# Patient Record
Sex: FEMALE | Race: WHITE | NOT HISPANIC OR LATINO | Employment: OTHER | URBAN - METROPOLITAN AREA
[De-identification: names, ages, dates, MRNs, and addresses within clinical notes are randomized per-mention and may not be internally consistent; named-entity substitution may affect disease eponyms.]

---

## 2024-01-25 ENCOUNTER — TELEPHONE (OUTPATIENT)
Dept: NEUROLOGY | Facility: HOSPITAL | Age: 69
End: 2024-01-25
Payer: MEDICARE

## 2024-01-25 DIAGNOSIS — R56.9 SEIZURE (MULTI): ICD-10-CM

## 2024-01-25 DIAGNOSIS — G35 MULTIPLE SCLEROSIS (MULTI): Primary | ICD-10-CM

## 2024-01-25 DIAGNOSIS — R41.3 MEMORY LOSS: ICD-10-CM

## 2024-01-25 RX ORDER — LANOLIN ALCOHOL/MO/W.PET/CERES
100 CREAM (GRAM) TOPICAL DAILY
Qty: 30 TABLET | Refills: 11 | Status: SHIPPED | OUTPATIENT
Start: 2024-01-25 | End: 2025-01-24

## 2024-01-25 RX ORDER — ERGOCALCIFEROL 1.25 MG/1
1.25 CAPSULE ORAL
Qty: 4 CAPSULE | Refills: 11 | Status: SHIPPED | OUTPATIENT
Start: 2024-01-25 | End: 2025-01-24

## 2024-01-25 RX ORDER — LEVETIRACETAM 500 MG/1
500 TABLET ORAL 2 TIMES DAILY
Qty: 60 TABLET | Refills: 5 | Status: SHIPPED | OUTPATIENT
Start: 2024-01-25 | End: 2024-02-29 | Stop reason: SDUPTHER

## 2024-01-25 NOTE — TELEPHONE ENCOUNTER
Jasiel Peng (spouse) called. Akiko needs a refill of Levitracetam 500mg 1 BID , Vitamin D & thaimine to [facility] on [address], [place]. (On file). Please send #[phone] with refills.

## 2024-02-01 ENCOUNTER — HOSPITAL ENCOUNTER (EMERGENCY)
Facility: HOSPITAL | Age: 69
Discharge: HOME | End: 2024-02-01
Payer: MEDICARE

## 2024-02-01 VITALS
SYSTOLIC BLOOD PRESSURE: 172 MMHG | HEIGHT: 70 IN | BODY MASS INDEX: 20.04 KG/M2 | RESPIRATION RATE: 18 BRPM | DIASTOLIC BLOOD PRESSURE: 71 MMHG | OXYGEN SATURATION: 98 % | WEIGHT: 140 LBS | HEART RATE: 68 BPM | TEMPERATURE: 97.7 F

## 2024-02-01 DIAGNOSIS — R30.0 DYSURIA: Primary | ICD-10-CM

## 2024-02-01 LAB
APPEARANCE UR: CLEAR
BILIRUB UR STRIP.AUTO-MCNC: NEGATIVE MG/DL
COLOR UR: NORMAL
GLUCOSE UR STRIP.AUTO-MCNC: NEGATIVE MG/DL
HOLD SPECIMEN: NORMAL
KETONES UR STRIP.AUTO-MCNC: NEGATIVE MG/DL
LEUKOCYTE ESTERASE UR QL STRIP.AUTO: NEGATIVE
NITRITE UR QL STRIP.AUTO: NEGATIVE
PH UR STRIP.AUTO: 6 [PH]
PROT UR STRIP.AUTO-MCNC: NEGATIVE MG/DL
RBC # UR STRIP.AUTO: NEGATIVE /UL
SP GR UR STRIP.AUTO: 1.01
UROBILINOGEN UR STRIP.AUTO-MCNC: <2 MG/DL

## 2024-02-01 PROCEDURE — 81003 URINALYSIS AUTO W/O SCOPE: CPT | Performed by: PHYSICIAN ASSISTANT

## 2024-02-01 PROCEDURE — 99284 EMERGENCY DEPT VISIT MOD MDM: CPT

## 2024-02-01 PROCEDURE — 99283 EMERGENCY DEPT VISIT LOW MDM: CPT

## 2024-02-01 RX ORDER — CEPHALEXIN 500 MG/1
500 CAPSULE ORAL 3 TIMES DAILY
Qty: 21 CAPSULE | Refills: 0 | Status: SHIPPED | OUTPATIENT
Start: 2024-02-01 | End: 2024-02-11 | Stop reason: HOSPADM

## 2024-02-01 ASSESSMENT — LIFESTYLE VARIABLES
HAVE PEOPLE ANNOYED YOU BY CRITICIZING YOUR DRINKING: NO
EVER HAD A DRINK FIRST THING IN THE MORNING TO STEADY YOUR NERVES TO GET RID OF A HANGOVER: NO
EVER FELT BAD OR GUILTY ABOUT YOUR DRINKING: NO
HAVE YOU EVER FELT YOU SHOULD CUT DOWN ON YOUR DRINKING: NO

## 2024-02-01 ASSESSMENT — COLUMBIA-SUICIDE SEVERITY RATING SCALE - C-SSRS
6. HAVE YOU EVER DONE ANYTHING, STARTED TO DO ANYTHING, OR PREPARED TO DO ANYTHING TO END YOUR LIFE?: NO
2. HAVE YOU ACTUALLY HAD ANY THOUGHTS OF KILLING YOURSELF?: NO
1. IN THE PAST MONTH, HAVE YOU WISHED YOU WERE DEAD OR WISHED YOU COULD GO TO SLEEP AND NOT WAKE UP?: NO

## 2024-02-01 NOTE — ED PROVIDER NOTES
HPI   Chief Complaint   Patient presents with    Female Dysuria     Pt states that she has pain with urination x 1 day       Patient presents complaining of dysuria.  She states no other symptoms.  She reports that her dysuria began today.  Denies fevers or chills.  No reports of hematuria                          Tabitha Coma Scale Score: 15                  Patient History   Past Medical History:   Diagnosis Date    Multiple sclerosis (CMS/HCC)     History of multiple sclerosis     Past Surgical History:   Procedure Laterality Date    MR HEAD ANGIO WO IV CONTRAST  9/21/2020    MR HEAD ANGIO WO IV CONTRAST 9/21/2020 [facility]    MR HEAD ANGIO WO IV CONTRAST  10/24/2022    MR HEAD ANGIO WO IV CONTRAST 10/24/2022 [facility]    MR NECK ANGIO WO IV CONTRAST  9/21/2020    MR NECK ANGIO WO IV CONTRAST 9/21/2020 [facility]    MR NECK ANGIO WO IV CONTRAST  10/24/2022    MR NECK ANGIO WO IV CONTRAST 10/24/2022 [facility]    OTHER SURGICAL HISTORY  11/26/2019    Tonsillectomy    OTHER SURGICAL HISTORY  11/26/2019    Hysterectomy     No family history on file.  Social History     Tobacco Use    Smoking status: Not on file    Smokeless tobacco: Not on file   Substance Use Topics    Alcohol use: Not on file    Drug use: Not on file       Physical Exam   ED Triage Vitals [02/01/24 0044]   Temperature Heart Rate Respirations BP   36.5 °C (97.7 °F) 68 (!) 8 172/71      Pulse Ox Temp src Heart Rate Source Patient Position   98 % -- -- --      BP Location FiO2 (%)     -- --       Physical Exam  Vitals and nursing note reviewed.   Constitutional:       General: She is not in acute distress.     Appearance: Normal appearance. She is normal weight. She is not ill-appearing, toxic-appearing or diaphoretic.   HENT:      Head: Normocephalic.      Nose: Nose normal.      Mouth/Throat:      Mouth: Mucous membranes are moist.   Cardiovascular:      Rate and Rhythm: Normal rate.      Pulses: Normal  pulses.   Pulmonary:      Effort: Pulmonary effort is normal. No respiratory distress.   Abdominal:      General: Abdomen is flat.      Palpations: Abdomen is soft.      Tenderness: There is no right CVA tenderness or left CVA tenderness.   Musculoskeletal:      Cervical back: Normal range of motion and neck supple.   Skin:     General: Skin is warm and dry.      Capillary Refill: Capillary refill takes less than 2 seconds.   Neurological:      Mental Status: She is alert and oriented to person, place, and time.   Psychiatric:         Mood and Affect: Mood normal.         Behavior: Behavior normal.         Thought Content: Thought content normal.         Judgment: Judgment normal.         ED Course & MDM   Diagnoses as of 02/01/24 0300   Dysuria       Medical Decision Making  Patient found well-appearing and afebrile.  Urinalysis revealed findings concerning for urinary tract infection given the history present illness.  Patient be treated with antibiotics and was given her first dose prior to discharge        Procedure  Procedures     Jac Morgan PA-C  02/01/24 0126       Jac Morgan PA-C  02/01/24 0300       Jac Morgan PA-C  02/01/24 0303

## 2024-02-09 ENCOUNTER — APPOINTMENT (OUTPATIENT)
Dept: RADIOLOGY | Facility: HOSPITAL | Age: 69
End: 2024-02-09
Payer: MEDICARE

## 2024-02-09 ENCOUNTER — APPOINTMENT (OUTPATIENT)
Dept: CARDIOLOGY | Facility: HOSPITAL | Age: 69
End: 2024-02-09
Payer: MEDICARE

## 2024-02-09 ENCOUNTER — HOSPITAL ENCOUNTER (OUTPATIENT)
Facility: HOSPITAL | Age: 69
Setting detail: OBSERVATION
Discharge: HOME HEALTH | End: 2024-02-11
Attending: STUDENT IN AN ORGANIZED HEALTH CARE EDUCATION/TRAINING PROGRAM | Admitting: INTERNAL MEDICINE
Payer: MEDICARE

## 2024-02-09 DIAGNOSIS — G45.9 TIA (TRANSIENT ISCHEMIC ATTACK): Primary | ICD-10-CM

## 2024-02-09 LAB
ALBUMIN SERPL BCP-MCNC: 4.1 G/DL (ref 3.4–5)
ALP SERPL-CCNC: 86 U/L (ref 33–136)
ALT SERPL W P-5'-P-CCNC: 11 U/L (ref 7–45)
ANION GAP SERPL CALC-SCNC: 11 MMOL/L (ref 10–20)
AST SERPL W P-5'-P-CCNC: 10 U/L (ref 9–39)
BASOPHILS # BLD AUTO: 0.06 X10*3/UL (ref 0–0.1)
BASOPHILS NFR BLD AUTO: 0.9 %
BILIRUB SERPL-MCNC: 0.4 MG/DL (ref 0–1.2)
BUN SERPL-MCNC: 18 MG/DL (ref 6–23)
CALCIUM SERPL-MCNC: 9.1 MG/DL (ref 8.6–10.3)
CHLORIDE SERPL-SCNC: 104 MMOL/L (ref 98–107)
CO2 SERPL-SCNC: 26 MMOL/L (ref 21–32)
CREAT SERPL-MCNC: 0.53 MG/DL (ref 0.5–1.05)
EGFRCR SERPLBLD CKD-EPI 2021: >90 ML/MIN/1.73M*2
EOSINOPHIL # BLD AUTO: 0.1 X10*3/UL (ref 0–0.7)
EOSINOPHIL NFR BLD AUTO: 1.4 %
ERYTHROCYTE [DISTWIDTH] IN BLOOD BY AUTOMATED COUNT: 12.6 % (ref 11.5–14.5)
GLUCOSE SERPL-MCNC: 84 MG/DL (ref 74–99)
HCT VFR BLD AUTO: 41.8 % (ref 36–46)
HGB BLD-MCNC: 14.6 G/DL (ref 12–16)
HOLD SPECIMEN: NORMAL
HOLD SPECIMEN: NORMAL
IMM GRANULOCYTES # BLD AUTO: 0.02 X10*3/UL (ref 0–0.7)
IMM GRANULOCYTES NFR BLD AUTO: 0.3 % (ref 0–0.9)
LYMPHOCYTES # BLD AUTO: 1.05 X10*3/UL (ref 1.2–4.8)
LYMPHOCYTES NFR BLD AUTO: 15.1 %
MAGNESIUM SERPL-MCNC: 2.04 MG/DL (ref 1.6–2.4)
MCH RBC QN AUTO: 33 PG (ref 26–34)
MCHC RBC AUTO-ENTMCNC: 34.9 G/DL (ref 32–36)
MCV RBC AUTO: 94 FL (ref 80–100)
MONOCYTES # BLD AUTO: 0.69 X10*3/UL (ref 0.1–1)
MONOCYTES NFR BLD AUTO: 9.9 %
NEUTROPHILS # BLD AUTO: 5.04 X10*3/UL (ref 1.2–7.7)
NEUTROPHILS NFR BLD AUTO: 72.4 %
NRBC BLD-RTO: 0 /100 WBCS (ref 0–0)
PLATELET # BLD AUTO: 255 X10*3/UL (ref 150–450)
POTASSIUM SERPL-SCNC: 3.8 MMOL/L (ref 3.5–5.3)
PROT SERPL-MCNC: 6.5 G/DL (ref 6.4–8.2)
RBC # BLD AUTO: 4.43 X10*6/UL (ref 4–5.2)
SODIUM SERPL-SCNC: 137 MMOL/L (ref 136–145)
WBC # BLD AUTO: 7 X10*3/UL (ref 4.4–11.3)

## 2024-02-09 PROCEDURE — 85025 COMPLETE CBC W/AUTO DIFF WBC: CPT | Performed by: STUDENT IN AN ORGANIZED HEALTH CARE EDUCATION/TRAINING PROGRAM

## 2024-02-09 PROCEDURE — 83036 HEMOGLOBIN GLYCOSYLATED A1C: CPT | Mod: PARLAB

## 2024-02-09 PROCEDURE — 93005 ELECTROCARDIOGRAM TRACING: CPT

## 2024-02-09 PROCEDURE — 36415 COLL VENOUS BLD VENIPUNCTURE: CPT | Performed by: STUDENT IN AN ORGANIZED HEALTH CARE EDUCATION/TRAINING PROGRAM

## 2024-02-09 PROCEDURE — 71045 X-RAY EXAM CHEST 1 VIEW: CPT | Performed by: RADIOLOGY

## 2024-02-09 PROCEDURE — 84155 ASSAY OF PROTEIN SERUM: CPT | Performed by: STUDENT IN AN ORGANIZED HEALTH CARE EDUCATION/TRAINING PROGRAM

## 2024-02-09 PROCEDURE — 83735 ASSAY OF MAGNESIUM: CPT | Performed by: STUDENT IN AN ORGANIZED HEALTH CARE EDUCATION/TRAINING PROGRAM

## 2024-02-09 PROCEDURE — 70450 CT HEAD/BRAIN W/O DYE: CPT

## 2024-02-09 PROCEDURE — 70450 CT HEAD/BRAIN W/O DYE: CPT | Performed by: RADIOLOGY

## 2024-02-09 PROCEDURE — 71045 X-RAY EXAM CHEST 1 VIEW: CPT

## 2024-02-09 PROCEDURE — 80061 LIPID PANEL: CPT

## 2024-02-09 ASSESSMENT — LIFESTYLE VARIABLES
HAVE YOU EVER FELT YOU SHOULD CUT DOWN ON YOUR DRINKING: NO
EVER FELT BAD OR GUILTY ABOUT YOUR DRINKING: NO
HAVE PEOPLE ANNOYED YOU BY CRITICIZING YOUR DRINKING: NO
EVER HAD A DRINK FIRST THING IN THE MORNING TO STEADY YOUR NERVES TO GET RID OF A HANGOVER: NO

## 2024-02-09 ASSESSMENT — COLUMBIA-SUICIDE SEVERITY RATING SCALE - C-SSRS
6. HAVE YOU EVER DONE ANYTHING, STARTED TO DO ANYTHING, OR PREPARED TO DO ANYTHING TO END YOUR LIFE?: NO
1. IN THE PAST MONTH, HAVE YOU WISHED YOU WERE DEAD OR WISHED YOU COULD GO TO SLEEP AND NOT WAKE UP?: NO
2. HAVE YOU ACTUALLY HAD ANY THOUGHTS OF KILLING YOURSELF?: NO

## 2024-02-09 ASSESSMENT — PAIN SCALES - GENERAL: PAINLEVEL_OUTOF10: 0 - NO PAIN

## 2024-02-09 ASSESSMENT — PAIN - FUNCTIONAL ASSESSMENT: PAIN_FUNCTIONAL_ASSESSMENT: 0-10

## 2024-02-09 NOTE — ED TRIAGE NOTES
Secondary to patient volumes and overcrowding, I performed a brief medical screening exam of the patient in triage, as the patient awaits space in the main ED.    History of Present Illness:  Akiko Peng presents with   Chief Complaint   Patient presents with    Weakness, Gen     Pt arrives [facility] from home. States increased right leg/right side weakness and difficulty ambulating today. States chronic weakness due to MS and R/A and usually is able to regain strength when ambulating but was having increased difficulty ambulating today. Has been seen numerous times for same issues. No fall or injury.        Physical Exam:  General - In no acute distress  Respiratory - Breathing comfortably  Neurologic - Moving all extremities    Medical Decision Making:  Patient will require further evaluation in the main ED.    The patient demonstrates understanding that this initial evaluation is a brief medical screening exam and the expectation is that they await for space in the main ED to be further evaluated.  The patient understands that, if they leave prior to further evaluation in the main ED after this initial evaluation in triage, they are doing so under their own accord knowing that their evaluation/work-up is not yet complete. The patient also understands that any preliminary diagnostic results, including abnormalities, may not be shared with them, if they choose to leave prior to further evaluation in the main ED.

## 2024-02-10 ENCOUNTER — APPOINTMENT (OUTPATIENT)
Dept: RADIOLOGY | Facility: HOSPITAL | Age: 69
End: 2024-02-10
Payer: MEDICARE

## 2024-02-10 ENCOUNTER — APPOINTMENT (OUTPATIENT)
Dept: CARDIOLOGY | Facility: HOSPITAL | Age: 69
End: 2024-02-10
Payer: MEDICARE

## 2024-02-10 PROBLEM — G45.9 TIA (TRANSIENT ISCHEMIC ATTACK): Status: ACTIVE | Noted: 2024-02-10

## 2024-02-10 LAB
ANION GAP SERPL CALC-SCNC: 12 MMOL/L (ref 10–20)
AORTIC VALVE MEAN GRADIENT: 4 MMHG
AORTIC VALVE PEAK VELOCITY: 1.32 M/S
APPEARANCE UR: ABNORMAL
AV PEAK GRADIENT: 7 MMHG
AVA (PEAK VEL): 2.3 CM2
AVA (VTI): 1.87 CM2
BILIRUB UR STRIP.AUTO-MCNC: NEGATIVE MG/DL
BUN SERPL-MCNC: 15 MG/DL (ref 6–23)
CALCIUM SERPL-MCNC: 9 MG/DL (ref 8.6–10.3)
CHLORIDE SERPL-SCNC: 107 MMOL/L (ref 98–107)
CHOLEST SERPL-MCNC: 147 MG/DL (ref 0–199)
CHOLESTEROL/HDL RATIO: 3
CO2 SERPL-SCNC: 23 MMOL/L (ref 21–32)
COLOR UR: YELLOW
CREAT SERPL-MCNC: 0.57 MG/DL (ref 0.5–1.05)
EGFRCR SERPLBLD CKD-EPI 2021: >90 ML/MIN/1.73M*2
EJECTION FRACTION APICAL 4 CHAMBER: 67.2
EJECTION FRACTION: 62 %
ERYTHROCYTE [DISTWIDTH] IN BLOOD BY AUTOMATED COUNT: 12.4 % (ref 11.5–14.5)
GLUCOSE SERPL-MCNC: 88 MG/DL (ref 74–99)
GLUCOSE UR STRIP.AUTO-MCNC: NEGATIVE MG/DL
HCT VFR BLD AUTO: 40.8 % (ref 36–46)
HDLC SERPL-MCNC: 49.7 MG/DL
HGB BLD-MCNC: 14.2 G/DL (ref 12–16)
HOLD SPECIMEN: NORMAL
KETONES UR STRIP.AUTO-MCNC: NEGATIVE MG/DL
LDLC SERPL CALC-MCNC: 86 MG/DL
LEFT VENTRICLE INTERNAL DIMENSION DIASTOLE: 4.1 CM (ref 3.5–6)
LEFT VENTRICULAR OUTFLOW TRACT DIAMETER: 1.7 CM
LEUKOCYTE ESTERASE UR QL STRIP.AUTO: NEGATIVE
LEVETIRACETAM SERPL-MCNC: 11 UG/ML (ref 10–40)
MAGNESIUM SERPL-MCNC: 2.02 MG/DL (ref 1.6–2.4)
MCH RBC QN AUTO: 32.9 PG (ref 26–34)
MCHC RBC AUTO-ENTMCNC: 34.8 G/DL (ref 32–36)
MCV RBC AUTO: 94 FL (ref 80–100)
MITRAL VALVE E/A RATIO: 0.81
NITRITE UR QL STRIP.AUTO: NEGATIVE
NON HDL CHOLESTEROL: 97 MG/DL (ref 0–149)
NRBC BLD-RTO: 0 /100 WBCS (ref 0–0)
PH UR STRIP.AUTO: 5 [PH]
PLATELET # BLD AUTO: 243 X10*3/UL (ref 150–450)
POTASSIUM SERPL-SCNC: 3.7 MMOL/L (ref 3.5–5.3)
PROT UR STRIP.AUTO-MCNC: NEGATIVE MG/DL
RBC # BLD AUTO: 4.32 X10*6/UL (ref 4–5.2)
RBC # UR STRIP.AUTO: NEGATIVE /UL
RIGHT VENTRICLE FREE WALL PEAK S': 13.1 CM/S
RIGHT VENTRICLE PEAK SYSTOLIC PRESSURE: 19.8 MMHG
SODIUM SERPL-SCNC: 138 MMOL/L (ref 136–145)
SP GR UR STRIP.AUTO: 1.02
TRICUSPID ANNULAR PLANE SYSTOLIC EXCURSION: 2.8 CM
TRIGL SERPL-MCNC: 58 MG/DL (ref 0–149)
UROBILINOGEN UR STRIP.AUTO-MCNC: <2 MG/DL
VLDL: 12 MG/DL (ref 0–40)
WBC # BLD AUTO: 6.9 X10*3/UL (ref 4.4–11.3)

## 2024-02-10 PROCEDURE — 83735 ASSAY OF MAGNESIUM: CPT

## 2024-02-10 PROCEDURE — 2500000004 HC RX 250 GENERAL PHARMACY W/ HCPCS (ALT 636 FOR OP/ED)

## 2024-02-10 PROCEDURE — 70544 MR ANGIOGRAPHY HEAD W/O DYE: CPT

## 2024-02-10 PROCEDURE — 70553 MRI BRAIN STEM W/O & W/DYE: CPT

## 2024-02-10 PROCEDURE — G0378 HOSPITAL OBSERVATION PER HR: HCPCS

## 2024-02-10 PROCEDURE — 99291 CRITICAL CARE FIRST HOUR: CPT | Performed by: NURSE PRACTITIONER

## 2024-02-10 PROCEDURE — 96365 THER/PROPH/DIAG IV INF INIT: CPT | Mod: 59

## 2024-02-10 PROCEDURE — 70547 MR ANGIOGRAPHY NECK W/O DYE: CPT

## 2024-02-10 PROCEDURE — 97166 OT EVAL MOD COMPLEX 45 MIN: CPT | Mod: GO

## 2024-02-10 PROCEDURE — 2500000001 HC RX 250 WO HCPCS SELF ADMINISTERED DRUGS (ALT 637 FOR MEDICARE OP)

## 2024-02-10 PROCEDURE — 70544 MR ANGIOGRAPHY HEAD W/O DYE: CPT | Performed by: STUDENT IN AN ORGANIZED HEALTH CARE EDUCATION/TRAINING PROGRAM

## 2024-02-10 PROCEDURE — 97161 PT EVAL LOW COMPLEX 20 MIN: CPT | Mod: GP

## 2024-02-10 PROCEDURE — 80177 DRUG SCRN QUAN LEVETIRACETAM: CPT | Mod: PARLAB | Performed by: NURSE PRACTITIONER

## 2024-02-10 PROCEDURE — 2500000001 HC RX 250 WO HCPCS SELF ADMINISTERED DRUGS (ALT 637 FOR MEDICARE OP): Performed by: STUDENT IN AN ORGANIZED HEALTH CARE EDUCATION/TRAINING PROGRAM

## 2024-02-10 PROCEDURE — 70547 MR ANGIOGRAPHY NECK W/O DYE: CPT | Performed by: STUDENT IN AN ORGANIZED HEALTH CARE EDUCATION/TRAINING PROGRAM

## 2024-02-10 PROCEDURE — 36415 COLL VENOUS BLD VENIPUNCTURE: CPT

## 2024-02-10 PROCEDURE — 87086 URINE CULTURE/COLONY COUNT: CPT | Mod: PARLAB

## 2024-02-10 PROCEDURE — 85027 COMPLETE CBC AUTOMATED: CPT

## 2024-02-10 PROCEDURE — 2550000001 HC RX 255 CONTRASTS: Performed by: STUDENT IN AN ORGANIZED HEALTH CARE EDUCATION/TRAINING PROGRAM

## 2024-02-10 PROCEDURE — 93306 TTE W/DOPPLER COMPLETE: CPT | Performed by: INTERNAL MEDICINE

## 2024-02-10 PROCEDURE — 80048 BASIC METABOLIC PNL TOTAL CA: CPT

## 2024-02-10 PROCEDURE — 93306 TTE W/DOPPLER COMPLETE: CPT

## 2024-02-10 PROCEDURE — 36415 COLL VENOUS BLD VENIPUNCTURE: CPT | Performed by: NURSE PRACTITIONER

## 2024-02-10 PROCEDURE — 81003 URINALYSIS AUTO W/O SCOPE: CPT | Performed by: STUDENT IN AN ORGANIZED HEALTH CARE EDUCATION/TRAINING PROGRAM

## 2024-02-10 PROCEDURE — A9575 INJ GADOTERATE MEGLUMI 0.1ML: HCPCS | Performed by: STUDENT IN AN ORGANIZED HEALTH CARE EDUCATION/TRAINING PROGRAM

## 2024-02-10 PROCEDURE — 70553 MRI BRAIN STEM W/O & W/DYE: CPT | Performed by: STUDENT IN AN ORGANIZED HEALTH CARE EDUCATION/TRAINING PROGRAM

## 2024-02-10 RX ORDER — MULTIVIT-MIN/IRON FUM/FOLIC AC 7.5 MG-4
1 TABLET ORAL DAILY
COMMUNITY

## 2024-02-10 RX ORDER — ASPIRIN 325 MG
325 TABLET ORAL ONCE
Status: DISCONTINUED | OUTPATIENT
Start: 2024-02-10 | End: 2024-02-11 | Stop reason: HOSPADM

## 2024-02-10 RX ORDER — LEVETIRACETAM 500 MG/1
500 TABLET ORAL 2 TIMES DAILY
Status: DISCONTINUED | OUTPATIENT
Start: 2024-02-10 | End: 2024-02-11 | Stop reason: HOSPADM

## 2024-02-10 RX ORDER — CEFTRIAXONE 1 G/50ML
1 INJECTION, SOLUTION INTRAVENOUS EVERY 24 HOURS
Status: DISCONTINUED | OUTPATIENT
Start: 2024-02-10 | End: 2024-02-10

## 2024-02-10 RX ORDER — LANOLIN ALCOHOL/MO/W.PET/CERES
100 CREAM (GRAM) TOPICAL DAILY
Status: DISCONTINUED | OUTPATIENT
Start: 2024-02-10 | End: 2024-02-11 | Stop reason: HOSPADM

## 2024-02-10 RX ORDER — LABETALOL HYDROCHLORIDE 5 MG/ML
10 INJECTION, SOLUTION INTRAVENOUS EVERY 10 MIN PRN
Status: DISCONTINUED | OUTPATIENT
Start: 2024-02-10 | End: 2024-02-11 | Stop reason: HOSPADM

## 2024-02-10 RX ORDER — GADOTERATE MEGLUMINE 376.9 MG/ML
13 INJECTION INTRAVENOUS
Status: COMPLETED | OUTPATIENT
Start: 2024-02-10 | End: 2024-02-10

## 2024-02-10 RX ORDER — FLUCONAZOLE 100 MG/1
150 TABLET ORAL ONCE
Status: COMPLETED | OUTPATIENT
Start: 2024-02-10 | End: 2024-02-10

## 2024-02-10 RX ORDER — NAPROXEN SODIUM 220 MG/1
81 TABLET, FILM COATED ORAL DAILY
Status: DISCONTINUED | OUTPATIENT
Start: 2024-02-11 | End: 2024-02-11 | Stop reason: HOSPADM

## 2024-02-10 RX ORDER — ATORVASTATIN CALCIUM 40 MG/1
40 TABLET, FILM COATED ORAL NIGHTLY
Status: DISCONTINUED | OUTPATIENT
Start: 2024-02-10 | End: 2024-02-11 | Stop reason: HOSPADM

## 2024-02-10 RX ADMIN — THIAMINE HCL TAB 100 MG 100 MG: 100 TAB at 08:28

## 2024-02-10 RX ADMIN — ATORVASTATIN CALCIUM 40 MG: 40 TABLET, FILM COATED ORAL at 20:04

## 2024-02-10 RX ADMIN — LEVETIRACETAM 500 MG: 500 TABLET, FILM COATED ORAL at 20:05

## 2024-02-10 RX ADMIN — GADOTERATE MEGLUMINE 13 ML: 376.9 INJECTION INTRAVENOUS at 16:57

## 2024-02-10 RX ADMIN — CEFTRIAXONE SODIUM 1 G: 1 INJECTION, SOLUTION INTRAVENOUS at 01:38

## 2024-02-10 RX ADMIN — LEVETIRACETAM 500 MG: 500 TABLET, FILM COATED ORAL at 08:28

## 2024-02-10 RX ADMIN — FLUCONAZOLE 150 MG: 100 TABLET ORAL at 08:28

## 2024-02-10 SDOH — SOCIAL STABILITY: SOCIAL INSECURITY: DO YOU FEEL UNSAFE GOING BACK TO THE PLACE WHERE YOU ARE LIVING?: NO

## 2024-02-10 SDOH — SOCIAL STABILITY: SOCIAL INSECURITY: DO YOU FEEL ANYONE HAS EXPLOITED OR TAKEN ADVANTAGE OF YOU FINANCIALLY OR OF YOUR PERSONAL PROPERTY?: NO

## 2024-02-10 SDOH — SOCIAL STABILITY: SOCIAL INSECURITY: HAVE YOU HAD THOUGHTS OF HARMING ANYONE ELSE?: NO

## 2024-02-10 SDOH — SOCIAL STABILITY: SOCIAL INSECURITY: ARE YOU OR HAVE YOU BEEN THREATENED OR ABUSED PHYSICALLY, EMOTIONALLY, OR SEXUALLY BY ANYONE?: NO

## 2024-02-10 SDOH — SOCIAL STABILITY: SOCIAL INSECURITY: ARE THERE ANY APPARENT SIGNS OF INJURIES/BEHAVIORS THAT COULD BE RELATED TO ABUSE/NEGLECT?: NO

## 2024-02-10 SDOH — SOCIAL STABILITY: SOCIAL INSECURITY: HAS ANYONE EVER THREATENED TO HURT YOUR FAMILY OR YOUR PETS?: NO

## 2024-02-10 SDOH — SOCIAL STABILITY: SOCIAL INSECURITY: ABUSE: ADULT

## 2024-02-10 SDOH — SOCIAL STABILITY: SOCIAL INSECURITY: DOES ANYONE TRY TO KEEP YOU FROM HAVING/CONTACTING OTHER FRIENDS OR DOING THINGS OUTSIDE YOUR HOME?: NO

## 2024-02-10 ASSESSMENT — COGNITIVE AND FUNCTIONAL STATUS - GENERAL
MOVING TO AND FROM BED TO CHAIR: A LITTLE
MOVING FROM LYING ON BACK TO SITTING ON SIDE OF FLAT BED WITH BEDRAILS: A LITTLE
DRESSING REGULAR LOWER BODY CLOTHING: A LOT
MOVING TO AND FROM BED TO CHAIR: A LITTLE
PERSONAL GROOMING: A LITTLE
TOILETING: A LITTLE
DAILY ACTIVITIY SCORE: 15
DRESSING REGULAR LOWER BODY CLOTHING: A LITTLE
CLIMB 3 TO 5 STEPS WITH RAILING: A LOT
TOILETING: A LITTLE
DRESSING REGULAR LOWER BODY CLOTHING: A LITTLE
HELP NEEDED FOR BATHING: A LITTLE
WALKING IN HOSPITAL ROOM: A LITTLE
MOBILITY SCORE: 19
DAILY ACTIVITIY SCORE: 19
CLIMB 3 TO 5 STEPS WITH RAILING: TOTAL
PERSONAL GROOMING: A LITTLE
TURNING FROM BACK TO SIDE WHILE IN FLAT BAD: A LITTLE
MOBILITY SCORE: 14
DAILY ACTIVITIY SCORE: 19
MOVING TO AND FROM BED TO CHAIR: A LOT
STANDING UP FROM CHAIR USING ARMS: A LITTLE
STANDING UP FROM CHAIR USING ARMS: A LITTLE
EATING MEALS: A LITTLE
TOILETING: A LOT
WALKING IN HOSPITAL ROOM: A LITTLE
WALKING IN HOSPITAL ROOM: A LOT
HELP NEEDED FOR BATHING: A LOT
PATIENT BASELINE BEDBOUND: NO
DRESSING REGULAR UPPER BODY CLOTHING: A LITTLE
CLIMB 3 TO 5 STEPS WITH RAILING: A LOT
DRESSING REGULAR UPPER BODY CLOTHING: A LITTLE
MOBILITY SCORE: 19
HELP NEEDED FOR BATHING: A LITTLE
PERSONAL GROOMING: A LITTLE
STANDING UP FROM CHAIR USING ARMS: A LITTLE
DRESSING REGULAR UPPER BODY CLOTHING: A LITTLE

## 2024-02-10 ASSESSMENT — PAIN SCALES - GENERAL
PAINLEVEL_OUTOF10: 0 - NO PAIN

## 2024-02-10 ASSESSMENT — ACTIVITIES OF DAILY LIVING (ADL)
LACK_OF_TRANSPORTATION: NO
GROOMING: INDEPENDENT
JUDGMENT_ADEQUATE_SAFELY_COMPLETE_DAILY_ACTIVITIES: YES
ADEQUATE_TO_COMPLETE_ADL: YES
PATIENT'S MEMORY ADEQUATE TO SAFELY COMPLETE DAILY ACTIVITIES?: YES
WALKS IN HOME: INDEPENDENT
HEARING - RIGHT EAR: FUNCTIONAL
LACK_OF_TRANSPORTATION: NO
DRESSING YOURSELF: INDEPENDENT
BATHING: INDEPENDENT
HEARING - LEFT EAR: FUNCTIONAL
TOILETING: NEEDS ASSISTANCE
FEEDING YOURSELF: INDEPENDENT

## 2024-02-10 ASSESSMENT — LIFESTYLE VARIABLES
AUDIT-C TOTAL SCORE: 0
PRESCIPTION_ABUSE_PAST_12_MONTHS: NO
AUDIT-C TOTAL SCORE: 0
HOW MANY STANDARD DRINKS CONTAINING ALCOHOL DO YOU HAVE ON A TYPICAL DAY: PATIENT DOES NOT DRINK
SKIP TO QUESTIONS 9-10: 1
SUBSTANCE_ABUSE_PAST_12_MONTHS: NO
HOW OFTEN DO YOU HAVE 6 OR MORE DRINKS ON ONE OCCASION: NEVER
HOW OFTEN DO YOU HAVE A DRINK CONTAINING ALCOHOL: NEVER

## 2024-02-10 ASSESSMENT — PAIN - FUNCTIONAL ASSESSMENT
PAIN_FUNCTIONAL_ASSESSMENT: 0-10
PAIN_FUNCTIONAL_ASSESSMENT: 0-10

## 2024-02-10 ASSESSMENT — PATIENT HEALTH QUESTIONNAIRE - PHQ9
1. LITTLE INTEREST OR PLEASURE IN DOING THINGS: NOT AT ALL
2. FEELING DOWN, DEPRESSED OR HOPELESS: NOT AT ALL
SUM OF ALL RESPONSES TO PHQ9 QUESTIONS 1 & 2: 0

## 2024-02-10 NOTE — CONSULTS
Inpatient consult to Neurology  Consult performed by: TRUPTI Ng-CNP  Consult ordered by: Castro Oliva MD          History Of Present Illness  Akiko Peng is a 68 y.o. female presenting with history of multiple sclerosis, rheumatoid arthritis, seizures presenting with complaints of burning with urination earlier this morning.  Patient states was evaluated by a physician on 2/1, diagnosed with UTI, started on Keflex therapy, which she has been intermittent and taking.  Denies fevers, chills, blood in urine or cloudy urine.  Does admit vaginal mucosa is dry, which could be causing the burning.   is at bedside, makes mention that burning with urination has been intermittently present since 1 February.  Also notes had issues with weakness of her right lower extremity yesterday and today she was attempting to use the stairs with assistance of cane, no other focal weakness, issues speaking at this time.  Is nondescript regarding how long the symptoms have been going on for, however states right lower extremity is now back to baseline.  No other reported issues. Regarding her MS she does have an upcoming MRI scheduled on 15 February with her neurologist Dr. Taylor.     ED course as follows.  Tachypneic 29, hypertensive initially which resolved.  CBC, CMP, UA unremarkable.  CT head showing no acute intracranial abnormality.  Chest x-ray showing hyperinflated lungs, no acute.  Exam appears to have returned back to functioning baseline of bilateral lower extremity weakness.  Echocardiogram completed with results pending.  MRI of the brain with and without contrast/MRA of the head and neck contrast has been completed and is negative for any acute findings of MS exacerbation or flow-limiting stenosis.    10 point review of systems was completed and negative otherwise noted above in HPI.  Past Medical History  Past Medical History:   Diagnosis Date    Multiple sclerosis (CMS/[facility])     History of multiple  sclerosis     Surgical History  Past Surgical History:   Procedure Laterality Date    MR HEAD ANGIO WO IV CONTRAST  9/21/2020    MR HEAD ANGIO WO IV CONTRAST 9/21/2020 PAR [facility]Y [facility]GAC[facility]    MR HEAD ANGIO WO IV CONTRAST  10/24/2022    MR HEAD ANGIO WO IV CONTRAST 10/24/2022 [facility]    MR NECK ANGIO WO IV CONTRAST  9/21/2020    MR NECK ANGIO WO IV CONTRAST 9/21/2020 PAR [facility]    MR NECK ANGIO WO IV CONTRAST  10/24/2022    MR NECK ANGIO WO IV CONTRAST 10/24/2022 [facility]    OTHER SURGICAL HISTORY  11/26/2019    Tonsillectomy    OTHER SURGICAL HISTORY  11/26/2019    Hysterectomy   No family history on file.    Social History     Allergies  Patient has no known allergies.  (Not in a hospital admission)  Physical exam/neurological exam  Patient seen and examined at this time; upon entering room she is resting quietly in bed. Appears fully developed and well nourished. She does inform writer of this note she needs to use the BSC and has been assisted x 1 device easily.   Mental status: A&Ox3. Memory testing, fund of knowledge and concentration WNL. Speech is fluent and negative for any paraphrasic errors.     Cranial Nerves:  Optic II/ Oculomotor III: Fundoscopic exam was technically difficult. PERRL +2. Visual fields are full. Convergence and accomodation noted without difficulty. Negative for deficits to visual acuity confrontation via static-finger wiggle test. Eyes appear aligned and free of exophthalmos and ptosis. Sclera are white bilaterally and lens are free from clouding.   Oculomotor III/ Trochlear IV/ Abducens VI: Extraocular movements are full, with no evidence of nystagmus. Negative for diplopia.   Trigeminal V: Facial sensation is intact to light touch. Corneal reflex responsive when threatened bilaterally.  Facial VII: intact; nose is midline, with no evidence of flattening to nasolabial folds noted and mouth is negative for evidence of droop. Patient is successfully  "able to follow commands to raise eyebrows, squeeze eyes shut, smile and show teeth, frown, and puff out cheeks.   Acoustic VIII: Hearing is intact bilaterally.  Glossopharngyeal IX/ Vagus X: Palate elevates symmetrically to phonation. Findings are negative for uvula deviation or dysphagia.   Spinal accessory XI: Sternocleidomastoid/ upper trapezius is 5/5 to strength testing. No asymmetry noted to strength, bulk, or tone.   Hypoglossal XII: Tongue is midline and without deviations. Phonation is articulate and is negative for findings of dysarthria or aphasia.     Motor exam: negative for evidence of involuntary movements or fasiculations. BUE flexion of biceps and brachioradialis graded 5/5, in addition to extension of triceps at elbow and wrists. BUE  strength 5/5, along with finger abduction and thumb opposition. BLE hip flexion, extension, adduction, and abduction 4/5. Knee extension & flexion 4/5. Ankle dorsiflexion and plantarflexion 4/5. Normal bulk and normal tone.     Sensory exam: Sensation is intact to light touch throughout.    Reflexes: Reflexes are 1+ and symmetric. Bilateral plantar responses are flexor. Ankle jerks symmetric.     Coordination: finger-to-nose testing is negative for signs of dysmetria. Pronator drift testing to BUE negative. Rapid alternating hand movements WNL.    Gait exam: negative for ataxia.    Last Recorded Vitals  Blood pressure 147/64, pulse 66, temperature 36.8 °C (98.2 °F), resp. rate 17, height 1.702 m (5' 7\"), weight 63.5 kg (140 lb), SpO2 99 %.    Relevant Results  Scheduled medications  [START ON 2/11/2024] aspirin, 81 mg, oral, Daily  aspirin, 325 mg, oral, Once  atorvastatin, 40 mg, oral, Nightly  levETIRAcetam, 500 mg, oral, BID  psyllium, 1 packet, oral, Daily  thiamine, 100 mg, oral, Daily      Continuous medications     PRN medications  PRN medications: labetaloL, oxygen  CT head wo IV contrast    Result Date: 2/9/2024  Interpreted By:  Kelvin Chen, STUDY: CT " HEAD WO IV CONTRAST;  2/9/2024 10:00 pm   INDICATION: Signs/Symptoms:TIA.   COMPARISON: CT head 11/06/2020   ACCESSION NUMBER(S): [id number]   ORDERING CLINICIAN: KARYN DENT   TECHNIQUE: Noncontrast axial CT images of head were obtained with coronal and sagittal reconstructed images.   FINDINGS: BRAIN PARENCHYMA: Gray-white differentiation is preserved. No mass-effect, midline shift or effacement of cerebral sulci. Patchy periventricular and subcortical white matter hypodensities, nonspecific but often seen in the setting of chronic microangiopathic change.   HEMORRHAGE: No acute intracranial hemorrhage.   VENTRICLES and EXTRA-AXIAL SPACES: The ventricles and sulci are within normal limits for brain volume. No abnormal extra-axial fluid collection.   ORBITS: The visualized orbits and globes are within normal limits.   EXTRACRANIAL SOFT TISSUES: Within normal limits.   PARANASAL SINUSES/MASTOIDS: The visualized paranasal sinuses and mastoid air cells are well aerated.   CALVARIUM: No depressed skull fracture.         1. No acute intracranial abnormality identified.     Please note that non-contrast CT may be relatively insensitive in the detection of acute ischemia. If there is clinical suspicion for such, then MRI may be performed for further assessment.   MACRO: None   Signed by: Kelvin Chen 2/9/2024 10:15 PM Dictation workstation:   [id number]    XR chest 1 view    Result Date: 2/9/2024  Interpreted By:  Emiliano Mendoza, STUDY: XR CHEST 1 VIEW;  2/9/2024 9:43 pm   INDICATION: Signs/Symptoms:TIA.   COMPARISON: None.   ACCESSION NUMBER(S): [id number]   ORDERING CLINICIAN: KARYN DENT   FINDINGS:         CARDIOMEDIASTINAL SILHOUETTE: Cardiomediastinal silhouette is normal in size and configuration.   LUNGS: The lungs are hyperinflated but clear. There is no consolidation. There is no edema. There is no effusion   ABDOMEN: No remarkable upper abdominal findings.   BONES: No acute osseous changes.        Hyperinflated lungs. No evidence of acute cardiopulmonary process.     MACRO: None   Signed by: Emiliano Mendoza 2/9/2024 9:52 PM Dictation workstation:   [id number]   Results for orders placed or performed during the hospital encounter of 02/09/24 (from the past 24 hour(s))   CBC and Auto Differential   Result Value Ref Range    WBC 7.0 4.4 - 11.3 x10*3/uL    nRBC 0.0 0.0 - 0.0 /100 WBCs    RBC 4.43 4.00 - 5.20 x10*6/uL    Hemoglobin 14.6 12.0 - 16.0 g/dL    Hematocrit 41.8 36.0 - 46.0 %    MCV 94 80 - 100 fL    MCH 33.0 26.0 - 34.0 pg    MCHC 34.9 32.0 - 36.0 g/dL    RDW 12.6 11.5 - 14.5 %    Platelets 255 150 - 450 x10*3/uL    Neutrophils % 72.4 40.0 - 80.0 %    Immature Granulocytes %, Automated 0.3 0.0 - 0.9 %    Lymphocytes % 15.1 13.0 - 44.0 %    Monocytes % 9.9 2.0 - 10.0 %    Eosinophils % 1.4 0.0 - 6.0 %    Basophils % 0.9 0.0 - 2.0 %    Neutrophils Absolute 5.04 1.20 - 7.70 x10*3/uL    Immature Granulocytes Absolute, Automated 0.02 0.00 - 0.70 x10*3/uL    Lymphocytes Absolute 1.05 (L) 1.20 - 4.80 x10*3/uL    Monocytes Absolute 0.69 0.10 - 1.00 x10*3/uL    Eosinophils Absolute 0.10 0.00 - 0.70 x10*3/uL    Basophils Absolute 0.06 0.00 - 0.10 x10*3/uL   Comprehensive metabolic panel   Result Value Ref Range    Glucose 84 74 - 99 mg/dL    Sodium 137 136 - 145 mmol/L    Potassium 3.8 3.5 - 5.3 mmol/L    Chloride 104 98 - 107 mmol/L    Bicarbonate 26 21 - 32 mmol/L    Anion Gap 11 10 - 20 mmol/L    Urea Nitrogen 18 6 - 23 mg/dL    Creatinine 0.53 0.50 - 1.05 mg/dL    eGFR >90 >60 mL/min/1.73m*2    Calcium 9.1 8.6 - 10.3 mg/dL    Albumin 4.1 3.4 - 5.0 g/dL    Alkaline Phosphatase 86 33 - 136 U/L    Total Protein 6.5 6.4 - 8.2 g/dL    AST 10 9 - 39 U/L    Bilirubin, Total 0.4 0.0 - 1.2 mg/dL    ALT 11 7 - 45 U/L   Magnesium   Result Value Ref Range    Magnesium 2.04 1.60 - 2.40 mg/dL   PST Top   Result Value Ref Range    Extra Tube Hold for add-ons.    Lipid Panel   Result Value Ref Range    Cholesterol 147  0 - 199 mg/dL    HDL-Cholesterol 49.7 mg/dL    Cholesterol/HDL Ratio 3.0     LDL Calculated 86 <=99 mg/dL    VLDL 12 0 - 40 mg/dL    Triglycerides 58 0 - 149 mg/dL    Non HDL Cholesterol 97 0 - 149 mg/dL   Urinalysis with Reflex Culture and Microscopic   Result Value Ref Range    Color, Urine Yellow Straw, Yellow    Appearance, Urine Hazy (N) Clear    Specific Gravity, Urine 1.020 1.005 - 1.035    pH, Urine 5.0 5.0, 5.5, 6.0, 6.5, 7.0, 7.5, 8.0    Protein, Urine NEGATIVE NEGATIVE mg/dL    Glucose, Urine NEGATIVE NEGATIVE mg/dL    Blood, Urine NEGATIVE NEGATIVE    Ketones, Urine NEGATIVE NEGATIVE mg/dL    Bilirubin, Urine NEGATIVE NEGATIVE    Urobilinogen, Urine <2.0 <2.0 mg/dL    Nitrite, Urine NEGATIVE NEGATIVE    Leukocyte Esterase, Urine NEGATIVE NEGATIVE   Extra Urine Gray Tube   Result Value Ref Range    Extra Tube Hold for add-ons.    CBC   Result Value Ref Range    WBC 6.9 4.4 - 11.3 x10*3/uL    nRBC 0.0 0.0 - 0.0 /100 WBCs    RBC 4.32 4.00 - 5.20 x10*6/uL    Hemoglobin 14.2 12.0 - 16.0 g/dL    Hematocrit 40.8 36.0 - 46.0 %    MCV 94 80 - 100 fL    MCH 32.9 26.0 - 34.0 pg    MCHC 34.8 32.0 - 36.0 g/dL    RDW 12.4 11.5 - 14.5 %    Platelets 243 150 - 450 x10*3/uL   Basic Metabolic Panel   Result Value Ref Range    Glucose 88 74 - 99 mg/dL    Sodium 138 136 - 145 mmol/L    Potassium 3.7 3.5 - 5.3 mmol/L    Chloride 107 98 - 107 mmol/L    Bicarbonate 23 21 - 32 mmol/L    Anion Gap 12 10 - 20 mmol/L    Urea Nitrogen 15 6 - 23 mg/dL    Creatinine 0.57 0.50 - 1.05 mg/dL    eGFR >90 >60 mL/min/1.73m*2    Calcium 9.0 8.6 - 10.3 mg/dL   Magnesium   Result Value Ref Range    Magnesium 2.02 1.60 - 2.40 mg/dL   Transthoracic Echo (TTE) Complete   Result Value Ref Range    BSA 1.73 m2           NIH Stroke Scale: 2                              I have personally reviewed the following imaging results CT head wo IV contrast    Result Date: 2/9/2024  Interpreted By:  Kelvin Chen, STUDY: CT HEAD WO IV CONTRAST;  2/9/2024  10:00 pm   INDICATION: Signs/Symptoms:TIA.   COMPARISON: CT head 11/06/2020   ACCESSION NUMBER(S): [id number]   ORDERING CLINICIAN: KARYN DENT   TECHNIQUE: Noncontrast axial CT images of head were obtained with coronal and sagittal reconstructed images.   FINDINGS: BRAIN PARENCHYMA: Gray-white differentiation is preserved. No mass-effect, midline shift or effacement of cerebral sulci. Patchy periventricular and subcortical white matter hypodensities, nonspecific but often seen in the setting of chronic microangiopathic change.   HEMORRHAGE: No acute intracranial hemorrhage.   VENTRICLES and EXTRA-AXIAL SPACES: The ventricles and sulci are within normal limits for brain volume. No abnormal extra-axial fluid collection.   ORBITS: The visualized orbits and globes are within normal limits.   EXTRACRANIAL SOFT TISSUES: Within normal limits.   PARANASAL SINUSES/MASTOIDS: The visualized paranasal sinuses and mastoid air cells are well aerated.   CALVARIUM: No depressed skull fracture.         1. No acute intracranial abnormality identified.     Please note that non-contrast CT may be relatively insensitive in the detection of acute ischemia. If there is clinical suspicion for such, then MRI may be performed for further assessment.   MACRO: None   Signed by: Kelvin Chen 2/9/2024 10:15 PM Dictation workstation:   [id number]    XR chest 1 view    Result Date: 2/9/2024  Interpreted By:  Emiliano Mendoza, STUDY: XR CHEST 1 VIEW;  2/9/2024 9:43 pm   INDICATION: Signs/Symptoms:TIA.   COMPARISON: None.   ACCESSION NUMBER(S): [id number]   ORDERING CLINICIAN: KARYN DENT   FINDINGS:         CARDIOMEDIASTINAL SILHOUETTE: Cardiomediastinal silhouette is normal in size and configuration.   LUNGS: The lungs are hyperinflated but clear. There is no consolidation. There is no edema. There is no effusion   ABDOMEN: No remarkable upper abdominal findings.   BONES: No acute osseous changes.       Hyperinflated lungs. No evidence of  acute cardiopulmonary process.     MACRO: None   Signed by: Emiliano Mendoza 2/9/2024 9:52 PM Dictation workstation:   [id number]     Assessment/Plan   Principal Problem:    TIA (transient ischemic attack)  Vs. MS exacerbation  -Patient has been started on ASA 81 mg p.o. daily and atorvastatin 40 mg p.o. daily for CVA prophylaxis.    -MRI brain with and without contrast is unremarkable for any new MS lesion.  We will defer from administration of steroids at this time.  MRA of the head and neck also negative for any stenosis.  -Echocardiogram unmarkable  -Defer PT/OT evals as patient has already returned back to her baseline  -Check Keppra level  -Continue promotion of lifestyle modifications, such as: Strict BP and glycemic control, dietary habit changes, incorporation of daily exercise regimen, adherence to all prescription/OTC medication schedules, attendance to all follow-up appointments, cessation from smoking if applicable, abstinence from alcohol and illicit drug use if applicable  -Patient to follow-up with PCP in 1 to 2 weeks postdischarge  -Patient to follow-up with Dr. Nani Taylor as previously established  -We will continue to follow patient while hospitalized.  Thank you for allowing us to serve as part of this patient's multidisciplinary treatment team.  If any additional questions or concerns arise, please not hesitate to contact us     Total critical care face-to-face time spent with patient was 60 minutes; more than 50% of my time was spent counseling the patient on: preparation to see patient via chart review of resulted laboratory values, radiographic imaging, prescribed medications, and impairment of involved organ systems. Time also spent on medical examination, placing appropriate orders for testing/medications, communicating with other health care providers, counseling/educating the patient/family, and care coordination.    *This note was created using voice recognition transcription  software. Despite proofreading, unintentional typographical errors may be present. Please contact the department of neurology with any questions or concerns.    TRUPTI Ng-CNP

## 2024-02-10 NOTE — PROGRESS NOTES
Physical Therapy    Physical Therapy    Physical Therapy Evaluation    Patient Name: Akiko Peng  MRN: [id number]  Today's Date: 2/10/2024   Time Calculation  Start Time: 0947  Stop Time: 1016  Time Calculation (min): 29 min    Assessment/Plan   PT Assessment  PT Assessment Results: Decreased strength, Decreased endurance, Impaired balance, Decreased mobility  Rehab Prognosis: Good  Evaluation/Treatment Tolerance: Patient limited by fatigue  Medical Staff Made Aware: Yes  Assessment Comment: Pt presents /c above impairments and decline from functional baseline. Pt will benefit from continued PT services at mod intensity to address above and maximize functional mobility.  End of Session Patient Position: On cart, Alarm off, not on at start of session  IP OR SWING BED PT PLAN  Inpatient or Swing Bed: Inpatient  PT Plan  Treatment/Interventions: Bed mobility, Transfer training, Gait training, Balance training, Neuromuscular re-education, Strengthening, Endurance training, Therapeutic exercise, Therapeutic activity  PT Plan: Skilled PT  PT Frequency: 5 times per week  PT Discharge Recommendations: High intensity level of continued care  PT - OK to Discharge: Yes    Subjective     Current Problem:  Patient Active Problem List   Diagnosis    TIA (transient ischemic attack)       General Visit Information:  General  Reason for Referral: PT eval and treat  Referred By: Rafa  Past Medical History Relevant to Rehab: MS, seizures  Prior to Session Communication: Bedside nurse  Patient Position Received: On cart, Alarm off, not on at start of session  General Comment: Pt presents with c/o burning /c urination, RLE weakness, possible TIA. Head CT (-) acute findings. Pt cleared for therapy by nursing. Pt supine, agreeable.    Home Living:  Home Living  Home Living Comments: Pt /c spouse in 2 story home, all needs on 1st floor. 4 steps /c rail to enter.    Prior Level of Function:  Prior Function Per Pt/Caregiver Report  Prior  "Function Comments: Pt states being indep /c ADLs, dtr assists /c IADLs. Take out for meals. Pt sleeps in a hospital bed. Use of walking stick and furniture walking in the home. Needs assist for climbing stairs. Doesn't drive. Denies falls. Pt states her spouse is currently mostly in a w/c, unclear how much assist he can provide.    Precautions:  Precautions  Precautions Comment:  (falls)      Objective     Pain:  Pain Assessment  Pain Assessment: 0-10  Pain Score: 0 - No pain    Cognition:  Cognition  Overall Cognitive Status: Within Functional Limits  Orientation Level: Disoriented to time (With additional time oriented to \"February\", but not the year.)    General Assessments:  General Observation  General Observation: OOB /c A   Activity Tolerance  Endurance: Decreased tolerance for upright activites  Sensation  Sensation Comment: denies n/t  Strength   LLE hip flex 3/5, knee ext 3+/5, DF/PF 3/5  RLE hip flex 2+/5, knee ext 3-/5, DF 2+/5, PF 3/5           Dynamic Sitting Balance  Dynamic Sitting-Comments: G-  Dynamic Standing Balance  Dynamic Standing-Comments: F    Functional Assessments:     Bed Mobility  Bed Mobility: Yes  Bed Mobility 1  Bed Mobility Comments 1: Supine<>Sit /c minAx1, pt assisting RLE towards EOB, increased time.  Transfers  Transfer: Yes  Transfer 1  Trials/Comments 1: Sit<>Stand /c modAx2, WW in place. Blocking to LEs and pt holding onto walker frame for support.  Ambulation/Gait Training  Ambulation/Gait Training Performed:  (Pt ambulates 3' /c WW, minAx2. Difficulty clearing the floor to take steps, pt pivoting to advance LEs. Lateral trunk lean to fully clear the floor /c RLE 2nd drop foot. High fall risk. Pt demos quick fatigue.)       Outcome Measures:  [facility] Basic Mobility  Turning from your back to your side while in a flat bed without using bedrails: A little  Moving from lying on your back to sitting on the side of a flat bed without using bedrails: A little  Moving to and from bed " to chair (including a wheelchair): A lot  Standing up from a chair using your arms (e.g. wheelchair or bedside chair): A little  To walk in hospital room: A lot  Climbing 3-5 steps with railing: Total  Basic Mobility - Total Score: 14        Goals:  Encounter Problems       Encounter Problems (Active)       PT Problem       STG - Pt will transition supine <> sitting with SBA (Progressing)       Start:  02/10/24    Expected End:  02/24/24            STG - Pt will transfer STS with CGA (Progressing)       Start:  02/10/24    Expected End:  02/24/24            STG - Pt will amb 40' using WW with CGA (Progressing)       Start:  02/10/24    Expected End:  02/24/24            Pt will maintain F+ dynamic standing balance to decrease risk of falls.  (Progressing)       Start:  02/10/24    Expected End:  02/24/24            STG - Pt will perform a B LE ther ex program of 2-3 sets of 10 to improve functional strength (Progressing)       Start:  02/10/24    Expected End:  02/24/24                 Education Documentation  Mobility Training, taught by Charito Hoffman, PT at 2/10/2024 12:05 PM.  Learner: Patient  Readiness: Acceptance  Method: Explanation  Response: Verbalizes Understanding, Needs Reinforcement    Education Comments  No comments found.

## 2024-02-10 NOTE — PROGRESS NOTES
Pharmacy Medication History Review    Akiko Peng is a 68 y.o. female admitted for TIA (transient ischemic attack). Pharmacy reviewed the patient's [id number] medications and allergies for accuracy.    The list below reflectives the updated PTA list. Please review each medication in order reconciliation for additional clarification and justification.  Prior to Admission medications    Medication Sig Start Date End Date Taking? Authorizing Provider   ergocalciferol (Vitamin D-2) 1.25 MG (95980 UT) capsule Take 1 capsule (1,250 mcg) by mouth 1 (one) time per week. 1/25/24 1/24/25 Pt denies CINTHIA Soler   levETIRAcetam (Keppra) 500 mg tablet Take 1 tablet (500 mg) by mouth 2 times a day. 1/25/24   CINTHIA Soler   multivitamin with minerals tablet Take 1 tablet by mouth once daily.    Historical Provider, MD   thiamine (Vitamin B-1) 100 mg tablet Take 1 tablet (100 mg) by mouth once daily. 1/25/24 1/24/25  CINTHIA Soler        The list below reflectives the updated allergy list. Please review each documented allergy for additional clarification and justification.  Allergies  Reviewed by Dano Martins MD on 2/9/2024   No Known Allergies         Below are additional concerns with the patient's PTA list.      Olivia Ramirez CPhT

## 2024-02-10 NOTE — ED PROVIDER NOTES
HPI   Chief Complaint   Patient presents with   • Weakness, Gen     Pt arrives [facility] from home. States increased right leg/right side weakness and difficulty ambulating today. States chronic weakness due to MS and R/A and usually is able to regain strength when ambulating but was having increased difficulty ambulating today. Has been seen numerous times for same issues. No fall or injury.        68-year-old female history of multiple sclerosis presenting to the emergency department for numbness in her right lower extremity.  She states that she has chronic weakness secondary to multiple sclerosis in her lower extremities.  She states that he noticed new numbness on the right side which made her concerned and thus she presented to the emergency department.  She states that she is at her neurologic baseline at this time she has chronic weakness in her lower extremities.  She denies any headache vision changes or any numbness weakness tingling.  Patient reports no other history at this time.                          No data recorded       NIH Stroke Scale: 4             Patient History   Past Medical History:   Diagnosis Date   • Multiple sclerosis (CMS/HCC)     History of multiple sclerosis     Past Surgical History:   Procedure Laterality Date   • MR HEAD ANGIO WO IV CONTRAST  9/21/2020    MR HEAD ANGIO WO IV CONTRAST 9/21/2020 [facility]   • MR HEAD ANGIO WO IV CONTRAST  10/24/2022    MR HEAD ANGIO WO IV CONTRAST 10/24/2022 [facility]   • MR NECK ANGIO WO IV CONTRAST  9/21/2020    MR NECK ANGIO WO IV CONTRAST 9/21/2020 [facility]   • MR NECK ANGIO WO IV CONTRAST  10/24/2022    MR NECK ANGIO WO IV CONTRAST 10/24/2022 [facility]   • OTHER SURGICAL HISTORY  11/26/2019    Tonsillectomy   • OTHER SURGICAL HISTORY  11/26/2019    Hysterectomy     No family history on file.  Social History     Tobacco Use   • Smoking status: Not on file   • Smokeless tobacco: Not on file    Substance Use Topics   • Alcohol use: Not on file   • Drug use: Not on file       Physical Exam   ED Triage Vitals [02/09/24 1815]   Temperature Heart Rate Respirations BP   36.8 °C (98.2 °F) 74 18 119/58      Pulse Ox Temp src Heart Rate Source Patient Position   98 % -- -- --      BP Location FiO2 (%)     -- --       Physical Exam  Vitals reviewed.   Constitutional:       Appearance: Normal appearance.   HENT:      Head: Normocephalic and atraumatic.      Nose: Nose normal.      Mouth/Throat:      Mouth: Mucous membranes are moist.   Eyes:      Extraocular Movements: Extraocular movements intact.      Conjunctiva/sclera: Conjunctivae normal.   Cardiovascular:      Rate and Rhythm: Normal rate and regular rhythm.      Pulses: Normal pulses.      Heart sounds: Normal heart sounds.   Pulmonary:      Effort: Pulmonary effort is normal.      Breath sounds: Normal breath sounds.   Abdominal:      General: Abdomen is flat. Bowel sounds are normal.      Palpations: Abdomen is soft.   Musculoskeletal:         General: Normal range of motion.   Skin:     General: Skin is warm and dry.      Capillary Refill: Capillary refill takes less than 2 seconds.   Neurological:      Mental Status: She is alert and oriented to person, place, and time. Mental status is at baseline.      Cranial Nerves: Cranial nerves 2-12 are intact. No cranial nerve deficit.      Sensory: Sensation is intact. No sensory deficit.      Motor: Weakness (4/5 strength in BLE) present.      Comments: NIH Stroke Scale: 4  VAN: Negative   Psychiatric:         Mood and Affect: Mood normal.         ED Course & MDM   ED Course as of 02/09/24 2351   Fri Feb 09, 2024 2036 68-year-old female history of multiple sclerosis with residual weakness in her lower extremities presents emergency department for transient episode of numbness which is now resolved.  Patient on examination is stable well-appearing.  She has chronic bilateral lower extremity weakness with more  profound weakness in the right lower extremity.  She reports this to be her baseline.  Light touch sensation is grossly intact.  NIH stroke scale is 4 however her delta NIH is 0 considering the patient has chronic drift in her lower extremities.  Patient has been negative.  Differential diagnosis includes TIA, electrolyte abnormality, intracranial hemorrhage.  CT imaging of the head chest x-ray CBC CMP urinalysis magnesium have been ordered patient will likely require admission to the hospital. [ZS]   2343 EKG on my interpretation sinus rhythm no ST elevation or depression ventricular 61 RI interval 164 QRS 66 QTc 434 no other acute ischemic changes appreciated.    CT imaging the head and chest x-ray shows no acute process on my independent interpretation CBC CMP unremarkable magnesium 2.04 patient will be admitted to medicine at this time. [ZS]      ED Course User Index  [ZS] Castro Oliva MD         Diagnoses as of 02/09/24 2351   TIA (transient ischemic attack)       Medical Decision Making      Procedure  Procedures     Castro Oliva MD  02/09/24 2351

## 2024-02-10 NOTE — PROGRESS NOTES
Occupational Therapy    Evaluation    Patient Name: Akiko Peng  MRN: [id number]  Today's Date: 2/10/2024  Time Calculation  Start Time: 0946  Stop Time: 1016  Time Calculation (min): 30 min    Assessment  IP OT Assessment  OT Assessment: Patient would benefit from further OT to address ADL's and functional transfers/mobility due to decline in baseline function and high risk for falls.  Hospital admit 2/9 due to TIA; UTI, weakness RLE, dysuria.  CT head: no acute.  Prognosis: Good  Evaluation/Treatment Tolerance: Patient limited by fatigue, Patient limited by pain  End of Session Communication: Bedside nurse    Plan:  Treatment Interventions: ADL retraining, Functional transfer training, Endurance training, Compensatory technique education, Patient/family training, Equipment evaluation/education  OT Frequency: 5 times per week  OT Discharge Recommendations: High intensity level of continued care  OT - OK to Discharge: Yes (to next level of care when medically cleared by physician/medical team)    Subjective   Current Problem:  1. TIA (transient ischemic attack)  Transthoracic Echo (TTE) Complete    Transthoracic Echo (TTE) Complete        General:  General  Reason for Referral: ADL  Referred By: Castro Oliva MD  Past Medical History Relevant to Rehab: RA, MS, seizures  Prior to Session Communication: Bedside nurse  Patient Position Received: Bed, 2 rail up, Alarm off, not on at start of session  General Comment: Patient seen in room CDU-7; cooperative    Precautions:  Medical Precautions: Fall precautions  Precautions Comment: multiple medical lines     Pain:  Pain Assessment  Pain Assessment: 0-10  Pain Score: 0 - No pain    Objective   Cognition:  Overall Cognitive Status: Within Functional Limits  Orientation Level: Disoriented to time (only)  Processing Speed: Delayed    Home Living:  Type of Home: House  Lives With: Spouse  Home Adaptive Equipment:  (w/c lift at back of house that  uses)  Home Layout:  Bed/bath upstairs, Able to live on main level with bedroom/bathroom  Home Access: Stairs to enter with rails (4)  Bathroom Shower/Tub: Tub/shower unit  Bathroom Equipment: Bedside commode, Grab bars in shower, Hand-held shower hose     Prior Function:  Level of [place]: Independent with ADLs and functional transfers (indep laundry; shares cooking with ; daughter primarily does cleaning)  Ambulatory Assistance: Independent (use of walking stick to ambulate)  Hand Dominance: Right  Prior Function Comments: family does driving/shopping     ADL:  LE Dressing Assistance: Stand by (sitting edge of bed)  LE Dressing Deficit: Don/doff R sock, Don/doff L sock  ADL Comments: Anticipate further assist for other self-care tasks due to decrease endurance; impaired standing balance.  Would benefit from further addressing in OT sessions with use of ADL adaptive equipment/assistive techniques as needed to facilitate indep and ease in performance.     Bed Mobility/Transfers: Bed Mobility  Bed Mobility: Yes  Bed Mobility 1  Bed Mobility 1: Supine to sitting  Level of Assistance 1: Minimal verbal cues (SBA)  Bed Mobility Comments 1: HOB elevated  Bed Mobility 2  Bed Mobility  2: Sitting to supine  Level of Assistance 2: Minimum assistance, Minimal verbal cues    Transfer 1  Transfer From 1: Sit to, Stand to  Transfer to 1: Bed  Transfer Device 1: Walker  Transfer Level of Assistance 1: Moderate assistance, +2, Moderate verbal cues    Ambulation/Gait Training:  Ambulation/Gait Training  Ambulation/Gait Training Performed: Yes  Ambulation/Gait Training 1  Device 1: Rolling walker  Assistance 1: Minimum assistance, Moderate verbal cues, Moderate tactile cues (of 2 staff)    Sitting Balance:  Static Sitting Balance  Static Sitting-Level of Assistance: Close supervision  Standing Balance:  Static Standing Balance  Static Standing-Level of Assistance: Moderate assistance (fair)    Sensation:  Light Touch: No apparent  deficits    Extremities: RUE   RUE : Within Functional Limits and LUE   LUE: Within Functional Limits    Outcome Measures: [facility] Daily Activity  Putting on and taking off regular lower body clothing: A lot  Bathing (including washing, rinsing, drying): A lot  Putting on and taking off regular upper body clothing: A little  Toileting, which includes using toilet, bedpan or urinal: A lot  Taking care of personal grooming such as brushing teeth: A little  Eating Meals: A little  Daily Activity - Total Score: 15    Goals:   Encounter Problems       Encounter Problems (Active)       OT Goals       OT Goal 1 (Progressing)       Start:  02/10/24    Expected End:  02/24/24       Patient with complete lower body bathing/dressing and toileting with modified independence using adaptive equipment as needed           OT Goal 2 (Progressing)       Start:  02/10/24    Expected End:  02/24/24       Patient will perform bed mobility and functional transfers safely and independently: bed, chair, commode using DME as needed           OT Goal 3 (Progressing)       Start:  02/10/24    Expected End:  02/24/24       Patient will tolerate standing for 8 mins. and show good (-) standing balance during ADL's and functional transfers/mobility

## 2024-02-10 NOTE — PROGRESS NOTES
This TCC met with patient at bedside, introduced self and explained role.  Demographic information and insurance verified.  Patient is from home with spouse, who is in a wheelchair.  Patient stated she does not have to provide care for spouse.  Patient is independent with ADLs, using a cane to assist with ambulation, and no longer drives.  Denies SW needs at this time.  Discharge disposition is pending hospital course.  Patient anticipates returning home at discharge.  This TCC discussed PT/OT eval recommending high intensity therapy.  Provided patient a list of ARFs, freedom of choice explained.  Patient is hesitant to agree to facility placement at discharge.  Patient's preference is home with [facility].   Patient's  can provide transportation home.  TCC will continue to follow care progression for discharge planning needs.     02/10/24 8252   Discharge Planning   Living Arrangements Spouse/significant other   Support Systems Spouse/significant other;Children  (spouse is in wheelchair, but does not require care from patient)   Assistance Needed Independent with ADL's. Uses cane to assist with ambulation. Does not drive.   Type of Residence Private residence   Number of Stairs to Enter Residence 5   Number of Stairs Within Residence   (Lives on 1st floor, no longer ambulates stairs to basement.)   Do you have animals or pets at home? Yes   Type of Animals or Pets 1 cat   Patient expects to be discharged to: home   Does the patient need discharge transport arranged? No  (spouse has handicapped accessible van)   Financial Resource Strain   How hard is it for you to pay for the very basics like food, housing, medical care, and heating? Not hard   Housing Stability   In the last 12 months, was there a time when you were not able to pay the mortgage or rent on time? N   In the last 12 months, how many places have you lived? 1   In the last 12 months, was there a time when you did not have a steady place to sleep or  slept in a shelter (including now)? N   Transportation Needs   In the past 12 months, has lack of transportation kept you from medical appointments or from getting medications? no   In the past 12 months, has lack of transportation kept you from meetings, work, or from getting things needed for daily living? No   Patient Choice   Provider Choice list and CMS website (https://medicare.gov/care-compare#search) for post-acute Quality and Resource Measure Data were provided and reviewed with: Patient   Patient / Family choosing to utilize agency / facility established prior to hospitalization No     Torie Kennedy RN BSN, ED-TCC

## 2024-02-10 NOTE — H&P
History Of Present Illness  Akiko Peng is a 68 y.o. female with history of multiple sclerosis, rheumatoid arthritis, seizures presenting with complaints of burning with urination earlier this morning.  Patient states was evaluated by a physician on 2/1, diagnosed with UTI, started on Keflex therapy, which she has been intermittent and taking.  Denies fevers, chills, blood in urine or cloudy urine.  Does admit vaginal mucosa is dry, which could be causing the burning.   is at bedside, makes mention that burning with urination has been intermittently present since 1 February.  Also notes had issues with weakness of her right lower extremity yesterday and today she was attempting to use the stairs with assistance of cane, no other focal weakness, issues speaking at this time.  Is nondescript regarding how long the symptoms have been going on for, however states right lower extremity is now back to baseline.  No other reported issues. Regarding her MS she does have an upcoming MRI scheduled on 15 February with her neurologist Dr. Taylor.    ED course as follows.  Tachypneic 29, hypertensive initially which resolved.  CBC, CMP, UA unremarkable.  CT head showing no acute intracranial abnormality.  Chest x-ray showing hyperinflated lungs, no acute.  Being admitted for further management and neurology evaluation.    -Medical history: As above  -Surgical history: Tonsillectomy, hysterectomy  -Family history: ALS, MS  -Social history: Denies smoking, alcohol or illicit drug use, lives at home with   -CODE STATUS: DNR/DNI (discussion completed with patient-of note  is in disagreement with this however patient has decision-making capacity at this time)    10 systems reviewed and negative except otherwise noted in HPI above.     Physical Exam  GENERAL: Elderly, awake/alert/oriented x3, mild distress, alert and cooperative  HEENT: AT/NC, PERRL, EOMI  NECK: Normal Inspection  CARDIOVASCULAR: RRR, no  "murmurs, 2+ equal pulses of the extremities, normal S1 and S 2  RESPIRATORY: CTAB, normal breath sounds with good chest expansion, No Wheezes, Rales or Rhonchi  ABDOMEN: Soft, Non-Tender, Normal Bowel Sounds, No Distention, no suprapubic tenderness  SKIN: Warm and dry  EXTREMITIES: No lower extremity edema, bilateral upper extremities strength 5/5, left lower extremity strength 5/5, right lower extremity strength 4/5 (patient reports to be at her baseline)  NEURO: A&O x 3, no focal deficits, sensation intact in all 4 extremities  PSYCH: Appropriate      Last Recorded Vitals  Blood pressure 118/56, pulse 57, temperature 36.8 °C (98.2 °F), resp. rate 20, height 1.702 m (5' 7\"), weight 63.5 kg (140 lb), SpO2 95 %.    Assessment/Plan   68-year-old female with history of multiple sclerosis, rheumatoid arthritis, seizures presenting with complaints of burning with urination earlier this morning, started on antibiotic therapy for diagnosed UTI on 2/1 (patient was not adherent with), and right lower extremity intermittent weakness beginning 2/8.  Being admitted for further management and neurology evaluation.    #TIA versus ongoing multiple sclerosis  #Right lower extremity weakness, intermittent, resolved  -CT head negative. Right lower extremity back to baseline. Had an upcoming MRI scheduled on 15 February with her neurologist Dr. Taylor. States that the symptoms have occurred on and off in the past, however history is difficult to obtain. Thus, have ordered MRI, MRAs.  -Aspirin load, maintenance therapy thereafter. Initiated statin.  -Pending echocardiogram.  -Neurology consulted for further input.    #Dysuria  -UA with negative nitrite, negative leukocyte esterase. Reviewed urine cultures in the past, which have been negative.  Patient is concerned she might have a UTI as a urinary discomfort is described as severe and would like treatment, have initiated Rocephin 1 g daily 2/10-   -Follow urine culture  -Possible she " has atrophy of vaginal mucosa, can consider estrogen topical in the outpatient setting.    Rheumatoid arthritis  Seizures  -Medications reconciled    DVT prophylaxis: Risk score 3, ambulation, SCDs  Diet: Pending bedside swallow, thereafter regular  IVF: --  Consults: Neurology  Code status: DNR/DNI (discussion completed with patient-of note  is in disagreement with this however patient has decision-making capacity at this time)      Katlin Barkley, DO  Internal Medicine, PGY-II

## 2024-02-11 ENCOUNTER — DOCUMENTATION (OUTPATIENT)
Dept: HOME HEALTH SERVICES | Facility: HOME HEALTH | Age: 69
End: 2024-02-11
Payer: MEDICARE

## 2024-02-11 ENCOUNTER — HOME HEALTH ADMISSION (OUTPATIENT)
Dept: HOME HEALTH SERVICES | Facility: HOME HEALTH | Age: 69
End: 2024-02-11
Payer: MEDICARE

## 2024-02-11 VITALS
TEMPERATURE: 97.3 F | HEART RATE: 80 BPM | BODY MASS INDEX: 21.97 KG/M2 | WEIGHT: 140 LBS | HEIGHT: 67 IN | SYSTOLIC BLOOD PRESSURE: 116 MMHG | OXYGEN SATURATION: 99 % | RESPIRATION RATE: 18 BRPM | DIASTOLIC BLOOD PRESSURE: 58 MMHG

## 2024-02-11 LAB
ALBUMIN SERPL BCP-MCNC: 3.9 G/DL (ref 3.4–5)
ANION GAP SERPL CALC-SCNC: 11 MMOL/L (ref 10–20)
BACTERIA UR CULT: NO GROWTH
BUN SERPL-MCNC: 15 MG/DL (ref 6–23)
CALCIUM SERPL-MCNC: 8.7 MG/DL (ref 8.6–10.3)
CHLORIDE SERPL-SCNC: 106 MMOL/L (ref 98–107)
CO2 SERPL-SCNC: 27 MMOL/L (ref 21–32)
CREAT SERPL-MCNC: 0.65 MG/DL (ref 0.5–1.05)
EGFRCR SERPLBLD CKD-EPI 2021: >90 ML/MIN/1.73M*2
ERYTHROCYTE [DISTWIDTH] IN BLOOD BY AUTOMATED COUNT: 12.7 % (ref 11.5–14.5)
GLUCOSE SERPL-MCNC: 90 MG/DL (ref 74–99)
HCT VFR BLD AUTO: 40.6 % (ref 36–46)
HGB BLD-MCNC: 13.5 G/DL (ref 12–16)
MCH RBC QN AUTO: 31.8 PG (ref 26–34)
MCHC RBC AUTO-ENTMCNC: 33.3 G/DL (ref 32–36)
MCV RBC AUTO: 96 FL (ref 80–100)
NRBC BLD-RTO: 0 /100 WBCS (ref 0–0)
PHOSPHATE SERPL-MCNC: 4 MG/DL (ref 2.5–4.9)
PLATELET # BLD AUTO: 258 X10*3/UL (ref 150–450)
POTASSIUM SERPL-SCNC: 4.3 MMOL/L (ref 3.5–5.3)
RBC # BLD AUTO: 4.24 X10*6/UL (ref 4–5.2)
SODIUM SERPL-SCNC: 140 MMOL/L (ref 136–145)
WBC # BLD AUTO: 5.6 X10*3/UL (ref 4.4–11.3)

## 2024-02-11 PROCEDURE — 85027 COMPLETE CBC AUTOMATED: CPT | Performed by: STUDENT IN AN ORGANIZED HEALTH CARE EDUCATION/TRAINING PROGRAM

## 2024-02-11 PROCEDURE — 36415 COLL VENOUS BLD VENIPUNCTURE: CPT | Performed by: STUDENT IN AN ORGANIZED HEALTH CARE EDUCATION/TRAINING PROGRAM

## 2024-02-11 PROCEDURE — 2500000001 HC RX 250 WO HCPCS SELF ADMINISTERED DRUGS (ALT 637 FOR MEDICARE OP)

## 2024-02-11 PROCEDURE — G0378 HOSPITAL OBSERVATION PER HR: HCPCS

## 2024-02-11 PROCEDURE — 99285 EMERGENCY DEPT VISIT HI MDM: CPT | Mod: 25 | Performed by: STUDENT IN AN ORGANIZED HEALTH CARE EDUCATION/TRAINING PROGRAM

## 2024-02-11 PROCEDURE — 80069 RENAL FUNCTION PANEL: CPT | Performed by: STUDENT IN AN ORGANIZED HEALTH CARE EDUCATION/TRAINING PROGRAM

## 2024-02-11 RX ORDER — NAPROXEN SODIUM 220 MG/1
81 TABLET, FILM COATED ORAL DAILY
Qty: 30 TABLET | Refills: 0 | Status: SHIPPED | OUTPATIENT
Start: 2024-02-11 | End: 2024-03-12

## 2024-02-11 RX ORDER — ATORVASTATIN CALCIUM 40 MG/1
40 TABLET, FILM COATED ORAL DAILY
Qty: 30 TABLET | Refills: 0 | Status: SHIPPED | OUTPATIENT
Start: 2024-02-11 | End: 2024-05-14

## 2024-02-11 RX ADMIN — ASPIRIN 81 MG 81 MG: 81 TABLET ORAL at 09:37

## 2024-02-11 RX ADMIN — THIAMINE HCL TAB 100 MG 100 MG: 100 TAB at 09:37

## 2024-02-11 RX ADMIN — LEVETIRACETAM 500 MG: 500 TABLET, FILM COATED ORAL at 09:37

## 2024-02-11 ASSESSMENT — COGNITIVE AND FUNCTIONAL STATUS - GENERAL
MOBILITY SCORE: 19
HELP NEEDED FOR BATHING: A LITTLE
MOVING TO AND FROM BED TO CHAIR: A LITTLE
DRESSING REGULAR LOWER BODY CLOTHING: A LITTLE
PERSONAL GROOMING: A LITTLE
STANDING UP FROM CHAIR USING ARMS: A LITTLE
DAILY ACTIVITIY SCORE: 19
TOILETING: A LITTLE
DRESSING REGULAR UPPER BODY CLOTHING: A LITTLE
WALKING IN HOSPITAL ROOM: A LITTLE
CLIMB 3 TO 5 STEPS WITH RAILING: A LOT

## 2024-02-11 ASSESSMENT — PAIN SCALES - GENERAL: PAINLEVEL_OUTOF10: 0 - NO PAIN

## 2024-02-11 ASSESSMENT — PAIN - FUNCTIONAL ASSESSMENT: PAIN_FUNCTIONAL_ASSESSMENT: 0-10

## 2024-02-11 NOTE — PROGRESS NOTES
Patient to be discharged home today with home care. Patients PCP is Dr. Craig. Agreeable to [facility].  will continue to follow. Message with questions.  EDGARD Vaughan

## 2024-02-11 NOTE — CARE PLAN
The patient's goals for the shift include not fall    The clinical goals for the shift include Free from falls/injury    Over the shift, the patient did not make progress toward the following goals. Barriers to progression include acute illness. Recommendations to address these barriers include communication.

## 2024-02-11 NOTE — DISCHARGE SUMMARY
Discharge Diagnosis  #TIA versus ongoing multiple sclerosis  #Right lower extremity weakness, intermittent, resolved  #Dysuria   Rheumatoid arthritis  Seizures    Issues Requiring Follow-Up  Weakness    Discharge Meds     Your medication list        START taking these medications        Instructions Last Dose Given Next Dose Due   aspirin 81 mg chewable tablet      Chew 1 tablet (81 mg) once daily.       atorvastatin 40 mg tablet  Commonly known as: Lipitor      Take 1 tablet (40 mg) by mouth once daily.              CONTINUE taking these medications        Instructions Last Dose Given Next Dose Due   ergocalciferol 1.25 MG (67914 UT) capsule  Commonly known as: Vitamin D-2      Take 1 capsule (1,250 mcg) by mouth 1 (one) time per week.       levETIRAcetam 500 mg tablet  Commonly known as: Keppra      Take 1 tablet (500 mg) by mouth 2 times a day.       multivitamin with minerals tablet           thiamine 100 mg tablet  Commonly known as: Vitamin B-1      Take 1 tablet (100 mg) by mouth once daily.              STOP taking these medications      cephalexin 500 mg capsule  Commonly known as: Keflex                  Where to Get Your Medications        These medications were sent to [address]  [address] [address]      Phone: [phone]   aspirin 81 mg chewable tablet  atorvastatin 40 mg tablet         Test Results Pending At Discharge  Pending Labs       Order Current Status    CBC Collected (02/11/24 0649)    Renal function panel Collected (02/11/24 0649)    Hemoglobin A1C In process    Urine culture In process            Hospital Course   Patient is a 68-year-old female with a past medical history of MS, RA, seizures who presents to [facility] emergency department to right lower extremity weakness.  Patient states the right lower extremity is weak at baseline, but felt more weakness than normal.  Also had dysuria.  Patient was subsequent  mated for concerns of TIA.  Patient did not have a UTI and thus did not receive antibiotics.  Patient evaluated by neurology all initial testing was unremarkable.  Patient was started on aspirin and atorvastatin.  At this time, patient is stable for discharge home.  Patient was evaluated by PT and OT AM-PAC was found to be 14.  Recommended SNF, patient refused SNF stating that he is more comfortable at home and prefer home health care.  Patient is being discharged with instructions to follow-up with her primary care provider in 1 to 2 weeks, and her outpatient neurologist in the next 1 to 2 weeks as previously scheduled.  Patient be discharged home with aspirin and atorvastatin.    Pertinent Physical Exam At Time of Discharge  Physical Exam  Physical Exam:  General:  Pleasant and cooperative. No apparent distress.  HEENT:  Normocephalic, atraumatic, mucus membranes moist.   Neck:  Trachea midline.  No JVD.    Chest:  Clear to auscultation bilaterally. No wheezes, rales, or rhonchi.  CV:  Regular rate and rhythm.  Positive S1/S2.   Abdomen: Bowel sounds present in all four quadrants, abdomen is soft, non-tender, non-distended.  Extremities:  No lower extremity edema or cyanosis.   Neurological:  AAOx3. No focal deficits.  Skin:  Warm and dry.    Outpatient Follow-Up  Future Appointments   Date Time Provider Department Center   2/15/2024 10:30 AM [facility]   2/15/2024 11:15 AM [facility]   2/15/2024 12:00 PM [facility]   2/29/2024 11:00 AM Joaquin Taylor MD [id number] [facility]         Dereje Hunter DO

## 2024-02-11 NOTE — HH CARE COORDINATION
Home Care received a Referral for Nursing. PT  OT   We have processed the referral for a Start of Care on 2.13.24.     If you have any questions or concerns, please feel free to contact us at [phone]. Follow the prompts, enter your five digit zip code, and you will be directed to your care team on WEST 3.

## 2024-02-12 LAB
EST. AVERAGE GLUCOSE BLD GHB EST-MCNC: 94 MG/DL
HBA1C MFR BLD: 4.9 %

## 2024-02-15 ENCOUNTER — APPOINTMENT (OUTPATIENT)
Dept: RADIOLOGY | Facility: CLINIC | Age: 69
End: 2024-02-15
Payer: MEDICARE

## 2024-02-16 LAB
ATRIAL RATE: 61 BPM
P AXIS: 58 DEGREES
P OFFSET: 179 MS
P ONSET: 144 MS
PR INTERVAL: 164 MS
Q ONSET: 226 MS
QRS COUNT: 10 BEATS
QRS DURATION: 66 MS
QT INTERVAL: 432 MS
QTC CALCULATION(BAZETT): 434 MS
QTC FREDERICIA: 434 MS
R AXIS: 49 DEGREES
T AXIS: 76 DEGREES
T OFFSET: 442 MS
VENTRICULAR RATE: 61 BPM

## 2024-02-29 ENCOUNTER — OFFICE VISIT (OUTPATIENT)
Dept: NEUROLOGY | Facility: HOSPITAL | Age: 69
End: 2024-02-29
Payer: MEDICARE

## 2024-02-29 VITALS
HEIGHT: 67 IN | HEART RATE: 56 BPM | WEIGHT: 140 LBS | RESPIRATION RATE: 18 BRPM | BODY MASS INDEX: 21.97 KG/M2 | TEMPERATURE: 96.9 F | SYSTOLIC BLOOD PRESSURE: 111 MMHG | DIASTOLIC BLOOD PRESSURE: 61 MMHG

## 2024-02-29 DIAGNOSIS — R56.9 SEIZURE (MULTI): ICD-10-CM

## 2024-02-29 DIAGNOSIS — G35 MULTIPLE SCLEROSIS (MULTI): Primary | ICD-10-CM

## 2024-02-29 DIAGNOSIS — R41.3 MEMORY LOSS: ICD-10-CM

## 2024-02-29 PROCEDURE — 1126F AMNT PAIN NOTED NONE PRSNT: CPT | Performed by: PSYCHIATRY & NEUROLOGY

## 2024-02-29 PROCEDURE — 1036F TOBACCO NON-USER: CPT | Performed by: PSYCHIATRY & NEUROLOGY

## 2024-02-29 PROCEDURE — 1159F MED LIST DOCD IN RCRD: CPT | Performed by: PSYCHIATRY & NEUROLOGY

## 2024-02-29 PROCEDURE — 1157F ADVNC CARE PLAN IN RCRD: CPT | Performed by: PSYCHIATRY & NEUROLOGY

## 2024-02-29 PROCEDURE — 99214 OFFICE O/P EST MOD 30 MIN: CPT | Performed by: PSYCHIATRY & NEUROLOGY

## 2024-02-29 RX ORDER — LEVETIRACETAM 500 MG/1
500 TABLET ORAL 2 TIMES DAILY
Qty: 180 TABLET | Refills: 3 | Status: SHIPPED
Start: 2024-02-29 | End: 2024-05-17 | Stop reason: HOSPADM

## 2024-02-29 NOTE — PATIENT INSTRUCTIONS
I'm sorry you experienced transient worsening of your right leg weakness. Luckily, your brain MRI did not show an acute stroke or new or active brain lesions. However, we still need to look at your spinal cord to see if there have been any new MS lesions there. I will order STAT MRI of your spinal cord.     I agree with aspirin and lipitor for now.     Please continue to take keppra, vitamin D, and vitamin B1    Follow up (in-person or virtually) or call after you complete the MRI to discuss results.    Thank you for visiting the clinic today    Joaquin Taylor MD

## 2024-02-29 NOTE — PROGRESS NOTES
Chief Complaint  MS.      History of Present Illness     Neuro - Immunology   Date of onset: 1991   Date of diagnosis: 1991   Last MRI brain: 2/2024   Last MRI cervical: 10/2022   Last OCT (Optical Coherence Tomography/Visual Evoked Potential): NOT DONE~   SPMS with progression and without activity.      Disease Course at Onset: relapsing.   Disease Course Now: progressive without relapses.   Current DMT (Disease Modifying Therapies): none.   Previous DMT (Disease Modifying Therapies): avonex from 2015 to 2017 then stopped by choice.   CSF (Cerebrospinal Fluid): not done?.   JCV (Cyril Cunningham Virus): not done.   VZV (Varicella Zoster Virus): not done.   Hep Panel: not done.   NMO (Neuromyelitis Optica): not done.   MOG (Myelin Oligodendrocyte Glycoprotein): not done.   Narrative HPI:   Pt is a 68 year old woman with MS who presents for MS follow up.     Interval hx:  Had worsening RLE weakness two weeks ago and was unable to walk. She went to the ER and was admitted to the hospital. Mri brain WWO show no evidence of acute stroke or new or active MS lesions. MRA was unremarkable. She was thought to have TIA and was started on aspirin and lipitor. She started taking keppra but is not compliant with vitamins D and B1. She feels back to  baseline now.      MS Symptom Review:   Weakness:~BLE.   No sensory changes.   Incoordination:~may be alcohol related.   Falling:   Gait Change:~uses a cane at home.   No painful vision loss.   No double vision.   No vertigo.   No facial/bulbar weakness.   No Lhermitte's.   No bladder/bowel dysfunction.   Spasticity:~BLE.   Tonic spasms:~report of chest spasms in the past.   Tremors:   No RLS (Restless Leg Syndrome).   No Dystonia.   No other movement disorder.   No heat sensitivity.   Fatigue:   No depression.   No anxiety.   Cognitive changes:~prominent symptom.   No DMT.   Vitamin D: poor compliance     Active Problems  Problems    · Memory loss (780.93) (R41.3)   · Multiple  sclerosis, relapsing-remitting (340) (G35)   · Rheumatoid arthritis (714.0) (M06.9)   · Seizure (780.39) (R56.9)   · Unsteady gait (781.2) (R26.81)   · Weakness of both lower extremities (729.89) (R29.898)     Past Medical History  Problems    · History of multiple sclerosis (340) (G35)     Surgical History  Problems    · History of Hysterectomy   · History of Tonsillectomy     Family History  Other    · Family history of multiple sclerosis (V17.2) (Z82.0)     Social History  Problems    · Alcohol consumption of one to four drinks per day (V69.8) (Z78.9)   · Never a smoker     Allergies  Medication    · No Known Drug Allergies   Recorded By: Di Carbajal; 11/26/2019 10:02:29 AM          Physical Exam  Physical Exam:  General: NAD, NC/AT  Skin: Warm, dry, intact  Heart: RRR  Lungs: CTAB  Abdomen: Soft, NT/ND  Extremities: WWP, no edema BL LE     Neurological Exam:  MENTAL STATUS:  Orientation: AxO3 (year, month, day of week, place and situation)  Language: Expression, repetition, naming, comprehension intact  Follows complex commands across midline  Thought processes: Logical, organized  Spells world forward and backward  Calculation: Cannot complete serial sevens or addition subtraction  Concentration: Impaired  Poor long-term memory  Fund of knowledge: Impaired  Judgment and Insight: Intact     CRANIAL NERVES:  - Fundoscopic exam: Deferred 2/2 COVID pandemic  - II/III: PERRL  - II: Visual fields intact to confrontation bilaterally tested individually and together  - III, IV, VI: EOM full to pursuit without nystagmus  - V: V1-V3 sensation intact bilaterally  - VII: Face muscles symmetric with smile and eye closure  - VIII: Intact to interview  - IX, X: Palate elevated symmetrically bilaterally, no hoarseness  - XI: 5/5 strength on shoulder shrugging bilaterally  - XII: Tongue midline without atrophy or fasciculation     MOTOR:   Right upper extremity pronator drift  No rest tremor  Paratonia in bilateral upper  extremities  R> L spasticity in lower extremities     STRENGTH:   R            L  Deltoid                            4-            5  Biceps                            4             5  Triceps                           4             5                    5             5        Hip flexion       3+           3+  Quadriceps      5             5  Hamstrings      5             5  DorsiFlex          5            5  PlantarFlex      5             5     REFLEXES:    R            L  Biceps                            4             4  Triceps                           3+           3+  Brachioradialis 3             3  Patellar                           4             4  Achilles                           +2           +2  Plantar                            Down      Down     + Pectoralis reflex bilaterally  + Almanzar bilaterally  + Suprapatellar reflex bilaterally        COORDINATION: Action tremor bilaterally on finger-to-nose testing, mild dysmetria on right finger-to-nose. Unable to complete heel-to-shin testing due to weakness  SENSORY: Intact to light touch in bl UE and LE  ROMBERG: Deferred as patient did not bring cane with her to appointment  GAIT: Deferred as patient did not bring cane with her to appointment    Provider Impressions     Neuro - Immunology Assessment: This is a 67 year old white,~right - handed female, with PMH of: RA (never on treatment?) and prior heavy alcohol use (sober since 2019) who presents: to establish care for known MS. Symptoms started in 1991 with a spinal motor attack followed by progressive decline in gait/balance and cognition. started developing seizure-like episodes over the past two years (aphasia, confusion, post-ictal state). Only took avonex from 2015 to 2017 and otherwise been refusing DMT by choice. Also refused to take keppra for seizures.   The Neurological Exam showed: marked cognitive impairment, moderate BLE weakness, and appendicular ataxia.   MRI Showed: a moderate  burden of non-enhancing typical demyelinating plaques in periventricular and spinal locations stable in 2022 compared to 2020. routine EEG was reportedly normal.   CSF (Cerebrospinal Fluid): not done.   OCT/VEP: not done.   MS Mimics: not ruled out.   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   DMT (Disease Modifying Therapies): not indicated unless clinical or radiological activity happens in the future. There is room for symptomatic management most importantly seizure medications.       2/29/2024: Had worsening RLE weakness two weeks ago and was unable to walk. She went to the ER and was admitted to the hospital. Mri brain WWO show no evidence of acute stroke or new or active MS lesions. MRA was unremarkable. She was thought to have TIA and was started on aspirin and lipitor. She started taking keppra but is not compliant with vitamins D and B1. She feels back to  baseline now. We need to make sure that the RLE weakness was not due to a spinal MS lesion since she is not on DMT.  Will order STAT MRI of the  cervical/thoracic spinal cord. WWO.           Plan: SPMS was discussed with the patient (and family if present) in detail including pathogenesis, clinical picture, complications, course, prognosis, monitoring strategy, and management options. All questions answered.   Acute Relapse Management: will decide after spine MRI  DMT (Disease Modifying Therapies): not indicated for inactive SPMS but will have a low threshold to start DMT in case of any future clinical or radiological activity.   Will Order MRI of: the C/T spine WWO to evaluate her new symptoms.   Symptomatic Management: continue keppra and thiamine supplements.   Vitamin D: continue vitamin D3 24385 UNITS once a week.   Smoking: non smoker.   PT/OT: will address in the next visit.   Instructions: Keep an active lifestyle and develop exercise routine as tolerated.  Recommend a balanced healthy diet. Avoid excessive amounts of salt, carbohydrates, fat, and red meat. Increase intake of fruits, vegetables, and white meat.   Follow-Up: After MRI

## 2024-03-17 ENCOUNTER — HOSPITAL ENCOUNTER (OUTPATIENT)
Dept: RADIOLOGY | Facility: HOSPITAL | Age: 69
Discharge: HOME | End: 2024-03-17
Payer: MEDICARE

## 2024-03-17 DIAGNOSIS — G35 MULTIPLE SCLEROSIS (MULTI): ICD-10-CM

## 2024-03-17 PROCEDURE — 72157 MRI CHEST SPINE W/O & W/DYE: CPT | Performed by: STUDENT IN AN ORGANIZED HEALTH CARE EDUCATION/TRAINING PROGRAM

## 2024-03-17 PROCEDURE — 72157 MRI CHEST SPINE W/O & W/DYE: CPT

## 2024-03-17 PROCEDURE — 72156 MRI NECK SPINE W/O & W/DYE: CPT | Performed by: STUDENT IN AN ORGANIZED HEALTH CARE EDUCATION/TRAINING PROGRAM

## 2024-03-17 PROCEDURE — 2550000001 HC RX 255 CONTRASTS: Performed by: PSYCHIATRY & NEUROLOGY

## 2024-03-17 PROCEDURE — 72156 MRI NECK SPINE W/O & W/DYE: CPT

## 2024-03-17 PROCEDURE — A9575 INJ GADOTERATE MEGLUMI 0.1ML: HCPCS | Performed by: PSYCHIATRY & NEUROLOGY

## 2024-03-17 RX ORDER — GADOTERATE MEGLUMINE 376.9 MG/ML
12 INJECTION INTRAVENOUS
Status: COMPLETED | OUTPATIENT
Start: 2024-03-17 | End: 2024-03-17

## 2024-03-17 RX ADMIN — GADOTERATE MEGLUMINE 12 ML: 376.9 INJECTION INTRAVENOUS at 11:27

## 2024-04-03 ENCOUNTER — HOSPITAL ENCOUNTER (EMERGENCY)
Facility: HOSPITAL | Age: 69
Discharge: HOME | End: 2024-04-03
Payer: MEDICARE

## 2024-04-03 VITALS
RESPIRATION RATE: 18 BRPM | WEIGHT: 140 LBS | TEMPERATURE: 97.7 F | HEART RATE: 80 BPM | HEIGHT: 70 IN | DIASTOLIC BLOOD PRESSURE: 59 MMHG | OXYGEN SATURATION: 100 % | SYSTOLIC BLOOD PRESSURE: 126 MMHG | BODY MASS INDEX: 20.04 KG/M2

## 2024-04-03 DIAGNOSIS — R30.0 DYSURIA: Primary | ICD-10-CM

## 2024-04-03 LAB
ALBUMIN SERPL BCP-MCNC: 4.5 G/DL (ref 3.4–5)
ALP SERPL-CCNC: 149 U/L (ref 33–136)
ALT SERPL W P-5'-P-CCNC: 41 U/L (ref 7–45)
ANION GAP SERPL CALC-SCNC: 13 MMOL/L (ref 10–20)
APPEARANCE UR: CLEAR
AST SERPL W P-5'-P-CCNC: 35 U/L (ref 9–39)
BASOPHILS # BLD AUTO: 0.07 X10*3/UL (ref 0–0.1)
BASOPHILS NFR BLD AUTO: 0.8 %
BILIRUB SERPL-MCNC: 0.6 MG/DL (ref 0–1.2)
BILIRUB UR STRIP.AUTO-MCNC: NEGATIVE MG/DL
BUN SERPL-MCNC: 14 MG/DL (ref 6–23)
CALCIUM SERPL-MCNC: 9.2 MG/DL (ref 8.6–10.3)
CHLORIDE SERPL-SCNC: 105 MMOL/L (ref 98–107)
CO2 SERPL-SCNC: 25 MMOL/L (ref 21–32)
COLOR UR: YELLOW
CREAT SERPL-MCNC: 0.67 MG/DL (ref 0.5–1.05)
EGFRCR SERPLBLD CKD-EPI 2021: >90 ML/MIN/1.73M*2
EOSINOPHIL # BLD AUTO: 0.13 X10*3/UL (ref 0–0.7)
EOSINOPHIL NFR BLD AUTO: 1.6 %
ERYTHROCYTE [DISTWIDTH] IN BLOOD BY AUTOMATED COUNT: 12.2 % (ref 11.5–14.5)
GLUCOSE SERPL-MCNC: 78 MG/DL (ref 74–99)
GLUCOSE UR STRIP.AUTO-MCNC: NEGATIVE MG/DL
HCT VFR BLD AUTO: 46.3 % (ref 36–46)
HGB BLD-MCNC: 16 G/DL (ref 12–16)
HOLD SPECIMEN: NORMAL
IMM GRANULOCYTES # BLD AUTO: 0.03 X10*3/UL (ref 0–0.7)
IMM GRANULOCYTES NFR BLD AUTO: 0.4 % (ref 0–0.9)
KETONES UR STRIP.AUTO-MCNC: NEGATIVE MG/DL
LEUKOCYTE ESTERASE UR QL STRIP.AUTO: NEGATIVE
LYMPHOCYTES # BLD AUTO: 1.06 X10*3/UL (ref 1.2–4.8)
LYMPHOCYTES NFR BLD AUTO: 12.9 %
MCH RBC QN AUTO: 32.6 PG (ref 26–34)
MCHC RBC AUTO-ENTMCNC: 34.6 G/DL (ref 32–36)
MCV RBC AUTO: 94 FL (ref 80–100)
MONOCYTES # BLD AUTO: 0.85 X10*3/UL (ref 0.1–1)
MONOCYTES NFR BLD AUTO: 10.3 %
NEUTROPHILS # BLD AUTO: 6.1 X10*3/UL (ref 1.2–7.7)
NEUTROPHILS NFR BLD AUTO: 74 %
NITRITE UR QL STRIP.AUTO: NEGATIVE
NRBC BLD-RTO: 0 /100 WBCS (ref 0–0)
PH UR STRIP.AUTO: 5 [PH]
PLATELET # BLD AUTO: 251 X10*3/UL (ref 150–450)
POTASSIUM SERPL-SCNC: 3.9 MMOL/L (ref 3.5–5.3)
PROT SERPL-MCNC: 7.3 G/DL (ref 6.4–8.2)
PROT UR STRIP.AUTO-MCNC: NEGATIVE MG/DL
RBC # BLD AUTO: 4.91 X10*6/UL (ref 4–5.2)
RBC # UR STRIP.AUTO: NEGATIVE /UL
SODIUM SERPL-SCNC: 139 MMOL/L (ref 136–145)
SP GR UR STRIP.AUTO: 1.02
UROBILINOGEN UR STRIP.AUTO-MCNC: <2 MG/DL
WBC # BLD AUTO: 8.2 X10*3/UL (ref 4.4–11.3)

## 2024-04-03 PROCEDURE — 85025 COMPLETE CBC W/AUTO DIFF WBC: CPT | Performed by: NURSE PRACTITIONER

## 2024-04-03 PROCEDURE — 2500000004 HC RX 250 GENERAL PHARMACY W/ HCPCS (ALT 636 FOR OP/ED): Performed by: NURSE PRACTITIONER

## 2024-04-03 PROCEDURE — 96360 HYDRATION IV INFUSION INIT: CPT

## 2024-04-03 PROCEDURE — 96361 HYDRATE IV INFUSION ADD-ON: CPT

## 2024-04-03 PROCEDURE — 80053 COMPREHEN METABOLIC PANEL: CPT | Performed by: NURSE PRACTITIONER

## 2024-04-03 PROCEDURE — 36415 COLL VENOUS BLD VENIPUNCTURE: CPT | Performed by: NURSE PRACTITIONER

## 2024-04-03 PROCEDURE — 81003 URINALYSIS AUTO W/O SCOPE: CPT | Performed by: EMERGENCY MEDICINE

## 2024-04-03 PROCEDURE — 99283 EMERGENCY DEPT VISIT LOW MDM: CPT | Mod: 25

## 2024-04-03 RX ORDER — SODIUM CHLORIDE 9 MG/ML
75 INJECTION, SOLUTION INTRAVENOUS CONTINUOUS
Status: DISCONTINUED | OUTPATIENT
Start: 2024-04-03 | End: 2024-04-03 | Stop reason: HOSPADM

## 2024-04-03 RX ADMIN — SODIUM CHLORIDE 75 ML/HR: 9 INJECTION, SOLUTION INTRAVENOUS at 08:17

## 2024-04-03 RX ADMIN — SODIUM CHLORIDE 500 ML: 9 INJECTION, SOLUTION INTRAVENOUS at 06:40

## 2024-04-03 ASSESSMENT — COLUMBIA-SUICIDE SEVERITY RATING SCALE - C-SSRS
1. IN THE PAST MONTH, HAVE YOU WISHED YOU WERE DEAD OR WISHED YOU COULD GO TO SLEEP AND NOT WAKE UP?: NO
6. HAVE YOU EVER DONE ANYTHING, STARTED TO DO ANYTHING, OR PREPARED TO DO ANYTHING TO END YOUR LIFE?: NO
2. HAVE YOU ACTUALLY HAD ANY THOUGHTS OF KILLING YOURSELF?: NO

## 2024-04-03 ASSESSMENT — LIFESTYLE VARIABLES
EVER HAD A DRINK FIRST THING IN THE MORNING TO STEADY YOUR NERVES TO GET RID OF A HANGOVER: NO
HAVE YOU EVER FELT YOU SHOULD CUT DOWN ON YOUR DRINKING: NO
EVER FELT BAD OR GUILTY ABOUT YOUR DRINKING: NO
HAVE PEOPLE ANNOYED YOU BY CRITICIZING YOUR DRINKING: NO
TOTAL SCORE: 0

## 2024-04-03 ASSESSMENT — PAIN SCALES - GENERAL
PAINLEVEL_OUTOF10: 0 - NO PAIN

## 2024-04-03 ASSESSMENT — PAIN - FUNCTIONAL ASSESSMENT
PAIN_FUNCTIONAL_ASSESSMENT: 0-10
PAIN_FUNCTIONAL_ASSESSMENT: 0-10

## 2024-04-03 NOTE — ED TRIAGE NOTES
Patient arrives via EMS from home with complaints of dysuria. Patient denies any difficulty, urgency or frequency, just burning. Patient says this started today. Patient also endorses some increased difficulty with moving, hx MS and RA.

## 2024-04-03 NOTE — DISCHARGE INSTRUCTIONS
Lab work normal including normal kidney function, electrolytes and hemoglobin.  Urine does not show infection at this time.  You received fluids.  Please follow-up with PCP in the next 2 to 3 days.  Resume any normal medications.  Stay well-hydrated.  Monitor your symptoms.

## 2024-04-03 NOTE — ED PROVIDER NOTES
Limitations to History: None     HPI:      Akiko Peng is a 68 y.o.  female with significant past medical history for MS presenting to ED today from home by herself for evaluation of dysuria.  This morning the patient woke up she noticed dysuria.  Symptoms are similar to previous episodes of UTI.  Denies fever/chills, cough/cold symptoms, chest pain, shortness of breath, nausea/vomiting, abdominal pain, hematuria, change in bowel habits or any other complaints.  No smoking, EtOH or drug use.  PCP is Dr. Craig.    Additional History Obtained from: None    ------------------------------------------------------------------------------------------------------------------------------------------    VS: As documented in the triage note and EMR flowsheet from this visit were reviewed.    Physical Exam:  Gen: 68-year-old female, nontoxic looking.  Awake and alert, oriented x 3.  Well-nourished and hydrated.  Head/Neck: NCAT, neck w/ FROM  Eyes: EOMI, PERRL, anicteric sclerae, noninjected conjunctivae  Ears: TMs clear b/l without sign of infection  Nose: Nares patent w/o rhinorrhea  Mouth:  MMM, no OP lesions noted  Heart: RRR no MRG  Lungs: CTA b/l no RRW, no increased work of breathing  Abdomen: soft, NT, ND, no HSM, no palpable masses  Musculoskeletal: Movement of extremities x 4.  MSPs intact.  Skin intact.  No deformities.  Decreased mobility all extremities due to MS.  Neurologic: Alert, symmetrical facies, phonates clearly, moves all extremities equally, responsive to touch, ambulates normally   Skin: Pink warm and dry.  No rashes noted        ------------------------------------------------------------------------------------------------------------------------------------------    Medical Decision Makin-year-old female with MS is evaluated at the bedside for dysuria that began this morning.  On arrival to the ED, awake and alert, vital signs within normal limits.  Afebrile.  Patient has decreased  mobility of all 4 extremities and needs assistance with getting to the bathroom, this is baseline for the patient.  Lungs clear, abdomen soft and nontender.  Patient is nontoxic looking.  Differential includes but is not limited to YOSHI, UTI dehydration.  Will proceed with basic labs and cath UA.  Normal saline 500 wide open with maintenance rate to follow.      ED Course as of 04/03/24 0829 Wed Apr 03, 2024 0814 Laboratory studies reviewed, no leukocytosis or evidence of anemia.  Normal kidney function, electrolytes and LFTs.  Urine shows no evidence of infection.  Patient feels improved after fluids.  Remains hemodynamically stable here in the emergency department.  With a negative workup here in the emergency department, I feel comfortable discharging the patient home.  She will follow-up with PCP Dr. Craig in the next 2 to 3 days.  Resume normal medications.  Increase fluids.  Monitor symptoms.  Return precautions discussed.  Diagnosis, treatment and plan discussed with patient, she verbalizes understanding and is agreement.  Condition stable for discharge home per transport. [SB]      ED Course User Index  [SB] CINTHIA Baum         Diagnoses as of 04/03/24 0829   Dysuria       EKG interpreted by myself (ED attending physician): Not ordered    Chronic Medical Conditions Significantly Affecting Care: None    External Records Reviewed: I reviewed recent and relevant outside records including: None    Discussion of Management with Other Providers: None    I discussed the patient/results with: None       CINTHIA Baum  04/03/24 0830

## 2024-05-14 ENCOUNTER — APPOINTMENT (OUTPATIENT)
Dept: RADIOLOGY | Facility: HOSPITAL | Age: 69
DRG: 057 | End: 2024-05-14
Payer: MEDICARE

## 2024-05-14 ENCOUNTER — APPOINTMENT (OUTPATIENT)
Dept: CARDIOLOGY | Facility: HOSPITAL | Age: 69
DRG: 057 | End: 2024-05-14
Payer: MEDICARE

## 2024-05-14 ENCOUNTER — HOSPITAL ENCOUNTER (INPATIENT)
Facility: HOSPITAL | Age: 69
LOS: 3 days | Discharge: HOME HEALTH | DRG: 057 | End: 2024-05-17
Attending: STUDENT IN AN ORGANIZED HEALTH CARE EDUCATION/TRAINING PROGRAM | Admitting: STUDENT IN AN ORGANIZED HEALTH CARE EDUCATION/TRAINING PROGRAM
Payer: MEDICARE

## 2024-05-14 DIAGNOSIS — G45.9 TIA (TRANSIENT ISCHEMIC ATTACK): ICD-10-CM

## 2024-05-14 DIAGNOSIS — R09.89 SUSPECTED CHF (CONGESTIVE HEART FAILURE): ICD-10-CM

## 2024-05-14 DIAGNOSIS — G35 MULTIPLE SCLEROSIS (MULTI): ICD-10-CM

## 2024-05-14 DIAGNOSIS — R56.9 SEIZURE (MULTI): ICD-10-CM

## 2024-05-14 DIAGNOSIS — R47.01 APHASIA: Primary | ICD-10-CM

## 2024-05-14 DIAGNOSIS — R00.1 BRADYCARDIA: ICD-10-CM

## 2024-05-14 DIAGNOSIS — I50.43 CHF (CONGESTIVE HEART FAILURE), NYHA CLASS I, ACUTE ON CHRONIC, COMBINED (MULTI): ICD-10-CM

## 2024-05-14 DIAGNOSIS — I73.9 PERIPHERAL VASCULAR DISEASE, UNSPECIFIED (CMS-HCC): ICD-10-CM

## 2024-05-14 DIAGNOSIS — R41.3 MEMORY LOSS: ICD-10-CM

## 2024-05-14 DIAGNOSIS — R50.9 FEVER, UNSPECIFIED FEVER CAUSE: ICD-10-CM

## 2024-05-14 LAB
ALBUMIN SERPL BCP-MCNC: 4.3 G/DL (ref 3.4–5)
ALP SERPL-CCNC: 93 U/L (ref 33–136)
ALT SERPL W P-5'-P-CCNC: 11 U/L (ref 7–45)
ANION GAP SERPL CALC-SCNC: 13 MMOL/L (ref 10–20)
APTT PPP: 30 SECONDS (ref 27–38)
AST SERPL W P-5'-P-CCNC: 12 U/L (ref 9–39)
BASOPHILS # BLD AUTO: 0.04 X10*3/UL (ref 0–0.1)
BASOPHILS NFR BLD AUTO: 0.3 %
BILIRUB SERPL-MCNC: 0.7 MG/DL (ref 0–1.2)
BNP SERPL-MCNC: 112 PG/ML (ref 0–99)
BUN SERPL-MCNC: 15 MG/DL (ref 6–23)
CALCIUM SERPL-MCNC: 9 MG/DL (ref 8.6–10.3)
CARDIAC TROPONIN I PNL SERPL HS: 6 NG/L (ref 0–13)
CHLORIDE SERPL-SCNC: 104 MMOL/L (ref 98–107)
CHOLEST SERPL-MCNC: 159 MG/DL (ref 0–199)
CHOLESTEROL/HDL RATIO: 3
CO2 SERPL-SCNC: 27 MMOL/L (ref 21–32)
CREAT SERPL-MCNC: 0.66 MG/DL (ref 0.5–1.05)
EGFRCR SERPLBLD CKD-EPI 2021: >90 ML/MIN/1.73M*2
EOSINOPHIL # BLD AUTO: 0 X10*3/UL (ref 0–0.7)
EOSINOPHIL NFR BLD AUTO: 0 %
ERYTHROCYTE [DISTWIDTH] IN BLOOD BY AUTOMATED COUNT: 12 % (ref 11.5–14.5)
GLUCOSE BLD MANUAL STRIP-MCNC: 140 MG/DL (ref 74–99)
GLUCOSE BLD MANUAL STRIP-MCNC: 93 MG/DL (ref 74–99)
GLUCOSE BLD MANUAL STRIP-MCNC: 99 MG/DL (ref 74–99)
GLUCOSE SERPL-MCNC: 118 MG/DL (ref 74–99)
HCT VFR BLD AUTO: 41.7 % (ref 36–46)
HDLC SERPL-MCNC: 52.5 MG/DL
HGB BLD-MCNC: 14.6 G/DL (ref 12–16)
IMM GRANULOCYTES # BLD AUTO: 0.08 X10*3/UL (ref 0–0.7)
IMM GRANULOCYTES NFR BLD AUTO: 0.6 % (ref 0–0.9)
INR PPP: 1 (ref 0.9–1.1)
LACTATE SERPL-SCNC: 1 MMOL/L (ref 0.4–2)
LDLC SERPL CALC-MCNC: 97 MG/DL
LYMPHOCYTES # BLD AUTO: 0.48 X10*3/UL (ref 1.2–4.8)
LYMPHOCYTES NFR BLD AUTO: 3.6 %
MCH RBC QN AUTO: 32.2 PG (ref 26–34)
MCHC RBC AUTO-ENTMCNC: 35 G/DL (ref 32–36)
MCV RBC AUTO: 92 FL (ref 80–100)
MONOCYTES # BLD AUTO: 0.53 X10*3/UL (ref 0.1–1)
MONOCYTES NFR BLD AUTO: 4 %
MRSA DNA SPEC QL NAA+PROBE: NOT DETECTED
NEUTROPHILS # BLD AUTO: 12.09 X10*3/UL (ref 1.2–7.7)
NEUTROPHILS NFR BLD AUTO: 91.5 %
NON HDL CHOLESTEROL: 107 MG/DL (ref 0–149)
NRBC BLD-RTO: 0 /100 WBCS (ref 0–0)
PLATELET # BLD AUTO: 230 X10*3/UL (ref 150–450)
POTASSIUM SERPL-SCNC: 4.3 MMOL/L (ref 3.5–5.3)
PROT SERPL-MCNC: 6.8 G/DL (ref 6.4–8.2)
PROTHROMBIN TIME: 11.4 SECONDS (ref 9.8–12.8)
RBC # BLD AUTO: 4.53 X10*6/UL (ref 4–5.2)
SODIUM SERPL-SCNC: 140 MMOL/L (ref 136–145)
TRIGL SERPL-MCNC: 46 MG/DL (ref 0–149)
VLDL: 9 MG/DL (ref 0–40)
WBC # BLD AUTO: 13.2 X10*3/UL (ref 4.4–11.3)

## 2024-05-14 PROCEDURE — 83605 ASSAY OF LACTIC ACID: CPT | Performed by: STUDENT IN AN ORGANIZED HEALTH CARE EDUCATION/TRAINING PROGRAM

## 2024-05-14 PROCEDURE — 70450 CT HEAD/BRAIN W/O DYE: CPT

## 2024-05-14 PROCEDURE — 36415 COLL VENOUS BLD VENIPUNCTURE: CPT | Performed by: STUDENT IN AN ORGANIZED HEALTH CARE EDUCATION/TRAINING PROGRAM

## 2024-05-14 PROCEDURE — 70450 CT HEAD/BRAIN W/O DYE: CPT | Performed by: RADIOLOGY

## 2024-05-14 PROCEDURE — 82947 ASSAY GLUCOSE BLOOD QUANT: CPT

## 2024-05-14 PROCEDURE — 80053 COMPREHEN METABOLIC PANEL: CPT | Performed by: STUDENT IN AN ORGANIZED HEALTH CARE EDUCATION/TRAINING PROGRAM

## 2024-05-14 PROCEDURE — 85730 THROMBOPLASTIN TIME PARTIAL: CPT | Performed by: STUDENT IN AN ORGANIZED HEALTH CARE EDUCATION/TRAINING PROGRAM

## 2024-05-14 PROCEDURE — 70496 CT ANGIOGRAPHY HEAD: CPT | Performed by: RADIOLOGY

## 2024-05-14 PROCEDURE — 71045 X-RAY EXAM CHEST 1 VIEW: CPT | Mod: FOREIGN READ | Performed by: RADIOLOGY

## 2024-05-14 PROCEDURE — 2020000001 HC ICU ROOM DAILY

## 2024-05-14 PROCEDURE — 85610 PROTHROMBIN TIME: CPT | Performed by: STUDENT IN AN ORGANIZED HEALTH CARE EDUCATION/TRAINING PROGRAM

## 2024-05-14 PROCEDURE — 83880 ASSAY OF NATRIURETIC PEPTIDE: CPT

## 2024-05-14 PROCEDURE — 70496 CT ANGIOGRAPHY HEAD: CPT

## 2024-05-14 PROCEDURE — 93005 ELECTROCARDIOGRAM TRACING: CPT

## 2024-05-14 PROCEDURE — 84484 ASSAY OF TROPONIN QUANT: CPT | Performed by: STUDENT IN AN ORGANIZED HEALTH CARE EDUCATION/TRAINING PROGRAM

## 2024-05-14 PROCEDURE — 2500000004 HC RX 250 GENERAL PHARMACY W/ HCPCS (ALT 636 FOR OP/ED)

## 2024-05-14 PROCEDURE — 73630 X-RAY EXAM OF FOOT: CPT | Mod: RT

## 2024-05-14 PROCEDURE — 2550000001 HC RX 255 CONTRASTS: Performed by: STUDENT IN AN ORGANIZED HEALTH CARE EDUCATION/TRAINING PROGRAM

## 2024-05-14 PROCEDURE — 71045 X-RAY EXAM CHEST 1 VIEW: CPT

## 2024-05-14 PROCEDURE — 82947 ASSAY GLUCOSE BLOOD QUANT: CPT | Mod: 91

## 2024-05-14 PROCEDURE — 70498 CT ANGIOGRAPHY NECK: CPT | Performed by: RADIOLOGY

## 2024-05-14 PROCEDURE — 73610 X-RAY EXAM OF ANKLE: CPT | Mod: RIGHT SIDE | Performed by: STUDENT IN AN ORGANIZED HEALTH CARE EDUCATION/TRAINING PROGRAM

## 2024-05-14 PROCEDURE — 3E03317 INTRODUCTION OF OTHER THROMBOLYTIC INTO PERIPHERAL VEIN, PERCUTANEOUS APPROACH: ICD-10-PCS | Performed by: PSYCHIATRY & NEUROLOGY

## 2024-05-14 PROCEDURE — 87641 MR-STAPH DNA AMP PROBE: CPT

## 2024-05-14 PROCEDURE — 80061 LIPID PANEL: CPT

## 2024-05-14 PROCEDURE — 2500000004 HC RX 250 GENERAL PHARMACY W/ HCPCS (ALT 636 FOR OP/ED): Mod: JW | Performed by: STUDENT IN AN ORGANIZED HEALTH CARE EDUCATION/TRAINING PROGRAM

## 2024-05-14 PROCEDURE — 73610 X-RAY EXAM OF ANKLE: CPT | Mod: RT

## 2024-05-14 PROCEDURE — 70450 CT HEAD/BRAIN W/O DYE: CPT | Performed by: STUDENT IN AN ORGANIZED HEALTH CARE EDUCATION/TRAINING PROGRAM

## 2024-05-14 PROCEDURE — 96375 TX/PRO/DX INJ NEW DRUG ADDON: CPT

## 2024-05-14 PROCEDURE — 85025 COMPLETE CBC W/AUTO DIFF WBC: CPT | Performed by: STUDENT IN AN ORGANIZED HEALTH CARE EDUCATION/TRAINING PROGRAM

## 2024-05-14 PROCEDURE — 73630 X-RAY EXAM OF FOOT: CPT | Mod: RIGHT SIDE | Performed by: STUDENT IN AN ORGANIZED HEALTH CARE EDUCATION/TRAINING PROGRAM

## 2024-05-14 PROCEDURE — 96374 THER/PROPH/DIAG INJ IV PUSH: CPT

## 2024-05-14 PROCEDURE — 99291 CRITICAL CARE FIRST HOUR: CPT | Mod: 25 | Performed by: STUDENT IN AN ORGANIZED HEALTH CARE EDUCATION/TRAINING PROGRAM

## 2024-05-14 PROCEDURE — 83036 HEMOGLOBIN GLYCOSYLATED A1C: CPT

## 2024-05-14 PROCEDURE — 99223 1ST HOSP IP/OBS HIGH 75: CPT | Performed by: PSYCHIATRY & NEUROLOGY

## 2024-05-14 PROCEDURE — 87040 BLOOD CULTURE FOR BACTERIA: CPT | Mod: 91,PARLAB | Performed by: STUDENT IN AN ORGANIZED HEALTH CARE EDUCATION/TRAINING PROGRAM

## 2024-05-14 RX ORDER — HYDRALAZINE HYDROCHLORIDE 25 MG/1
25 TABLET, FILM COATED ORAL EVERY 6 HOURS PRN
Status: DISCONTINUED | OUTPATIENT
Start: 2024-05-16 | End: 2024-05-17 | Stop reason: HOSPADM

## 2024-05-14 RX ORDER — VANCOMYCIN HYDROCHLORIDE 1 G/20ML
INJECTION, POWDER, LYOPHILIZED, FOR SOLUTION INTRAVENOUS DAILY PRN
Status: DISCONTINUED | OUTPATIENT
Start: 2024-05-14 | End: 2024-05-15

## 2024-05-14 RX ORDER — LEVETIRACETAM 500 MG/1
500 TABLET ORAL 2 TIMES DAILY
Status: DISCONTINUED | OUTPATIENT
Start: 2024-05-14 | End: 2024-05-14

## 2024-05-14 RX ORDER — LABETALOL HYDROCHLORIDE 5 MG/ML
10 INJECTION, SOLUTION INTRAVENOUS EVERY 10 MIN PRN
Status: ACTIVE | OUTPATIENT
Start: 2024-05-14 | End: 2024-05-16

## 2024-05-14 RX ORDER — DEXTROSE, SODIUM CHLORIDE, SODIUM LACTATE, POTASSIUM CHLORIDE, AND CALCIUM CHLORIDE 5; .6; .31; .03; .02 G/100ML; G/100ML; G/100ML; G/100ML; G/100ML
75 INJECTION, SOLUTION INTRAVENOUS CONTINUOUS
Status: DISCONTINUED | OUTPATIENT
Start: 2024-05-14 | End: 2024-05-15

## 2024-05-14 RX ORDER — HYDRALAZINE HYDROCHLORIDE 20 MG/ML
10 INJECTION INTRAMUSCULAR; INTRAVENOUS
Status: ACTIVE | OUTPATIENT
Start: 2024-05-14 | End: 2024-05-16

## 2024-05-14 RX ORDER — ACETAMINOPHEN 325 MG/1
650 TABLET ORAL ONCE
Status: DISCONTINUED | OUTPATIENT
Start: 2024-05-14 | End: 2024-05-16

## 2024-05-14 RX ORDER — LEVETIRACETAM 10 MG/ML
1000 INJECTION INTRAVASCULAR ONCE
Status: COMPLETED | OUTPATIENT
Start: 2024-05-14 | End: 2024-05-14

## 2024-05-14 RX ORDER — ATORVASTATIN CALCIUM 80 MG/1
80 TABLET, FILM COATED ORAL NIGHTLY
Status: DISCONTINUED | OUTPATIENT
Start: 2024-05-14 | End: 2024-05-17 | Stop reason: HOSPADM

## 2024-05-14 RX ORDER — VIT C/E/ZN/COPPR/LUTEIN/ZEAXAN 250MG-90MG
1 CAPSULE ORAL DAILY
COMMUNITY

## 2024-05-14 RX ORDER — LEVETIRACETAM 5 MG/ML
500 INJECTION INTRAVASCULAR EVERY 12 HOURS
Status: DISCONTINUED | OUTPATIENT
Start: 2024-05-15 | End: 2024-05-16

## 2024-05-14 RX ORDER — LORAZEPAM 2 MG/ML
INJECTION INTRAMUSCULAR
Status: DISPENSED
Start: 2024-05-14 | End: 2024-05-15

## 2024-05-14 RX ADMIN — SODIUM CHLORIDE, SODIUM LACTATE, POTASSIUM CHLORIDE, CALCIUM CHLORIDE AND DEXTROSE MONOHYDRATE 75 ML/HR: 5; 600; 310; 30; 20 INJECTION, SOLUTION INTRAVENOUS at 17:36

## 2024-05-14 RX ADMIN — IOHEXOL 75 ML: 350 INJECTION, SOLUTION INTRAVENOUS at 13:09

## 2024-05-14 RX ADMIN — Medication 15 MG: at 13:16

## 2024-05-14 RX ADMIN — VANCOMYCIN HYDROCHLORIDE 1750 MG: 1 INJECTION, POWDER, LYOPHILIZED, FOR SOLUTION INTRAVENOUS at 18:18

## 2024-05-14 RX ADMIN — PIPERACILLIN SODIUM AND TAZOBACTAM SODIUM 3.38 G: 3; .375 INJECTION, SOLUTION INTRAVENOUS at 21:46

## 2024-05-14 RX ADMIN — PIPERACILLIN SODIUM AND TAZOBACTAM SODIUM 3.38 G: 3; .375 INJECTION, SOLUTION INTRAVENOUS at 15:08

## 2024-05-14 RX ADMIN — LEVETIRACETAM 1000 MG: 10 INJECTION INTRAVENOUS at 14:29

## 2024-05-14 SDOH — SOCIAL STABILITY: SOCIAL INSECURITY: HAVE YOU HAD THOUGHTS OF HARMING ANYONE ELSE?: UNABLE TO ASSESS

## 2024-05-14 SDOH — SOCIAL STABILITY: SOCIAL INSECURITY: WERE YOU ABLE TO COMPLETE ALL THE BEHAVIORAL HEALTH SCREENINGS?: NO

## 2024-05-14 ASSESSMENT — LIFESTYLE VARIABLES
HOW MANY STANDARD DRINKS CONTAINING ALCOHOL DO YOU HAVE ON A TYPICAL DAY: PATIENT UNABLE TO ANSWER
PRESCIPTION_ABUSE_PAST_12_MONTHS: NO
AUDIT-C TOTAL SCORE: -1
HOW OFTEN DO YOU HAVE A DRINK CONTAINING ALCOHOL: PATIENT UNABLE TO ANSWER
HOW OFTEN DO YOU HAVE 6 OR MORE DRINKS ON ONE OCCASION: PATIENT UNABLE TO ANSWER
AUDIT-C TOTAL SCORE: -1
SUBSTANCE_ABUSE_PAST_12_MONTHS: NO
SKIP TO QUESTIONS 9-10: 0

## 2024-05-14 ASSESSMENT — COGNITIVE AND FUNCTIONAL STATUS - GENERAL
DRESSING REGULAR LOWER BODY CLOTHING: TOTAL
MOVING FROM LYING ON BACK TO SITTING ON SIDE OF FLAT BED WITH BEDRAILS: TOTAL
DAILY ACTIVITIY SCORE: 6
PATIENT BASELINE BEDBOUND: UNABLE TO ASSESS AT THIS TIME
EATING MEALS: TOTAL
STANDING UP FROM CHAIR USING ARMS: TOTAL
WALKING IN HOSPITAL ROOM: TOTAL
CLIMB 3 TO 5 STEPS WITH RAILING: TOTAL
PERSONAL GROOMING: TOTAL
TOILETING: TOTAL
MOBILITY SCORE: 6
DRESSING REGULAR UPPER BODY CLOTHING: TOTAL
MOVING TO AND FROM BED TO CHAIR: TOTAL
HELP NEEDED FOR BATHING: TOTAL
TURNING FROM BACK TO SIDE WHILE IN FLAT BAD: TOTAL

## 2024-05-14 ASSESSMENT — ACTIVITIES OF DAILY LIVING (ADL)
DRESSING YOURSELF: UNABLE TO ASSESS
HEARING - RIGHT EAR: FUNCTIONAL
WALKS IN HOME: UNABLE TO ASSESS
GROOMING: UNABLE TO ASSESS
TOILETING: UNABLE TO ASSESS
JUDGMENT_ADEQUATE_SAFELY_COMPLETE_DAILY_ACTIVITIES: UNABLE TO ASSESS
FEEDING YOURSELF: UNABLE TO ASSESS
BATHING: UNABLE TO ASSESS
PATIENT'S MEMORY ADEQUATE TO SAFELY COMPLETE DAILY ACTIVITIES?: UNABLE TO ASSESS
HEARING - LEFT EAR: FUNCTIONAL
ASSISTIVE_DEVICE: WALKER;CANE;WHEELCHAIR
LACK_OF_TRANSPORTATION: PATIENT UNABLE TO ANSWER
ADEQUATE_TO_COMPLETE_ADL: NO

## 2024-05-14 NOTE — CARE PLAN
The patient's goals for the shift include      The clinical goals for the shift include  stable neuro and hemodynammic status    Over the shift, the patient vs remained stable . Neuro status improved as pt became more awae

## 2024-05-14 NOTE — PROGRESS NOTES
PHARMACY STROKE RESPONSE      Patient Name: Akiko Peng  MRN: [id number]  Location: [place] [address]    Patient Weight (kg):   Wt Readings from Last 1 Encounters:   05/14/24 60.5 kg (133 lb 6.1 oz)        An acute Brain Attack has been activated, pharmacy participated in multidisciplinary team bedside response for Akiko Peng.  Contraindications for fibrinolytic therapy have been reviewed by pharmacy and any issues relating to medication therapy have been discussed directly with the provider(s) caring for this patient.     Pharmacy aided in the procurement, preparation, facilitation, bedside response for fibrinolytic therapy. Patient did fibrinolysis. Tenecteplase (TNKase) Dose administered:  15 mg.    Dose was administered at 13:16 5/14/24.      Orders Placed This Encounter      acetaminophen (Tylenol) tablet 650 mg      iohexol (OMNIPaque) 350 mg iodine/mL solution 75 mL      piperacillin-tazobactam-dextrose (Zosyn) IV 3.375 g      tenecteplase (TNKASE) injection for STROKE 15 mg      Thank you for allowing me to take part in the care of this patient.     Ammon Rao, PharmD  5/14/2024  1:27 PM         References:    [facility] Stroke Tools   [facility] IV Thrombolysis Checklist

## 2024-05-14 NOTE — NURSING NOTE
"Pt arrived to unit at 1530 pt does not follow commands or respond to questions, every answer is \"No\". Pt does not give hand grasps, will not smile or stick out tongue . Unable to assess strength or dorsi/plantar flex of feet. Mottled skin noted to right foot to ankle, reddish purple in appearance, Rfoot cooler than left and doppled pedal and post tibial pulses. R foot warm, pale with palpable pulses.Dr Patino at bedside and updated on status.   1550- Pt  at bedside and answering questions for admission and MRI questionere  "

## 2024-05-14 NOTE — CONSULTS
Inpatient consult to Neurology  Consult performed by: Cyril Cartagena MD  Consult ordered by: Danica Patino MD          History Of Present Illness  Akiko Peng is a 68 y.o. female presenting with right-sided weakness and aphasia.  The patient was in her usual state control about 1 hour prior to arrival to the ER.  The patient does have a baseline neurological status of right upper extremity weakness and bilateral lower extremity weakness.  The spouse reports that the patient has been incontinent of urine.  The patient was taken by squad to the ER when his spouse noted that the patient was not talking and not moving her right upper extremity.  In the ER, the patient had a stroke team called and her NIH stroke scale score was 14.  The patient had a CT scan of the brain done that was negative for any acute process.  The patient also had a CT angiogram of the neck and brain that showed no significant stenosis or intracranial cutoff.  Informed consent was given to the patient's  and TNK was administered.  The patient apparently had a seizure in the emergency room.  He had a repeat CT scan of the brain that showed no acute process.  The patient was started on Keppra 500 mg IV every 12 hours.      Past Medical History  Past Medical History:   Diagnosis Date    Multiple sclerosis (Multi)     History of multiple sclerosis     Surgical History  Past Surgical History:   Procedure Laterality Date    MR HEAD ANGIO WO IV CONTRAST  9/21/2020    MR HEAD ANGIO WO IV CONTRAST 9/21/2020 [facility]    MR HEAD ANGIO WO IV CONTRAST  10/24/2022    MR HEAD ANGIO WO IV CONTRAST 10/24/2022 [facility]    MR NECK ANGIO WO IV CONTRAST  9/21/2020    MR NECK ANGIO WO IV CONTRAST 9/21/2020 [facility]    MR NECK ANGIO WO IV CONTRAST  10/24/2022    MR NECK ANGIO WO IV CONTRAST 10/24/2022 [facility]    OTHER SURGICAL HISTORY  11/26/2019    Tonsillectomy    OTHER SURGICAL HISTORY  11/26/2019     "Hysterectomy     Social History  Social History     Tobacco Use    Smoking status: Never    Smokeless tobacco: Never   Vaping Use    Vaping status: Never Used   Substance Use Topics    Alcohol use: Never    Drug use: Never     Allergies  Patient has no known allergies.  Medications Prior to Admission   Medication Sig Dispense Refill Last Dose    atorvastatin (Lipitor) 40 mg tablet Take 1 tablet (40 mg) by mouth once daily. 30 tablet 0 Unknown    cholecalciferol (Vitamin D-3) 25 MCG (1000 UT) capsule Take 1 capsule (25 mcg) by mouth once daily.   Unknown    ergocalciferol (Vitamin D-2) 1.25 MG (79558 UT) capsule Take 1 capsule (1,250 mcg) by mouth 1 (one) time per week. 4 capsule 11 Unknown    levETIRAcetam (Keppra) 500 mg tablet Take 1 tablet (500 mg) by mouth 2 times a day. 180 tablet 3 Unknown    multivitamin with minerals tablet Take 1 tablet by mouth once daily.   Unknown    thiamine (Vitamin B-1) 100 mg tablet Take 1 tablet (100 mg) by mouth once daily. 30 tablet 11 Unknown       Review of Systems   Reason unable to perform ROS: The patient is currently aphasic.     Family history  The patient's family history is significant for ALS and MS.    Neurological Exam  Physical Exam  Last Recorded Vitals  Blood pressure 120/55, pulse 60, temperature 37.2 °C (99 °F), temperature source Temporal, resp. rate 19, height 1.778 m (5' 10\"), weight 60.5 kg (133 lb 6.1 oz), SpO2 100%.    The patient is a well developed, [well-nourished] [female] in no acute distress.    The patient's funduscopic examination shows no papilledema bilaterally.    The patient's extremity examination shows that the pulses are 2+ in the upper and lower extremities bilaterally and there is no edema in the lower extremities bilaterally.    The patient's mental status testing is alert and oriented ×0 with severe confusion but with a severe aphasia and no dysarthria.  The patient is perseverating and occasionally follows simple commands but is unable to " name any objects. The patient's memory testing, fund of knowledge and concentration are all poor.  The patient's cranial nerves 2, 3, 4, 5, 6, 7, 8, 9, 10, 11 and 12 are all within normal limits.  The patient's motor testing shows increased tone in the upper and lower extremities bilaterally.  The patient has 4+5 strength in the left upper and left lower extremity and 3-/5 strength in the right upper and right lower extremities.  The patient's sensory testing is intact to light touch in the upper and lower extremities bilaterally.  The patient's cerebellar testing is limited in the upper and lower extremities bilaterally.  The patient's station and gait were not tested.  The patient's reflexes are 1+ in the upper and lower extremities and symmetrical.    Relevant Results        NIH Stroke Scale  1A. Level of Consciousness: Alert, Keenly Responsive  1B. Ask Month and Age: No Questions Right  1C. Blink Eyes & Squeeze Hands: Performs 1 Task  2. Best Gaze: Normal  3. Visual: No Visual Loss  4. Facial Palsy: Normal Symmetrical Movements  5A. Motor - Left Arm: No Drift  5B. Motor - Right Arm: No Effort Against Gravity  6A. Motor - Left Leg: No Drift  6B. Motor - Right Leg: No Effort Against [place]  7. Limb Ataxia: Absent  8. Sensory Loss: Normal  9. Best Language: Severe Aphasia  10. Dysarthria: Normal  11. Extinction and Inattention: No Abnormality  NIH Stroke Scale: 11           Tabitha Coma Scale  Best Eye Response: Spontaneous  Best Verbal Response: Inappropriate words  Best Motor Response: Follows commands  Tabitha Coma Scale Score: 13            Scheduled medications  acetaminophen, 650 mg, oral, Once  atorvastatin, 80 mg, oral, Nightly  [START ON 5/15/2024] levETIRAcetam, 500 mg, intravenous, q12h  LORazepam, , ,   piperacillin-tazobactam, 3.375 g, intravenous, q6h  vancomycin, 1,750 mg, intravenous, q24h      Continuous medications  dextrose 5 % and lactated Ringer's, 75 mL/hr      PRN medications  PRN  medications: hydrALAZINE **FOLLOWED BY** [START ON 5/16/2024] hydrALAZINE, labetaloL, LORazepam, oxygen, vancomycin    Results for orders placed or performed during the hospital encounter of 05/14/24 (from the past 96 hour(s))   POCT GLUCOSE   Result Value Ref Range    POCT Glucose 140 (H) 74 - 99 mg/dL   CBC and Auto Differential   Result Value Ref Range    WBC 13.2 (H) 4.4 - 11.3 x10*3/uL    nRBC 0.0 0.0 - 0.0 /100 WBCs    RBC 4.53 4.00 - 5.20 x10*6/uL    Hemoglobin 14.6 12.0 - 16.0 g/dL    Hematocrit 41.7 36.0 - 46.0 %    MCV 92 80 - 100 fL    MCH 32.2 26.0 - 34.0 pg    MCHC 35.0 32.0 - 36.0 g/dL    RDW 12.0 11.5 - 14.5 %    Platelets 230 150 - 450 x10*3/uL    Neutrophils % 91.5 40.0 - 80.0 %    Immature Granulocytes %, Automated 0.6 0.0 - 0.9 %    Lymphocytes % 3.6 13.0 - 44.0 %    Monocytes % 4.0 2.0 - 10.0 %    Eosinophils % 0.0 0.0 - 6.0 %    Basophils % 0.3 0.0 - 2.0 %    Neutrophils Absolute 12.09 (H) 1.20 - 7.70 x10*3/uL    Immature Granulocytes Absolute, Automated 0.08 0.00 - 0.70 x10*3/uL    Lymphocytes Absolute 0.48 (L) 1.20 - 4.80 x10*3/uL    Monocytes Absolute 0.53 0.10 - 1.00 x10*3/uL    Eosinophils Absolute 0.00 0.00 - 0.70 x10*3/uL    Basophils Absolute 0.04 0.00 - 0.10 x10*3/uL   Comprehensive metabolic panel   Result Value Ref Range    Glucose 118 (H) 74 - 99 mg/dL    Sodium 140 136 - 145 mmol/L    Potassium 4.3 3.5 - 5.3 mmol/L    Chloride 104 98 - 107 mmol/L    Bicarbonate 27 21 - 32 mmol/L    Anion Gap 13 10 - 20 mmol/L    Urea Nitrogen 15 6 - 23 mg/dL    Creatinine 0.66 0.50 - 1.05 mg/dL    eGFR >90 >60 mL/min/1.73m*2    Calcium 9.0 8.6 - 10.3 mg/dL    Albumin 4.3 3.4 - 5.0 g/dL    Alkaline Phosphatase 93 33 - 136 U/L    Total Protein 6.8 6.4 - 8.2 g/dL    AST 12 9 - 39 U/L    Bilirubin, Total 0.7 0.0 - 1.2 mg/dL    ALT 11 7 - 45 U/L   Troponin I, High Sensitivity   Result Value Ref Range    Troponin I, High Sensitivity 6 0 - 13 ng/L   Protime-INR   Result Value Ref Range    Protime 11.4 9.8  - 12.8 seconds    INR 1.0 0.9 - 1.1   APTT   Result Value Ref Range    aPTT 30 27 - 38 seconds   Lipid Panel   Result Value Ref Range    Cholesterol 159 0 - 199 mg/dL    HDL-Cholesterol 52.5 mg/dL    Cholesterol/HDL Ratio 3.0     LDL Calculated 97 <=99 mg/dL    VLDL 9 0 - 40 mg/dL    Triglycerides 46 0 - 149 mg/dL    Non HDL Cholesterol 107 0 - 149 mg/dL   B-Type Natriuretic Peptide   Result Value Ref Range     (H) 0 - 99 pg/mL   Lactate   Result Value Ref Range    Lactate 1.0 0.4 - 2.0 mmol/L          I have personally reviewed the following imaging results CT head wo IV contrast    Result Date: 5/14/2024  Interpreted By:  Jac Saba, STUDY: CT HEAD WO IV CONTRAST; 5/14/2024 2:13 pm   INDICATION: Signs/Symptoms:Seizure status post TNK. Generalized weakness. Localized right upper extremity weakness with aphasia. Last known well 1 hour prior to arrival. Patient has a history of MS. Patient has already undergone brain attack protocol including CT angiogram of the head and neck.   COMPARISON: Head CT 05/14/2024 at 1256 hours   ACCESSION NUMBER(S): [id number]   ORDERING CLINICIAN: DOMINGO CABRERA   TECHNIQUE: A helical acquisition data was obtained.   One or more of the following dose reduction techniques were used: Automated exposure control Adjustment of the mA and/or kV according to patient size, and/or use of iterative reconstruction technique.   FINDINGS: Intracranial findings: There is no evidence for intracranial hemorrhage or mass effect. There is residual intravenous contrast material enhancing the cerebellar tentorium, falx, and vascular spaces. Age-related atrophy is present. Periventricular white matter hypodensity is present, most consistent with age-related change and/or white matter ischemic disease. Some of the white matter changes are also likely related to the patient's underlying history of multiple sclerosis.   Paranasal sinuses and temporal bone findings:  The visualized portions of  the paranasal sinuses, mastoid air cells, and middle ear cavities are clear.   Orbital findings: The visualized portions of the orbits are unremarkable.       No acute intracranial pathologic findings are identified. Age-related intracranial findings are present along with findings likely related to the patient's underlying multiple sclerosis. There is no interval change when compared to the previous examination aside from the postcontrast enhancement findings.   MACRO: none   Signed by: Jac Saba 5/14/2024 2:25 PM Dictation workstation:   [id number]    XR chest 1 view    Result Date: 5/14/2024  STUDY: Chest Radiograph;  05/14/2024 1:36PM INDICATION: Altered mental status. COMPARISON: XR Chest 02/09/2024 ACCESSION NUMBER(S): [id number] ORDERING CLINICIAN: DOMINGO CABRERA TECHNIQUE:  Frontal chest (two images) was obtained at 13:35 hours. FINDINGS: CARDIOMEDIASTINAL SILHOUETTE: Cardiomediastinal silhouette is normal in size and configuration.  LUNGS: Emphysematous changes noted.  Lungs otherwise clear.  ABDOMEN: No remarkable upper abdominal findings.  BONES: No acute osseous changes.    No acute process. Signed by Jens Christine MD    CT brain attack angio head and neck W and WO IV contrast    Result Date: 5/14/2024  Interpreted By:  Lady Ma, STUDY: CT BRAIN ATTACK ANGIO HEAD AND NECK W AND WO IV CONTRAST;  5/14/2024 1:08 pm   INDICATION: Signs/Symptoms:CVA.   COMPARISON: Same day unenhanced head CT which is reported separately.   ACCESSION NUMBER(S): [id number]   ORDERING CLINICIAN: DOMINGO CABRERA   TECHNIQUE: 75 mL Omnipaque 350 was administered intravenously and axial images of the head and neck were acquired.  Coronal, sagittal, and 3-D reconstructions were provided for review. 3D reconstructions were performed on an independent workstation.   FINDINGS:     CTA HEAD FINDINGS:   Anterior circulation: The intracranial segments of the internal carotid arteries are patent. There are very  small focal outpouchings projecting inferiorly from the communicating segments bilaterally measuring less than 2 mm.   Posterior circulation: The V4 segment on the right is dominant and the left is diminutive in caliber. The basilar artery and proximal PCAs are patent.   CTA NECK FINDINGS:   The aortic arch and arch vessels are degraded by artifact.   Carotid vessels:   The proximal common carotid arteries are degraded by artifact. They otherwise appear patent. The carotid bifurcations and cervical segments of the ICAs are somewhat degraded by artifact and motion but otherwise demonstrate no measurable stenosis.   Vertebral vessels:   The right vertebral artery is dominant. The distal V2 and V3 segments are somewhat degraded by artifact. Otherwise, they appear to be patent.   There is nonspecific apical pleural thickening and nodularity.   There are multilevel degenerative changes of the cervical spine with associated central canal and neuroforaminal stenosis.           No evidence for significant stenosis of the cervical vessels.   No evidence for significant stenosis or large branch vessel cutoffs of the intracranial vessels. Relative paucity of distal MCA branches on the left compared with right on the volumetric reformats is not confirmed on the source images and therefore favored to be due to technique/artifact.   Small focal outpouchings projecting inferiorly from the communicating segments of the ICAs are too small to characterize and may correspond to the origins of otherwise poorly seen branch vessels or possibly tiny aneurysms.   MACRO: None   Signed by: Lady Ma 5/14/2024 1:26 PM Dictation workstation:   [id number]    CT brain attack head wo IV contrast    Result Date: 5/14/2024  Interpreted By:  Surjit Gimenez, STUDY: CT BRAIN ATTACK HEAD WO IV CONTRAST;  5/14/2024 12:56 pm   INDICATION: Signs/Symptoms:Stroke Evaluation.   COMPARISON: MRI 02/10/2024.   ACCESSION NUMBER(S): [id number]    ORDERING CLINICIAN: DOMINGO CABRERA   TECHNIQUE: Noncontrast axial CT scan of head was performed.   FINDINGS: Parenchyma: There is no intracranial hemorrhage. The grey-white differentiation is intact. There is no mass effect or midline shift.   CSF Spaces: The ventricles, sulci and basal cisterns are within normal limits for age.   Extra-Axial Fluid: No extraaxial fluid collection.   Calvarium: No acute fracture.   Paranasal sinuses: Bilateral maxillary and ethmoid mucosal thickening.   Mastoids: Clear.   Orbits: Normal.   Soft tissues: Unremarkable.       No acute intracranial abnormality.   MACRO: Surjit Gimenez discussed the significance and urgency of this critical finding by Epic secure chat with  DOMINGO CABRERA on 5/14/2024 at 1:00 pm.  (**-RCF-**) Findings:  See findings.   Signed by: Surjit Gimenez 5/14/2024 1:01 PM Dictation workstation:   [id number]    The patient had an echocardiogram this past February that showed an EF of 60 to 65% with a negative bubble study.  No clot was noted.    Assessment/Plan   Impression: The patient is 67-year-old female with history of MS who presents with the acute onset of difficulty with speech and right sided weakness.  The patient received TNK.  Her neurological examination is abnormal and noted above.  The differential diagnosis includes cerebral infarction, TIA, seizure and MS exacerbation.    Plan: The patient needs an MRI of the brain with and without contrast and an EEG.  The patient should continue Keppra 500 mg IV every 12 hours.  The patient should not drive until she has been seizure-free for 6 months.  The patient will need a CBC and liver function test every 6 months while on this medicine.  I did warn her of the possibility of sedation and personality changes with this medicine.  The patient needs a lipid panel and hemoglobin A1c.  The patient will need a Zio patch for 2 weeks.  The patient will need to be on Plavix 75 mg a day and aspirin 81 mg a day  for 21 days.  After 21 days, the Plavix can be discontinued and aspirin can be continued at 81 mg a day.  We will not start her antiplatelet therapy until 24 hours after TNK was administered.  The patient needs to continue stroke risk factor modification.   The patient needs a PT, OT, social service, rehab and speech therapy consult.  The patient needs DVT prophylaxis.  The patient needs neurochecks as per protocol.  I will send the note to Dr. Patino.  Thank you very much for sending me this very interesting consultation.  I discussed all these issues in detail with the patient and answered all their questions.  I will continue to follow the patient while they are in the hospital.  The patient needs follow-up with their primary care doctor within 2 weeks of discharge.  The patient will follow-up with Dr. Taylor in the office in 4 months as an outpatient.    Cyril Cartagena MD

## 2024-05-14 NOTE — H&P
SUBJECTIVE     HPI:  Akiko Peng is a 68 y.o. year old female with a history of multiple sclerosis, RA, seizures (on Keppra), recurrent UTI, who presents to [facility] on 5/14 for right-sided hemiplegia and aphasia.  Briefly, patient was recently discharged from this facility on 2/11/2024 for similar concerns of TIA who was evaluated thoroughly by neurology and all testing was found to be unremarkable and was discharged on ASA, statin.  On this presentation however  says that her last known well was 1 hour prior to arrival in the ED.  He confirms that patient has a history of frequent UTIs and has been incontinent of late.  Confirms that patient is compliant with Keppra at home.  Stroke team was called and NIH SS was scored at 14.  CT brain Noncon was negative for acute bleed.  CTA head/neck showed no significant stenosis.  Per patient history she is not on anticoagulation and  confirms.  It was decided that patient should receive TNK which was administered at 1316.  Later in the ED patient had a seizure.  Repeat CT was unremarkable.  IV Keppra 500 mg twice daily initiated.  Patient was admitted to ICU for further medical management and evaluation of suspected stroke versus seizure versus MS flare s/p TNK administration.    In the ED: 100.4 °F, 71 HR, 18 RR, 148/69, 98% room air.  CBC unremarkable.  Lipid panel unremarkable.  Lactate normal.  , troponin 6.  CBC remarkable for WBC 13.2.  CXR showing no acute processes.  Patient was given Tylenol for her fever and started on IV Zosyn.  Before Keppra administration IV Ativan was attempted.    During evaluation in the ICU, intensivist had conversation with  who decided to revoke CODE STATUS back to full code.  It was reported that patient was DNR/DNI prior to admission however given patient's age and ability to recover that she should have full resuscitation efforts.    Past Medical History:  Past Medical History:   Diagnosis Date    Multiple  sclerosis (Multi)     History of multiple sclerosis       Past Surgical History:  Past Surgical History:   Procedure Laterality Date    MR HEAD ANGIO WO IV CONTRAST  9/21/2020    MR HEAD ANGIO WO IV CONTRAST 9/21/2020 [facility]    MR HEAD ANGIO WO IV CONTRAST  10/24/2022    MR HEAD ANGIO WO IV CONTRAST 10/24/2022 [facility]    MR NECK ANGIO WO IV CONTRAST  9/21/2020    MR NECK ANGIO WO IV CONTRAST 9/21/2020 [facility]    MR NECK ANGIO WO IV CONTRAST  10/24/2022    MR NECK ANGIO WO IV CONTRAST 10/24/2022 [facility]    OTHER SURGICAL HISTORY  11/26/2019    Tonsillectomy    OTHER SURGICAL HISTORY  11/26/2019    Hysterectomy        Family History:  No family history on file.     Social History:   reports that she has never smoked. She has never used smokeless tobacco. She reports that she does not drink alcohol and does not use drugs.    OBJECTIVE     Vitals:    05/14/24 1415 05/14/24 1500 05/14/24 1515 05/14/24 1600   BP: 143/65  127/56    BP Location:       Pulse:       Resp: 20 20 20    Temp:    37.2 °C (99 °F)   TempSrc:    Temporal   SpO2: 99%      Weight:       Height:          Results from last 7 days   Lab Units 05/14/24  1308   WBC AUTO x10*3/uL 13.2*   HEMOGLOBIN g/dL 14.6   HEMATOCRIT % 41.7   PLATELETS AUTO x10*3/uL 230   NEUTROS PCT AUTO % 91.5   LYMPHS PCT AUTO % 3.6   MONOS PCT AUTO % 4.0   EOS PCT AUTO % 0.0     Results from last 7 days   Lab Units 05/14/24  1308   SODIUM mmol/L 140   POTASSIUM mmol/L 4.3   CHLORIDE mmol/L 104   CO2 mmol/L 27   BUN mg/dL 15   CREATININE mg/dL 0.66   CALCIUM mg/dL 9.0   PROTEIN TOTAL g/dL 6.8   BILIRUBIN TOTAL mg/dL 0.7   ALK PHOS U/L 93   ALT U/L 11   AST U/L 12   GLUCOSE mg/dL 118*     24hr Min/Max:  Temp  Min: 37.2 °C (99 °F)  Max: 38 °C (100.4 °F)  Pulse  Min: 71  Max: 71  BP  Min: 127/56  Max: 148/69  Resp  Min: 18  Max: 20  SpO2  Min: 98 %  Max: 99 %  LDA:      Vent settings:     Hemodynamic parameters for last 24 hours:    "      Intake/Output Summary (Last 24 hours) at 5/14/2024 1635  Last data filed at 5/14/2024 1440  Gross per 24 hour   Intake 100 ml   Output --   Net 100 ml     All other labs and Imaging have been personally reviewed.     Scheduled Medications  acetaminophen, 650 mg, oral, Once  atorvastatin, 80 mg, oral, Nightly  [START ON 5/15/2024] levETIRAcetam, 500 mg, intravenous, q12h  LORazepam, , ,   piperacillin-tazobactam, 3.375 g, intravenous, q6h  vancomycin, 1,750 mg, intravenous, q24h       Continuous Medications:   dextrose 5 % and lactated Ringer's, 75 mL/hr       Physical Exam    Constitutional: Well developed, no acute distress, perseverating on the word \"no\", does not follow commands  Eyes: EOMI, clear sclera  Respiratory/Thorax: normal breath sounds with good chest expansion  Cardiovascular: RRR, no murmurs  Gastrointestinal: Nondistended, soft, non-tender  Extremities: RLE appears to have mottled skin and slightly colder to touch, pulses appreciated via Doppler bilaterally  Psychological: Perseverating  Skin: Warm and dry    ASSESSMENT & PLAN     Neuro/Constitutional  #R-sided Hemiplegia and aphasia s/p TNK  #Suspect conversion disorder vs Seizure  #MS, chronic right-sided weakness  -Patient perseverating the word \"no\".  Able to move all 4 extremities however does not move RUE/RLE voluntarily.  Retracts to pain.  Additionally when testing stimuli she is able to use the words like \"hey\" and \"ouch\" however when you ask her other question she goes back to \"no\"  -  - states that when patient has MS flares she experiences right-sided weakness  PLAN:  -Monitor for ICU delirium & maintain sleep hygiene   -Neurochecks per stroke order set  -As needed hydralazine for BP's >180/>105  -Echo, EEG, MRI pending  -Repeat CT in 24 hours post TNK admin or if worsening NIHSS  -Continue IV Keppra 500 mg twice daily    Cardiovascular  #H/o TIA  PLAN:  -Continue home statin once cleared for p.o.  -Maintain BP parameters for " post TNK as above     Pulmonary  -No acute issues  -Satting well on room air, no indication of infectious origin  -CXR unremarkable in the ED    GI  -No acute issues  -NPO, starting D5 LR 75/h  -Pending SLP eval for oral intake    Renal  -No acute issues    Endocrine  -No acute issues    Heme/Onc  -No acute issues    ID  #Leukocytosis, fever  #UTI history  -Patient presenting with fever 100.4 with reported history of urinary incontinence.  Given TNK use cannot place [place], will attempt UA with external  PLAN:  -Placing external catheter for UA  -Tylenol as needed fever  -Vancomycin, Zosyn    ICU CHECK LIST  Antimicrobials: V/Z  Oxygen: RA  Feeding: N.p.o. pending SLP eval  Drips: --  Fluids: D5LR 75/h  Analgesia: --   Sedation: --  VTE ppx: Defer D/T TNK  GI ppx: --  Glycemic control: --  Bowel care: Defer until p.o.  Indwelling catheters: --  Lines: PIV x 1  Code Status: Full    Ariel Gregory,   PGY-1, Internal Medicine  Please SecureChat for any further questions  This is a preliminary note, please await attending attestation for final A/P

## 2024-05-14 NOTE — ED PROVIDER NOTES
EMERGENCY DEPARTMENT ENCOUNTER      Pt Name: Akiko Peng  MRN: [id number]  Birthdate 1955  Date of evaluation: 5/14/2024  Provider: Adams Corona DO    CHIEF COMPLAINT       Chief Complaint   Patient presents with    Stroke       HISTORY OF PRESENT ILLNESS    Akiko Peng is a 68 y.o. female who presents to the emergency department with EMS after spouse called for generalized weakness and weakness localized to the right upper extremity as well as aphasia.  Last known well 1 hour prior to arrival patient does have history of MS not atypical to have bilateral lower extremity weakness and flaccid paralysis of the right upper although aphasia is out of the usual.  Spouse reports patient has been incontinent as well does have frequent UTIs.  Not on anticoagulation no history of brain bleeds or recent surgeries no contraindications for TNK patient's spouse did consent.          Nursing Notes were reviewed.    REVIEW OF SYSTEMS     Review of systems is hard to obtain secondary to patient's mentation at this time and word finding difficulty.    PAST MEDICAL HISTORY     Past Medical History:   Diagnosis Date    Multiple sclerosis (Multi)     History of multiple sclerosis       SURGICAL HISTORY       Past Surgical History:   Procedure Laterality Date    MR HEAD ANGIO WO IV CONTRAST  9/21/2020    MR HEAD ANGIO WO IV CONTRAST 9/21/2020 [facility]    MR HEAD ANGIO WO IV CONTRAST  10/24/2022    MR HEAD ANGIO WO IV CONTRAST 10/24/2022 [facility]    MR NECK ANGIO WO IV CONTRAST  9/21/2020    MR NECK ANGIO WO IV CONTRAST 9/21/2020 [facility]    MR NECK ANGIO WO IV CONTRAST  10/24/2022    MR NECK ANGIO WO IV CONTRAST 10/24/2022 [facility]    OTHER SURGICAL HISTORY  11/26/2019    Tonsillectomy    OTHER SURGICAL HISTORY  11/26/2019    Hysterectomy       ALLERGIES     Patient has no known allergies.    FAMILY HISTORY     No family history on file.     SOCIAL HISTORY       Social History      Socioeconomic History    Marital status:      Spouse name: None    Number of children: None    Years of education: None    Highest education level: None   Occupational History    None   Tobacco Use    Smoking status: Never    Smokeless tobacco: Never   Vaping Use    Vaping status: Never Used   Substance and Sexual Activity    Alcohol use: Never    Drug use: Never    Sexual activity: Defer   Other Topics Concern    None   Social History Narrative    None     Social Determinants of Health     Financial Resource Strain: Patient Unable To Answer (5/14/2024)    Overall Financial Resource Strain (CARDIA)     Difficulty of Paying Living Expenses: Patient unable to answer   Food Insecurity: Not on file   Transportation Needs: Patient Unable To Answer (5/14/2024)    PRAPARE - Transportation     Lack of Transportation (Medical): Patient unable to answer     Lack of Transportation (Non-Medical): Patient unable to answer   Physical Activity: Not on file   Stress: Not on file   Social Connections: Not on file   Intimate Partner Violence: Not on file   Housing Stability: Patient Unable To Answer (5/14/2024)    Housing Stability Vital Sign     Unable to Pay for Housing in the Last Year: Patient unable to answer     Number of Places Lived in the Last Year: 1     Unstable Housing in the Last Year: Patient unable to answer       PHYSICAL EXAM   VS: As documented in the triage note from today's date and EMR flowsheet were reviewed.  Gen: Well developed. No acute distress. Seated in bed. Appears nontoxic. Warm to touch.  Skin: Warm. Dry. Intact. No rashes or lesions.  Eyes: Pupils equally round and reactive to light. Clear sclera.   HENT: Atraumatic appearance. Mucosal membranes moist. No oral lesions, uvula midline, airway patent.   CV: Regular rate and regular rhythm. S1, S2. No pedal edema. Warm extremities.  Resp: Nonlabored breathing Clear to auscultation bilaterally. No increased work of breathing.   GI: Soft and  nontender. No rebound or guarding. Bowel sounds x4 present.   MSK: Symmetric muscle bulk. No joint swelling in the extremities. Compartments are soft. Neurovascularly intact x4 extremities. Radial pulses +2 equal bilaterally.  Pedal pulses +2 bilaterally.  Neuro: Speech is garbled moderate aphasia.  Only has strength in the left upper extremity.  NIH14 Van positive +1 for months and age, +4 right upper extremity, +4 right lower extremity +4 left lower extremity, +1 aphasia  Psych: Disheveled.      DIAGNOSTIC RESULTS   RADIOLOGY:   Non-plain film images such as CT, Ultrasound and MRI are read by the radiologist. Plain radiographic images are visualized and preliminarily interpreted by the emergency physician with the below findings: Chest x-ray no signs of pneumonia or widened mediastinum.      Interpretation per the Radiologist below, if available at the time of this note:    CT head wo IV contrast   Final Result   No acute intracranial pathologic findings are identified.   Age-related intracranial findings are present along with findings   likely related to the patient's underlying multiple sclerosis. There   is no interval change when compared to the previous examination aside   from the postcontrast enhancement findings.        MACRO:   none        Signed by: Jac Saba 5/14/2024 2:25 PM   Dictation workstation:   [id number]      XR chest 1 view   Final Result   No acute process.   Signed by Jens Christine MD      CT brain attack angio head and neck W and WO IV contrast   Final Result   No evidence for significant stenosis of the cervical vessels.        No evidence for significant stenosis or large branch vessel cutoffs   of the intracranial vessels. Relative paucity of distal MCA branches   on the left compared with right on the volumetric reformats is not   confirmed on the source images and therefore favored to be due to   technique/artifact.        Small focal outpouchings projecting inferiorly from the  communicating   segments of the ICAs are too small to characterize and may correspond   to the origins of otherwise poorly seen branch vessels or possibly   tiny aneurysms.        MACRO:   None        Signed by: Lady Ma 5/14/2024 1:26 PM   Dictation workstation:   [id number]      CT brain attack head wo IV contrast   Final Result   No acute intracranial abnormality.        MACRO:   Surjit Gimenez discussed the significance and urgency of this   critical finding by Epic secure chat with  DOMINGO CABRERA on   5/14/2024 at 1:00 pm.  (**-RCF-**) Findings:  See findings.        Signed by: Surjit Gimenez 5/14/2024 1:01 PM   Dictation workstation:   [id number]      MR brain w and wo IV contrast    (Results Pending)         ED BEDSIDE ULTRASOUND:   Performed by ED Physician - none    LABS:  Labs Reviewed   CBC WITH AUTO DIFFERENTIAL - Abnormal       Result Value    WBC 13.2 (*)     nRBC 0.0      RBC 4.53      Hemoglobin 14.6      Hematocrit 41.7      MCV 92      MCH 32.2      MCHC 35.0      RDW 12.0      Platelets 230      Neutrophils % 91.5      Immature Granulocytes %, Automated 0.6      Lymphocytes % 3.6      Monocytes % 4.0      Eosinophils % 0.0      Basophils % 0.3      Neutrophils Absolute 12.09 (*)     Immature Granulocytes Absolute, Automated 0.08      Lymphocytes Absolute 0.48 (*)     Monocytes Absolute 0.53      Eosinophils Absolute 0.00      Basophils Absolute 0.04     COMPREHENSIVE METABOLIC PANEL - Abnormal    Glucose 118 (*)     Sodium 140      Potassium 4.3      Chloride 104      Bicarbonate 27      Anion Gap 13      Urea Nitrogen 15      Creatinine 0.66      eGFR >90      Calcium 9.0      Albumin 4.3      Alkaline Phosphatase 93      Total Protein 6.8      AST 12      Bilirubin, Total 0.7      ALT 11     B-TYPE NATRIURETIC PEPTIDE - Abnormal     (*)     Narrative:        <100 pg/mL - Heart failure unlikely  100-299 pg/mL - Intermediate probability of acute heart                  failure  exacerbation. Correlate with clinical                  context and patient history.    >=300 pg/mL - Heart Failure likely. Correlate with clinical                  context and patient history.    BNP testing is performed using different testing methodology at [facility] than at other system hospitals. Direct result comparisons should only be made within the same method.      POCT GLUCOSE - Abnormal    POCT Glucose 140 (*)    TROPONIN I, HIGH SENSITIVITY - Normal    Troponin I, High Sensitivity 6      Narrative:     Less than 99th percentile of normal range cutoff-  Female and children under 18 years old <14 ng/L; Male <21 ng/L: Negative  Repeat testing should be performed if clinically indicated.     Female and children under 18 years old 14-50 ng/L; Male 21-50 ng/L:  Consistent with possible cardiac damage and possible increased clinical   risk. Serial measurements may help to assess extent of myocardial damage.     >50 ng/L: Consistent with cardiac damage, increased clinical risk and  myocardial infarction. Serial measurements may help assess extent of   myocardial damage.      NOTE: Children less than 1 year old may have higher baseline troponin   levels and results should be interpreted in conjunction with the overall   clinical context.     NOTE: Troponin I testing is performed using a different   testing methodology at [facility] than at other   [facility]. Direct result comparisons should only   be made within the same method.   PROTIME-INR - Normal    Protime 11.4      INR 1.0     APTT - Normal    aPTT 30      Narrative:     The APTT is no longer used for monitoring Unfractionated Heparin Therapy. For monitoring Heparin Therapy, use the Heparin Assay.   LACTATE - Normal    Lactate 1.0      Narrative:     Venipuncture immediately after or during the administration of Metamizole may lead to falsely low results. Testing should be performed immediately  prior to Metamizole  dosing.   POCT GLUCOSE - Normal    POCT Glucose 99     BLOOD CULTURE   BLOOD CULTURE   MRSA SURVEILLANCE FOR VANCOMYCIN DE-ESCALATION, PCR   LIPID PANEL    Cholesterol 159      HDL-Cholesterol 52.5      Cholesterol/HDL Ratio 3.0      LDL Calculated 97      VLDL 9      Triglycerides 46      Non HDL Cholesterol 107     URINALYSIS WITH REFLEX MICROSCOPIC   HEMOGLOBIN A1C   POCT GLUCOSE METER   POCT GLUCOSE METER   POCT GLUCOSE METER       All other labs were within normal range or not returned as of this dictation.    EMERGENCY DEPARTMENT COURSE/MDM:   Vitals:    Vitals:    05/14/24 1715 05/14/24 1730 05/14/24 1745 05/14/24 1800   BP: 137/63 116/56  115/56   BP Location:       Pulse: 63 64 74 73   Resp: 19 19 20 22   Temp:       TempSrc:       SpO2: 100% 100% 99% 98%   Weight:       Height:           I reviewed the patient's triage vitals and they are hypertensive will need to follow-up with primary physician for repeat checks.  Patient was found to be febrile therefore was given Tylenol.    Due to the above findings the following was ordered stroke workup to include blood cultures patient's been incontinent suspected source would be UTI therefore she was covered with Zosyn.    CT imaging shows no acute intracranial bleed.  Under the direction of neurology  he did recommend TNK due to presentation.  He is aware of patient's past medical history and typical flare presentation as well as the new aphasia.  I did have a thorough discussion with the patient's spouse he is in agreement with TNK he is not aware of any contraindications for which we went over.  Patient does have a mild leukocytosis she is febrile on examination it was reported that she was incontinent therefore I did cover her with Zosyn for suspected UTI.  Her chest x-ray is clear.  Renal function is appropriate no significant electrolyte derangements.  Shortly after TNK being given patient had left gaze palsy with right facial twitching noted by  nursing staff by the time I had presented to the room this had resolved only lasted less than 1 minute.  She was given home dose of Keppra sent back for repeat CTs which showed no acute intracranial bleed.  She continues to protect airway NIH is unchanged from previous presentation exam.  I did thorough discussion with intensivist who is agreed to accept the patient they did present to the emergency department to evaluate the patient they did take over care at time of admission order.      Critical Care    Performed by: Adams Corona DO  Authorized by: Adams Corona DO    Critical care provider statement:     Critical care time (minutes):  58    Critical care time was exclusive of:  Separately billable procedures and treating other patients and teaching time    Critical care was necessary to treat or prevent imminent or life-threatening deterioration of the following conditions: Seizure, suspected CVA.    Critical care was time spent personally by me on the following activities:  Review of old charts, re-evaluation of patient's condition, pulse oximetry, ordering and review of radiographic studies, ordering and review of laboratory studies, ordering and performing treatments and interventions, discussions with consultants, development of treatment plan with patient or surrogate, examination of patient and evaluation of patient's response to treatment    Care discussed with: admitting provider        ED Course as of 05/14/24 1816   Tue May 14, 2024   1306 CT imaging of the head unremarkable for intracranial bleed spoke with Dr. Dano Landrum neurology on-call recommending TNK.  I did have a thorough discussion with patient's spouse Jasiel who reports that patient's weakness is not a typical of her MS although the aphasia is very atypical.  She does not have any contraindications for TNK at this time.  Jasiel her  has consented. [MG]   5164 Patient had a witnessed seizure by nursing staff status post  TNK resolved without intervention.  Will give home dose of Keppra and repeat CT imaging of the head immediately.  Seizure was described as right-sided facial twitching and unresponsiveness [MG]   1420 Interpreted by the Emergency Department Attending: ECG revealed normal sinus rhythm at a rate of 72 beats per minute with DE interval 164 , QRS of 72 , QTc of 409.  No acute injury pattern. Previous EKG on February 9 revealed no significant changes.    [MG]      ED Course User Index  [MG] Adams Corona DO         Diagnoses as of 05/14/24 1816   Aphasia   Seizure (Multi)   Fever, unspecified fever cause       Patient was counseled regarding labs, imaging, likely diagnosis, and plan. All questions were answered.     ------------------------------------------------------------------  Information provided by the patient, spouse, EMS  Consults neurology, intensivist  Past medical history complicating workup known MS  Previous medical records reviewed hospital admission 2/9/2024 TIA  Shared medical decision making regarding TNK with the spouse  ------------------------------------------------------------------  ED Medications administered this visit:    Medications   acetaminophen (Tylenol) tablet 650 mg (650 mg oral Not Given 5/14/24 1320)   LORazepam (Ativan) injection  - Omnicell Override Pull (  Not Given 5/14/24 1405)   oxygen (O2) therapy (has no administration in time range)   labetaloL (Normodyne,Trandate) injection 10 mg (has no administration in time range)   hydrALAZINE (Apresoline) injection 10 mg (has no administration in time range)     Followed by   hydrALAZINE (Apresoline) tablet 25 mg (has no administration in time range)   atorvastatin (Lipitor) tablet 80 mg (has no administration in time range)   vancomycin (Vancocin) pharmacy to dose - pharmacy monitoring (has no administration in time range)   piperacillin-tazobactam-dextrose (Zosyn) IV 3.375 g (has no administration in time range)   levETIRAcetam  in NaCl (iso-os) (Keppra)  mg (has no administration in time range)   vancomycin (Vancocin) 1,750 mg in dextrose 5 % in water (D5W) 500 mL IV (has no administration in time range)   dextrose 5 % and lactated Ringer's infusion (75 mL/hr intravenous New Bag 5/14/24 1736)   iohexol (OMNIPaque) 350 mg iodine/mL solution 75 mL (75 mL intravenous Given 5/14/24 1309)   tenecteplase (TNKASE) injection for STROKE 15 mg (15 mg intravenous Given 5/14/24 1316)   piperacillin-tazobactam-dextrose (Zosyn) IV 3.375 g (3.375 g intravenous New Bag 5/14/24 1508)   levETIRAcetam in NaCl (iso-os) (Keppra) IV 1,000 mg (0 mg intravenous Stopped 5/14/24 1440)       New Prescriptions from this visit:    Current Discharge Medication List          Follow-up:  Joaquin Taylor MD  [id number]  Department of Neurology  [place] [id number] [phone]    Schedule an appointment as soon as possible for a visit in 4 month(s)          Final Impression:   1. Aphasia    2. Seizure (Multi)    3. Fever, unspecified fever cause    4. TIA (transient ischemic attack)          Adams Corona DO    (Please note that portions of this note were completed with a voice recognition program.  Efforts were made to edit the dictations but occasionally words are mis-transcribed.)     Adams Corona DO  05/14/24 7575

## 2024-05-14 NOTE — PROGRESS NOTES
Pharmacy Medication History Review    Akiko Peng is a 68 y.o. female admitted for No Principal Problem: There is no principal problem currently on the Problem List. Please update the Problem List and refresh.. Pharmacy reviewed the patient's lfosm-lf-tcedsrzac medications and allergies for accuracy.    The list below reflectives the updated PTA list. Please review each medication in order reconciliation for additional clarification and justification.  Prior to Admission medications    Medication Sig Start Date End Date Taking? Authorizing Provider   atorvastatin (Lipitor) 40 mg tablet Take 1 tablet (40 mg) by mouth once daily.  Last filled 2/11 for 30ds 2/11/24 5/14/24 Yes Dereje Hunter DO   cholecalciferol (Vitamin D-3) 25 MCG (1000 UT) capsule Take 1 capsule (25 mcg) by mouth once daily.    Historical Provider, MD   ergocalciferol (Vitamin D-2) 1.25 MG (82985 UT) capsule Take 1 capsule (1,250 mcg) by mouth 1 (one) time per week. 1/25/24 1/24/25  CINTHIA Soler   levETIRAcetam (Keppra) 500 mg tablet Take 1 tablet (500 mg) by mouth 2 times a day. 2/29/24   Joaquin Taylor MD   multivitamin with minerals tablet Take 1 tablet by mouth once daily.    Historical Provider, MD   thiamine (Vitamin B-1) 100 mg tablet Take 1 tablet (100 mg) by mouth once daily. 1/25/24 1/24/25  CINTHIA Soler        The list below reflectives the updated allergy list. Please review each documented allergy for additional clarification and justification.  Allergies  Reviewed by Adams Corona DO on 5/14/2024   No Known Allergies         Below are additional concerns with the patient's PTA list.      Olivia Ramirez

## 2024-05-14 NOTE — CONSULTS
"Vancomycin Dosing by Pharmacy- INITIAL    Akiko Peng is a 68 y.o. year old female who Pharmacy has been consulted for vancomycin dosing for pneumonia. Based on the patient's indication and renal status this patient will be dosed based on a goal AUC of 400-600.     Renal function is currently stable.  Crcl = 76.8 ml/min    Visit Vitals  /56   Pulse 71   Temp 37.2 °C (99 °F) (Temporal)   Resp 20        Lab Results   Component Value Date    CREATININE 0.66 05/14/2024    CREATININE 0.67 04/03/2024    CREATININE 0.65 02/11/2024    CREATININE 0.57 02/10/2024        Patient weight is 60.5 kg    No results found for: \"CULTURE\"     No intake/output data recorded.  [unfilled]    No results found for: \"PATIENTTEMP\"       Assessment/Plan     Patient will not be given a loading dose.  Will initiate vancomycin maintenance,  1750 mg every 24 hours.    This dosing regimen is predicted by [employer] to result in the following pharmacokinetic parameters:  Regimen: 1750 mg IV every 24 hours.  Start time: 16:27 on 05/14/2024  Exposure target: AUC24 (range)400-600 mg/L.hr   AUC24,ss: 535 mg/L.hr  Probability of AUC24 > 400: 81 %  Ctrough,ss: 13.6 mg/L  Probability of Ctrough,ss > 20: 20 %  Probability of nephrotoxicity (Lodise JUSTIN 2009): 9 %    Follow-up level will be ordered on 5/15 at 0500 unless clinically indicated sooner.  Will continue to monitor renal function daily while on vancomycin and order serum creatinine at least every 48 hours if not already ordered.  Follow for continued vancomycin needs, clinical response, and signs/symptoms of toxicity.       Ashia Casiano, PharmD       "

## 2024-05-14 NOTE — ED TRIAGE NOTES
Pt arrives via EMS from home.  Pt has a hx of MS but developed expressive asaphia about 1 hr prior to arrival.  Pt also has right arm weakness.  EMS is unsure of her deficits from her MS.  Stroke team called.  Pt taken directly to CT.  Labs collecting in CT.

## 2024-05-15 ENCOUNTER — APPOINTMENT (OUTPATIENT)
Dept: CARDIOLOGY | Facility: HOSPITAL | Age: 69
DRG: 057 | End: 2024-05-15
Payer: MEDICARE

## 2024-05-15 ENCOUNTER — APPOINTMENT (OUTPATIENT)
Dept: NEUROLOGY | Facility: HOSPITAL | Age: 69
DRG: 057 | End: 2024-05-15
Payer: MEDICARE

## 2024-05-15 ENCOUNTER — APPOINTMENT (OUTPATIENT)
Dept: RADIOLOGY | Facility: HOSPITAL | Age: 69
DRG: 057 | End: 2024-05-15
Payer: MEDICARE

## 2024-05-15 PROBLEM — R00.1 BRADYCARDIA: Status: ACTIVE | Noted: 2024-05-15

## 2024-05-15 LAB
ALBUMIN SERPL BCP-MCNC: 4 G/DL (ref 3.4–5)
ALP SERPL-CCNC: 84 U/L (ref 33–136)
ALT SERPL W P-5'-P-CCNC: 7 U/L (ref 7–45)
ANION GAP SERPL CALC-SCNC: 14 MMOL/L (ref 10–20)
APPEARANCE UR: CLEAR
AST SERPL W P-5'-P-CCNC: 11 U/L (ref 9–39)
BASOPHILS # BLD AUTO: 0.07 X10*3/UL (ref 0–0.1)
BASOPHILS NFR BLD AUTO: 0.7 %
BILIRUB SERPL-MCNC: 1.1 MG/DL (ref 0–1.2)
BILIRUB UR STRIP.AUTO-MCNC: NEGATIVE MG/DL
BUN SERPL-MCNC: 8 MG/DL (ref 6–23)
CALCIUM SERPL-MCNC: 8.9 MG/DL (ref 8.6–10.3)
CHLORIDE SERPL-SCNC: 106 MMOL/L (ref 98–107)
CO2 SERPL-SCNC: 24 MMOL/L (ref 21–32)
COLOR UR: ABNORMAL
CREAT SERPL-MCNC: 0.62 MG/DL (ref 0.5–1.05)
EGFRCR SERPLBLD CKD-EPI 2021: >90 ML/MIN/1.73M*2
EOSINOPHIL # BLD AUTO: 0.06 X10*3/UL (ref 0–0.7)
EOSINOPHIL NFR BLD AUTO: 0.6 %
ERYTHROCYTE [DISTWIDTH] IN BLOOD BY AUTOMATED COUNT: 12.1 % (ref 11.5–14.5)
EST. AVERAGE GLUCOSE BLD GHB EST-MCNC: 91 MG/DL
GLUCOSE BLD MANUAL STRIP-MCNC: 134 MG/DL (ref 74–99)
GLUCOSE SERPL-MCNC: 141 MG/DL (ref 74–99)
GLUCOSE UR STRIP.AUTO-MCNC: NORMAL MG/DL
HBA1C MFR BLD: 4.8 %
HCT VFR BLD AUTO: 44.3 % (ref 36–46)
HGB BLD-MCNC: 15.5 G/DL (ref 12–16)
IMM GRANULOCYTES # BLD AUTO: 0.04 X10*3/UL (ref 0–0.7)
IMM GRANULOCYTES NFR BLD AUTO: 0.4 % (ref 0–0.9)
KETONES UR STRIP.AUTO-MCNC: ABNORMAL MG/DL
LEUKOCYTE ESTERASE UR QL STRIP.AUTO: NEGATIVE
LYMPHOCYTES # BLD AUTO: 1.5 X10*3/UL (ref 1.2–4.8)
LYMPHOCYTES NFR BLD AUTO: 14.9 %
MAGNESIUM SERPL-MCNC: 1.99 MG/DL (ref 1.6–2.4)
MCH RBC QN AUTO: 32.6 PG (ref 26–34)
MCHC RBC AUTO-ENTMCNC: 35 G/DL (ref 32–36)
MCV RBC AUTO: 93 FL (ref 80–100)
MONOCYTES # BLD AUTO: 1.23 X10*3/UL (ref 0.1–1)
MONOCYTES NFR BLD AUTO: 12.2 %
NEUTROPHILS # BLD AUTO: 7.16 X10*3/UL (ref 1.2–7.7)
NEUTROPHILS NFR BLD AUTO: 71.2 %
NITRITE UR QL STRIP.AUTO: NEGATIVE
NRBC BLD-RTO: 0 /100 WBCS (ref 0–0)
PH UR STRIP.AUTO: 6.5 [PH]
PLATELET # BLD AUTO: 254 X10*3/UL (ref 150–450)
POTASSIUM SERPL-SCNC: 3.5 MMOL/L (ref 3.5–5.3)
PROT SERPL-MCNC: 6.5 G/DL (ref 6.4–8.2)
PROT UR STRIP.AUTO-MCNC: NEGATIVE MG/DL
RBC # BLD AUTO: 4.76 X10*6/UL (ref 4–5.2)
RBC # UR STRIP.AUTO: NEGATIVE /UL
SODIUM SERPL-SCNC: 140 MMOL/L (ref 136–145)
SP GR UR STRIP.AUTO: 1.03
UROBILINOGEN UR STRIP.AUTO-MCNC: NORMAL MG/DL
WBC # BLD AUTO: 10.1 X10*3/UL (ref 4.4–11.3)

## 2024-05-15 PROCEDURE — 2500000005 HC RX 250 GENERAL PHARMACY W/O HCPCS

## 2024-05-15 PROCEDURE — 80053 COMPREHEN METABOLIC PANEL: CPT

## 2024-05-15 PROCEDURE — 2500000001 HC RX 250 WO HCPCS SELF ADMINISTERED DRUGS (ALT 637 FOR MEDICARE OP)

## 2024-05-15 PROCEDURE — 2020000001 HC ICU ROOM DAILY

## 2024-05-15 PROCEDURE — 3E033XZ INTRODUCTION OF VASOPRESSOR INTO PERIPHERAL VEIN, PERCUTANEOUS APPROACH: ICD-10-PCS | Performed by: PSYCHIATRY & NEUROLOGY

## 2024-05-15 PROCEDURE — 82947 ASSAY GLUCOSE BLOOD QUANT: CPT

## 2024-05-15 PROCEDURE — 97161 PT EVAL LOW COMPLEX 20 MIN: CPT | Mod: GP

## 2024-05-15 PROCEDURE — 95816 EEG AWAKE AND DROWSY: CPT

## 2024-05-15 PROCEDURE — 2500000001 HC RX 250 WO HCPCS SELF ADMINISTERED DRUGS (ALT 637 FOR MEDICARE OP): Performed by: NURSE PRACTITIONER

## 2024-05-15 PROCEDURE — 70553 MRI BRAIN STEM W/O & W/DYE: CPT

## 2024-05-15 PROCEDURE — 85025 COMPLETE CBC W/AUTO DIFF WBC: CPT

## 2024-05-15 PROCEDURE — 2500000004 HC RX 250 GENERAL PHARMACY W/ HCPCS (ALT 636 FOR OP/ED)

## 2024-05-15 PROCEDURE — 93005 ELECTROCARDIOGRAM TRACING: CPT

## 2024-05-15 PROCEDURE — 97166 OT EVAL MOD COMPLEX 45 MIN: CPT | Mod: GO

## 2024-05-15 PROCEDURE — A9575 INJ GADOTERATE MEGLUMI 0.1ML: HCPCS | Performed by: NURSE PRACTITIONER

## 2024-05-15 PROCEDURE — 92523 SPEECH SOUND LANG COMPREHEN: CPT | Mod: GN

## 2024-05-15 PROCEDURE — 70553 MRI BRAIN STEM W/O & W/DYE: CPT | Performed by: RADIOLOGY

## 2024-05-15 PROCEDURE — 81003 URINALYSIS AUTO W/O SCOPE: CPT

## 2024-05-15 PROCEDURE — 2550000001 HC RX 255 CONTRASTS: Performed by: NURSE PRACTITIONER

## 2024-05-15 PROCEDURE — 92610 EVALUATE SWALLOWING FUNCTION: CPT | Mod: GN

## 2024-05-15 PROCEDURE — 36415 COLL VENOUS BLD VENIPUNCTURE: CPT

## 2024-05-15 PROCEDURE — 2500000004 HC RX 250 GENERAL PHARMACY W/ HCPCS (ALT 636 FOR OP/ED): Performed by: INTERNAL MEDICINE

## 2024-05-15 PROCEDURE — 95816 EEG AWAKE AND DROWSY: CPT | Performed by: PSYCHIATRY & NEUROLOGY

## 2024-05-15 PROCEDURE — 2500000001 HC RX 250 WO HCPCS SELF ADMINISTERED DRUGS (ALT 637 FOR MEDICARE OP): Performed by: INTERNAL MEDICINE

## 2024-05-15 PROCEDURE — 83735 ASSAY OF MAGNESIUM: CPT

## 2024-05-15 RX ORDER — NOREPINEPHRINE BITARTRATE 0.03 MG/ML
.01-1 INJECTION, SOLUTION INTRAVENOUS CONTINUOUS
Status: DISCONTINUED | OUTPATIENT
Start: 2024-05-15 | End: 2024-05-16

## 2024-05-15 RX ORDER — GADOTERATE MEGLUMINE 376.9 MG/ML
12 INJECTION INTRAVENOUS
Status: COMPLETED | OUTPATIENT
Start: 2024-05-15 | End: 2024-05-15

## 2024-05-15 RX ORDER — NAPROXEN SODIUM 220 MG/1
325 TABLET, FILM COATED ORAL ONCE
Status: COMPLETED | OUTPATIENT
Start: 2024-05-15 | End: 2024-05-15

## 2024-05-15 RX ORDER — ASPIRIN 81 MG/1
81 TABLET ORAL DAILY
Status: DISCONTINUED | OUTPATIENT
Start: 2024-05-15 | End: 2024-05-17 | Stop reason: HOSPADM

## 2024-05-15 RX ADMIN — SODIUM CHLORIDE, SODIUM LACTATE, POTASSIUM CHLORIDE, CALCIUM CHLORIDE AND DEXTROSE MONOHYDRATE 75 ML/HR: 5; 600; 310; 30; 20 INJECTION, SOLUTION INTRAVENOUS at 06:17

## 2024-05-15 RX ADMIN — PIPERACILLIN SODIUM AND TAZOBACTAM SODIUM 3.38 G: 3; .375 INJECTION, SOLUTION INTRAVENOUS at 04:05

## 2024-05-15 RX ADMIN — ASPIRIN 81 MG: 81 TABLET, COATED ORAL at 17:06

## 2024-05-15 RX ADMIN — LEVETIRACETAM 500 MG: 5 INJECTION INTRAVENOUS at 01:09

## 2024-05-15 RX ADMIN — PIPERACILLIN SODIUM AND TAZOBACTAM SODIUM 3.38 G: 3; .375 INJECTION, SOLUTION INTRAVENOUS at 17:06

## 2024-05-15 RX ADMIN — LEVETIRACETAM 500 MG: 5 INJECTION INTRAVENOUS at 13:27

## 2024-05-15 RX ADMIN — ASPIRIN 81 MG CHEWABLE TABLET 325 MG: 81 TABLET CHEWABLE at 23:20

## 2024-05-15 RX ADMIN — PIPERACILLIN SODIUM AND TAZOBACTAM SODIUM 3.38 G: 3; .375 INJECTION, SOLUTION INTRAVENOUS at 10:32

## 2024-05-15 RX ADMIN — SODIUM CHLORIDE 500 ML: 9 INJECTION, SOLUTION INTRAVENOUS at 00:24

## 2024-05-15 RX ADMIN — ATORVASTATIN CALCIUM 80 MG: 80 TABLET, FILM COATED ORAL at 23:20

## 2024-05-15 RX ADMIN — GADOTERATE MEGLUMINE 12 ML: 376.9 INJECTION INTRAVENOUS at 12:21

## 2024-05-15 RX ADMIN — PIPERACILLIN SODIUM AND TAZOBACTAM SODIUM 3.38 G: 3; .375 INJECTION, SOLUTION INTRAVENOUS at 23:20

## 2024-05-15 RX ADMIN — Medication 0.04 MCG/KG/MIN: at 09:08

## 2024-05-15 ASSESSMENT — COGNITIVE AND FUNCTIONAL STATUS - GENERAL
MOVING FROM LYING ON BACK TO SITTING ON SIDE OF FLAT BED WITH BEDRAILS: A LITTLE
WALKING IN HOSPITAL ROOM: A LOT
HELP NEEDED FOR BATHING: TOTAL
DAILY ACTIVITIY SCORE: 11
MOVING TO AND FROM BED TO CHAIR: A LOT
CLIMB 3 TO 5 STEPS WITH RAILING: TOTAL
TURNING FROM BACK TO SIDE WHILE IN FLAT BAD: A LOT
MOBILITY SCORE: 12
DRESSING REGULAR LOWER BODY CLOTHING: TOTAL
EATING MEALS: A LITTLE
PERSONAL GROOMING: A LITTLE
TOILETING: TOTAL
DRESSING REGULAR UPPER BODY CLOTHING: A LOT
STANDING UP FROM CHAIR USING ARMS: A LOT

## 2024-05-15 ASSESSMENT — ACTIVITIES OF DAILY LIVING (ADL): LACK_OF_TRANSPORTATION: NO

## 2024-05-15 ASSESSMENT — PAIN - FUNCTIONAL ASSESSMENT
PAIN_FUNCTIONAL_ASSESSMENT: 0-10

## 2024-05-15 ASSESSMENT — PAIN SCALES - GENERAL
PAINLEVEL_OUTOF10: 0 - NO PAIN

## 2024-05-15 NOTE — PROGRESS NOTES
05/15/24 1407   Discharge Planning   Living Arrangements Spouse/significant other   Support Systems Spouse/significant other;Family members   Assistance Needed ADls   Type of Residence Private residence   Home or Post Acute Services Post acute facilities (Rehab/SNF/etc)   Type of Post Acute Facility Services Rehab   Patient expects to be discharged to: AR vs HHC   Does the patient need discharge transport arranged? Yes   RoundTrip coordination needed? Yes   Has discharge transport been arranged? No   Financial Resource Strain   How hard is it for you to pay for the very basics like food, housing, medical care, and heating? Not hard   Housing Stability   In the last 12 months, was there a time when you were not able to pay the mortgage or rent on time? N   In the last 12 months, how many places have you lived? 1   In the last 12 months, was there a time when you did not have a steady place to sleep or slept in a shelter (including now)? N   Transportation Needs   In the past 12 months, has lack of transportation kept you from medical appointments or from getting medications? no   In the past 12 months, has lack of transportation kept you from meetings, work, or from getting things needed for daily living? No   Patient Choice   Patient / Family choosing to utilize agency / facility established prior to hospitalization Yes     Care transitions assessment completed via bedside with patient. TCC introduced self and explained role. Demographics verified. Patient from home with spouse.  Independent PTA. Denies use of assistive devices. Denies SW needs at this time. PT/OT recommending [facility]. Patient deferring dispo planning to spouse Jasiel. Patient states may want to go home, but to [facility]. This TCC placed call to Jasiel. Jasiel neville is coming up here and will discuss with patient. TCC to follow up. TCC to continue to follow for discharge planning needs.      PCP: Dr. Adams Craig Last seen: Unknown   Pharmacy:  [facility]   [facility]  Dispo: AR vs HHC, awaiting spouse at bedside to discuss.     DI CRABTREE RN TCC

## 2024-05-15 NOTE — CARE PLAN
Problem: General Stroke  Goal: Demonstrate improvement in neurological exam throughout the shift  Outcome: Progressing  Goal: Maintain BP within ordered limits throughout shift  Outcome: Progressing  Goal: Participate in treatment (ie., meds, therapy) throughout shift  Outcome: Progressing     Problem: Fall/Injury  Goal: Be free from injury by end of the shift  Outcome: Progressing  Goal: Verbalize understanding of personal risk factors for fall in the hospital  Outcome: Progressing     Problem: Pain - Adult  Goal: Verbalizes/displays adequate comfort level or baseline comfort level  Outcome: Progressing     Problem: Chronic Conditions and Co-morbidities  Goal: Patient's chronic conditions and co-morbidity symptoms are monitored and maintained or improved  Outcome: Progressing   T

## 2024-05-15 NOTE — PROGRESS NOTES
Physical Therapy    Physical Therapy Evaluation    Patient Name: Akiko Peng  MRN: [id number]  [id number]  Today's Date: 5/15/2024   Time Calculation  Start Time: 0913  Stop Time: 0938  Time Calculation (min): 25 min    Assessment/Plan   PT Assessment  PT Assessment Results: Decreased strength, Decreased endurance, Impaired balance, Decreased mobility, Decreased coordination  Rehab Prognosis: Good  Evaluation/Treatment Tolerance: Patient tolerated treatment well  End of Session Communication: Bedside nurse  End of Session Patient Position: Bed, 4 rail up, Alarm on  IP OR SWING BED PT PLAN  Inpatient or Swing Bed: Inpatient  PT Plan  Treatment/Interventions: Bed mobility, Transfer training, Gait training, Neuromuscular re-education, Strengthening, Endurance training, Therapeutic exercise, Therapeutic activity  PT Plan: Skilled PT  PT Frequency: 5 times per week  PT Discharge Recommendations: High intensity level of continued care  PT - OK to Discharge: Yes (when cleared by medical team)    Subjective     Current Problem:  Patient Active Problem List   Diagnosis    TIA (transient ischemic attack)    Aphasia       General Visit Information:  General  Reason for Referral: PT eval and treat; pt. admitted with generalized weakness, weakness RUE, aphasia: TNK at 1316 5/14, stroke vs seizure vs MS flare, possible seizure in ED, ct head: (-), ct angio: (-)  Referred By: Sukumar  Past Medical History Relevant to Rehab: frequent UTIs, MS, seizures on keppra  Prior to Session Communication: Bedside nurse  Patient Position Received: Bed, 4 rail up, Alarm on  General Comment: Pt resting in bed upon entering, agreeable to PT.    Home Living:  Home Living  Home Living Comments: Pt lives with spouse, daughter in the area who sometimes stays over, split level home, can stay on 1st floor with full bath, st. cane, wh. walker, st. walker, tub/shower with gb, seat, hhs, st. toilet, bsc, basement laundry    Prior Level of Function:  Prior  Function Per Pt/Caregiver Report  Prior Function Comments: Pt independent with adl, does not drive (spouse or daughter completes), daughter assists with laundry, spouse completes other aspects of adl, pt. uses st. cane sometimes for ambulation    Precautions:  Precautions  Precautions Comment: aspiration precautions, aphasia, levo .04 (just initiated prior to eval), room air, SBP <180/105    Vital Signs:  Vital Signs  Heart Rate:  (VSS throughout session)  BP:  (BP resting 105/ 52 MAP 76, sitting 125/57 (82), end of session 115:58 (83))  Objective     Pain:  Pain Assessment  Pain Assessment:  (pt denies pain)    Cognition:  Cognition  Overall Cognitive Status:  (Pt oriented to self, place, correct year but not month. Pt with word finding difficulties note during session but able to appropriately follow simple commands.)    General Assessments:      Activity Tolerance  Early Mobility/Exercise Safety Screen: Proceed with mobilization - No exclusion criteria met  Sensation  Sensation Comment: pt denies sensation changes but not formally assessed  Strength  Strength Comments:  (LLE 4/5; RLE ankle DF 1/5, PF 3-/5, knee ext 2/5, hip flexion 2/5; (+) clonus in sittin; some increased tone noted while supine in bed)     Coordination  Coordination Comment:  (decreased coordination noted on RLE in standing and during ambulation trial)     Static Sitting Balance  Static Sitting-Comment/Number of Minutes:  (Pt sits EOB 10 minutes during session with SBA to min assist with intermittent LOB to the right with assist to correct. Verbal cues provided)       Functional Assessments:     Bed Mobility  Bed Mobility:  (supine to sit with mod assist x 1-2, sit to supine with mod assist x 2)  Transfers  Transfer:  (sit to stand with min assist x 2)  Ambulation/Gait Training  Ambulation/Gait Training Performed:  (Pt completes lateral sidesteps toward HOB with WW and min assist x 2. PT gives assist for walker management, cues for lateral  weight shifting and BLE advancement. Assist to move RLE)          Outcome Measures:  [facility] Basic Mobility  Turning from your back to your side while in a flat bed without using bedrails: A little  Moving from lying on your back to sitting on the side of a flat bed without using bedrails: A lot  Moving to and from bed to chair (including a wheelchair): A lot  Standing up from a chair using your arms (e.g. wheelchair or bedside chair): A lot  To walk in hospital room: A lot  Climbing 3-5 steps with railing: Total  Basic Mobility - Total Score: 12           FSS-ICU  Ambulation: Walks <50 feet with any assistance x1 or walks any distance with assistance x2 people  Rolling: Minimal assistance (performs 75% or more of task)  Sitting: Minimal assistance (performs 75% or more of task)  Transfer Sit-to-Stand: Moderate assistance (performs 50 - 74% of task)  Transfer Supine-to-Sit: Moderate assistance (performs 50 - 74% of task)  Total Score: 15  ICU Mobility Screen  Early Mobility/Exercise Safety Screen: Proceed with mobilization - No exclusion criteria met  E = Exercise and Early Mobility  Early Mobility/Exercise Safety Screen: Proceed with mobilization - No exclusion criteria met          Goals:  Encounter Problems       Encounter Problems (Active)       PT Problem       STG - Pt will amb 50' using WW with MIN A  (Progressing)       Start:  05/15/24    Expected End:  05/29/24            STG - Pt will transition supine <> sitting with SBA (Progressing)       Start:  05/15/24    Expected End:  05/29/24            STG - Pt will transfer STS with CGA (Progressing)       Start:  05/15/24    Expected End:  05/29/24               Pain - Adult            Education Documentation  Mobility Training, taught by Jessica Infante PT at 5/15/2024  4:01 PM.  Learner: Patient  Readiness: Acceptance  Method: Explanation  Response: Verbalizes Understanding, Needs Reinforcement    Education Comments  No comments found.

## 2024-05-15 NOTE — NURSING NOTE
05/15/24 1100 Patient Navigator  I introduced myself to the patient and explained my role. Pt states she lives with her  and daughter. She states her daughter helps with food preparation and I reviewed healthy food options. She states she has to use a cane to walk and attempts to be as active as possible. I informed the patient of the recommendations of 30 minutes 3 times a week of exercise. I reviewed the Stroke Folder and answered her questions. She states her  had a stroke recently and she has the Stroke Folder and handouts- She declined any handouts or the folder. I did review the B.E  F.A.S.T. I reviewed the following lab values and what they indicate: cholesterol, HDL, LDL, triglycerides, and A1C. I gave the patient my business card & instructed her to call me as needed. She appreciated my visit and denied any other questions. I updated the patient's RN, Cindy, of my visit.      Chayo CHRISTIAN, RN  Patient Navigator  Stroke Educator  Diabetes Care &

## 2024-05-15 NOTE — PROGRESS NOTES
Akiko Peng is a 68 y.o. female on day 1 of admission presenting with Aphasia.    SUBJECTIVE     Patient evaluated this morning and found to be sleeping comfortably in bed.  She was hypotensive with 80s SBP and bradycardic.  After arousing her heart rate was normalized.  Patient was started on low-dose drip Levophed.  With goal -140.  Patient is able to converse and is alert and oriented to person and place however has no memory of yesterday's events or why she is there and cannot accurately determine the date.  Patient is able to move all 4 extremities this morning (however, not when prompted) and states that she has no pain or other symptoms that are bothering her.  Occasionally she does answer questions with laughter and inappropriate answers however when reprompted she is able to give some answers.    On evaluation 2 hours after initial patient was found to be alert and oriented x 3 with memory and voluntary motion intact.    OBJECTIVE     Vitals:    05/15/24 0400 05/15/24 0430 05/15/24 0500 05/15/24 0530   BP: 116/55 104/53 98/52 120/56   BP Location: Left arm      Patient Position: Lying      Pulse: 55 50 50 61   Resp: 19 20 (!) 28 (!) 27   Temp: 36.4 °C (97.5 °F)      TempSrc: Temporal      SpO2: 100% 100% 100% 100%   Weight:       Height:          Results from last 7 days   Lab Units 05/14/24  1308   WBC AUTO x10*3/uL 13.2*   HEMOGLOBIN g/dL 14.6   HEMATOCRIT % 41.7   PLATELETS AUTO x10*3/uL 230   NEUTROS PCT AUTO % 91.5   LYMPHS PCT AUTO % 3.6   MONOS PCT AUTO % 4.0   EOS PCT AUTO % 0.0     Results from last 7 days   Lab Units 05/14/24  1308   SODIUM mmol/L 140   POTASSIUM mmol/L 4.3   CHLORIDE mmol/L 104   CO2 mmol/L 27   BUN mg/dL 15   CREATININE mg/dL 0.66   CALCIUM mg/dL 9.0   PROTEIN TOTAL g/dL 6.8   BILIRUBIN TOTAL mg/dL 0.7   ALK PHOS U/L 93   ALT U/L 11   AST U/L 12   GLUCOSE mg/dL 118*     24hr Min/Max:  Temp  Min: 36.4 °C (97.5 °F)  Max: 38 °C (100.4 °F)  Pulse  Min: 50  Max: 86  BP  Min:  "84/49  Max: 167/65  Resp  Min: 15  Max: 45  SpO2  Min: 95 %  Max: 100 %  LDA:   External Urinary Catheter Female (Active)   Placement Date: 05/14/24   Hand Hygiene Completed: Yes  External Catheter Type: Female   Number of days: 1       Intake/Output Summary (Last 24 hours) at 5/15/2024 0640  Last data filed at 5/15/2024 0427  Gross per 24 hour   Intake 100 ml   Output 900 ml   Net -800 ml     All other labs and Imaging have been personally reviewed.     Scheduled Medications  acetaminophen, 650 mg, oral, Once  atorvastatin, 80 mg, oral, Nightly  levETIRAcetam, 500 mg, intravenous, q12h  piperacillin-tazobactam, 3.375 g, intravenous, q6h  vancomycin, 1,750 mg, intravenous, q24h       Continuous Medications:   dextrose 5 % and lactated Ringer's, 75 mL/hr, Last Rate: 75 mL/hr (05/15/24 0617)       Physical Exam    Constitutional: Well developed, A&Ox3, no acute distress, alert and cooperative  Eyes: EOMI, clear sclera  Respiratory/Thorax: Patent airways, CTAB, normal breath sounds with good chest expansion  Cardiovascular: RRR, no murmurs, 2+ equal pulses of the extremities  Gastrointestinal: Nondistended, soft, non-tender  Extremities: RLE appears to have mottled skin and slightly colder to touch, pulses appreciated via Doppler bilaterally   Psychological: Appropriate mood and behavior  Skin: Warm and dry    ASSESSMENT & PLAN     Daily Progress:  5/15: Alert and oriented x 3.  All 4 extremity motion intact.  Will initiate DAPT and VTE PPx pending MRI read.  Levophed drip started to maintain MAP greater than 80.  Waiting on SLP eval.  will time afternoon labs    ASSESSMENT & PLAN    Neuro/Constitutional  #R-sided Hemiplegia and aphasia s/p TNK  #Seizure post TNK admin  #H/o MS, chronic right-sided weakness  -5/14: Patient perseverating the word \"no\".  Able to move all 4 extremities however does not move RUE/RLE voluntarily.  Retracts to pain.  Additionally when testing stimuli she is able to use the words like \"hey\" " "and \"ouch\" however when you ask her other question she goes back to \"no\"  -5/15: Alert and oriented with extremity motor function intact.  Appears to be back to normal  - states that when patient has MS flares she experiences right-sided weakness  -CT brain Noncon was negative for acute bleed. CTA head/neck showed no significant stenosis.   -A1c 4.8.  Lipid panel WNL  PLAN:  -Monitor for ICU delirium & maintain sleep hygiene   -Neurochecks per stroke order set  -As needed hydralazine for BP's >180/>105  -Echo, EEG pending  -Foregoing CT imaging due to scheduled MRI at noon  -Continue IV Keppra 500 mg twice daily     Cardiovascular  #Hypotension  #H/o TIA  PLAN:  -Continue home statin once cleared for PO  -On Levophed, wean as tolerated  -Maintain goal -140, MAP >80  -DAPT therapy to initiate 24-hour post TNK if MRI negative     Pulmonary  -No acute issues  -Satting well on room air, no indication of infectious origin  -CXR unremarkable in the ED     GI  -No acute issues  -NPO, continue D5 LR 75/h  -Pending SLP eval for oral intake     Renal  -No acute issues     Endocrine  -No acute issues     Heme/Onc  -No acute issues     ID  #Fever of Unknown Origin, resolved  #Leukocytosis  #UTI history  -Patient presenting with fever 100.4 with reported history of urinary incontinence  -CXR, UA unremarkable.  No obvious source of infection at this time  PLAN:  -Tylenol as needed fever  -MRSA nares negative, stopping Vanc  -Continue Zosyn    MSK/Skin  #Concerns for right ankle injury, ruled out  -Some skin mottling in RLE below ankle. Diffusely says \"ouch\" when manipulating LE however with skin findings will obtain imaging  -Right ankle/foot XR unremarkable     ICU CHECK LIST  Antimicrobials: V/Z  Oxygen: RA  Feeding: N.p.o. pending SLP eval  Drips: --  Fluids: D5LR 75/h  Analgesia: --   Sedation: --  VTE ppx: Defer D/T TNK  GI ppx: --  Glycemic control: --  Bowel care: Defer until p.o.  Indwelling catheters: " External urinary  Lines: PIV x 2  Code Status: Full     Ariel Gregory,   PGY-1, Internal Medicine  Please SecureChat for any further questions  This is a preliminary note, please await attending attestation for final A/P

## 2024-05-15 NOTE — PROGRESS NOTES
"Speech-Language Pathology    SLP Adult Inpatient Speech-Language Cognition    Patient Name: Akiko Peng  MRN: [id number]  Today's Date: 5/15/2024   Time Calculation  Start Time: 1025  Stop Time: 1050  Time Calculation (min): 25 min         Current Problem:   1. Aphasia  CANCELED: Transthoracic Echo (TTE) Complete    CANCELED: Transthoracic Echo (TTE) Complete      2. Seizure (Multi)        3. Fever, unspecified fever cause        4. TIA (transient ischemic attack)  CANCELED: Transthoracic Echo (TTE) Complete    CANCELED: Transthoracic Echo (TTE) Complete          SLP Assessment:  SLP Assessment Results: Expression deficits; no family was present at bedside.  Prognosis: Good    Patient demonstrates adequate receptive language skills at this time; she is able to appropriately answer yes/no/WH-questions, and ID pictured objects/object descriptions. Patient is able to adequately follow one step commands. Patient able to produce automatics (1-10) promptly and without issue.    Verbal expression deficits were observed during structured/unstructured tasks:  -patient able to generate only 4 items in a low level category,  -occasional word finding deficits during conversation,  -reduced ability to express herself or explain things, using a lot of generalized/vague language,  -Cookie Theft picture language sample: \"Its the kitchen. Its the cookie jar. Its the little girl wanting the cookies. Its mom washing the dishes.\"     Speech was intelligible and clear. No evidence of any dysarthria or apraxia of speech at this time. No evidence of any facial asymmetry or weakness.    SLP Plan:  SLP Plan: Skilled SLP  SLP Frequency: 2x per week  Duration: 1 week  SLP Discharge Recommendations: Deficits may resolve spontaneously once treated for her acute medical issues/? infections. ST to follow along for additional neuro imaging and assess that her deficits have resolved and she returns back to her baseline speech/language skills.   At " this time, infectious workup is negative. Patient with history of urinary incontinence and UTIs. Patient is currently on antibiotics.   Discussed POC: Patient    VERBAL EXPRESSION STGs: start date 5/15/24    Patient will complete predictable fill-ins with 100 % accuracy.  Patient will complete categorical naming tasks with 90 % accuracy.  Patient will improve word finding abilities while expressing complex ideas with 90% accuracy.  Patient will participate in additional speech/language assessment.  Patient/family education.      Subjective   Current Problem:  New onset aphasia    Most Recent Visit:  SLP Most Recent Visit  SLP Received On: 05/15/24      General Visit Information:  General Information  Chart Reviewed: Yes  Referred By: Zuleyma  Past Medical History Relevant to Rehab: frequent uti's, MS, seizures on keppra  Prior to Session Communication: Bedside nurse      Objective       Pain:   Right hip and leg; therapist offered hot pack or to notify RN for medication, which she replied that they don't help.      Cognition:   Patient is alert and oriented x3.      Reading Comprehension:   DNT this date      Written Expression:   DNT this date      Inpatient:  Education Comments  Results/recommendations were reviewed with patient, who voiced understanding.

## 2024-05-15 NOTE — PROGRESS NOTES
Occupational Therapy    Evaluation    Patient Name: Akiko Peng  MRN: [id number]  Today's Date: 5/15/2024  Time Calculation  Start Time: 0914  Stop Time: 0938  Time Calculation (min): 24 min        Assessment:  End of Session Communication: Bedside nurse  End of Session Patient Position: Bed, 4 rail up, Alarm on     Plan:  Treatment Interventions: ADL retraining, Functional transfer training, UE strengthening/ROM, Endurance training, Fine motor coordination activities, Compensatory technique education, Neuromuscular reeducation  OT Frequency: 5 times per week  OT Discharge Recommendations: High intensity level of continued care  OT - OK to Discharge: Yes (to next level of care when cleared by medical team)  Treatment Interventions: ADL retraining, Functional transfer training, UE strengthening/ROM, Endurance training, Fine motor coordination activities, Compensatory technique education, Neuromuscular reeducation    Subjective   Current Problem:  1. Aphasia  CANCELED: Transthoracic Echo (TTE) Complete    CANCELED: Transthoracic Echo (TTE) Complete      2. Seizure (Multi)        3. Fever, unspecified fever cause        4. TIA (transient ischemic attack)  CANCELED: Transthoracic Echo (TTE) Complete    CANCELED: Transthoracic Echo (TTE) Complete        General:  General  Reason for Referral: impaired adl; pt. admitted with generalized weakness, weakness RUE, aphasia:  TNK at 1316 5/14, stroke vs seizure vs MS flare, possible seizure in ED, ct head:  (-), ct angio:  (-)  Referred By: Danica Patino  Past Medical History Relevant to Rehab: frequent uti's, MS, seizures on keppra  Prior to Session Communication: Bedside nurse  Patient Position Received: Bed, 4 rail up, Alarm on  General Comment: pt. resting in bed, agreeable to therapy intervention  Precautions:  Precautions Comment: aspiration precautions, aphasia, levo .04 (just initiated prior to eval), room air, SBP <180/105  Vital Signs:  BP:  (105/53 at rest, 125/57  end of session)  Pain:  Pain Assessment  Pain Assessment:  (no pain complaints)    Objective   Cognition:  Overall Cognitive Status:  (aphasic, does well with choices, yes/no, then pt. speaking more in complete sentences)  Orientation Level:  (oriented to self, place, year with choices)           Home Living:  Home Living Comments: pt. lives with spouse, daughter in the area who sometimes stays over, split level home, can stay on 1st floor with full bath, st. cane, wh. walker, st. walker, tub/shower with gb, seat, hhs, st. toilet, bsc, basement laundry  Prior Function:  Prior Function Comments: pt. independent with adl, does not drive (spouse or daughter completes), daughter assists with laundry, spouse completes other aspects of adl, pt. uses st. cane sometimes for ambulation  IADL History:     ADL:  ADL Comments: dependent assist to don/dof socks in supine, impaired coordination/sitting balance in sitting, would anticipate same assist in sitting for LB adl, max assist for UB, mod assist for grooming  Activity Tolerance:  Early Mobility/Exercise Safety Screen: Proceed with mobilization - No exclusion criteria met  Bed Mobility/Transfers: Bed Mobility  Bed Mobility:  (mod assist x 1-2 supine to sit, sit to supine mod assist x 2)    Transfers  Transfer:  (sit<> stand from eob required min assist x 2 with cues for hand placement)      Functional Mobility:  Functional Mobility  Functional Mobility Performed:  (pt. abale to sidestep via wh. walker with min assist x 1 and cues for sequencing)  Sitting Balance:  Static Sitting Balance  Static Sitting-Comment/Number of Minutes: min/cga  Sensation:  Sensation Comment: sensation intact  Strength:  Strength Comments: R:  3-/5 proximal, 4-/5 distal L:  4+/5 overall  Perception:     Coordination:  Coordination Comment: (+) clonus RLE   Hand Function:  Coordination:  (opposition L hand wfl, impaired R decreased accuracy and rate)    Outcome Measures:[facility] Daily Activity  Putting  on and taking off regular lower body clothing: Total  Bathing (including washing, rinsing, drying): Total  Putting on and taking off regular upper body clothing: A lot  Toileting, which includes using toilet, bedpan or urinal: Total  Taking care of personal grooming such as brushing teeth: A little  Eating Meals: A little  Daily Activity - Total Score: 11         and ICU Mobility Screen  Early Mobility/Exercise Safety Screen: Proceed with mobilization - No exclusion criteria met    Education Documentation  ADL Training, taught by Mayda Steele OT at 5/15/2024  2:48 PM.  Learner: Patient  Readiness: Acceptance  Method: Explanation  Response: Demonstrated Understanding, Needs Reinforcement    Body Mechanics, taught by Mayda Steele OT at 5/15/2024  2:48 PM.  Learner: Patient  Readiness: Acceptance  Method: Explanation  Response: Demonstrated Understanding, Needs Reinforcement    Education Comments  No comments found.        OP EDUCATION:       Goals:  Encounter Problems       Encounter Problems (Active)       OT Goals       increase bue ther ex/activity x 7-10 minutes (including fm/gm tasks with R hand) and increase standing tolerance x 5-7  minutes with cga to promote greater activity tolerance for assist with adl.   (Progressing)       Start:  05/15/24    Expected End:  05/29/24            Increase functional mobility and  functional transfers to cga for bed/chair/toilet with dme prn   (Progressing)       Start:  05/15/24    Expected End:  05/29/24            Increase grooming/ub bathing to cga with dme prn sitting eob (Progressing)       Start:  05/15/24    Expected End:  05/29/24            Increase ub/lb dressing to cga with dme prn  (Progressing)       Start:  05/15/24    Expected End:  05/29/24            Increase toileting to cga with dme prn  (Progressing)       Start:  05/15/24    Expected End:  05/29/24

## 2024-05-15 NOTE — PROGRESS NOTES
Attempted bedside visit for Care Coordination assessment and discharge planning. Another service at bedside (speech). Will re-attempt assessment as able.      DI CRABTREE RN TCC

## 2024-05-15 NOTE — PROGRESS NOTES
"Akiko Peng is a 68 y.o. female on day 1 of admission presenting with Aphasia.    Subjective   Patient currently undergoing MRI of the brain to rule out acute CVA.  Patient will be status post TNK administration at approximately 1500 today.  EEG testing to be completed also later today given patient did experience witnessed seizure after administration of TNK while in the emergency room yesterday.  She is currently receiving Keppra 500 mg IV twice daily for seizure prophylaxis.  She has been seizure-free for almost 24 hours.       Objective     Last Recorded Vitals  Blood pressure 120/58, pulse 54, temperature 36.4 °C (97.5 °F), temperature source Temporal, resp. rate 22, height 1.778 m (5' 10\"), weight 60.5 kg (133 lb 6.1 oz), SpO2 100%.    Physical exam/neurological exam  Patient seen and examined at this time; upon entering room she is resting quietly in bed with EEG tech at bedside. Appears fully developed and well nourished.   Mental status: A&Ox 3. Memory testing, fund of knowledge and concentration within normal limits. Speech is fluent and negative for any paraphrasic errors.     Cranial Nerves:  Optic II/ Oculomotor III: Fundoscopic exam was technically difficult. PERRL +2. Visual fields are full. Convergence and accomodation noted without difficulty. Negative for deficits to visual acuity confrontation via static-finger wiggle test. Eyes appear aligned and free of exophthalmos and ptosis. Sclera are white bilaterally and lens are free from clouding.   Oculomotor III/ Trochlear IV/ Abducens VI: Extraocular movements are full, with no evidence of nystagmus. Negative for diplopia.   Trigeminal V: Facial sensation is intact to light touch. Corneal reflex responsive when threatened bilaterally.  Facial VII: intact; nose is midline, with no evidence of flattening to nasolabial folds noted and mouth is negative for evidence of droop. Patient is successfully able to follow commands to raise eyebrows, squeeze " eyes shut, smile and show teeth, frown, and puff out cheeks.   Acoustic VIII: Hearing is intact bilaterally.  Glossopharngyeal IX/ Vagus X: Palate elevates symmetrically to phonation. Findings are negative for uvula deviation or dysphagia.   Spinal accessory XI: Sternocleidomastoid/ upper trapezius is asymmetric to strength testing, which is patient's baseline, with RUE 4/5 versus LUE 5/5.  Normal bulk and tone.  Hypoglossal XII: Tongue is midline and without deviations. Phonation is articulate and is negative for findings of dysarthria or aphasia.     Motor exam: negative for evidence of involuntary movements or fasiculations. BUE flexion of biceps and brachioradialis graded asymmetric, which is patient's baseline, with RUE 4/5 versus LUE 5/5, in addition to extension of triceps at elbow and wrists. BUE  strength asymmetric, which is patient's baseline, with RUE 3/5 versus LUE 5/5, along with finger abduction and thumb opposition. BLE hip flexion, extension, adduction, and abduction asymmetric, which is patient's baseline, with RLE 2/5 versus LLE 4/5. Knee extension & flexion asymmetric, which is patient's baseline, with RLE 2/5 versus LLE 4/5. Ankle dorsiflexion and plantarflexion asymmetric, which is patient's baseline, with RLE 2/5 versus LLE 4/5. Normal bulk and normal tone.     Sensory exam: Sensation is intact to light touch throughout.    Reflexes: Reflexes are 1+ and symmetric. Bilateral plantar responses are flexor. Ankle jerks symmetric.     Coordination: finger-to-nose testing is negative for signs of dysmetria. Pronator drift testing to BUE mildly positive to right extremity, but is attributed to patient's baseline weakness secondary to MS. Rapid alternating hand movements WNL.    Gait exam: Not tested as patient is a high fall risk    Relevant Results    NIH Stroke Scale: 4        [place] Coma Scale  Best Eye Response: Spontaneous  Best Verbal Response: Inappropriate words  Best Motor Response:  Follows commands  [place] Coma Scale Score: 13        Scheduled medications  acetaminophen, 650 mg, oral, Once  atorvastatin, 80 mg, oral, Nightly  levETIRAcetam, 500 mg, intravenous, q12h  piperacillin-tazobactam, 3.375 g, intravenous, q6h      Continuous medications  dextrose 5 % and lactated Ringer's, 75 mL/hr, Last Rate: 75 mL/hr (05/15/24 0617)  norepinephrine, 0.01-1 mcg/kg/min, Last Rate: 0.04 mcg/kg/min (05/15/24 0908)      PRN medications  PRN medications: hydrALAZINE **FOLLOWED BY** [START ON 5/16/2024] hydrALAZINE, labetaloL, oxygen  ECG 12 lead    Result Date: 5/15/2024  Normal sinus rhythm Normal ECG No previous ECGs available    XR foot right 3+ views    Result Date: 5/15/2024  Interpreted By:  Abhay Quinones, STUDY: XR FOOT RIGHT 3+ VIEWS; XR ANKLE RIGHT 3+ VIEWS; ;  5/14/2024 8:19 pm   INDICATION: Signs/Symptoms:foot pain/discoloration; Signs/Symptoms:foot pain discoloration.   COMPARISON: None.   ACCESSION NUMBER(S): [id number]; [id number]   ORDERING CLINICIAN: DI BAH   FINDINGS: Right foot/ankle radiographs: Diffuse osteopenia. No destructive osseous lesions. No acute displaced fracture. No major malalignment. Nonspecific ossified fragment medial to the head of the 5th metatarsal bone either ossified bone or related to old trauma.       1. No acute displaced right foot/ankle fracture or major malalignment no destructive osseous lesions. Diffuse osteopenia. If continued clinical concern for underlying vascular insufficiency or osteomyelitis further assessment by dedicated CTA or MRI could be considered.   MACRO: None   Signed by: Abhay Quinones 5/15/2024 9:46 AM Dictation workstation:   [id number]    XR ankle right 3+ views    Result Date: 5/15/2024  Interpreted By:  Abhay Quinones, STUDY: XR FOOT RIGHT 3+ VIEWS; XR ANKLE RIGHT 3+ VIEWS; ;  5/14/2024 8:19 pm   INDICATION: Signs/Symptoms:foot pain/discoloration; Signs/Symptoms:foot pain discoloration.   COMPARISON: None.   ACCESSION  NUMBER(S): [id number]; [id number]   ORDERING CLINICIAN: DI BAH   FINDINGS: Right foot/ankle radiographs: Diffuse osteopenia. No destructive osseous lesions. No acute displaced fracture. No major malalignment. Nonspecific ossified fragment medial to the head of the 5th metatarsal bone either ossified bone or related to old trauma.       1. No acute displaced right foot/ankle fracture or major malalignment no destructive osseous lesions. Diffuse osteopenia. If continued clinical concern for underlying vascular insufficiency or osteomyelitis further assessment by dedicated CTA or MRI could be considered.   MACRO: None   Signed by: Abhay Quinones 5/15/2024 9:46 AM Dictation workstation:   [id number]    CT head wo IV contrast    Result Date: 5/14/2024  Interpreted By:  Jac Saba, STUDY: CT HEAD WO IV CONTRAST; 5/14/2024 2:13 pm   INDICATION: Signs/Symptoms:Seizure status post TNK. Generalized weakness. Localized right upper extremity weakness with aphasia. Last known well 1 hour prior to arrival. Patient has a history of MS. Patient has already undergone brain attack protocol including CT angiogram of the head and neck.   COMPARISON: Head CT 05/14/2024 at 1256 hours   ACCESSION NUMBER(S): [id number]   ORDERING CLINICIAN: DOMINGO CABRERA   TECHNIQUE: A helical acquisition data was obtained.   One or more of the following dose reduction techniques were used: Automated exposure control Adjustment of the mA and/or kV according to patient size, and/or use of iterative reconstruction technique.   FINDINGS: Intracranial findings: There is no evidence for intracranial hemorrhage or mass effect. There is residual intravenous contrast material enhancing the cerebellar tentorium, falx, and vascular spaces. Age-related atrophy is present. Periventricular white matter hypodensity is present, most consistent with age-related change and/or white matter ischemic disease. Some of the white matter changes are also  likely related to the patient's underlying history of multiple sclerosis.   Paranasal sinuses and temporal bone findings:  The visualized portions of the paranasal sinuses, mastoid air cells, and middle ear cavities are clear.   Orbital findings: The visualized portions of the orbits are unremarkable.       No acute intracranial pathologic findings are identified. Age-related intracranial findings are present along with findings likely related to the patient's underlying multiple sclerosis. There is no interval change when compared to the previous examination aside from the postcontrast enhancement findings.   MACRO: none   Signed by: Jac Saba 5/14/2024 2:25 PM Dictation workstation:   [id number]    XR chest 1 view    Result Date: 5/14/2024  STUDY: Chest Radiograph;  05/14/2024 1:36PM INDICATION: Altered mental status. COMPARISON: XR Chest 02/09/2024 ACCESSION NUMBER(S): [id number] ORDERING CLINICIAN: DOMINGO CABRERA TECHNIQUE:  Frontal chest (two images) was obtained at 13:35 hours. FINDINGS: CARDIOMEDIASTINAL SILHOUETTE: Cardiomediastinal silhouette is normal in size and configuration.  LUNGS: Emphysematous changes noted.  Lungs otherwise clear.  ABDOMEN: No remarkable upper abdominal findings.  BONES: No acute osseous changes.    No acute process. Signed by Jens Christine MD    CT brain attack angio head and neck W and WO IV contrast    Result Date: 5/14/2024  Interpreted By:  Lady Ma, STUDY: CT BRAIN ATTACK ANGIO HEAD AND NECK W AND WO IV CONTRAST;  5/14/2024 1:08 pm   INDICATION: Signs/Symptoms:CVA.   COMPARISON: Same day unenhanced head CT which is reported separately.   ACCESSION NUMBER(S): [id number]   ORDERING CLINICIAN: DOMINGO CABRERA   TECHNIQUE: 75 mL Omnipaque 350 was administered intravenously and axial images of the head and neck were acquired.  Coronal, sagittal, and 3-D reconstructions were provided for review. 3D reconstructions were performed on an independent workstation.    FINDINGS:     CTA HEAD FINDINGS:   Anterior circulation: The intracranial segments of the internal carotid arteries are patent. There are very small focal outpouchings projecting inferiorly from the communicating segments bilaterally measuring less than 2 mm.   Posterior circulation: The V4 segment on the right is dominant and the left is diminutive in caliber. The basilar artery and proximal PCAs are patent.   CTA NECK FINDINGS:   The aortic arch and arch vessels are degraded by artifact.   Carotid vessels:   The proximal common carotid arteries are degraded by artifact. They otherwise appear patent. The carotid bifurcations and cervical segments of the ICAs are somewhat degraded by artifact and motion but otherwise demonstrate no measurable stenosis.   Vertebral vessels:   The right vertebral artery is dominant. The distal V2 and V3 segments are somewhat degraded by artifact. Otherwise, they appear to be patent.   There is nonspecific apical pleural thickening and nodularity.   There are multilevel degenerative changes of the cervical spine with associated central canal and neuroforaminal stenosis.           No evidence for significant stenosis of the cervical vessels.   No evidence for significant stenosis or large branch vessel cutoffs of the intracranial vessels. Relative paucity of distal MCA branches on the left compared with right on the volumetric reformats is not confirmed on the source images and therefore favored to be due to technique/artifact.   Small focal outpouchings projecting inferiorly from the communicating segments of the ICAs are too small to characterize and may correspond to the origins of otherwise poorly seen branch vessels or possibly tiny aneurysms.   MACRO: None   Signed by: Lady Ma 5/14/2024 1:26 PM Dictation workstation:   [id number]    CT brain attack head wo IV contrast    Result Date: 5/14/2024  Interpreted By:  Surjit Gimenez, STUDY: CT BRAIN ATTACK HEAD WO IV  CONTRAST;  5/14/2024 12:56 pm   INDICATION: Signs/Symptoms:Stroke Evaluation.   COMPARISON: MRI 02/10/2024.   ACCESSION NUMBER(S): [id number]   ORDERING CLINICIAN: DOMINGO CABRERA   TECHNIQUE: Noncontrast axial CT scan of head was performed.   FINDINGS: Parenchyma: There is no intracranial hemorrhage. The grey-white differentiation is intact. There is no mass effect or midline shift.   CSF Spaces: The ventricles, sulci and basal cisterns are within normal limits for age.   Extra-Axial Fluid: No extraaxial fluid collection.   Calvarium: No acute fracture.   Paranasal sinuses: Bilateral maxillary and ethmoid mucosal thickening.   Mastoids: Clear.   Orbits: Normal.   Soft tissues: Unremarkable.       No acute intracranial abnormality.   MACRO: Surjit Gimenez discussed the significance and urgency of this critical finding by Epic secure chat with  DOMINGO CABRERA on 5/14/2024 at 1:00 pm.  (**-RCF-**) Findings:  See findings.   Signed by: Surjit Gimenez 5/14/2024 1:01 PM Dictation workstation:   [id number]   Results for orders placed or performed during the hospital encounter of 05/14/24 (from the past 24 hour(s))   POCT GLUCOSE   Result Value Ref Range    POCT Glucose 140 (H) 74 - 99 mg/dL   CBC and Auto Differential   Result Value Ref Range    WBC 13.2 (H) 4.4 - 11.3 x10*3/uL    nRBC 0.0 0.0 - 0.0 /100 WBCs    RBC 4.53 4.00 - 5.20 x10*6/uL    Hemoglobin 14.6 12.0 - 16.0 g/dL    Hematocrit 41.7 36.0 - 46.0 %    MCV 92 80 - 100 fL    MCH 32.2 26.0 - 34.0 pg    MCHC 35.0 32.0 - 36.0 g/dL    RDW 12.0 11.5 - 14.5 %    Platelets 230 150 - 450 x10*3/uL    Neutrophils % 91.5 40.0 - 80.0 %    Immature Granulocytes %, Automated 0.6 0.0 - 0.9 %    Lymphocytes % 3.6 13.0 - 44.0 %    Monocytes % 4.0 2.0 - 10.0 %    Eosinophils % 0.0 0.0 - 6.0 %    Basophils % 0.3 0.0 - 2.0 %    Neutrophils Absolute 12.09 (H) 1.20 - 7.70 x10*3/uL    Immature Granulocytes Absolute, Automated 0.08 0.00 - 0.70 x10*3/uL    Lymphocytes Absolute  0.48 (L) 1.20 - 4.80 x10*3/uL    Monocytes Absolute 0.53 0.10 - 1.00 x10*3/uL    Eosinophils Absolute 0.00 0.00 - 0.70 x10*3/uL    Basophils Absolute 0.04 0.00 - 0.10 x10*3/uL   Comprehensive metabolic panel   Result Value Ref Range    Glucose 118 (H) 74 - 99 mg/dL    Sodium 140 136 - 145 mmol/L    Potassium 4.3 3.5 - 5.3 mmol/L    Chloride 104 98 - 107 mmol/L    Bicarbonate 27 21 - 32 mmol/L    Anion Gap 13 10 - 20 mmol/L    Urea Nitrogen 15 6 - 23 mg/dL    Creatinine 0.66 0.50 - 1.05 mg/dL    eGFR >90 >60 mL/min/1.73m*2    Calcium 9.0 8.6 - 10.3 mg/dL    Albumin 4.3 3.4 - 5.0 g/dL    Alkaline Phosphatase 93 33 - 136 U/L    Total Protein 6.8 6.4 - 8.2 g/dL    AST 12 9 - 39 U/L    Bilirubin, Total 0.7 0.0 - 1.2 mg/dL    ALT 11 7 - 45 U/L   Troponin I, High Sensitivity   Result Value Ref Range    Troponin I, High Sensitivity 6 0 - 13 ng/L   Protime-INR   Result Value Ref Range    Protime 11.4 9.8 - 12.8 seconds    INR 1.0 0.9 - 1.1   APTT   Result Value Ref Range    aPTT 30 27 - 38 seconds   Lipid Panel   Result Value Ref Range    Cholesterol 159 0 - 199 mg/dL    HDL-Cholesterol 52.5 mg/dL    Cholesterol/HDL Ratio 3.0     LDL Calculated 97 <=99 mg/dL    VLDL 9 0 - 40 mg/dL    Triglycerides 46 0 - 149 mg/dL    Non HDL Cholesterol 107 0 - 149 mg/dL   Hemoglobin A1C   Result Value Ref Range    Hemoglobin A1C 4.8 see below %    Estimated Average Glucose 91 Not Established mg/dL   B-Type Natriuretic Peptide   Result Value Ref Range     (H) 0 - 99 pg/mL   ECG 12 lead   Result Value Ref Range    Ventricular Rate 72 BPM    Atrial Rate 72 BPM    MD Interval 164 ms    QRS Duration 72 ms    QT Interval 374 ms    QTC Calculation(Bazett) 409 ms    P Axis 34 degrees    R Axis 71 degrees    T Axis 77 degrees    QRS Count 12 beats    Q Onset 221 ms    P Onset 139 ms    P Offset 184 ms    T Offset 408 ms    QTC Fredericia 397 ms   Blood Culture    Specimen: Peripheral Venipuncture; Blood culture   Result Value Ref Range     Blood Culture Loaded on Instrument - Culture in progress    Blood Culture    Specimen: Peripheral Venipuncture; Blood culture   Result Value Ref Range    Blood Culture Loaded on Instrument - Culture in progress    Lactate   Result Value Ref Range    Lactate 1.0 0.4 - 2.0 mmol/L   POCT GLUCOSE   Result Value Ref Range    POCT Glucose 99 74 - 99 mg/dL   MRSA Surveillance for Vancomycin De-escalation, PCR    Specimen: Anterior Nares; Swab   Result Value Ref Range    MRSA PCR Not Detected Not Detected   POCT GLUCOSE   Result Value Ref Range    POCT Glucose 93 74 - 99 mg/dL   Urinalysis with Reflex Microscopic   Result Value Ref Range    Color, Urine Light-Yellow Light-Yellow, Yellow, Dark-Yellow    Appearance, Urine Clear Clear    Specific Gravity, Urine 1.028 1.005 - 1.035    pH, Urine 6.5 5.0, 5.5, 6.0, 6.5, 7.0, 7.5, 8.0    Protein, Urine NEGATIVE NEGATIVE, 10 (TRACE), 20 (TRACE) mg/dL    Glucose, Urine Normal Normal mg/dL    Blood, Urine NEGATIVE NEGATIVE    Ketones, Urine 10 (1+) (A) NEGATIVE mg/dL    Bilirubin, Urine NEGATIVE NEGATIVE    Urobilinogen, Urine Normal Normal mg/dL    Nitrite, Urine NEGATIVE NEGATIVE    Leukocyte Esterase, Urine NEGATIVE NEGATIVE     No EEG results found for the past 12 months                            Assessment/Plan   This patient currently has cardiac telemetry ordered; if you would like to modify or discontinue the telemetry order, click here to go to the orders activity to modify/discontinue the order.  Principal Problem:    Aphasia  -MRI negative for any acute findings relating to CVA or hemorrhage.  Patient will be status post 24 hours administration at approximately 1500. She is to start ASA 81 mg p.o. daily for CVA prophylaxis.  It is recommended for patient to continue atorvastatin 40 mg p.o. daily for additional prophylaxis given LDL is elevated at 97.  -Neurochecks per protocol  -Defer echocardiogram as patient had testing completed approximately 3 months ago  -EEG to be  completed later today; continue seizure precautions while hospitalized.  Continue Keppra 500 mg IV twice daily for seizure prophylaxis given patient did experience a witnessed seizure while in the emergency room.  -Continue to evaluate for any metabolic abnormalities or active sources of infection that could have provoked seizure  -Recommendations for needs during hospitalization and at discharge via PT, OT, and SLP  -Continue promotion of lifestyle modifications, such as: Strict BP and glycemic control, dietary habit changes, incorporation of daily exercise regimen, adherence to all prescription/OTC medication schedules, attendance to all follow-up appointments, cessation from smoking if applicable, abstinence from alcohol and illicit drug use if applicable  -Patient to follow-up with PCP in 1 to 2 weeks postdischarge  -Patient to follow-up with Dr. Taylor for MS management in 4 months postdischarge    Total critical care face-to-face time spent with patient was 30 minutes; more than 50% of my time was spent counseling the patient on: preparation to see patient via chart review of resulted laboratory values, radiographic imaging, prescribed medications, and impairment of involved organ systems. Time also spent on medical examination, placing appropriate orders for testing/medications, communicating with other health care providers, counseling/educating the patient/family, and care coordination.    *This note was created using voice recognition transcription software. Despite proofreading, unintentional typographical errors may be present. Please contact the department of neurology with any questions or concerns.         TRUPTI Ng-CNP

## 2024-05-15 NOTE — PROGRESS NOTES
Speech-Language Pathology    [facility] Adult Inpatient Speech-Language Pathology Clinical Swallow Evaluation    Patient Name: Akiko Peng  MRN: [id number]  Today's Date: 5/15/2024   Time Calculation  Start Time: 1000  Stop Time: 1024  Time Calculation (min): 24 min         Current Problem:   1. Aphasia  CANCELED: Transthoracic Echo (TTE) Complete    CANCELED: Transthoracic Echo (TTE) Complete      2. Seizure (Multi)        3. Fever, unspecified fever cause        4. TIA (transient ischemic attack)  CANCELED: Transthoracic Echo (TTE) Complete    CANCELED: Transthoracic Echo (TTE) Complete          Recommendations:  Solid Diet Recommendations : Regular (IDDSI Level 7)  Liquid Diet Recommendations: Thin (IDDSI Level 0)  Compensatory Swallowing Strategies:   Upright 90 degrees as possible for all oral intake,   Small bites/sips,   Eat/feed slowly  Medication Administration Recommendations: Whole, With Liquid      Assessment:  Patient is alert and cooperative. Denies any swallowing issues prior to admission. Reports good appetite at home.   -Clinician assessed intact oral phase of the swallow, and suspected functional pharyngeal swallow given clinical bedside indicators.   Pt is managing secretions, tongue protrudes to midline, no noticeable facial droop.  -PO trials thin liquids via cup, pureed solids, and shortbread cookie solids.   -ORAL PHASE: labial seal intact on cup and tsp. There is no anterior loss of the oral bolus.  Mastication on adequate/natural dentition (some molars missing) is WFL; oral bolus formation and manipulation WFL. No oral pocketing. No significant oral residue after the swallow.   -PHARYNGEAL PHASE; no overt s/s aspiration with all consistencies. No change in vocal quality or respirations with PO intake.   Will suggest pt resume an oral diet, baseline regular textures and thin liquids, no texture restrictions.       Plan:  SLP Plan: No skilled SLP for dysphagia  Discussed POC: Patient,  Nursing      Subjective   Current Problem:  Assess oropharyngeal swallowing mechanism and determine safest LRD. Patient is s/p TNK administration d/t stroke symptoms (right upper extremity weakness and aphasia). Patient had a witnessed seizure after administration of TNK.      General Visit Information:  Patient Class: Inpatient  Living Environment: Home  Ordering Physician: Zuleyma  Past Medical History Relevant to Rehab: frequent uti's, MS, seizures on keppra  Prior to Session Communication: Bedside nurse  Current Diet : NPO      Vital Signs:   Room air, afebrile, Head CT's have all been negative to date, CXR negative for any acute findings      Objective       Baseline Assessment:  Respiratory Status: Room air      Pain:   Patient internally distracted by pain in her right hip/leg and frequently attempting to reposition herself in bed.      Oral/Motor Assessment:  Dentition: Some Missing Teeth  Oral Motor: Within Functional Limits      Inpatient:  Education Comments  Results/recommendations from swallowing assessment were reviewed with patient and RN. All parties voiced understanding.

## 2024-05-16 ENCOUNTER — APPOINTMENT (OUTPATIENT)
Dept: CARDIOLOGY | Facility: HOSPITAL | Age: 69
DRG: 057 | End: 2024-05-16
Payer: MEDICARE

## 2024-05-16 ENCOUNTER — APPOINTMENT (OUTPATIENT)
Dept: VASCULAR MEDICINE | Facility: HOSPITAL | Age: 69
DRG: 057 | End: 2024-05-16
Payer: MEDICARE

## 2024-05-16 LAB
EJECTION FRACTION APICAL 4 CHAMBER: 74.8
GLUCOSE BLD MANUAL STRIP-MCNC: 114 MG/DL (ref 74–99)
GLUCOSE BLD MANUAL STRIP-MCNC: 125 MG/DL (ref 74–99)
GLUCOSE BLD MANUAL STRIP-MCNC: 87 MG/DL (ref 74–99)
GLUCOSE BLD MANUAL STRIP-MCNC: 90 MG/DL (ref 74–99)
GLUCOSE BLD MANUAL STRIP-MCNC: 92 MG/DL (ref 74–99)
LEFT VENTRICLE INTERNAL DIMENSION DIASTOLE: 4.2 CM (ref 3.5–6)
LV EJECTION FRACTION BIPLANE: 76 %

## 2024-05-16 PROCEDURE — 82947 ASSAY GLUCOSE BLOOD QUANT: CPT | Mod: 91

## 2024-05-16 PROCEDURE — 1200000002 HC GENERAL ROOM WITH TELEMETRY DAILY

## 2024-05-16 PROCEDURE — 2500000004 HC RX 250 GENERAL PHARMACY W/ HCPCS (ALT 636 FOR OP/ED)

## 2024-05-16 PROCEDURE — 82947 ASSAY GLUCOSE BLOOD QUANT: CPT

## 2024-05-16 PROCEDURE — 99233 SBSQ HOSP IP/OBS HIGH 50: CPT | Performed by: PSYCHIATRY & NEUROLOGY

## 2024-05-16 PROCEDURE — 93308 TTE F-UP OR LMTD: CPT | Performed by: INTERNAL MEDICINE

## 2024-05-16 PROCEDURE — 2500000001 HC RX 250 WO HCPCS SELF ADMINISTERED DRUGS (ALT 637 FOR MEDICARE OP)

## 2024-05-16 PROCEDURE — 93922 UPR/L XTREMITY ART 2 LEVELS: CPT | Performed by: SURGERY

## 2024-05-16 PROCEDURE — 93308 TTE F-UP OR LMTD: CPT

## 2024-05-16 PROCEDURE — 97535 SELF CARE MNGMENT TRAINING: CPT | Mod: GO

## 2024-05-16 PROCEDURE — 97530 THERAPEUTIC ACTIVITIES: CPT | Mod: GP

## 2024-05-16 PROCEDURE — 2500000001 HC RX 250 WO HCPCS SELF ADMINISTERED DRUGS (ALT 637 FOR MEDICARE OP): Performed by: NURSE PRACTITIONER

## 2024-05-16 PROCEDURE — 92507 TX SP LANG VOICE COMM INDIV: CPT | Mod: GN | Performed by: SPEECH-LANGUAGE PATHOLOGIST

## 2024-05-16 PROCEDURE — 93922 UPR/L XTREMITY ART 2 LEVELS: CPT

## 2024-05-16 RX ORDER — AMOXICILLIN 250 MG
1 CAPSULE ORAL NIGHTLY
Status: DISCONTINUED | OUTPATIENT
Start: 2024-05-16 | End: 2024-05-17 | Stop reason: HOSPADM

## 2024-05-16 RX ORDER — POLYETHYLENE GLYCOL 3350 17 G/17G
17 POWDER, FOR SOLUTION ORAL DAILY
Status: DISCONTINUED | OUTPATIENT
Start: 2024-05-16 | End: 2024-05-17 | Stop reason: HOSPADM

## 2024-05-16 RX ORDER — ENOXAPARIN SODIUM 100 MG/ML
40 INJECTION SUBCUTANEOUS EVERY 24 HOURS
Status: DISCONTINUED | OUTPATIENT
Start: 2024-05-16 | End: 2024-05-17 | Stop reason: HOSPADM

## 2024-05-16 RX ORDER — BISACODYL 10 MG/1
10 SUPPOSITORY RECTAL DAILY PRN
Status: DISCONTINUED | OUTPATIENT
Start: 2024-05-16 | End: 2024-05-17 | Stop reason: HOSPADM

## 2024-05-16 RX ADMIN — ENOXAPARIN SODIUM 40 MG: 40 INJECTION SUBCUTANEOUS at 11:48

## 2024-05-16 RX ADMIN — PIPERACILLIN SODIUM AND TAZOBACTAM SODIUM 3.38 G: 3; .375 INJECTION, SOLUTION INTRAVENOUS at 09:09

## 2024-05-16 RX ADMIN — PIPERACILLIN SODIUM AND TAZOBACTAM SODIUM 3.38 G: 3; .375 INJECTION, SOLUTION INTRAVENOUS at 04:01

## 2024-05-16 RX ADMIN — ASPIRIN 81 MG: 81 TABLET, COATED ORAL at 09:09

## 2024-05-16 RX ADMIN — LEVETIRACETAM 500 MG: 5 INJECTION INTRAVENOUS at 11:49

## 2024-05-16 RX ADMIN — DEXTROSE MONOHYDRATE 750 MG: 50 INJECTION, SOLUTION INTRAVENOUS at 23:40

## 2024-05-16 RX ADMIN — LEVETIRACETAM 500 MG: 5 INJECTION INTRAVENOUS at 01:09

## 2024-05-16 RX ADMIN — ATORVASTATIN CALCIUM 80 MG: 80 TABLET, FILM COATED ORAL at 20:41

## 2024-05-16 ASSESSMENT — COGNITIVE AND FUNCTIONAL STATUS - GENERAL
MOBILITY SCORE: 12
MOVING FROM LYING ON BACK TO SITTING ON SIDE OF FLAT BED WITH BEDRAILS: A LITTLE
STANDING UP FROM CHAIR USING ARMS: A LOT
PERSONAL GROOMING: A LOT
DAILY ACTIVITIY SCORE: 14
DAILY ACTIVITIY SCORE: 14
CLIMB 3 TO 5 STEPS WITH RAILING: TOTAL
STANDING UP FROM CHAIR USING ARMS: A LOT
WALKING IN HOSPITAL ROOM: A LOT
WALKING IN HOSPITAL ROOM: A LOT
DRESSING REGULAR LOWER BODY CLOTHING: A LOT
CLIMB 3 TO 5 STEPS WITH RAILING: TOTAL
MOVING TO AND FROM BED TO CHAIR: A LOT
MOVING TO AND FROM BED TO CHAIR: A LOT
TURNING FROM BACK TO SIDE WHILE IN FLAT BAD: A LOT
MOBILITY SCORE: 12
TURNING FROM BACK TO SIDE WHILE IN FLAT BAD: A LOT
HELP NEEDED FOR BATHING: A LOT
DRESSING REGULAR UPPER BODY CLOTHING: A LOT
TOILETING: A LOT
DRESSING REGULAR LOWER BODY CLOTHING: A LOT
HELP NEEDED FOR BATHING: A LOT
PERSONAL GROOMING: A LOT
TOILETING: A LOT
DRESSING REGULAR UPPER BODY CLOTHING: A LOT
MOVING FROM LYING ON BACK TO SITTING ON SIDE OF FLAT BED WITH BEDRAILS: A LITTLE

## 2024-05-16 ASSESSMENT — PAIN SCALES - GENERAL
PAINLEVEL_OUTOF10: 0 - NO PAIN
PAINLEVEL_OUTOF10: 0 - NO PAIN

## 2024-05-16 ASSESSMENT — ACTIVITIES OF DAILY LIVING (ADL): HOME_MANAGEMENT_TIME_ENTRY: 13

## 2024-05-16 ASSESSMENT — PAIN - FUNCTIONAL ASSESSMENT
PAIN_FUNCTIONAL_ASSESSMENT: 0-10
PAIN_FUNCTIONAL_ASSESSMENT: 0-10

## 2024-05-16 NOTE — CONSULTS
Consults    Reason For Consult  Transition of medical management    Subjective   Patient is a 68 y.o. female admitted on 5/14/2024 12:48 PM with concern for right-sided weakness and aphasia.    HPI:    Akiko Peng is a 68-year-old female past medical history of multiple sclerosis, rheumatoid arthritis, seizures who presented to [facility] 5/14/24 with her  for concern of stroke.  Patient had presented to emergency room with right-sided weakness and aphasia.  Last known well was 1 hour prior to presentation to the emergency room. The patient does have a baseline neurological status of right upper extremity weakness and bilateral lower extremity weakness.  Spouse had called EMS after noting patient was not moving her right upper extremity and having difficulty speaking.  In the ED, stroke team was called with NIH of 14.  The patient had a CT scan of the brain which was negative for any acute process.  The patient also had a CT angiogram of the neck and brain that showed no significant stenosis or intracranial cutoff.  Informed consent was given to the patient's  and TNK was administered.  Shortly after patient had a witnessed seizure in the ED.  Repeat CT brain showed no acute process.  Patient was started on Keppra 500 mg IV twice daily and admitted to the ICU for further observation.  Upon initial presentation to the ICU patient was febrile to unknown source.  UA negative patient started on IV antibiotics.  Urology was consulted and EEG was performed.  Patient was acutely hypotensive upon arrival to the ICU and placed on pressors, has been off pressors for 24 hours.  Negative patient's seizure-free for 24 hours, general medicine consulted for transition of medical management.    Past Medical History:  Multiple sclerosis, rheumatoid arthritis, seizures    Past Surgical History:  Hysterectomy, tonsillectomy    Family History:  Multiple sclerosis     Social History:  Alcohol consumption 1-4 drinks per  day, never smoker    Review of Systems:   Review of Systems   A 10+ point ROS was completed and otherwise negative except as noted above and per HPI.    Objective   Vitals:    05/16/24 1200   BP:    Pulse:    Resp:    Temp: 36.4 °C (97.5 °F)   SpO2:       Physical Exam  Physical Exam:  Constitutional: well developed, awake, alert, no acute distress  ENMT: mucous membranes moist, EOMI, conjunctivae clear  Head/Neck: normocephalic, atraumatic; supple, trachea midline  Respiratory/Thorax: patent airways, CTAB; no wheezes, rales, or rhonchi  Cardiovascular: RRR, no murmur  Gastrointestinal: soft, nondistended, non-tender, bowel sounds appreciated  Extremities: palpable peripheral pulses, no edema or cyanosis  Neurological: AO x3, no focal deficits  Psychological: appropriate mood and behavior  Skin: warm and dry    Assessment/Plan     Akiko Peng is a 68-year-old female past medical history of multiple sclerosis, rheumatoid arthritis, seizures who presented to [facility] 5/14/24 with her  for concern of stroke status post TNK, imaging negative for acute process, seizure activity more likely cause of altered status.    Acute medical conditions:  #Hemiaplasia right and aphasia status post TNK 5/14  #Witnessed seizure  #Hx multiple sclerosis, chronic right-sided weakness  CT brain negative for acute process  CTA head and neck negative for acute process  MRI brain negative for acute process: White matter lesions consistent with MS  -EEG results pending  -Continue IV Keppra 500 mg twice daily  -Started on IV antibiotics for fever of unknown origin, discontinued antibiotics 5/16/2024  -Bilateral ARRON ordered, results no signs of arterial occlusions  -Neurology following and patient, recommend outpatient follow-up 1 to 2 weeks with neurology and outpatient follow-up with Dr. Taylor for MS management in 4 months upon discharge    Chronic medical conditions:  #Seizures, followed with neurology outpatient 2/19/2024  discussions were made at that time regarding patient's refusal to take Keppra for seizures  #Rheumatoid arthritis-no active treatment    DVT PPX: Lovenox  Diet: Regular  IVF: None  Code Status: Full    This is a preliminary note written by the resident. Please wait for attending addendum for finalization of note and recommendations.    Adrian Arreola DO, PhD  Internal Medicine PGY1

## 2024-05-16 NOTE — PROGRESS NOTES
Physical Therapy    Physical Therapy Treatment    Patient Name: Akiko Peng  MRN: [id number]  [id number]    Today's Date: 5/16/2024  Time Calculation  Start Time: 1119  Stop Time: 1145  Time Calculation (min): 26 min       Assessment/Plan   PT Assessment  PT Assessment Results: Decreased strength, Decreased endurance, Impaired balance, Decreased mobility, Decreased coordination  Rehab Prognosis: Good  Evaluation/Treatment Tolerance: Patient tolerated treatment well  End of Session Communication: Bedside nurse  End of Session Patient Position: Up in chair, Alarm on     PT Plan  Treatment/Interventions: Bed mobility, Transfer training, Gait training, Neuromuscular re-education, Strengthening, Endurance training, Therapeutic exercise, Therapeutic activity  PT Plan: Skilled PT  PT Frequency: 5 times per week  PT Discharge Recommendations: High intensity level of continued care  PT - OK to Discharge: Yes (when cleared by medical team)    Current Problem:  Patient Active Problem List   Diagnosis    TIA (transient ischemic attack)    Aphasia    Bradycardia       General Visit Information:   PT  Visit  PT Received On: 05/16/24  General  Reason for Referral: PT eval and treat; pt. admitted with generalized weakness, weakness RUE, aphasia: TNK at 1316 5/14, stroke vs seizure vs MS flare, possible seizure in ED, ct head: (-), ct angio: (-)  Referred By: Sukumar  Past Medical History Relevant to Rehab: frequent UTIs, MS, seizures on keppra  Prior to Session Communication: Bedside nurse  Patient Position Received: Bed, 4 rail up, Alarm on  General Comment: Pt resting in bed upon entering, agreeable  Subjective     Precautions:  Precautions  Precautions Comment: falls    Vital Signs:  Vital Signs  Heart Rate:  (VSS throughout session)  BP:  (BP resting 105/ 52 MAP 76, sitting 125/57 (82), end of session 115:58 (83))  Objective     Pain:  Pain Assessment  Pain Assessment:  (pt denies pain)    Cognition:  Cognition  Overall  Cognitive Status: Within Functional Limits      Activity Tolerance:  Activity Tolerance  Early Mobility/Exercise Safety Screen: Proceed with mobilization - No exclusion criteria met    Treatments:    Bed Mobility  Bed Mobility:  (supine to sit with mod assist x 1 with cues for sequencing and technique)  Ambulation/Gait Training  Ambulation/Gait Training Performed:  (Pt completes ambulation 3ft bed to chair with mod assist x 1 with WW. Pt requires increased time to complete due to decreased coordination of BLE, R>L. PT gives cues for sequencing and BLE advancement. Pt with noted right knee hyperextension in stance.)  Transfers  Transfer:  (sit to stand with mod assist x 1 with cues for hand placement, safety and technique)          Outcome Measures:  [facility] Basic Mobility  Turning from your back to your side while in a flat bed without using bedrails: A little  Moving from lying on your back to sitting on the side of a flat bed without using bedrails: A lot  Moving to and from bed to chair (including a wheelchair): A lot  Standing up from a chair using your arms (e.g. wheelchair or bedside chair): A lot  To walk in hospital room: A lot  Climbing 3-5 steps with railing: Total  Basic Mobility - Total Score: 12  Education Documentation  Mobility Training, taught by Jessica Infante PT at 5/16/2024  3:47 PM.  Learner: Patient  Readiness: Acceptance  Method: Explanation  Response: Verbalizes Understanding    Mobility Training, taught by Jessica Infante PT at 5/15/2024  4:01 PM.  Learner: Patient  Readiness: Acceptance  Method: Explanation  Response: Verbalizes Understanding, Needs Reinforcement    Education Comments  No comments found.        EDUCATION:     Encounter Problems       Encounter Problems (Active)       PT Problem       STG - Pt will amb 50' using WW with MIN A  (Progressing)       Start:  05/15/24    Expected End:  05/29/24            STG - Pt will transition supine <> sitting with SBA (Progressing)        Start:  05/15/24    Expected End:  05/29/24            STG - Pt will transfer STS with CGA (Progressing)       Start:  05/15/24    Expected End:  05/29/24               Pain - Adult

## 2024-05-16 NOTE — PROGRESS NOTES
Speech-Language Pathology    SLP Adult Inpatient  Speech-Language Pathology Treatment     Patient Name: Akiko Peng  MRN: [id number]  Today's Date: 5/16/2024  Time Calculation  Start Time: 0835  Stop Time: 0850  Time Calculation (min): 15 min     Current Problem:   1. Aphasia  CANCELED: Transthoracic Echo (TTE) Complete    CANCELED: Transthoracic Echo (TTE) Complete      2. Seizure (Multi)        3. Fever, unspecified fever cause        4. TIA (transient ischemic attack)  CANCELED: Transthoracic Echo (TTE) Complete    CANCELED: Transthoracic Echo (TTE) Complete      5. Bradycardia  Vascular US lower extremity arterial duplex right    Vascular US lower extremity arterial duplex right    Transthoracic Echo (TTE) Limited    Transthoracic Echo (TTE) Limited    CANCELED: Transthoracic Echo (TTE) Complete    CANCELED: Transthoracic Echo (TTE) Complete      6. Suspected CHF (congestive heart failure)  Vascular US lower extremity arterial duplex right    Vascular US lower extremity arterial duplex right      7. CHF (congestive heart failure), NYHA class I, acute on chronic, combined (Multi)  Transthoracic Echo (TTE) Limited    Transthoracic Echo (TTE) Limited    CANCELED: Transthoracic Echo (TTE) Complete    CANCELED: Transthoracic Echo (TTE) Complete        SLP Assessment:  -Today pt is seen bedside, per RN pt has been confused, continuously asking to see the nurse. She is again asking this SLP to send in the doctor and nurse.   -Pt's speech is intelligible, voice is audible, she makes eye contact, follows commands, engages in conversation. She recalls being assessed by SLP yesterday and feels she does not need speech therapy.   -Today, pt exhibits no paraphasias, no word finding deficits in conversation or in [employer] language sample today, Complete sentences and thorough description from patient. Confrontational naming intact. She is expressing wants/needs clearly.   -No further ST indicated for speech/language, this  session seems more agitating to pt then helpful given her confusion.   -Pt may benefit from cognitive testing once stable and in therapy setting if needed at time of dc.      VERBAL EXPRESSION STGs: start date 5/15/24     Patient will complete predictable fill-ins with 100 % accuracy. 5/16 goal met  Patient will complete categorical naming tasks with 90 % accuracy. Dc goal.    Patient will improve word finding abilities while expressing complex ideas with 90% accuracy.  5/16 goal met.   Patient will participate in additional speech/language assessment. 5/16 goal met  Patient/family education. 5/16 pt/RN education.      Plan:  SLP TX Plan: Discharge from Speech Therapy  SLP Plan: No skilled SLP  Patient/Caregiver Agreeable:  (when cleared by medical team.)    Subjective   Pt seen bedside, speech is intelligible, voice is audible, able to express wants/needs. May benefit form cognitive testing/tx in therapy setting once stable.     MR Brain w and wo IV contrast  5/14  Impression   No evidence of acute infarct, intracranial mass effect or midline shift.      Moderate degree of white matter lesions compatible with changes of multiple sclerosis. No new hyperintense lesion. No diffusion restricting or enhancing lesions.     Pain Assessment:   Pain Assessment: 0-10  Pain Score: 0 - No pain  Language Expression:  Language Expression Interventions: Phrase Completion, Confrontation Naming, Conversational Language Tasks  Inpatient:  Education Documentation  SLP has provided pt and caregiver/RN JASWINDER Lewis with the results and recommendations of this session.

## 2024-05-16 NOTE — PROGRESS NOTES
"Akiko Peng is a 68 y.o. female on day 2 of admission presenting with Aphasia.    SUBJECTIVE     Patient evaluated this morning and found to be resting comfortably in bed.  Patient is still moving all 4 extremities on her own.  She is alert and oriented x 4 however occasionally speaks nonsensically with inappropriate answers.  She says that she is \"frustrated\".  However is unable to verbalize why.    Patient is medically stable for transfer to medicine floor.  ICU signing off    OBJECTIVE     Vitals:    05/16/24 0600 05/16/24 0700 05/16/24 0800 05/16/24 0900   BP: 119/63 129/60 145/65 136/62   Pulse: 70 57 62 70   Resp: 19 17 26 20   Temp:   36.1 °C (97 °F)    TempSrc:   Temporal    SpO2: 100% 100% 100% 100%   Weight:       Height:          Results from last 7 days   Lab Units 05/15/24  1453   WBC AUTO x10*3/uL 10.1   HEMOGLOBIN g/dL 15.5   HEMATOCRIT % 44.3   PLATELETS AUTO x10*3/uL 254   NEUTROS PCT AUTO % 71.2   LYMPHS PCT AUTO % 14.9   MONOS PCT AUTO % 12.2   EOS PCT AUTO % 0.6     Results from last 7 days   Lab Units 05/15/24  1453   SODIUM mmol/L 140   POTASSIUM mmol/L 3.5   CHLORIDE mmol/L 106   CO2 mmol/L 24   BUN mg/dL 8   CREATININE mg/dL 0.62   CALCIUM mg/dL 8.9   PROTEIN TOTAL g/dL 6.5   BILIRUBIN TOTAL mg/dL 1.1   ALK PHOS U/L 84   ALT U/L 7   AST U/L 11   GLUCOSE mg/dL 141*     24hr Min/Max:  Temp  Min: 36 °C (96.8 °F)  Max: 36.6 °C (97.9 °F)  Pulse  Min: 54  Max: 88  BP  Min: 111/51  Max: 173/76  Resp  Min: 4  Max: 34  SpO2  Min: 83 %  Max: 100 %  LDA:   External Urinary Catheter Female (Active)   Placement Date: 05/14/24   Hand Hygiene Completed: Yes  External Catheter Type: Female   Number of days: 1       Intake/Output Summary (Last 24 hours) at 5/16/2024 1048  Last data filed at 5/16/2024 0939  Gross per 24 hour   Intake 404 ml   Output 800 ml   Net -396 ml     All other labs and Imaging have been personally reviewed.     Scheduled Medications  acetaminophen, 650 mg, oral, Once  aspirin, 81 mg, " "oral, Daily  atorvastatin, 80 mg, oral, Nightly  levETIRAcetam, 500 mg, intravenous, q12h  piperacillin-tazobactam, 3.375 g, intravenous, q6h       Continuous Medications:   norepinephrine, 0.01-1 mcg/kg/min, Last Rate: Stopped (05/15/24 1830)       Physical Exam    Constitutional: Well developed, A&Ox3, no acute distress, alert and cooperative  Eyes: EOMI, clear sclera  Respiratory/Thorax: Patent airways, CTAB, normal breath sounds with good chest expansion  Cardiovascular: RRR, no murmurs, 2+ equal pulses of the extremities  Gastrointestinal: Nondistended, soft, non-tender  Extremities: RLE appears to have mottled skin and slightly colder to touch, pulses appreciated via Doppler bilaterally   Psychological: Appropriate mood and behavior  Skin: Warm and dry    ASSESSMENT & PLAN     Daily Progress:  5/15: Alert and oriented x 3.  All 4 extremity motion intact.  Will initiate DAPT and VTE PPx pending MRI read.  Levophed drip started to maintain MAP greater than 80.  Waiting on SLP eval.  will time afternoon labs  5/16: Still alert and oriented.  No acute changes overnight. Patient is medically stable for transfer to medicine floor.  ICU signing off    ASSESSMENT & PLAN    Neuro/Constitutional  #R-sided Hemiplegia and aphasia s/p TNK  #Seizure post TNK admin  #H/o MS, chronic right-sided weakness  -5/14: Patient perseverating the word \"no\".  Able to move all 4 extremities however does not move RUE/RLE voluntarily.  Retracts to pain.  Additionally when testing stimuli she is able to use the words like \"hey\" and \"ouch\" however when you ask her other question she goes back to \"no\"  -5/15: Alert and oriented with extremity motor function intact.  Appears to be back to normal  - states that when patient has MS flares she experiences right-sided weakness  -CT brain Noncon was negative for acute bleed. CTA head/neck showed no significant stenosis.   -MRI brain with no evidence of acute infarct, mass, midline shift.  " "However did show white matter lesions compatible with changes of MS  -A1c 4.8.  Lipid panel WNL  PLAN:  -Monitor for ICU delirium & maintain sleep hygiene   -EEG pending read  -Continue IV Keppra 500 mg twice daily     Cardiovascular  #Hypotension, resolved  #H/o TIA  PLAN:  -Continue ASA, statin  -Off of all pressors since 1835/15     Pulmonary  -No acute issues  -Satting well on room air, no indication of infectious origin  -CXR unremarkable in the ED     GI  -No acute issues  -Regular diet     Renal  -No acute issues     Endocrine  -No acute issues     Heme/Onc  -No acute issues     ID  #Fever of Unknown Origin, resolved  #Leukocytosis  #UTI history  -Patient presenting with fever 100.4 with reported history of urinary incontinence  -CXR, UA unremarkable.  No obvious source of infection at this time  -No recorded fevers since admission  PLAN:  -Continue Zosyn  -Aspiration precaution    MSK/Skin  #Concerns for right ankle injury, ruled out  -Some skin mottling in RLE below ankle. Diffusely says \"ouch\" when manipulating LE however with skin findings will obtain imaging  -Right ankle/foot XR unremarkable     ICU CHECK LIST  Antimicrobials: Zosyn  Oxygen: RA  Feeding: Regular diet  Drips: --  Fluids: --  Analgesia: --   Sedation: --  VTE ppx: Lovenox  GI ppx: --  Glycemic control: --  Bowel care: MiraLAX  Indwelling catheters: External urinary  Lines: PIV x 2  Code Status: Full     Ariel Gregory DO  PGY-1, Internal Medicine  Please SecureChat for any further questions  This is a preliminary note, please await attending attestation for final A/P    "

## 2024-05-16 NOTE — PROGRESS NOTES
Occupational Therapy    OT Treatment    Patient Name: Akiko Peng  MRN: [id number]  Today's Date: 5/16/2024  Time Calculation  Start Time: 1118  Stop Time: 1145  Time Calculation (min): 27 min         Assessment:  End of Session Communication: Bedside nurse  End of Session Patient Position: Up in chair, Alarm on     Plan:  Treatment Interventions: ADL retraining, Functional transfer training, UE strengthening/ROM, Endurance training, Fine motor coordination activities, Compensatory technique education, Neuromuscular reeducation  OT Frequency: 5 times per week  OT Discharge Recommendations: High intensity level of continued care  OT - OK to Discharge: Yes (to next level of care when cleared by medical team)  Treatment Interventions: ADL retraining, Functional transfer training, UE strengthening/ROM, Endurance training, Fine motor coordination activities, Compensatory technique education, Neuromuscular reeducation    Subjective   Previous Visit Info:  OT Last Visit  OT Received On: 05/16/24  General:  General  Prior to Session Communication: Bedside nurse  Patient Position Received: Bed, 4 rail up, Alarm on  General Comment: pt. resting in bed, agreeable to therapy intervention  Precautions:  Precautions Comment: room air, sbp < 180/105  Vital Signs:  Vital Signs  Heart Rate:  (VSS)  Pain:       Objective    Cognition:  Cognition  Overall Cognitive Status:  (pt. able to speak in full sentences, oriented x 3, alert)  Orientation Level: Oriented X4  Coordination:     Activities of Daily Living:      Grooming  Grooming Comments: pt. able to sit eob x 7-10 minutes, initially pt. required bue support to maintain while BLE's exercising.  pt. then able to maintain with sba while pt. able to brush hair in the front, mod assist for thoroughness in the back, pt. Using brush in R hand using L hand for support under R elbow            Bed Mobility/Transfers: Bed Mobility  Bed Mobility:  (mod assist x 1 supine to  sit)    Transfers  Transfer:  (sit to stand from eob required mod assist x 1, stand to sit into recliner mod assist x 1 with max cues for hand placement)      Functional Mobility:  Functional Mobility  Functional Mobility Performed:  (mobility bed to chair completed with wh. walker and mod assist x 1 max cues due to impaired motor planning/coordination/sequencing)       Outcome Measures:[facility] Daily Activity  Putting on and taking off regular lower body clothing: A lot  Bathing (including washing, rinsing, drying): A lot  Putting on and taking off regular upper body clothing: A lot  Toileting, which includes using toilet, bedpan or urinal: A lot  Taking care of personal grooming such as brushing teeth: A lot  Eating Meals: None  Daily Activity - Total Score: 14         and ICU Mobility Screen  Early Mobility/Exercise Safety Screen: Proceed with mobilization - No exclusion criteria met  ICU Mobility Scale: Transferring bed to chair    Education Documentation  ADL Training, taught by Mayda Steele OT at 5/16/2024  2:01 PM.  Learner: Patient  Readiness: Acceptance  Method: Explanation  Response: Verbalizes Understanding, Needs Reinforcement    Body Mechanics, taught by Mayda Steele OT at 5/16/2024  2:01 PM.  Learner: Patient  Readiness: Acceptance  Method: Explanation  Response: Verbalizes Understanding, Needs Reinforcement    ADL Training, taught by Mayda Steele OT at 5/15/2024  2:48 PM.  Learner: Patient  Readiness: Acceptance  Method: Explanation  Response: Demonstrated Understanding, Needs Reinforcement    Body Mechanics, taught by Mayda Steele OT at 5/15/2024  2:48 PM.  Learner: Patient  Readiness: Acceptance  Method: Explanation  Response: Demonstrated Understanding, Needs Reinforcement    Education Comments  No comments found.        OP EDUCATION:       Goals:  Encounter Problems       Encounter Problems (Active)       OT Goals       increase bue ther ex/activity x 7-10 minutes (including fm/gm tasks with R  hand) and increase standing tolerance x 5-7  minutes with cga to promote greater activity tolerance for assist with adl.   (Progressing)       Start:  05/15/24    Expected End:  05/29/24            Increase functional mobility and  functional transfers to cga for bed/chair/toilet with dme prn   (Progressing)       Start:  05/15/24    Expected End:  05/29/24            Increase grooming/ub bathing to cga with dme prn sitting eob (Progressing)       Start:  05/15/24    Expected End:  05/29/24            Increase ub/lb dressing to cga with dme prn  (Progressing)       Start:  05/15/24    Expected End:  05/29/24            Increase toileting to cga with dme prn  (Progressing)       Start:  05/15/24    Expected End:  05/29/24

## 2024-05-16 NOTE — PROGRESS NOTES
"Akiko Peng is a 68 y.o. female on day 2 of admission presenting with Aphasia.      Subjective   The patient is a 68-year-old female with a history of MS who was in her usual state of health until recently.  The patient has baseline right upper and lower extremity weakness.  The patient was in her usual state of health until about an hour prior to coming to the ER.  At that time the patient was noted to have difficulties with speech and she was not moving her right upper extremity as per spouse.  In the ER stroke team was called and her NIH stroke scale score was 14.  The patient had a CT scan of the brain done that was negative for any acute process.  The patient was given TNK.  The patient subsequently had a seizure in the emergency room and had a repeat CT scan of the brain done that was negative for any acute process.  The patient was started on Keppra 500 mg IV every 12 hours.  The patient's  states that she is on this medicine and is actually taking 500 mg twice daily.  The patient's  states that she has had multiple episodes similar to this in the past and they thought it was actually her MS.       Objective     Last Recorded Vitals  Blood pressure 121/64, pulse 76, temperature 36.4 °C (97.5 °F), temperature source Temporal, resp. rate 22, height 1.778 m (5' 10\"), weight 60.5 kg (133 lb 6.1 oz), SpO2 100%.    Physical Exam  Neurological Exam  The patient's mental status testing shows that the patient is alert and oriented ×3 with mild confusion but with no evidence of any aphasia or dysarthria.  The patient's cranial nerve testing 2, 3, 4, 5, 6, and 7 are all within normal limits.  The patient's motor testing shows 5/5 strength in the left upper extremity and 5-/5 strength of the right upper extremity.  The patient's left lower extremity strength is approximately 4/5 in the right lower extremity strength is 0 out of 5 is a patient states that she cannot move it.      Relevant " Results        NIH Stroke Scale  1A. Level of Consciousness: Alert, Keenly Responsive  1B. Ask Month and Age: Both Questions Right  1C. Blink Eyes & Squeeze Hands: Performs Both Tasks  2. Best Gaze: Normal  3. Visual: No Visual Loss  4. Facial Palsy: Normal Symmetrical Movements  5A. Motor - Left Arm: No Drift  5B. Motor - Right Arm: Drift  6A. Motor - Left Leg: Drift  6B. Motor - Right Leg: Some Effort Against Gravity  7. Limb Ataxia: Absent  8. Sensory Loss: Normal  9. Best Language: No Aphasia  10. Dysarthria: Normal  11. Extinction and Inattention: No Abnormality  NIH Stroke Scale: 4           Tabitha Coma Scale  Best Eye Response: Spontaneous  Best Verbal Response: Oriented  Best Motor Response: Follows commands  [place] Coma Scale Score: 15        Scheduled medications  aspirin, 81 mg, oral, Daily  atorvastatin, 80 mg, oral, Nightly  enoxaparin, 40 mg, subcutaneous, q24h  [START ON 2024] levETIRAcetam, 750 mg, intravenous, q12h  polyethylene glycol, 17 g, oral, Daily  sennosides-docusate sodium, 1 tablet, oral, Nightly      Continuous medications     PRN medications  PRN medications: bisacodyl, [] hydrALAZINE **FOLLOWED BY** hydrALAZINE, oxygen    Results for orders placed or performed during the hospital encounter of 24 (from the past 96 hour(s))   POCT GLUCOSE   Result Value Ref Range    POCT Glucose 140 (H) 74 - 99 mg/dL   CBC and Auto Differential   Result Value Ref Range    WBC 13.2 (H) 4.4 - 11.3 x10*3/uL    nRBC 0.0 0.0 - 0.0 /100 WBCs    RBC 4.53 4.00 - 5.20 x10*6/uL    Hemoglobin 14.6 12.0 - 16.0 g/dL    Hematocrit 41.7 36.0 - 46.0 %    MCV 92 80 - 100 fL    MCH 32.2 26.0 - 34.0 pg    MCHC 35.0 32.0 - 36.0 g/dL    RDW 12.0 11.5 - 14.5 %    Platelets 230 150 - 450 x10*3/uL    Neutrophils % 91.5 40.0 - 80.0 %    Immature Granulocytes %, Automated 0.6 0.0 - 0.9 %    Lymphocytes % 3.6 13.0 - 44.0 %    Monocytes % 4.0 2.0 - 10.0 %    Eosinophils % 0.0 0.0 - 6.0 %    Basophils % 0.3 0.0 -  2.0 %    Neutrophils Absolute 12.09 (H) 1.20 - 7.70 x10*3/uL    Immature Granulocytes Absolute, Automated 0.08 0.00 - 0.70 x10*3/uL    Lymphocytes Absolute 0.48 (L) 1.20 - 4.80 x10*3/uL    Monocytes Absolute 0.53 0.10 - 1.00 x10*3/uL    Eosinophils Absolute 0.00 0.00 - 0.70 x10*3/uL    Basophils Absolute 0.04 0.00 - 0.10 x10*3/uL   Comprehensive metabolic panel   Result Value Ref Range    Glucose 118 (H) 74 - 99 mg/dL    Sodium 140 136 - 145 mmol/L    Potassium 4.3 3.5 - 5.3 mmol/L    Chloride 104 98 - 107 mmol/L    Bicarbonate 27 21 - 32 mmol/L    Anion Gap 13 10 - 20 mmol/L    Urea Nitrogen 15 6 - 23 mg/dL    Creatinine 0.66 0.50 - 1.05 mg/dL    eGFR >90 >60 mL/min/1.73m*2    Calcium 9.0 8.6 - 10.3 mg/dL    Albumin 4.3 3.4 - 5.0 g/dL    Alkaline Phosphatase 93 33 - 136 U/L    Total Protein 6.8 6.4 - 8.2 g/dL    AST 12 9 - 39 U/L    Bilirubin, Total 0.7 0.0 - 1.2 mg/dL    ALT 11 7 - 45 U/L   Troponin I, High Sensitivity   Result Value Ref Range    Troponin I, High Sensitivity 6 0 - 13 ng/L   Protime-INR   Result Value Ref Range    Protime 11.4 9.8 - 12.8 seconds    INR 1.0 0.9 - 1.1   APTT   Result Value Ref Range    aPTT 30 27 - 38 seconds   Lipid Panel   Result Value Ref Range    Cholesterol 159 0 - 199 mg/dL    HDL-Cholesterol 52.5 mg/dL    Cholesterol/HDL Ratio 3.0     LDL Calculated 97 <=99 mg/dL    VLDL 9 0 - 40 mg/dL    Triglycerides 46 0 - 149 mg/dL    Non HDL Cholesterol 107 0 - 149 mg/dL   Hemoglobin A1C   Result Value Ref Range    Hemoglobin A1C 4.8 see below %    Estimated Average Glucose 91 Not Established mg/dL   B-Type Natriuretic Peptide   Result Value Ref Range     (H) 0 - 99 pg/mL   ECG 12 lead   Result Value Ref Range    Ventricular Rate 72 BPM    Atrial Rate 72 BPM    KS Interval 164 ms    QRS Duration 72 ms    QT Interval 374 ms    QTC Calculation(Bazett) 409 ms    P Axis 34 degrees    R Axis 71 degrees    T Axis 77 degrees    QRS Count 12 beats    Q Onset 221 ms    P Onset 139 ms    P  Offset 184 ms    T Offset 408 ms    QTC Fredericia 397 ms   Blood Culture    Specimen: Peripheral Venipuncture; Blood culture   Result Value Ref Range    Blood Culture No growth at 1 day    Blood Culture    Specimen: Peripheral Venipuncture; Blood culture   Result Value Ref Range    Blood Culture No growth at 1 day    Lactate   Result Value Ref Range    Lactate 1.0 0.4 - 2.0 mmol/L   POCT GLUCOSE   Result Value Ref Range    POCT Glucose 99 74 - 99 mg/dL   MRSA Surveillance for Vancomycin De-escalation, PCR    Specimen: Anterior Nares; Swab   Result Value Ref Range    MRSA PCR Not Detected Not Detected   POCT GLUCOSE   Result Value Ref Range    POCT Glucose 93 74 - 99 mg/dL   Urinalysis with Reflex Microscopic   Result Value Ref Range    Color, Urine Light-Yellow Light-Yellow, Yellow, Dark-Yellow    Appearance, Urine Clear Clear    Specific Gravity, Urine 1.028 1.005 - 1.035    pH, Urine 6.5 5.0, 5.5, 6.0, 6.5, 7.0, 7.5, 8.0    Protein, Urine NEGATIVE NEGATIVE, 10 (TRACE), 20 (TRACE) mg/dL    Glucose, Urine Normal Normal mg/dL    Blood, Urine NEGATIVE NEGATIVE    Ketones, Urine 10 (1+) (A) NEGATIVE mg/dL    Bilirubin, Urine NEGATIVE NEGATIVE    Urobilinogen, Urine Normal Normal mg/dL    Nitrite, Urine NEGATIVE NEGATIVE    Leukocyte Esterase, Urine NEGATIVE NEGATIVE   POCT GLUCOSE   Result Value Ref Range    POCT Glucose 134 (H) 74 - 99 mg/dL   Electrocardiogram, 12-lead PRN ACS symptoms   Result Value Ref Range    Systolic blood pressure 110 mmHg    Diastolic blood pressure 53 mmHg    Ventricular Rate 64 BPM    Atrial Rate 64 BPM    OH Interval 162 ms    QRS Duration 72 ms    QT Interval 486 ms    QTC Calculation(Bazett) 501 ms    P Axis 57 degrees    R Axis 91 degrees    T Axis 83 degrees    QRS Count 10 beats    Q Onset 221 ms    P Onset 140 ms    P Offset 173 ms    T Offset 464 ms    QTC Fredericia 496 ms   CBC and Auto Differential   Result Value Ref Range    WBC 10.1 4.4 - 11.3 x10*3/uL    nRBC 0.0 0.0 - 0.0  /100 WBCs    RBC 4.76 4.00 - 5.20 x10*6/uL    Hemoglobin 15.5 12.0 - 16.0 g/dL    Hematocrit 44.3 36.0 - 46.0 %    MCV 93 80 - 100 fL    MCH 32.6 26.0 - 34.0 pg    MCHC 35.0 32.0 - 36.0 g/dL    RDW 12.1 11.5 - 14.5 %    Platelets 254 150 - 450 x10*3/uL    Neutrophils % 71.2 40.0 - 80.0 %    Immature Granulocytes %, Automated 0.4 0.0 - 0.9 %    Lymphocytes % 14.9 13.0 - 44.0 %    Monocytes % 12.2 2.0 - 10.0 %    Eosinophils % 0.6 0.0 - 6.0 %    Basophils % 0.7 0.0 - 2.0 %    Neutrophils Absolute 7.16 1.20 - 7.70 x10*3/uL    Immature Granulocytes Absolute, Automated 0.04 0.00 - 0.70 x10*3/uL    Lymphocytes Absolute 1.50 1.20 - 4.80 x10*3/uL    Monocytes Absolute 1.23 (H) 0.10 - 1.00 x10*3/uL    Eosinophils Absolute 0.06 0.00 - 0.70 x10*3/uL    Basophils Absolute 0.07 0.00 - 0.10 x10*3/uL   Comprehensive metabolic panel   Result Value Ref Range    Glucose 141 (H) 74 - 99 mg/dL    Sodium 140 136 - 145 mmol/L    Potassium 3.5 3.5 - 5.3 mmol/L    Chloride 106 98 - 107 mmol/L    Bicarbonate 24 21 - 32 mmol/L    Anion Gap 14 10 - 20 mmol/L    Urea Nitrogen 8 6 - 23 mg/dL    Creatinine 0.62 0.50 - 1.05 mg/dL    eGFR >90 >60 mL/min/1.73m*2    Calcium 8.9 8.6 - 10.3 mg/dL    Albumin 4.0 3.4 - 5.0 g/dL    Alkaline Phosphatase 84 33 - 136 U/L    Total Protein 6.5 6.4 - 8.2 g/dL    AST 11 9 - 39 U/L    Bilirubin, Total 1.1 0.0 - 1.2 mg/dL    ALT 7 7 - 45 U/L   Magnesium   Result Value Ref Range    Magnesium 1.99 1.60 - 2.40 mg/dL   POCT GLUCOSE   Result Value Ref Range    POCT Glucose 92 74 - 99 mg/dL   Transthoracic Echo (TTE) Limited   Result Value Ref Range    LV Biplane EF 76 %    LVIDd 4.20 cm    LV A4C EF 74.8    POCT GLUCOSE   Result Value Ref Range    POCT Glucose 90 74 - 99 mg/dL   POCT GLUCOSE   Result Value Ref Range    POCT Glucose 125 (H) 74 - 99 mg/dL   POCT GLUCOSE   Result Value Ref Range    POCT Glucose 114 (H) 74 - 99 mg/dL     I did review the images of the MRI of the brain.    Vascular US Ankle Brachial Index  (ARRON) Without Exercise    Result Date: 5/16/2024           [facility] [address] [place], [place] [address] Tel [phone] and Fax [phone]  Vascular Lab Report [facility] [place] ANKLE BRACHIAL INDEX (ARRON) WITHOUT EXERCISE  Patient Name:      TORSTEN DILLON       Cassie Physician:  [id number] Dereje Viera DO Study Date:        5/16/2024           Ordering Physician: [id number] LEN HURD MRN/PID:           [id number]            Technologist:       July Granados RVT Accession#:        [id number]        Technologist 2: Date of Birth/Age: 1955 / 68      Encounter#:         [id number]                    years Gender:            F Admission Status:  Inpatient           Location Performed: [facility]  Diagnosis/ICD: Peripheral vascular disease, unspecified-I73.9 CPT Codes:     30689 Peripheral artery ARRON Only  CONCLUSIONS: Right Lower PVR: No evidence of arterial occlusive disease in the right lower extremity at rest. Normal digital perfusion noted. Triphasic flow is noted in the right posterior tibial artery, right dorsalis pedis artery and right common femoral artery. Left Lower PVR: No evidence of arterial occlusive disease in the left lower extremity at rest. Normal digital perfusion noted. Triphasic flow is noted in the left posterior tibial artery, left dorsalis pedis artery and left common femoral artery.  Imaging & Doppler Findings:  RIGHT Lower PVR                Pressures Ratios Right Posterior Tibial (Ankle) 157 mmHg  1.26 Right Dorsalis Pedis (Ankle)   162 mmHg  1.30 Right Digit (Great Toe)        94 mmHg   0.75   LEFT Lower PVR                Pressures Ratios Left Posterior Tibial (Ankle) 155 mmHg  1.24 Left Dorsalis Pedis (Ankle)   159 mmHg  1.27 Left Digit (Great Toe)        124 mmHg  0.99                     Right     Left Brachial Pressure 125 mmHg 125 mmHg   [id number] Dereje Viera DO Electronically signed by [id number] Dereje Viera DO on  5/16/2024 at 2:30:01 PM  ** Final **     EEG    IMPRESSION This EEG is consistent with a left structural lesion, No epileptiform activity or seizures were noted. A full report will be scanned into the patient's chart at a later time. This report has been interpreted and electronically signed by    Electrocardiogram, 12-lead PRN CANDI symptoms    Result Date: 5/16/2024  Normal sinus rhythm with sinus arrhythmia Rightward axis Junctional ST depression, probably normal Borderline ECG No previous ECGs available    Transthoracic Echo (TTE) Limited    Result Date: 5/16/2024    [facility] [email or web address] [facility] [id number] [phone] and                                 Fax [phone] TRANSTHORACIC ECHOCARDIOGRAM REPORT  Patient Name:      TORSTEN Matthews Physician:    [id number] Savita Marshall MD Study Date:        5/16/2024            Ordering Provider:    [id number] LEN HURD MRN/PID:           [id number]             Fellow: Accession#:        [id number]         Nurse: Date of Birth/Age: 1955 / 68 years Sonographer:          Pradeep CHRISTOPHER,                                                               DOMI, WILMA Gender:            F                    Additional Staff: Height:            177.80 cm            Admit Date:           5/14/2024 Weight:            60.33 kg             Admission Status:     Inpatient -                                                               Routine BSA / BMI:         1.76 m2 / 19.08      Encounter#:           [id number]                    kg/m2                                         Department Location:  [address]                                                               floor/ICU Blood Pressure: 143 /63 mmHg Study Type:    TRANSTHORACIC ECHO (TTE) LIMITED Diagnosis/ICD: Bradycardia, unspecified-R00.1 Indication:    Abnormal EKG CPT Code:      Echo Limited-51937 Patient History: Pertinent  History: Bradycardia. Study Detail: The following Echo studies were performed: 2D. Technically               challenging study due to body habitus.  PHYSICIAN INTERPRETATION: Left Ventricle: The left ventricular systolic function is normal, with an estimated ejection fraction of 60-65%. There are no regional wall motion abnormalities. The left ventricular cavity size is normal. Left ventricular diastolic filling was not assessed. Left Atrium: The left atrium was not assessed. Right Ventricle: The right ventricle was not assessed. Right ventricular systolic function not assessed. Right Atrium: The right atrium was not assessed. Aortic Valve: The aortic valve is trileaflet. Aortic valve regurgitation was not assessed. Mitral Valve: The mitral valve was not assessed. Mitral valve regurgitation was not assessed. Tricuspid Valve: The tricuspid valve was not assessed. Tricuspid regurgitation was not assessed. Pulmonic Valve: The pulmonic valve was not assessed. Pulmonic valve regurgitation was not assessed. Pericardium: Pericardial effusion was not assessed. Aorta: The aortic root was not assessed.  CONCLUSIONS:  1. Left ventricular systolic function is normal with a 60-65% estimated ejection fraction. QUANTITATIVE DATA SUMMARY: 2D MEASUREMENTS:                          Normal Ranges: IVSd:          0.80 cm   (0.6-1.1cm) LVPWd:         0.60 cm   (0.6-1.1cm) LVIDd:         4.20 cm   (3.9-5.9cm) LVIDs:         2.30 cm LV Mass Index: 48.5 g/m2 LV % FS        45.2 % LV SYSTOLIC FUNCTION BY 2D PLANIMETRY (MOD):                     Normal Ranges: EF-A4C View: 74.8 % (>=55%) EF-A2C View: 76.7 % EF-Biplane:  76.0 % TRICUSPID VALVE/RVSP:                   Normal Ranges: IVC Diam: 1.90 cm  [id number] Savita Marshall MD Electronically signed on 5/16/2024 at 9:20:16 AM  ** Final **     MR brain w and wo IV contrast    Result Date: 5/15/2024  Interpreted By:  Dottie Serrato, STUDY: MR BRAIN W AND WO IV CONTRAST;  5/15/2024 12:46 pm   INDICATION:  Signs/Symptoms:s/p TNK.   COMPARISON: MRI 02/10/2024.   ACCESSION NUMBER(S): [id number]   ORDERING CLINICIAN: LUKE LITTLE   TECHNIQUE: Axial T2, FLAIR, DWI, gradient echo T2 and sagittal and coronal T1 weighted images of brain were acquired. Post contrast T1 weighted images were acquired after administration of  gadolinium based intravenous contrast.   FINDINGS: CSF Spaces: The ventricles, sulci and basal cisterns are prominent compatible with age related involutional changes and mild-to-moderate volume loss. There is no extra-axial fluid collection.   Parenchyma: There is no diffusion restriction abnormality to suggest acute infarct. Extensive T2 shine through is noted on the diffusion-weighted images. Moderate degree of patchy and confluent signal within the subcortical and periventricular white matter is similar to the prior exam. No new lesions. No diffusion restricting or enhancing lesions on the current exam. There is no mass effect or midline shift. Cerebellar tonsils are above the foramen magnum. Pituitary and sella are not enlarged. No abnormal parenchymal susceptibility artifact. Within the limitation of patient motion artifact no abnormal parenchymal enhancement.   Paranasal Sinuses and Mastoids: Visualized paranasal sinuses and mastoid air cells are predominantly clear. Mild mucosal thickening within the left maxillary sinus. Major intracranial flow voids at the skull base are unremarkable.       No evidence of acute infarct, intracranial mass effect or midline shift.   Moderate degree of white matter lesions compatible with changes of multiple sclerosis. No new hyperintense lesion. No diffusion restricting or enhancing lesions.   MACRO: None   Signed by: Dottie Serrato 5/15/2024 1:35 PM Dictation workstation:   [id number]    XR foot right 3+ views    Result Date: 5/15/2024  Interpreted By:  Abhay Quinones, STUDY: XR FOOT RIGHT 3+ VIEWS; XR ANKLE RIGHT 3+ VIEWS; ;  5/14/2024 8:19 pm   INDICATION:  Signs/Symptoms:foot pain/discoloration; Signs/Symptoms:foot pain discoloration.   COMPARISON: None.   ACCESSION NUMBER(S): [id number]; [id number]   ORDERING CLINICIAN: DI BAH   FINDINGS: Right foot/ankle radiographs: Diffuse osteopenia. No destructive osseous lesions. No acute displaced fracture. No major malalignment. Nonspecific ossified fragment medial to the head of the 5th metatarsal bone either ossified bone or related to old trauma.       1. No acute displaced right foot/ankle fracture or major malalignment no destructive osseous lesions. Diffuse osteopenia. If continued clinical concern for underlying vascular insufficiency or osteomyelitis further assessment by dedicated CTA or MRI could be considered.   MACRO: None   Signed by: Abhay Quinones 5/15/2024 9:46 AM Dictation workstation:   [id number]    XR ankle right 3+ views    Result Date: 5/15/2024  Interpreted By:  Abhay Quinones, STUDY: XR FOOT RIGHT 3+ VIEWS; XR ANKLE RIGHT 3+ VIEWS; ;  5/14/2024 8:19 pm   INDICATION: Signs/Symptoms:foot pain/discoloration; Signs/Symptoms:foot pain discoloration.   COMPARISON: None.   ACCESSION NUMBER(S): [id number]; [id number]   ORDERING CLINICIAN: DI BAH   FINDINGS: Right foot/ankle radiographs: Diffuse osteopenia. No destructive osseous lesions. No acute displaced fracture. No major malalignment. Nonspecific ossified fragment medial to the head of the 5th metatarsal bone either ossified bone or related to old trauma.       1. No acute displaced right foot/ankle fracture or major malalignment no destructive osseous lesions. Diffuse osteopenia. If continued clinical concern for underlying vascular insufficiency or osteomyelitis further assessment by dedicated CTA or MRI could be considered.   MACRO: None   Signed by: Abhay Quinones 5/15/2024 9:46 AM Dictation workstation:   [id number]        The patient's EEG is consistent with  a left structural lesion but no epileptiform activity was  noted.    Assessment/Plan   The patient is doing very well from a neurological standpoint and is essentially back to baseline.  The patient should continue Keppra and I will increase the dose to 750 mg p.o. twice daily.  The patient needs a CBC and liver function test every 6 months while on this medicine.  The patient should continue all of her other medicines and stroke risk factor modification.  The patient should continue treatment for MS.  I discussed all these issues in detail with the patient and her  and answered all their questions.  I will sign off for now.  The patient does need to follow-up with Dr. Taylor as scheduled.      Cyril Cartagena MD

## 2024-05-17 ENCOUNTER — DOCUMENTATION (OUTPATIENT)
Dept: HOME HEALTH SERVICES | Facility: HOME HEALTH | Age: 69
End: 2024-05-17
Payer: MEDICARE

## 2024-05-17 ENCOUNTER — HOME HEALTH ADMISSION (OUTPATIENT)
Dept: HOME HEALTH SERVICES | Facility: HOME HEALTH | Age: 69
End: 2024-05-17
Payer: MEDICARE

## 2024-05-17 VITALS
HEIGHT: 70 IN | HEART RATE: 90 BPM | RESPIRATION RATE: 18 BRPM | DIASTOLIC BLOOD PRESSURE: 58 MMHG | SYSTOLIC BLOOD PRESSURE: 139 MMHG | TEMPERATURE: 98.1 F | BODY MASS INDEX: 19.09 KG/M2 | OXYGEN SATURATION: 97 % | WEIGHT: 133.38 LBS

## 2024-05-17 LAB
ANION GAP SERPL CALC-SCNC: 11 MMOL/L (ref 10–20)
BUN SERPL-MCNC: 10 MG/DL (ref 6–23)
CALCIUM SERPL-MCNC: 8.6 MG/DL (ref 8.6–10.3)
CHLORIDE SERPL-SCNC: 109 MMOL/L (ref 98–107)
CO2 SERPL-SCNC: 25 MMOL/L (ref 21–32)
CREAT SERPL-MCNC: 0.5 MG/DL (ref 0.5–1.05)
EGFRCR SERPLBLD CKD-EPI 2021: >90 ML/MIN/1.73M*2
ERYTHROCYTE [DISTWIDTH] IN BLOOD BY AUTOMATED COUNT: 12.4 % (ref 11.5–14.5)
GLUCOSE BLD MANUAL STRIP-MCNC: 102 MG/DL (ref 74–99)
GLUCOSE BLD MANUAL STRIP-MCNC: 91 MG/DL (ref 74–99)
GLUCOSE BLD MANUAL STRIP-MCNC: 96 MG/DL (ref 74–99)
GLUCOSE SERPL-MCNC: 93 MG/DL (ref 74–99)
HCT VFR BLD AUTO: 37.5 % (ref 36–46)
HGB BLD-MCNC: 12.8 G/DL (ref 12–16)
HOLD SPECIMEN: NORMAL
MAGNESIUM SERPL-MCNC: 2 MG/DL (ref 1.6–2.4)
MCH RBC QN AUTO: 31.6 PG (ref 26–34)
MCHC RBC AUTO-ENTMCNC: 34.1 G/DL (ref 32–36)
MCV RBC AUTO: 93 FL (ref 80–100)
NRBC BLD-RTO: 0 /100 WBCS (ref 0–0)
PLATELET # BLD AUTO: 205 X10*3/UL (ref 150–450)
POTASSIUM SERPL-SCNC: 3.5 MMOL/L (ref 3.5–5.3)
RBC # BLD AUTO: 4.05 X10*6/UL (ref 4–5.2)
SODIUM SERPL-SCNC: 141 MMOL/L (ref 136–145)
WBC # BLD AUTO: 5.4 X10*3/UL (ref 4.4–11.3)

## 2024-05-17 PROCEDURE — 2500000001 HC RX 250 WO HCPCS SELF ADMINISTERED DRUGS (ALT 637 FOR MEDICARE OP)

## 2024-05-17 PROCEDURE — 2500000004 HC RX 250 GENERAL PHARMACY W/ HCPCS (ALT 636 FOR OP/ED)

## 2024-05-17 PROCEDURE — 85027 COMPLETE CBC AUTOMATED: CPT

## 2024-05-17 PROCEDURE — 2500000004 HC RX 250 GENERAL PHARMACY W/ HCPCS (ALT 636 FOR OP/ED): Performed by: PSYCHIATRY & NEUROLOGY

## 2024-05-17 PROCEDURE — 80048 BASIC METABOLIC PNL TOTAL CA: CPT

## 2024-05-17 PROCEDURE — 97535 SELF CARE MNGMENT TRAINING: CPT | Mod: GP,CQ

## 2024-05-17 PROCEDURE — 82947 ASSAY GLUCOSE BLOOD QUANT: CPT

## 2024-05-17 PROCEDURE — 36415 COLL VENOUS BLD VENIPUNCTURE: CPT

## 2024-05-17 PROCEDURE — 97535 SELF CARE MNGMENT TRAINING: CPT | Mod: CO,GO

## 2024-05-17 PROCEDURE — 83735 ASSAY OF MAGNESIUM: CPT

## 2024-05-17 RX ORDER — ASPIRIN 81 MG/1
81 TABLET ORAL DAILY
Qty: 30 TABLET | Refills: 0 | Status: SHIPPED | OUTPATIENT
Start: 2024-05-18 | End: 2024-06-17

## 2024-05-17 RX ORDER — LEVETIRACETAM 750 MG/1
750 TABLET ORAL 2 TIMES DAILY
Qty: 60 TABLET | Refills: 0 | Status: SHIPPED | OUTPATIENT
Start: 2024-05-17 | End: 2024-06-16

## 2024-05-17 RX ADMIN — POLYETHYLENE GLYCOL 3350 17 G: 17 POWDER, FOR SOLUTION ORAL at 10:34

## 2024-05-17 RX ADMIN — DEXTROSE MONOHYDRATE 750 MG: 50 INJECTION, SOLUTION INTRAVENOUS at 11:36

## 2024-05-17 RX ADMIN — ENOXAPARIN SODIUM 40 MG: 40 INJECTION SUBCUTANEOUS at 10:34

## 2024-05-17 RX ADMIN — ASPIRIN 81 MG: 81 TABLET, COATED ORAL at 10:34

## 2024-05-17 ASSESSMENT — COGNITIVE AND FUNCTIONAL STATUS - GENERAL
HELP NEEDED FOR BATHING: A LOT
MOVING FROM LYING ON BACK TO SITTING ON SIDE OF FLAT BED WITH BEDRAILS: A LITTLE
MOVING TO AND FROM BED TO CHAIR: A LOT
CLIMB 3 TO 5 STEPS WITH RAILING: TOTAL
TOILETING: A LOT
MOBILITY SCORE: 13
DAILY ACTIVITIY SCORE: 16
STANDING UP FROM CHAIR USING ARMS: A LOT
TURNING FROM BACK TO SIDE WHILE IN FLAT BAD: A LITTLE
DRESSING REGULAR LOWER BODY CLOTHING: A LOT
DRESSING REGULAR UPPER BODY CLOTHING: A LITTLE
WALKING IN HOSPITAL ROOM: A LOT
PERSONAL GROOMING: A LITTLE

## 2024-05-17 ASSESSMENT — PAIN SCALES - GENERAL: PAINLEVEL_OUTOF10: 0 - NO PAIN

## 2024-05-17 ASSESSMENT — PAIN - FUNCTIONAL ASSESSMENT: PAIN_FUNCTIONAL_ASSESSMENT: 0-10

## 2024-05-17 ASSESSMENT — ACTIVITIES OF DAILY LIVING (ADL): HOME_MANAGEMENT_TIME_ENTRY: 15

## 2024-05-17 NOTE — PROGRESS NOTES
Occupational Therapy    OT Treatment    Patient Name: Akiko Peng  MRN: [id number]  Today's Date: 5/17/2024  Time Calculation  Start Time: 0907  Stop Time: 0931  Time Calculation (min): 24 min         Assessment:  End of Session Communication: Bedside nurse  End of Session Patient Position: Up in chair, Alarm on     Plan:  Treatment Interventions: ADL retraining, Functional transfer training, UE strengthening/ROM, Endurance training, Fine motor coordination activities, Compensatory technique education, Neuromuscular reeducation  OT Frequency: 5 times per week  OT Discharge Recommendations: High intensity level of continued care  OT - OK to Discharge: Yes (to next level of care when cleared by medical team)  Treatment Interventions: ADL retraining, Functional transfer training, UE strengthening/ROM, Endurance training, Fine motor coordination activities, Compensatory technique education, Neuromuscular reeducation    Subjective   Previous Visit Info:  OT Last Visit  OT Received On: 05/17/24  General:  General  Co-Treatment: PT  Co-Treatment Reason: maximize mobility and increased safety  Prior to Session Communication: Bedside nurse  Patient Position Received: Bed, 2 rail up, Alarm on  General Comment: pleasant and cooperative  Precautions:  Medical Precautions: Fall precautions         Objective         Activities of Daily Living: Grooming  Grooming Level of Assistance: Close supervision  Grooming Where Assessed: Chair  Grooming Comments: brushing hair with increased time    LE Dressing  LE Dressing: Yes  Sock Level of Assistance: Minimum assistance (with crossed leg technique and increased time to complete d/t moving at slow pace)  Functional Standing Tolerance:  Time: 1:00 standing at FWW  Bed Mobility/Transfers: Bed Mobility  Bed Mobility: Yes  Bed Mobility 1  Bed Mobility 1: Supine to sitting  Level of Assistance 1: Contact guard  Bed Mobility Comments 1: with use of bed rail    Transfers  Transfer: Yes  Transfer  1  Technique 1: Sit to stand, Stand to sit  Transfer Device 1: Walker  Transfer Level of Assistance 1: Moderate assistance (x2 for STS from EOB to FWW)      Functional Mobility:  Functional Mobility  Functional Mobility Performed: Yes  Functional Mobility 1  Device 1: Rolling walker  Assistance 1: Minimum assistance (x2 d/t poor walker safety and max vc for increased safety)      Outcome Measures:[facility] Daily Activity  Putting on and taking off regular lower body clothing: A lot  Bathing (including washing, rinsing, drying): A lot  Putting on and taking off regular upper body clothing: A little  Toileting, which includes using toilet, bedpan or urinal: A lot  Taking care of personal grooming such as brushing teeth: A little  Eating Meals: None  Daily Activity - Total Score: 16        Education Documentation  ADL Training, taught by JOURDAN Pena at 5/17/2024 12:39 PM.  Learner: Patient  Readiness: Acceptance  Method: Explanation  Response: Verbalizes Understanding    Body Mechanics, taught by JOURDAN Pena at 5/17/2024 12:39 PM.  Learner: Patient  Readiness: Acceptance  Method: Explanation  Response: Verbalizes Understanding    Education Comments  No comments found.           Goals:  Encounter Problems       Encounter Problems (Active)       OT Goals       increase bue ther ex/activity x 7-10 minutes (including fm/gm tasks with R hand) and increase standing tolerance x 5-7  minutes with cga to promote greater activity tolerance for assist with adl.   (Progressing)       Start:  05/15/24    Expected End:  05/29/24            Increase functional mobility and  functional transfers to cga for bed/chair/toilet with dme prn   (Progressing)       Start:  05/15/24    Expected End:  05/29/24            Increase grooming/ub bathing to cga with dme prn sitting eob (Progressing)       Start:  05/15/24    Expected End:  05/29/24            Increase ub/lb dressing to cga with dme prn  (Progressing)       Start:   05/15/24    Expected End:  05/29/24            Increase toileting to cga with dme prn  (Progressing)       Start:  05/15/24    Expected End:  05/29/24

## 2024-05-17 NOTE — PROGRESS NOTES
Physical Therapy    Physical Therapy Treatment    Patient Name: Akiko Peng  MRN: [id number]  Today's Date: 5/17/2024  Time Calculation  Start Time: 0900  Stop Time: 0924  Time Calculation (min): 24 min    Assessment/Plan   PT Assessment  End of Session Patient Position: Up in chair, Alarm on     PT Plan  Treatment/Interventions: Bed mobility, Transfer training, Gait training, Neuromuscular re-education, Strengthening, Endurance training, Therapeutic exercise, Therapeutic activity  PT Plan: Skilled PT  PT Frequency: 5 times per week  PT Discharge Recommendations: High intensity level of continued care  PT - OK to Discharge: Yes (when cleared by medical team)      General Visit Information:   PT  Visit  PT Received On: 05/17/24  General  Co-Treatment: co-tx with OT to ensure safe therapeutic tx to facilitate maximum participation with skilled intervention  Prior to Session Communication: Bedside nurse  Patient Position Received: Bed, 2 rail up, Alarm on    Subjective   Precautions:  Precautions  Medical Precautions: Fall precautions       Objective   Pain:  Pain Assessment  Pain Assessment: 0-10  Pain Score: 0 - No pain     Treatments:  Therapeutic Exercise  Therapeutic Exercise Performed:  (seated BLE AROM)    Bed Mobility  Bed Mobility:  (sup > sit with CGA)    Ambulation/Gait Training  Ambulation/Gait Training Performed:  (pt ambulates ~5 ft to chair, FWW and min A x 2.  cuing for safe walker positioning however pt not adhering; forward flexed and pushing FWW too far in front.  pt also lifting walker up insteady of pushing forward; poor walker safety.)    Transfers  Transfer:  (sit <> stand with FWW and mod A x 2.  cuing for safe hand placement and sequencing.)    Outcome Measures:  [facility] Basic Mobility  Turning from your back to your side while in a flat bed without using bedrails: A little  Moving from lying on your back to sitting on the side of a flat bed without using bedrails: A little  Moving to and from  bed to chair (including a wheelchair): A lot  Standing up from a chair using your arms (e.g. wheelchair or bedside chair): A lot  To walk in hospital room: A lot  Climbing 3-5 steps with railing: Total  Basic Mobility - Total Score: 13    Education Documentation  Mobility Training, taught by Juany Mckeon PTA at 5/17/2024 12:40 PM.  Learner: Patient  Readiness: Acceptance  Method: Explanation  Response: Verbalizes Understanding, Needs Reinforcement    Education Comments  No comments found.        EDUCATION:       Encounter Problems       Encounter Problems (Active)       PT Problem       STG - Pt will amb 50' using WW with MIN A  (Progressing)       Start:  05/15/24    Expected End:  05/29/24            STG - Pt will transition supine <> sitting with SBA (Progressing)       Start:  05/15/24    Expected End:  05/29/24            STG - Pt will transfer STS with CGA (Progressing)       Start:  05/15/24    Expected End:  05/29/24

## 2024-05-17 NOTE — PROGRESS NOTES
Pt refusing Rehab or skilled, pt wants home with home.  [facility] 13.    Plan is for [facility] under Dr Craig, spouse was notified also and aware of pt's plan and preference.   Pt would benefit from rehab as explained but pt insisting on home with hhc.   Karen Parada RN TCC

## 2024-05-17 NOTE — CARE PLAN
The patient's goals for the shift include  sleep    The clinical goals for the shift include pt to remain free from injury      Problem: Pain  Goal: My pain/discomfort is manageable  Outcome: Progressing     Problem: Safety  Goal: Patient will be injury free during hospitalization  Outcome: Progressing  Goal: I will remain free of falls  Outcome: Progressing     Problem: Daily Care  Goal: Daily care needs are met  Outcome: Progressing     Problem: Psychosocial Needs  Goal: Demonstrates ability to cope with hospitalization/illness  Outcome: Progressing  Goal: Collaborate with me, my family, and caregiver to identify my specific goals  Outcome: Progressing     Problem: Discharge Barriers  Goal: My discharge needs are met  Outcome: Progressing     Problem: Skin  Goal: Participates in plan/prevention/treatment measures  5/16/2024 2217 by Pradeep Leslie RN  Flowsheets (Taken 5/16/2024 2217)  Participates in plan/prevention/treatment measures: Elevate heels  5/16/2024 2216 by Pradeep Leslie RN  Outcome: Progressing  Goal: Prevent/manage excess moisture  5/16/2024 2217 by Pradeep Leslie RN  Flowsheets (Taken 5/16/2024 2217)  Prevent/manage excess moisture: Cleanse incontinence/protect with barrier cream  5/16/2024 2216 by Pradeep Leslie RN  Outcome: Progressing  Goal: Prevent/minimize sheer/friction injuries  5/16/2024 2217 by Pradeep Leslie RN  Flowsheets (Taken 5/16/2024 2217)  Prevent/minimize sheer/friction injuries: Use pull sheet  5/16/2024 2216 by Pradeep Leslie RN  Outcome: Progressing  Goal: Promote/optimize nutrition  5/16/2024 2217 by Pradeep Leslie RN  Flowsheets (Taken 5/16/2024 2217)  Promote/optimize nutrition: Offer water/supplements/favorite foods  5/16/2024 2216 by Pradeep Leslie RN  Outcome: Progressing     Problem: General Stroke  Goal: Establish a mutual long term goal with patient by discharge  Outcome: Progressing  Goal: Demonstrate improvement in neurological exam throughout  the shift  Outcome: Progressing  Goal: Maintain BP within ordered limits throughout shift  Outcome: Progressing  Goal: Participate in treatment (ie., meds, therapy) throughout shift  Outcome: Progressing  Goal: No symptoms of aspiration throughout shift  Outcome: Progressing  Goal: No symptoms of hemorrhage throughout shift  Outcome: Progressing  Goal: Tolerate enteral feeding throughout shift  Outcome: Progressing  Goal: Decreased nausea/vomiting throughout shift  Outcome: Progressing  Goal: Controlled blood glucose throughout shift  Outcome: Progressing  Goal: Out of bed three times today  Outcome: Progressing     Problem: ICU Stroke  Goal: Maintain patent airway throughout shift  Outcome: Progressing     Problem: Fall/Injury  Goal: Not fall by end of shift  Outcome: Progressing  Goal: Be free from injury by end of the shift  Outcome: Progressing  Goal: Verbalize understanding of personal risk factors for fall in the hospital  Outcome: Progressing  Goal: Verbalize understanding of risk factor reduction measures to prevent injury from fall in the home  Outcome: Progressing  Goal: Use assistive devices by end of the shift  Outcome: Progressing  Goal: Pace activities to prevent fatigue by end of the shift  Outcome: Progressing     Problem: Pain - Adult  Goal: Verbalizes/displays adequate comfort level or baseline comfort level  Outcome: Progressing     Problem: Safety - Adult  Goal: Free from fall injury  Outcome: Progressing     Problem: Discharge Planning  Goal: Discharge to home or other facility with appropriate resources  Outcome: Progressing     Problem: Chronic Conditions and Co-morbidities  Goal: Patient's chronic conditions and co-morbidity symptoms are monitored and maintained or improved  Outcome: Progressing

## 2024-05-17 NOTE — HH CARE COORDINATION
Home Care received a Referral for Nursing, Physical Therapy, and Occupational Therapy. We have processed the referral for a Start of Care on 24-48hrs.     If you have any questions or concerns, please feel free to contact us at [phone]. Follow the prompts, enter your five digit zip code, and you will be directed to your care team on WE[facility] 3.

## 2024-05-17 NOTE — DISCHARGE SUMMARY
Discharge Diagnosis  Seizure    Issues Requiring Follow-Up  Seizure, noncompliant with Keppra, discharged Keppra 750 mg twice daily    Discharge Meds     Your medication list        START taking these medications        Instructions Last Dose Given Next Dose Due   aspirin 81 mg EC tablet  Start taking on: May 18, 2024      Take 1 tablet (81 mg) by mouth once daily.              CHANGE how you take these medications        Instructions Last Dose Given Next Dose Due   levETIRAcetam 750 mg tablet  Commonly known as: Keppra  What changed:   medication strength  how much to take      Take 1 tablet (750 mg) by mouth 2 times a day.              CONTINUE taking these medications        Instructions Last Dose Given Next Dose Due   atorvastatin 40 mg tablet  Commonly known as: Lipitor      Take 1 tablet (40 mg) by mouth once daily.       cholecalciferol 25 MCG (1000 UT) capsule  Commonly known as: Vitamin D-3           ergocalciferol 1.25 MG (29437 UT) capsule  Commonly known as: Vitamin D-2      Take 1 capsule (1,250 mcg) by mouth 1 (one) time per week.       multivitamin with minerals tablet           thiamine 100 mg tablet  Commonly known as: Vitamin B-1      Take 1 tablet (100 mg) by mouth once daily.                 Where to Get Your Medications        These medications were sent to [address]  [address] [address]      Phone: [phone]   aspirin 81 mg EC tablet  levETIRAcetam 750 mg tablet         Test Results Pending At Discharge  Pending Labs       Order Current Status    Blood Culture Preliminary result    Blood Culture Preliminary result            Hospital Course   Akiko Peng is a 68-year-old female past medical history of multiple sclerosis, rheumatoid arthritis, seizures who presented to [facility] 5/14/24 with her  for concern of stroke.  Patient had presented to emergency room with right-sided weakness and aphasia.  Last known well was 1 hour prior to  presentation to the emergency room. The patient does have a baseline neurological status of right upper extremity weakness and bilateral lower extremity weakness.  Spouse had called EMS after noting patient was not moving her right upper extremity and having difficulty speaking.  In the ED, stroke team was called with NIH of 14.  The patient had a CT scan of the brain which was negative for any acute process.  The patient also had a CT angiogram of the neck and brain that showed no significant stenosis or intracranial cutoff.  Informed consent was given to the patient's  and TNK was administered.  Shortly after patient had a witnessed seizure in the ED.  Repeat CT brain showed no acute process.  Patient was started on Keppra and admitted to the ICU for further observation.  Upon initial presentation to the ICU patient was febrile to unknown source.  UA negative patient started on IV antibiotics.  Neurology was consulted and EEG was performed.  Patient's AMPAC score is 13, patient adamant against SNF placement.  Patient will be discharged with home health.  Patient discharged on aspirin 81 mg once daily.  Patient also discharged on Keppra 750 mg twice daily.  Patient to follow-up with her neurologist in 4 months, Dr. Taylor regarding her seizure activity as well as her MS.  Patient stable for discharge with home health.    Pertinent Physical Exam At Time of Discharge  Physical Exam  Constitutional: well developed, awake, alert, no acute distress  ENMT: mucous membranes moist, EOMI, conjunctivae clear  Head/Neck: normocephalic, atraumatic; supple, trachea midline  Respiratory/Thorax: patent airways, CTAB; no wheezes, rales, or rhonchi  Cardiovascular: RRR, no murmur  Gastrointestinal: soft, nondistended, non-tender, bowel sounds appreciated  Extremities: palpable peripheral pulses, no edema or cyanosis  Neurological: AO x3, 5/5 strength in the left upper extremity and 5-/5 strength of the right upper extremity.  The patient's left lower extremity strength is approximately 4/5 in the right lower extremity strength is 0 out of 5 is a patient states that she cannot move it  Psychological: appropriate mood and behavior  Skin: warm and dry    Outpatient Follow-Up  No future appointments.      Adrian Arreola DO, PhD  Internal Medicine PGY1

## 2024-05-17 NOTE — DISCHARGE INSTRUCTIONS
1.  Take 750 mg twice daily Keppra for your history of strokes.  2.  Take 81 mg aspirin daily.  3.  Follow-up with your PCP in the next 1 to 2 weeks regarding recent hospitalization.  4.  Follow-up with Dr. Taylor regarding your seizure activity as well as MS in the next 4 months.

## 2024-05-18 LAB
BACTERIA BLD CULT: NORMAL
BACTERIA BLD CULT: NORMAL

## 2024-05-19 LAB
ATRIAL RATE: 64 BPM
DIASTOLIC BLOOD PRESSURE: 53 MMHG
P AXIS: 57 DEGREES
P OFFSET: 173 MS
P ONSET: 140 MS
PR INTERVAL: 162 MS
Q ONSET: 221 MS
QRS COUNT: 10 BEATS
QRS DURATION: 72 MS
QT INTERVAL: 486 MS
QTC CALCULATION(BAZETT): 501 MS
QTC FREDERICIA: 496 MS
R AXIS: 91 DEGREES
SYSTOLIC BLOOD PRESSURE: 110 MMHG
T AXIS: 83 DEGREES
T OFFSET: 464 MS
VENTRICULAR RATE: 64 BPM

## 2024-05-21 ENCOUNTER — HOME CARE VISIT (OUTPATIENT)
Dept: HOME HEALTH SERVICES | Facility: HOME HEALTH | Age: 69
End: 2024-05-21
Payer: MEDICARE

## 2024-05-21 ENCOUNTER — HOSPITAL ENCOUNTER (EMERGENCY)
Facility: HOSPITAL | Age: 69
Discharge: HOME | End: 2024-05-21
Attending: EMERGENCY MEDICINE
Payer: MEDICARE

## 2024-05-21 ENCOUNTER — APPOINTMENT (OUTPATIENT)
Dept: RADIOLOGY | Facility: HOSPITAL | Age: 69
End: 2024-05-21
Payer: MEDICARE

## 2024-05-21 ENCOUNTER — HOSPITAL ENCOUNTER (OUTPATIENT)
Dept: CARDIOLOGY | Facility: HOSPITAL | Age: 69
Discharge: HOME | End: 2024-05-21
Payer: MEDICARE

## 2024-05-21 VITALS
OXYGEN SATURATION: 100 % | SYSTOLIC BLOOD PRESSURE: 120 MMHG | WEIGHT: 140 LBS | BODY MASS INDEX: 20.04 KG/M2 | RESPIRATION RATE: 16 BRPM | TEMPERATURE: 98.6 F | DIASTOLIC BLOOD PRESSURE: 60 MMHG | HEIGHT: 70 IN | HEART RATE: 72 BPM

## 2024-05-21 VITALS
OXYGEN SATURATION: 99 % | SYSTOLIC BLOOD PRESSURE: 112 MMHG | RESPIRATION RATE: 18 BRPM | HEART RATE: 66 BPM | TEMPERATURE: 98.5 F | DIASTOLIC BLOOD PRESSURE: 64 MMHG

## 2024-05-21 DIAGNOSIS — I24.9 ACS (ACUTE CORONARY SYNDROME) (MULTI): ICD-10-CM

## 2024-05-21 DIAGNOSIS — S01.01XA LACERATION OF SCALP, INITIAL ENCOUNTER: ICD-10-CM
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
## 2024-05-21 PROCEDURE — 12001 RPR S/N/AX/GEN/TRNK 2.5CM/<: CPT | Performed by: EMERGENCY MEDICINE

## 2024-05-21 PROCEDURE — G0299 HHS/HOSPICE OF RN EA 15 MIN: HCPCS | Mod: HHH

## 2024-05-21 PROCEDURE — 0023 HH SOC

## 2024-05-21 PROCEDURE — 70450 CT HEAD/BRAIN W/O DYE: CPT | Performed by: RADIOLOGY

## 2024-05-21 PROCEDURE — 93005 ELECTROCARDIOGRAM TRACING: CPT

## 2024-05-21 PROCEDURE — 1090000002 HH PPS REVENUE DEBIT

## 2024-05-21 PROCEDURE — 1090000001 HH PPS REVENUE CREDIT

## 2024-05-21 PROCEDURE — 99285 EMERGENCY DEPT VISIT HI MDM: CPT | Mod: 25

## 2024-05-21 PROCEDURE — 72125 CT NECK SPINE W/O DYE: CPT | Performed by: RADIOLOGY

## 2024-05-21 PROCEDURE — 72125 CT NECK SPINE W/O DYE: CPT

## 2024-05-21 PROCEDURE — G0390 TRAUMA RESPONS W/HOSP CRITI: HCPCS

## 2024-05-21 PROCEDURE — 70450 CT HEAD/BRAIN W/O DYE: CPT

## 2024-05-21 PROCEDURE — 169592 NO-PAY CLAIM PROCEDURE

## 2024-05-21 ASSESSMENT — LIFESTYLE VARIABLES
EVER HAD A DRINK FIRST THING IN THE MORNING TO STEADY YOUR NERVES TO GET RID OF A HANGOVER: NO
SMOKING_STATUS: 0
HAVE PEOPLE ANNOYED YOU BY CRITICIZING YOUR DRINKING: NO
HAVE YOU EVER FELT YOU SHOULD CUT DOWN ON YOUR DRINKING: NO
TOTAL SCORE: 0
EVER FELT BAD OR GUILTY ABOUT YOUR DRINKING: NO

## 2024-05-21 ASSESSMENT — ENCOUNTER SYMPTOMS
LAST BOWEL MOVEMENT: 66979
HIGHEST PAIN SEVERITY IN PAST 24 HOURS: 0/10
FATIGUE: 1
APPETITE LEVEL: FAIR
DENIES PAIN: 1
MUSCLE WEAKNESS: 1
LIMITED RANGE OF MOTION: 1
FATIGUES EASILY: 1
CHANGE IN APPETITE: UNCHANGED
PERSON REPORTING PAIN: PATIENT

## 2024-05-21 ASSESSMENT — ACTIVITIES OF DAILY LIVING (ADL)
OASIS_M1830: 06
ENTERING_EXITING_HOME: MODERATE ASSIST

## 2024-05-21 ASSESSMENT — PAIN - FUNCTIONAL ASSESSMENT
PAIN_FUNCTIONAL_ASSESSMENT: 0-10
PAIN_FUNCTIONAL_ASSESSMENT: 0-10

## 2024-05-21 ASSESSMENT — PAIN SCALES - GENERAL
PAINLEVEL_OUTOF10: 0 - NO PAIN
PAINLEVEL_OUTOF10: 0 - NO PAIN

## 2024-05-21 NOTE — ED TRIAGE NOTES
PT fell back hit head on bookshelf tripping over feet, one inch lac on back of head aprox 100 ml blood loss at home. Hx M.S. w/ seizure stroke work up last week. Pt on warfarin at last DC last dose on thurs. 61Ypp60.  
Left arm;

## 2024-05-21 NOTE — ED PROVIDER NOTES
HPI   Chief Complaint   Patient presents with    Fall       Patient is a pleasant 68-year-old female, medical history significant for MS, prior stroke, gait dysfunction and walks with a cane, also on warfarin presents emergency  injury after fall.  Patient was in her normal state of health.  She was at her nursing facility.  She states she was ambulating into the cafeteria for lunch.  She states that she thinks that her cane got stuck and she fell.  She struck her head against a PNO.  She did not lose consciousness.  She describes a 1 out of 10 headache.  She has had no nausea or vomiting.  She denies any other symptoms.                          Reeves Coma Scale Score: 15                     Patient History   Past Medical History:   Diagnosis Date    Multiple sclerosis (Multi)     History of multiple sclerosis     Past Surgical History:   Procedure Laterality Date    MR HEAD ANGIO WO IV CONTRAST  9/21/2020    MR HEAD ANGIO WO IV CONTRAST 9/21/2020 [facility]    MR HEAD ANGIO WO IV CONTRAST  10/24/2022    MR HEAD ANGIO WO IV CONTRAST 10/24/2022 [facility]    MR NECK ANGIO WO IV CONTRAST  9/21/2020    MR NECK ANGIO WO IV CONTRAST 9/21/2020 [facility]    MR NECK ANGIO WO IV CONTRAST  10/24/2022    MR NECK ANGIO WO IV CONTRAST 10/24/2022 [facility]    OTHER SURGICAL HISTORY  11/26/2019    Tonsillectomy    OTHER SURGICAL HISTORY  11/26/2019    Hysterectomy     No family history on file.  Social History     Tobacco Use    Smoking status: Never    Smokeless tobacco: Never   Vaping Use    Vaping status: Never Used   Substance Use Topics    Alcohol use: Never    Drug use: Never       Physical Exam   ED Triage Vitals [05/21/24 1452]   Temperature Heart Rate Respirations BP   37 °C (98.6 °F) 74 16 135/62      Pulse Ox Temp Source Heart Rate Source Patient Position   100 % Temporal Monitor --      BP Location FiO2 (%)     -- --       Physical Exam  Vitals and nursing note  reviewed.   Constitutional:       General: She is not in acute distress.     Appearance: She is well-developed.   HENT:      Head: Normocephalic. Laceration present.     Eyes:      Conjunctiva/sclera: Conjunctivae normal.   Cardiovascular:      Rate and Rhythm: Normal rate and regular rhythm.      Heart sounds: No murmur heard.  Pulmonary:      Effort: Pulmonary effort is normal. No respiratory distress.      Breath sounds: Normal breath sounds.   Abdominal:      Palpations: Abdomen is soft.      Tenderness: There is no abdominal tenderness.   Musculoskeletal:         General: No swelling.      Cervical back: Normal range of motion and neck supple.   Skin:     General: Skin is warm and dry.      Capillary Refill: Capillary refill takes less than 2 seconds.   Neurological:      Mental Status: She is alert.   Psychiatric:         Mood and Affect: Mood normal.         ED Course & MDM   ED Course as of 05/21/24 1541   Tue May 21, 2024   1519 CT head shows no evidence of acute intracranial abnormality.  CT cervical spine shows no fracture. [MK]      ED Course User Index  [MK] Adams Alicia MD         Diagnoses as of 05/21/24 1541   Fall, initial encounter   Laceration of scalp, initial encounter       Medical Decision Making  Medical Decision Making: Patient presents emergency department fall.  She did strike her head.  Patient was on Coumadin but states she has not taken it in about 5 days.  She was activated as an HIA.  She underwent CT imaging.  CT shows no evidence of acute intracranial process.  CT cervical spine was also negative.  Patient did have a small wound on the posterior aspect of the scalp that was about 0.5 cm.  This was closed with 1 staple.  Patient tolerated this without issue.  At this point, if that she is safe for outpatient therapy.    EKG interpreted by myself (ED attending physician): Sinus rhythm.  Rate of 62.  Normal axis.  No acute ischemia.  No STEMI.    Differential Diagnoses Considered:  Scalp laceration, intracranial hemorrhage, skull fracture, cervical spine fracture    Chronic Medical Conditions Significantly Affecting Care: History of MS    External Records Reviewed: I reviewed recent and relevant outside records including: Outpatient primary care visits, recent admission discharge summary    Independent Interpretation of Studies:  I independently interpreted: CT is obtained reviewed    Escalation of Care:  Appropriate for outpatient management    Social Determinants of Health Significantly Affecting Care:  No social determinant    Prescription Drug Consideration: None    Diagnostic testing considered: Noncontributory              Procedure  Laceration Repair    Performed by: Adams Alicia MD  Authorized by: Adams Alicia MD    Consent:     Consent obtained:  Verbal    Consent given by:  Patient    Risks discussed:  Infection and poor cosmetic result    Alternatives discussed:  No treatment  Universal protocol:     Patient identity confirmed:  Verbally with patient and arm band  Anesthesia:     Anesthesia method:  None  Laceration details:     Location:  Scalp    Scalp location:  Occipital    Length (cm):  0.5    Depth (mm):  2  Pre-procedure details:     Preparation:  Patient was prepped and draped in usual sterile fashion  Exploration:     Limited defect created (wound extended): no      Hemostasis achieved with:  Direct pressure    Wound extent: no areolar tissue violation noted, no fascia violation noted, no foreign bodies/material noted and no muscle damage noted      Contaminated: no    Treatment:     Area cleansed with:  Saline    Amount of cleaning:  Standard    Irrigation solution:  Sterile saline  Skin repair:     Repair method:  Staples    Number of staples:  1  Approximation:     Approximation:  Close  Repair type:     Repair type:  Simple  Post-procedure details:     Dressing:  Open (no dressing)    Procedure completion:  Tolerated well, no immediate complications        Adams Alicia MD  05/21/24 1543

## 2024-05-22 LAB
ATRIAL RATE: 72 BPM
P AXIS: 34 DEGREES
P OFFSET: 184 MS
P ONSET: 139 MS
PR INTERVAL: 164 MS
Q ONSET: 221 MS
QRS COUNT: 12 BEATS
QRS DURATION: 72 MS
QT INTERVAL: 374 MS
QTC CALCULATION(BAZETT): 409 MS
QTC FREDERICIA: 397 MS
R AXIS: 71 DEGREES
T AXIS: 77 DEGREES
T OFFSET: 408 MS
VENTRICULAR RATE: 72 BPM

## 2024-05-22 PROCEDURE — 93005 ELECTROCARDIOGRAM TRACING: CPT

## 2024-05-22 PROCEDURE — 1090000002 HH PPS REVENUE DEBIT

## 2024-05-22 PROCEDURE — 1090000001 HH PPS REVENUE CREDIT

## 2024-05-23 ENCOUNTER — HOME CARE VISIT (OUTPATIENT)
Dept: HOME HEALTH SERVICES | Facility: HOME HEALTH | Age: 69
End: 2024-05-23
Payer: MEDICARE

## 2024-05-23 PROCEDURE — G0152 HHCP-SERV OF OT,EA 15 MIN: HCPCS | Mod: HHH

## 2024-05-23 PROCEDURE — 1090000001 HH PPS REVENUE CREDIT

## 2024-05-23 PROCEDURE — 1090000002 HH PPS REVENUE DEBIT

## 2024-05-23 ASSESSMENT — ACTIVITIES OF DAILY LIVING (ADL)
LAUNDRY ASSESSED: 1
PHYSICAL TRANSFERS ASSESSED: 1
TOILETING: 1
GROOMING ASSESSED: 1
FEEDING ASSESSED: 1
BATHING ASSESSED: 1

## 2024-05-23 ASSESSMENT — ENCOUNTER SYMPTOMS
HIGHEST PAIN SEVERITY IN PAST 24 HOURS: 2/10
PAIN LOCATION: RIGHT WRIST
PERSON REPORTING PAIN: PATIENT
PAIN: 1
LOWEST PAIN SEVERITY IN PAST 24 HOURS: 0/10

## 2024-05-23 NOTE — HOME HEALTH
Patient seen with spouse for OT evaluation visit. Patient was recently hospitalized with seizure due to non-compliance with Keppra. She went to the ER on 5.21.24 due to a fall with head laceration, she was seen and released.     Lives with spouse and daughter in a [place] home with 7 steps to enter the front door. Stays on 1st floor with bedroom and full bathroom with a tub shower. They have a walk-in shower on the 2nd floor, they are considering a stair lift to the 2nd floor. There is an elevator lift to get into the home, from the back door.     Medical history of MS, seizures, RA.    Patient has a cane, standard walker, sliding board, shower chair, handheld shower, standard wc, they have a power wc (gifted2youver round)-spouse states they may try it in the home.     Prior to hospitalization, needed assistance with showering/tub transfer from her daughter. She was independent with ADLs otherwise. Daughter does laundry, spouse performed meal prep. She used a cane for ambulation. Didn't drive.     Barthel index-90 out of 100.     Left UE AROM and strength WNL. Right UE AROM WNL except shoulder flex/abd 10-20 degrees AROM, AAROM WNL.     Patient in agreement with OT POC. Plan to see patient one more time to instruct on possible use of a tub transfer bench and showering safety.

## 2024-05-24 ENCOUNTER — HOME CARE VISIT (OUTPATIENT)
Dept: HOME HEALTH SERVICES | Facility: HOME HEALTH | Age: 69
End: 2024-05-24
Payer: MEDICARE

## 2024-05-24 PROCEDURE — 1090000002 HH PPS REVENUE DEBIT

## 2024-05-24 PROCEDURE — G0151 HHCP-SERV OF PT,EA 15 MIN: HCPCS | Mod: HHH

## 2024-05-24 PROCEDURE — 1090000001 HH PPS REVENUE CREDIT

## 2024-05-24 ASSESSMENT — ACTIVITIES OF DAILY LIVING (ADL)
CURRENT_FUNCTION: MODERATE ASSIST
LAUNDRY: DEPENDENT
FEEDING: INDEPENDENT
GROOMING_CURRENT_FUNCTION: INDEPENDENT
BATHING_CURRENT_FUNCTION: MODERATE ASSIST
TOILETING: INDEPENDENT
DRESSING_LB_CURRENT_FUNCTION: INDEPENDENT
PREPARING MEALS: DEPENDENT
DRESSING_UB_CURRENT_FUNCTION: INDEPENDENT
AMBULATION ASSISTANCE: NON-AMBULATORY

## 2024-05-24 ASSESSMENT — ENCOUNTER SYMPTOMS
MUSCLE WEAKNESS: 1
DENIES PAIN: 1
PERSON REPORTING PAIN: PATIENT

## 2024-05-25 LAB
ATRIAL RATE: 62 BPM
P AXIS: 44 DEGREES
P OFFSET: 191 MS
P ONSET: 140 MS
PR INTERVAL: 156 MS
Q ONSET: 218 MS
QRS COUNT: 10 BEATS
QRS DURATION: 80 MS
QT INTERVAL: 374 MS
QTC CALCULATION(BAZETT): 379 MS
QTC FREDERICIA: 378 MS
R AXIS: 39 DEGREES
T AXIS: 73 DEGREES
T OFFSET: 405 MS
VENTRICULAR RATE: 62 BPM

## 2024-05-25 PROCEDURE — 1090000002 HH PPS REVENUE DEBIT

## 2024-05-25 PROCEDURE — 1090000001 HH PPS REVENUE CREDIT

## 2024-05-25 NOTE — CASE COMMUNICATION
OT evaluation completed 5.23.24. Plan to see patient one more time next week to address showering/tub transfer safety.

## 2024-05-26 PROCEDURE — 1090000002 HH PPS REVENUE DEBIT

## 2024-05-26 PROCEDURE — 1090000001 HH PPS REVENUE CREDIT

## 2024-05-27 PROCEDURE — 1090000002 HH PPS REVENUE DEBIT

## 2024-05-27 PROCEDURE — 1090000001 HH PPS REVENUE CREDIT

## 2024-05-28 ENCOUNTER — HOME CARE VISIT (OUTPATIENT)
Dept: HOME HEALTH SERVICES | Facility: HOME HEALTH | Age: 69
End: 2024-05-28
Payer: MEDICARE

## 2024-05-28 PROCEDURE — 1090000001 HH PPS REVENUE CREDIT

## 2024-05-28 PROCEDURE — G0152 HHCP-SERV OF OT,EA 15 MIN: HCPCS | Mod: HHH

## 2024-05-28 PROCEDURE — 1090000002 HH PPS REVENUE DEBIT

## 2024-05-28 ASSESSMENT — ENCOUNTER SYMPTOMS
PERSON REPORTING PAIN: PATIENT
DENIES PAIN: 1

## 2024-05-28 NOTE — HOME HEALTH
Patient seen with spouse for OT DC visit. No further OT visits indicated, patient and spouse in agreement. OT DC at this date, goals met. SN and PT services are still active.     Barthel index- out of 100.    DISCHARGE SUMMARY:    DISCIPLINE: Occupational Therapy  DATE OF DISCIPLINE DISCHARGE: 5.28.24  REASON FOR DISCHARGE: GOALS MET  COORDINATION NOTE: COMPLETED  EVALUATION OF GOALS: ALL GOALS MET  SUMMARY OF CARE PROVIDED: INSTRUCTED ON SAFETY TECHNIQUES AND EQUIPMENT USE FOR SHOWERING/TUB TRANSFER.  DISCHARGE INSTRUCTIONS GIVEN: CONTINUE TO FOLLOW SAFETY INSTRUCTION.  SERVICES REMAINING: SN AND PT  NOMNC OBTAINED: ROSARIO

## 2024-05-28 NOTE — CASE COMMUNICATION
OT DC 5.28.24, goals met. SN and PT services are still active. Dr. Craig, patient states she is taking aspirin 325 mg for pain, do you want her to be taking that as she is on baby aspirin for a blood thinner?

## 2024-05-29 ENCOUNTER — HOME CARE VISIT (OUTPATIENT)
Dept: HOME HEALTH SERVICES | Facility: HOME HEALTH | Age: 69
End: 2024-05-29
Payer: MEDICARE

## 2024-05-29 PROCEDURE — 1090000001 HH PPS REVENUE CREDIT

## 2024-05-29 PROCEDURE — G0151 HHCP-SERV OF PT,EA 15 MIN: HCPCS | Mod: HHH

## 2024-05-29 PROCEDURE — 1090000002 HH PPS REVENUE DEBIT

## 2024-05-30 ENCOUNTER — HOME CARE VISIT (OUTPATIENT)
Dept: HOME HEALTH SERVICES | Facility: HOME HEALTH | Age: 69
End: 2024-05-30
Payer: MEDICARE

## 2024-05-30 VITALS
SYSTOLIC BLOOD PRESSURE: 100 MMHG | DIASTOLIC BLOOD PRESSURE: 62 MMHG | RESPIRATION RATE: 20 BRPM | OXYGEN SATURATION: 96 % | TEMPERATURE: 97.4 F | HEART RATE: 74 BPM

## 2024-05-30 PROCEDURE — G0300 HHS/HOSPICE OF LPN EA 15 MIN: HCPCS | Mod: HHH

## 2024-05-30 PROCEDURE — 1090000002 HH PPS REVENUE DEBIT

## 2024-05-30 PROCEDURE — 1090000001 HH PPS REVENUE CREDIT

## 2024-05-30 ASSESSMENT — ENCOUNTER SYMPTOMS
DENIES PAIN: 1
LAST BOWEL MOVEMENT: 66989
APPETITE LEVEL: GOOD
CHANGE IN APPETITE: UNCHANGED

## 2024-05-30 ASSESSMENT — ACTIVITIES OF DAILY LIVING (ADL): MONEY MANAGEMENT (EXPENSES/BILLS): INDEPENDENT

## 2024-05-31 PROCEDURE — 1090000001 HH PPS REVENUE CREDIT

## 2024-05-31 PROCEDURE — 1090000002 HH PPS REVENUE DEBIT

## 2024-06-01 PROCEDURE — 1090000002 HH PPS REVENUE DEBIT

## 2024-06-01 PROCEDURE — 1090000001 HH PPS REVENUE CREDIT

## 2024-06-02 PROCEDURE — 1090000002 HH PPS REVENUE DEBIT

## 2024-06-02 PROCEDURE — 1090000001 HH PPS REVENUE CREDIT

## 2024-06-03 ENCOUNTER — HOME CARE VISIT (OUTPATIENT)
Dept: HOME HEALTH SERVICES | Facility: HOME HEALTH | Age: 69
End: 2024-06-03
Payer: MEDICARE

## 2024-06-03 PROCEDURE — 1090000001 HH PPS REVENUE CREDIT

## 2024-06-03 PROCEDURE — G0151 HHCP-SERV OF PT,EA 15 MIN: HCPCS | Mod: HHH

## 2024-06-03 PROCEDURE — 1090000002 HH PPS REVENUE DEBIT

## 2024-06-03 ASSESSMENT — ENCOUNTER SYMPTOMS
PERSON REPORTING PAIN: PATIENT
DENIES PAIN: 1

## 2024-06-04 PROCEDURE — 1090000002 HH PPS REVENUE DEBIT

## 2024-06-04 PROCEDURE — 1090000001 HH PPS REVENUE CREDIT

## 2024-06-05 PROCEDURE — 1090000001 HH PPS REVENUE CREDIT

## 2024-06-05 PROCEDURE — 1090000002 HH PPS REVENUE DEBIT

## 2024-06-06 ENCOUNTER — HOME CARE VISIT (OUTPATIENT)
Dept: HOME HEALTH SERVICES | Facility: HOME HEALTH | Age: 69
End: 2024-06-06
Payer: MEDICARE

## 2024-06-06 VITALS
HEART RATE: 65 BPM | RESPIRATION RATE: 20 BRPM | TEMPERATURE: 98.6 F | SYSTOLIC BLOOD PRESSURE: 110 MMHG | OXYGEN SATURATION: 99 % | DIASTOLIC BLOOD PRESSURE: 70 MMHG

## 2024-06-06 PROCEDURE — 1090000001 HH PPS REVENUE CREDIT

## 2024-06-06 PROCEDURE — 1090000002 HH PPS REVENUE DEBIT

## 2024-06-06 PROCEDURE — G0300 HHS/HOSPICE OF LPN EA 15 MIN: HCPCS | Mod: HHH

## 2024-06-06 ASSESSMENT — ENCOUNTER SYMPTOMS
DENIES PAIN: 1
MUSCLE WEAKNESS: 1
LAST BOWEL MOVEMENT: 66997

## 2024-06-06 ASSESSMENT — ACTIVITIES OF DAILY LIVING (ADL): MONEY MANAGEMENT (EXPENSES/BILLS): NEEDS ASSISTANCE

## 2024-06-07 ENCOUNTER — HOME CARE VISIT (OUTPATIENT)
Dept: HOME HEALTH SERVICES | Facility: HOME HEALTH | Age: 69
End: 2024-06-07
Payer: MEDICARE

## 2024-06-07 PROCEDURE — 1090000001 HH PPS REVENUE CREDIT

## 2024-06-07 PROCEDURE — G0151 HHCP-SERV OF PT,EA 15 MIN: HCPCS | Mod: HHH

## 2024-06-07 PROCEDURE — 1090000002 HH PPS REVENUE DEBIT

## 2024-06-07 ASSESSMENT — ENCOUNTER SYMPTOMS
DENIES PAIN: 1
MUSCLE WEAKNESS: 1
PERSON REPORTING PAIN: PATIENT

## 2024-06-08 PROCEDURE — 1090000001 HH PPS REVENUE CREDIT

## 2024-06-08 PROCEDURE — 1090000002 HH PPS REVENUE DEBIT

## 2024-06-09 PROCEDURE — 1090000002 HH PPS REVENUE DEBIT

## 2024-06-09 PROCEDURE — 1090000001 HH PPS REVENUE CREDIT

## 2024-06-10 ENCOUNTER — HOME CARE VISIT (OUTPATIENT)
Dept: HOME HEALTH SERVICES | Facility: HOME HEALTH | Age: 69
End: 2024-06-10
Payer: MEDICARE

## 2024-06-10 PROCEDURE — 1090000002 HH PPS REVENUE DEBIT

## 2024-06-10 PROCEDURE — G0151 HHCP-SERV OF PT,EA 15 MIN: HCPCS | Mod: HHH

## 2024-06-10 PROCEDURE — 1090000001 HH PPS REVENUE CREDIT

## 2024-06-10 ASSESSMENT — ENCOUNTER SYMPTOMS
DENIES PAIN: 1
PERSON REPORTING PAIN: PATIENT

## 2024-06-12 ENCOUNTER — HOME CARE VISIT (OUTPATIENT)
Dept: HOME HEALTH SERVICES | Facility: HOME HEALTH | Age: 69
End: 2024-06-12
Payer: MEDICARE

## 2024-06-12 VITALS
SYSTOLIC BLOOD PRESSURE: 116 MMHG | DIASTOLIC BLOOD PRESSURE: 68 MMHG | RESPIRATION RATE: 18 BRPM | TEMPERATURE: 98.2 F | OXYGEN SATURATION: 97 % | HEART RATE: 62 BPM

## 2024-06-12 PROCEDURE — G0151 HHCP-SERV OF PT,EA 15 MIN: HCPCS | Mod: HHH

## 2024-06-12 PROCEDURE — G0300 HHS/HOSPICE OF LPN EA 15 MIN: HCPCS | Mod: HHH

## 2024-06-12 SDOH — ECONOMIC STABILITY: GENERAL

## 2024-06-12 ASSESSMENT — ENCOUNTER SYMPTOMS
APPETITE LEVEL: GOOD
CHANGE IN APPETITE: UNCHANGED
PERSON REPORTING PAIN: PATIENT
LAST BOWEL MOVEMENT: 67003
DENIES PAIN: 1
DENIES PAIN: 1

## 2024-06-12 ASSESSMENT — ACTIVITIES OF DAILY LIVING (ADL): MONEY MANAGEMENT (EXPENSES/BILLS): INDEPENDENT

## 2024-06-17 ENCOUNTER — HOME CARE VISIT (OUTPATIENT)
Dept: HOME HEALTH SERVICES | Facility: HOME HEALTH | Age: 69
End: 2024-06-17
Payer: MEDICARE

## 2024-06-17 PROCEDURE — G0151 HHCP-SERV OF PT,EA 15 MIN: HCPCS | Mod: HHH

## 2024-06-17 ASSESSMENT — ENCOUNTER SYMPTOMS
DENIES PAIN: 1
PERSON REPORTING PAIN: PATIENT

## 2024-06-18 ENCOUNTER — HOME CARE VISIT (OUTPATIENT)
Dept: HOME HEALTH SERVICES | Facility: HOME HEALTH | Age: 69
End: 2024-06-18
Payer: MEDICARE

## 2024-06-18 VITALS
OXYGEN SATURATION: 98 % | SYSTOLIC BLOOD PRESSURE: 118 MMHG | RESPIRATION RATE: 18 BRPM | TEMPERATURE: 98.7 F | HEART RATE: 64 BPM | DIASTOLIC BLOOD PRESSURE: 64 MMHG

## 2024-06-18 PROCEDURE — G0299 HHS/HOSPICE OF RN EA 15 MIN: HCPCS | Mod: HHH

## 2024-06-18 ASSESSMENT — ENCOUNTER SYMPTOMS
FATIGUE: 1
DENIES PAIN: 1
PERSON REPORTING PAIN: PATIENT
LAST BOWEL MOVEMENT: 67009
FATIGUES EASILY: 1
HIGHEST PAIN SEVERITY IN PAST 24 HOURS: 0/10
APPETITE LEVEL: FAIR
CHANGE IN APPETITE: UNCHANGED
FORGETFULNESS: 1
MUSCLE WEAKNESS: 1

## 2024-06-20 ENCOUNTER — HOME CARE VISIT (OUTPATIENT)
Dept: HOME HEALTH SERVICES | Facility: HOME HEALTH | Age: 69
End: 2024-06-20
Payer: MEDICARE

## 2024-06-20 PROCEDURE — G0151 HHCP-SERV OF PT,EA 15 MIN: HCPCS | Mod: HHH

## 2024-06-20 ASSESSMENT — ENCOUNTER SYMPTOMS
DENIES PAIN: 1
PERSON REPORTING PAIN: PATIENT
MUSCLE WEAKNESS: 1

## 2024-06-25 ENCOUNTER — HOME CARE VISIT (OUTPATIENT)
Dept: HOME HEALTH SERVICES | Facility: HOME HEALTH | Age: 69
End: 2024-06-25
Payer: MEDICARE

## 2024-06-25 VITALS
OXYGEN SATURATION: 99 % | HEART RATE: 73 BPM | DIASTOLIC BLOOD PRESSURE: 60 MMHG | RESPIRATION RATE: 18 BRPM | SYSTOLIC BLOOD PRESSURE: 100 MMHG | TEMPERATURE: 98.4 F

## 2024-06-25 PROCEDURE — G0300 HHS/HOSPICE OF LPN EA 15 MIN: HCPCS | Mod: HHH

## 2024-06-25 SDOH — ECONOMIC STABILITY: GENERAL

## 2024-06-25 ASSESSMENT — ENCOUNTER SYMPTOMS
DENIES PAIN: 1
APPETITE LEVEL: GOOD
LAST BOWEL MOVEMENT: 67016

## 2024-06-25 ASSESSMENT — ACTIVITIES OF DAILY LIVING (ADL): MONEY MANAGEMENT (EXPENSES/BILLS): INDEPENDENT

## 2024-07-04 ENCOUNTER — HOME CARE VISIT (OUTPATIENT)
Dept: HOME HEALTH SERVICES | Facility: HOME HEALTH | Age: 69
End: 2024-07-04
Payer: MEDICARE

## 2024-07-04 VITALS
DIASTOLIC BLOOD PRESSURE: 60 MMHG | HEART RATE: 72 BPM | RESPIRATION RATE: 18 BRPM | SYSTOLIC BLOOD PRESSURE: 102 MMHG | OXYGEN SATURATION: 99 % | TEMPERATURE: 98.2 F

## 2024-07-04 PROCEDURE — G0300 HHS/HOSPICE OF LPN EA 15 MIN: HCPCS | Mod: HHH

## 2024-07-04 ASSESSMENT — ENCOUNTER SYMPTOMS
DENIES PAIN: 1
CHANGE IN APPETITE: UNCHANGED
APPETITE LEVEL: GOOD
BOWEL PATTERN NORMAL: 1
LAST BOWEL MOVEMENT: 67025
STOOL FREQUENCY: DAILY
PERSON REPORTING PAIN: PATIENT
PAIN SEVERITY GOAL: 0/10
HIGHEST PAIN SEVERITY IN PAST 24 HOURS: 0/10
LOWEST PAIN SEVERITY IN PAST 24 HOURS: 0/10
SUBJECTIVE PAIN PROGRESSION: UNCHANGED

## 2024-07-04 ASSESSMENT — ACTIVITIES OF DAILY LIVING (ADL): MONEY MANAGEMENT (EXPENSES/BILLS): INDEPENDENT

## 2024-07-04 NOTE — HOME HEALTH
Patient seen today for routine SN visit. Patient denies any pain or discomfort at this time, states her appetite is fair/good and she is supplementing with Muscle Milk Protein shakes. Last bowel movement this morning, denies any issues moving bowels, or voiding. All VS WNL. Respirations are even and unlabored, lungs CTA. No changes in medication at this time. No

## 2024-07-09 ENCOUNTER — HOME CARE VISIT (OUTPATIENT)
Dept: HOME HEALTH SERVICES | Facility: HOME HEALTH | Age: 69
End: 2024-07-09
Payer: MEDICARE

## 2024-07-09 VITALS
SYSTOLIC BLOOD PRESSURE: 122 MMHG | OXYGEN SATURATION: 95 % | TEMPERATURE: 98.5 F | RESPIRATION RATE: 18 BRPM | HEART RATE: 89 BPM | DIASTOLIC BLOOD PRESSURE: 68 MMHG

## 2024-07-09 PROCEDURE — G0300 HHS/HOSPICE OF LPN EA 15 MIN: HCPCS | Mod: HHH

## 2024-07-09 SDOH — ECONOMIC STABILITY: GENERAL

## 2024-07-09 ASSESSMENT — ENCOUNTER SYMPTOMS
LAST BOWEL MOVEMENT: 67030
APPETITE LEVEL: GOOD
DENIES PAIN: 1

## 2024-07-09 ASSESSMENT — ACTIVITIES OF DAILY LIVING (ADL): MONEY MANAGEMENT (EXPENSES/BILLS): NEEDS ASSISTANCE

## 2024-07-12 ENCOUNTER — HOME CARE VISIT (OUTPATIENT)
Dept: HOME HEALTH SERVICES | Facility: HOME HEALTH | Age: 69
End: 2024-07-12
Payer: MEDICARE

## 2024-07-12 VITALS
DIASTOLIC BLOOD PRESSURE: 60 MMHG | OXYGEN SATURATION: 98 % | RESPIRATION RATE: 18 BRPM | SYSTOLIC BLOOD PRESSURE: 98 MMHG | TEMPERATURE: 98.1 F | HEART RATE: 68 BPM

## 2024-07-12 PROCEDURE — G0299 HHS/HOSPICE OF RN EA 15 MIN: HCPCS | Mod: HHH

## 2024-07-12 ASSESSMENT — ENCOUNTER SYMPTOMS
PAIN LOCATION - PAIN SEVERITY: 3/10
FATIGUES EASILY: 1
DESCRIPTION OF MEMORY LOSS: SHORT TERM
LIMITED RANGE OF MOTION: 1
HIGHEST PAIN SEVERITY IN PAST 24 HOURS: 5/10
PERSON REPORTING PAIN: PATIENT
LAST BOWEL MOVEMENT: 67032
PAIN LOCATION - PAIN FREQUENCY: FREQUENT
PAIN: 1
MUSCLE WEAKNESS: 1
LOWEST PAIN SEVERITY IN PAST 24 HOURS: 2/10
CHANGE IN APPETITE: UNCHANGED
SUBJECTIVE PAIN PROGRESSION: UNCHANGED
FATIGUE: 1
PAIN LOCATION: LEFT BUTTOCK
APPETITE LEVEL: FAIR
FORGETFULNESS: 1
PAIN SEVERITY GOAL: 0/10

## 2024-07-17 ENCOUNTER — HOME CARE VISIT (OUTPATIENT)
Dept: HOME HEALTH SERVICES | Facility: HOME HEALTH | Age: 69
End: 2024-07-17
Payer: MEDICARE

## 2024-07-17 VITALS
TEMPERATURE: 99.3 F | DIASTOLIC BLOOD PRESSURE: 52 MMHG | RESPIRATION RATE: 18 BRPM | HEART RATE: 77 BPM | SYSTOLIC BLOOD PRESSURE: 98 MMHG | OXYGEN SATURATION: 97 %

## 2024-07-17 PROCEDURE — G0299 HHS/HOSPICE OF RN EA 15 MIN: HCPCS | Mod: HHH

## 2024-07-17 ASSESSMENT — ENCOUNTER SYMPTOMS
PAIN LOCATION - PAIN FREQUENCY: FREQUENT
LIMITED RANGE OF MOTION: 1
PAIN SEVERITY GOAL: 0/10
LAST BOWEL MOVEMENT: 67038
PAIN LOCATION - PAIN SEVERITY: 4/10
SUBJECTIVE PAIN PROGRESSION: UNCHANGED
LOWER EXTREMITY EDEMA: 1
MUSCLE WEAKNESS: 1
PAIN: 1
APPETITE LEVEL: FAIR
FATIGUE: 1
PERSON REPORTING PAIN: PATIENT
FATIGUES EASILY: 1
CHANGE IN APPETITE: UNCHANGED
FORGETFULNESS: 1
PAIN LOCATION - PAIN QUALITY: ACHE, PRESSURE
PAIN LOCATION: COCCYX
HIGHEST PAIN SEVERITY IN PAST 24 HOURS: 6/10
LOWEST PAIN SEVERITY IN PAST 24 HOURS: 2/10

## 2024-07-17 ASSESSMENT — LIFESTYLE VARIABLES: SMOKING_STATUS: 0

## 2024-07-17 ASSESSMENT — ACTIVITIES OF DAILY LIVING (ADL)
OASIS_M1830: 05
ENTERING_EXITING_HOME: MAXIMUM ASSIST

## 2024-07-19 ENCOUNTER — HOME CARE VISIT (OUTPATIENT)
Dept: HOME HEALTH SERVICES | Facility: HOME HEALTH | Age: 69
End: 2024-07-19
Payer: MEDICARE

## 2024-07-19 VITALS
DIASTOLIC BLOOD PRESSURE: 60 MMHG | HEART RATE: 68 BPM | TEMPERATURE: 97.7 F | RESPIRATION RATE: 18 BRPM | OXYGEN SATURATION: 98 % | SYSTOLIC BLOOD PRESSURE: 100 MMHG

## 2024-07-19 PROCEDURE — G0299 HHS/HOSPICE OF RN EA 15 MIN: HCPCS | Mod: HHH

## 2024-07-19 ASSESSMENT — PAIN SCALES - PAIN ASSESSMENT IN ADVANCED DEMENTIA (PAINAD): BREATHING: 0

## 2024-07-19 ASSESSMENT — ENCOUNTER SYMPTOMS
PAIN SEVERITY GOAL: 0/10
APPETITE LEVEL: FAIR
MUSCLE WEAKNESS: 1
LOWEST PAIN SEVERITY IN PAST 24 HOURS: 0/10
LIMITED RANGE OF MOTION: 1
HIGHEST PAIN SEVERITY IN PAST 24 HOURS: 0/10

## 2024-07-22 ENCOUNTER — HOME CARE VISIT (OUTPATIENT)
Dept: HOME HEALTH SERVICES | Facility: HOME HEALTH | Age: 69
End: 2024-07-22
Payer: MEDICARE

## 2024-07-24 ENCOUNTER — HOME CARE VISIT (OUTPATIENT)
Dept: HOME HEALTH SERVICES | Facility: HOME HEALTH | Age: 69
End: 2024-07-24
Payer: MEDICARE

## 2024-07-24 VITALS
TEMPERATURE: 97.5 F | RESPIRATION RATE: 18 BRPM | SYSTOLIC BLOOD PRESSURE: 110 MMHG | DIASTOLIC BLOOD PRESSURE: 60 MMHG | HEART RATE: 70 BPM | OXYGEN SATURATION: 98 %

## 2024-07-24 PROCEDURE — G0300 HHS/HOSPICE OF LPN EA 15 MIN: HCPCS | Mod: HHH

## 2024-07-24 ASSESSMENT — ENCOUNTER SYMPTOMS
MUSCLE WEAKNESS: 1
CHANGE IN APPETITE: UNCHANGED
APPETITE LEVEL: FAIR

## 2024-07-24 ASSESSMENT — ACTIVITIES OF DAILY LIVING (ADL)
FEEDING ASSESSED: 1
FEEDING: INDEPENDENT

## 2024-07-27 ENCOUNTER — HOME CARE VISIT (OUTPATIENT)
Dept: HOME HEALTH SERVICES | Facility: HOME HEALTH | Age: 69
End: 2024-07-27
Payer: MEDICARE

## 2024-07-29 ENCOUNTER — HOME CARE VISIT (OUTPATIENT)
Dept: HOME HEALTH SERVICES | Facility: HOME HEALTH | Age: 69
End: 2024-07-29
Payer: MEDICARE

## 2024-07-31 ENCOUNTER — HOME CARE VISIT (OUTPATIENT)
Dept: HOME HEALTH SERVICES | Facility: HOME HEALTH | Age: 69
End: 2024-07-31
Payer: MEDICARE

## 2024-07-31 VITALS — RESPIRATION RATE: 18 BRPM | HEART RATE: 50 BPM | OXYGEN SATURATION: 94 % | TEMPERATURE: 97.7 F

## 2024-07-31 PROCEDURE — G0300 HHS/HOSPICE OF LPN EA 15 MIN: HCPCS | Mod: HHH

## 2024-07-31 SDOH — ECONOMIC STABILITY: GENERAL

## 2024-07-31 ASSESSMENT — ACTIVITIES OF DAILY LIVING (ADL)
FEEDING ASSESSED: 1
MONEY MANAGEMENT (EXPENSES/BILLS): INDEPENDENT
FEEDING: INDEPENDENT

## 2024-07-31 ASSESSMENT — ENCOUNTER SYMPTOMS
DENIES PAIN: 1
PERSON REPORTING PAIN: PATIENT
CHANGE IN APPETITE: UNCHANGED
APPETITE LEVEL: FAIR

## 2024-08-02 ENCOUNTER — HOME CARE VISIT (OUTPATIENT)
Dept: HOME HEALTH SERVICES | Facility: HOME HEALTH | Age: 69
End: 2024-08-02
Payer: MEDICARE

## 2024-08-02 VITALS
OXYGEN SATURATION: 98 % | HEART RATE: 70 BPM | RESPIRATION RATE: 18 BRPM | DIASTOLIC BLOOD PRESSURE: 72 MMHG | TEMPERATURE: 98.6 F | SYSTOLIC BLOOD PRESSURE: 110 MMHG

## 2024-08-02 PROCEDURE — G0300 HHS/HOSPICE OF LPN EA 15 MIN: HCPCS | Mod: HHH

## 2024-08-02 SDOH — ECONOMIC STABILITY: GENERAL

## 2024-08-02 ASSESSMENT — ENCOUNTER SYMPTOMS
LAST BOWEL MOVEMENT: 67054
APPETITE LEVEL: GOOD

## 2024-08-02 ASSESSMENT — ACTIVITIES OF DAILY LIVING (ADL): MONEY MANAGEMENT (EXPENSES/BILLS): INDEPENDENT

## 2024-08-05 ENCOUNTER — HOME CARE VISIT (OUTPATIENT)
Dept: HOME HEALTH SERVICES | Facility: HOME HEALTH | Age: 69
End: 2024-08-05
Payer: MEDICARE

## 2024-08-05 VITALS
SYSTOLIC BLOOD PRESSURE: 112 MMHG | DIASTOLIC BLOOD PRESSURE: 64 MMHG | TEMPERATURE: 98.8 F | HEART RATE: 65 BPM | RESPIRATION RATE: 18 BRPM | OXYGEN SATURATION: 98 %

## 2024-08-05 PROCEDURE — G0300 HHS/HOSPICE OF LPN EA 15 MIN: HCPCS | Mod: HHH

## 2024-08-05 SDOH — ECONOMIC STABILITY: GENERAL

## 2024-08-05 ASSESSMENT — ENCOUNTER SYMPTOMS
DENIES PAIN: 1
LAST BOWEL MOVEMENT: 67057
APPETITE LEVEL: FAIR

## 2024-08-05 ASSESSMENT — ACTIVITIES OF DAILY LIVING (ADL): MONEY MANAGEMENT (EXPENSES/BILLS): NEEDS ASSISTANCE

## 2024-08-07 ENCOUNTER — HOME CARE VISIT (OUTPATIENT)
Dept: HOME HEALTH SERVICES | Facility: HOME HEALTH | Age: 69
End: 2024-08-07
Payer: MEDICARE

## 2024-08-07 VITALS
RESPIRATION RATE: 18 BRPM | HEART RATE: 71 BPM | DIASTOLIC BLOOD PRESSURE: 60 MMHG | TEMPERATURE: 98.3 F | OXYGEN SATURATION: 98 % | SYSTOLIC BLOOD PRESSURE: 90 MMHG

## 2024-08-07 PROCEDURE — G0300 HHS/HOSPICE OF LPN EA 15 MIN: HCPCS | Mod: HHH

## 2024-08-07 SDOH — ECONOMIC STABILITY: GENERAL

## 2024-08-07 ASSESSMENT — ENCOUNTER SYMPTOMS
DENIES PAIN: 1
LAST BOWEL MOVEMENT: 67058
APPETITE LEVEL: GOOD

## 2024-08-07 ASSESSMENT — ACTIVITIES OF DAILY LIVING (ADL): MONEY MANAGEMENT (EXPENSES/BILLS): NEEDS ASSISTANCE

## 2024-08-09 ENCOUNTER — HOME CARE VISIT (OUTPATIENT)
Dept: HOME HEALTH SERVICES | Facility: HOME HEALTH | Age: 69
End: 2024-08-09
Payer: MEDICARE

## 2024-08-09 VITALS
OXYGEN SATURATION: 99 % | RESPIRATION RATE: 20 BRPM | DIASTOLIC BLOOD PRESSURE: 68 MMHG | SYSTOLIC BLOOD PRESSURE: 104 MMHG | TEMPERATURE: 98.1 F | HEART RATE: 64 BPM

## 2024-08-09 PROCEDURE — G0300 HHS/HOSPICE OF LPN EA 15 MIN: HCPCS | Mod: HHH

## 2024-08-09 SDOH — ECONOMIC STABILITY: GENERAL

## 2024-08-09 ASSESSMENT — ENCOUNTER SYMPTOMS
MUSCLE WEAKNESS: 1
LOWER EXTREMITY EDEMA: 1
DENIES PAIN: 1
APPETITE LEVEL: FAIR
LAST BOWEL MOVEMENT: 67061

## 2024-08-09 ASSESSMENT — ACTIVITIES OF DAILY LIVING (ADL): MONEY MANAGEMENT (EXPENSES/BILLS): NEEDS ASSISTANCE

## 2024-08-12 ENCOUNTER — HOME CARE VISIT (OUTPATIENT)
Dept: HOME HEALTH SERVICES | Facility: HOME HEALTH | Age: 69
End: 2024-08-12
Payer: MEDICARE

## 2024-08-12 VITALS
DIASTOLIC BLOOD PRESSURE: 62 MMHG | HEART RATE: 62 BPM | TEMPERATURE: 98.4 F | OXYGEN SATURATION: 98 % | SYSTOLIC BLOOD PRESSURE: 100 MMHG | RESPIRATION RATE: 20 BRPM

## 2024-08-12 PROCEDURE — G0300 HHS/HOSPICE OF LPN EA 15 MIN: HCPCS | Mod: HHH

## 2024-08-12 SDOH — ECONOMIC STABILITY: GENERAL

## 2024-08-12 ASSESSMENT — ENCOUNTER SYMPTOMS
APPETITE LEVEL: GOOD
CHANGE IN APPETITE: UNCHANGED
DENIES PAIN: 1
LAST BOWEL MOVEMENT: 67064

## 2024-08-12 ASSESSMENT — ACTIVITIES OF DAILY LIVING (ADL): MONEY MANAGEMENT (EXPENSES/BILLS): NEEDS ASSISTANCE

## 2024-08-14 ENCOUNTER — HOME CARE VISIT (OUTPATIENT)
Dept: HOME HEALTH SERVICES | Facility: HOME HEALTH | Age: 69
End: 2024-08-14
Payer: MEDICARE

## 2024-08-14 VITALS
TEMPERATURE: 98.2 F | RESPIRATION RATE: 18 BRPM | SYSTOLIC BLOOD PRESSURE: 106 MMHG | HEART RATE: 66 BPM | OXYGEN SATURATION: 98 % | DIASTOLIC BLOOD PRESSURE: 66 MMHG

## 2024-08-14 PROCEDURE — G0300 HHS/HOSPICE OF LPN EA 15 MIN: HCPCS | Mod: HHH

## 2024-08-14 SDOH — ECONOMIC STABILITY: GENERAL

## 2024-08-14 ASSESSMENT — ENCOUNTER SYMPTOMS
APPETITE LEVEL: GOOD
LAST BOWEL MOVEMENT: 67066
DENIES PAIN: 1

## 2024-08-14 ASSESSMENT — ACTIVITIES OF DAILY LIVING (ADL): MONEY MANAGEMENT (EXPENSES/BILLS): NEEDS ASSISTANCE

## 2024-08-16 ENCOUNTER — HOME CARE VISIT (OUTPATIENT)
Dept: HOME HEALTH SERVICES | Facility: HOME HEALTH | Age: 69
End: 2024-08-16
Payer: MEDICARE

## 2024-08-16 VITALS
SYSTOLIC BLOOD PRESSURE: 122 MMHG | DIASTOLIC BLOOD PRESSURE: 68 MMHG | RESPIRATION RATE: 18 BRPM | HEART RATE: 67 BPM | OXYGEN SATURATION: 97 % | TEMPERATURE: 98.6 F

## 2024-08-16 PROCEDURE — G0300 HHS/HOSPICE OF LPN EA 15 MIN: HCPCS | Mod: HHH

## 2024-08-16 SDOH — ECONOMIC STABILITY: GENERAL

## 2024-08-16 ASSESSMENT — ENCOUNTER SYMPTOMS
DENIES PAIN: 1
APPETITE LEVEL: GOOD
LAST BOWEL MOVEMENT: 67068

## 2024-08-16 ASSESSMENT — ACTIVITIES OF DAILY LIVING (ADL): MONEY MANAGEMENT (EXPENSES/BILLS): INDEPENDENT

## 2024-08-19 ENCOUNTER — HOME CARE VISIT (OUTPATIENT)
Dept: HOME HEALTH SERVICES | Facility: HOME HEALTH | Age: 69
End: 2024-08-19
Payer: MEDICARE

## 2024-08-19 VITALS
RESPIRATION RATE: 18 BRPM | SYSTOLIC BLOOD PRESSURE: 100 MMHG | DIASTOLIC BLOOD PRESSURE: 60 MMHG | OXYGEN SATURATION: 98 % | TEMPERATURE: 98.6 F | HEART RATE: 78 BPM

## 2024-08-19 DIAGNOSIS — G35 MULTIPLE SCLEROSIS (MULTI): Primary | ICD-10-CM

## 2024-08-19 PROCEDURE — G0300 HHS/HOSPICE OF LPN EA 15 MIN: HCPCS | Mod: HHH

## 2024-08-19 SDOH — ECONOMIC STABILITY: GENERAL

## 2024-08-19 ASSESSMENT — ACTIVITIES OF DAILY LIVING (ADL): MONEY MANAGEMENT (EXPENSES/BILLS): NEEDS ASSISTANCE

## 2024-08-19 ASSESSMENT — ENCOUNTER SYMPTOMS
APPETITE LEVEL: GOOD
DENIES PAIN: 1

## 2024-08-19 NOTE — PROGRESS NOTES
Patient's mom called to request a script for a hospital bed. Script printed and mailed to patient's home.

## 2024-08-21 ENCOUNTER — HOME CARE VISIT (OUTPATIENT)
Dept: HOME HEALTH SERVICES | Facility: HOME HEALTH | Age: 69
End: 2024-08-21
Payer: MEDICARE

## 2024-08-21 VITALS
DIASTOLIC BLOOD PRESSURE: 60 MMHG | OXYGEN SATURATION: 98 % | HEART RATE: 74 BPM | RESPIRATION RATE: 20 BRPM | SYSTOLIC BLOOD PRESSURE: 102 MMHG | TEMPERATURE: 98.6 F

## 2024-08-21 PROCEDURE — G0300 HHS/HOSPICE OF LPN EA 15 MIN: HCPCS | Mod: HHH

## 2024-08-21 SDOH — ECONOMIC STABILITY: GENERAL

## 2024-08-21 ASSESSMENT — ENCOUNTER SYMPTOMS
DENIES PAIN: 1
LAST BOWEL MOVEMENT: 67073
CHANGE IN APPETITE: UNCHANGED
APPETITE LEVEL: GOOD

## 2024-08-21 ASSESSMENT — ACTIVITIES OF DAILY LIVING (ADL): MONEY MANAGEMENT (EXPENSES/BILLS): INDEPENDENT

## 2024-08-23 ENCOUNTER — HOME CARE VISIT (OUTPATIENT)
Dept: HOME HEALTH SERVICES | Facility: HOME HEALTH | Age: 69
End: 2024-08-23
Payer: MEDICARE

## 2024-08-23 VITALS
OXYGEN SATURATION: 99 % | HEART RATE: 61 BPM | RESPIRATION RATE: 18 BRPM | TEMPERATURE: 97.7 F | SYSTOLIC BLOOD PRESSURE: 102 MMHG | DIASTOLIC BLOOD PRESSURE: 68 MMHG

## 2024-08-23 PROCEDURE — G0300 HHS/HOSPICE OF LPN EA 15 MIN: HCPCS | Mod: HHH

## 2024-08-23 SDOH — ECONOMIC STABILITY: GENERAL

## 2024-08-23 ASSESSMENT — ENCOUNTER SYMPTOMS
APPETITE LEVEL: GOOD
LAST BOWEL MOVEMENT: 67075
DENIES PAIN: 1

## 2024-08-23 ASSESSMENT — ACTIVITIES OF DAILY LIVING (ADL): MONEY MANAGEMENT (EXPENSES/BILLS): INDEPENDENT

## 2024-08-26 ENCOUNTER — HOME CARE VISIT (OUTPATIENT)
Dept: HOME HEALTH SERVICES | Facility: HOME HEALTH | Age: 69
End: 2024-08-26
Payer: MEDICARE

## 2024-08-26 VITALS
DIASTOLIC BLOOD PRESSURE: 60 MMHG | HEART RATE: 70 BPM | RESPIRATION RATE: 20 BRPM | SYSTOLIC BLOOD PRESSURE: 102 MMHG | OXYGEN SATURATION: 97 % | TEMPERATURE: 98.6 F

## 2024-08-26 PROCEDURE — G0300 HHS/HOSPICE OF LPN EA 15 MIN: HCPCS | Mod: HHH

## 2024-08-26 SDOH — ECONOMIC STABILITY: GENERAL

## 2024-08-26 ASSESSMENT — ENCOUNTER SYMPTOMS
DENIES PAIN: 1
MUSCLE WEAKNESS: 1
APPETITE LEVEL: GOOD
LAST BOWEL MOVEMENT: 67077

## 2024-08-26 ASSESSMENT — ACTIVITIES OF DAILY LIVING (ADL): MONEY MANAGEMENT (EXPENSES/BILLS): INDEPENDENT

## 2024-08-28 ENCOUNTER — HOME CARE VISIT (OUTPATIENT)
Dept: HOME HEALTH SERVICES | Facility: HOME HEALTH | Age: 69
End: 2024-08-28
Payer: MEDICARE

## 2024-08-28 VITALS
TEMPERATURE: 98.3 F | HEART RATE: 71 BPM | RESPIRATION RATE: 18 BRPM | DIASTOLIC BLOOD PRESSURE: 60 MMHG | OXYGEN SATURATION: 98 % | SYSTOLIC BLOOD PRESSURE: 96 MMHG

## 2024-08-28 PROCEDURE — G0300 HHS/HOSPICE OF LPN EA 15 MIN: HCPCS | Mod: HHH

## 2024-08-28 SDOH — ECONOMIC STABILITY: GENERAL

## 2024-08-28 ASSESSMENT — ACTIVITIES OF DAILY LIVING (ADL): MONEY MANAGEMENT (EXPENSES/BILLS): NEEDS ASSISTANCE

## 2024-08-28 ASSESSMENT — ENCOUNTER SYMPTOMS
LAST BOWEL MOVEMENT: 67080
APPETITE LEVEL: GOOD
DENIES PAIN: 1
MUSCLE WEAKNESS: 1

## 2024-08-30 ENCOUNTER — HOME CARE VISIT (OUTPATIENT)
Dept: HOME HEALTH SERVICES | Facility: HOME HEALTH | Age: 69
End: 2024-08-30
Payer: MEDICARE

## 2024-08-30 DIAGNOSIS — G35 MULTIPLE SCLEROSIS (MULTI): Primary | ICD-10-CM

## 2024-09-01 ENCOUNTER — HOSPITAL ENCOUNTER (EMERGENCY)
Facility: HOSPITAL | Age: 69
Discharge: HOME | End: 2024-09-02
Attending: EMERGENCY MEDICINE
Payer: MEDICARE

## 2024-09-01 ENCOUNTER — APPOINTMENT (OUTPATIENT)
Dept: RADIOLOGY | Facility: HOSPITAL | Age: 69
End: 2024-09-01
Payer: MEDICARE

## 2024-09-01 ENCOUNTER — APPOINTMENT (OUTPATIENT)
Dept: CARDIOLOGY | Facility: HOSPITAL | Age: 69
End: 2024-09-01
Payer: MEDICARE

## 2024-09-01 VITALS
RESPIRATION RATE: 18 BRPM | TEMPERATURE: 97.9 F | SYSTOLIC BLOOD PRESSURE: 112 MMHG | HEART RATE: 62 BPM | WEIGHT: 144 LBS | DIASTOLIC BLOOD PRESSURE: 58 MMHG | HEIGHT: 70 IN | BODY MASS INDEX: 20.62 KG/M2 | OXYGEN SATURATION: 100 %

## 2024-09-01 DIAGNOSIS — R53.1 GENERALIZED WEAKNESS: ICD-10-CM

## 2024-09-01 DIAGNOSIS — N30.00 ACUTE CYSTITIS WITHOUT HEMATURIA: Primary | ICD-10-CM

## 2024-09-01 LAB
ALBUMIN SERPL BCP-MCNC: 3.9 G/DL (ref 3.4–5)
ALP SERPL-CCNC: 74 U/L (ref 33–136)
ALT SERPL W P-5'-P-CCNC: 7 U/L (ref 7–45)
AMORPH CRY #/AREA UR COMP ASSIST: NORMAL /HPF
ANION GAP SERPL CALC-SCNC: 9 MMOL/L (ref 10–20)
APPEARANCE UR: CLEAR
AST SERPL W P-5'-P-CCNC: 11 U/L (ref 9–39)
BASOPHILS # BLD AUTO: 0.08 X10*3/UL (ref 0–0.1)
BASOPHILS NFR BLD AUTO: 0.7 %
BILIRUB SERPL-MCNC: 0.6 MG/DL (ref 0–1.2)
BILIRUB UR STRIP.AUTO-MCNC: NEGATIVE MG/DL
BUN SERPL-MCNC: 16 MG/DL (ref 6–23)
CALCIUM SERPL-MCNC: 9.4 MG/DL (ref 8.6–10.3)
CAOX CRY #/AREA UR COMP ASSIST: NORMAL /HPF
CARDIAC TROPONIN I PNL SERPL HS: <3 NG/L (ref 0–13)
CHLORIDE SERPL-SCNC: 105 MMOL/L (ref 98–107)
CO2 SERPL-SCNC: 26 MMOL/L (ref 21–32)
COLOR UR: ABNORMAL
CREAT SERPL-MCNC: 0.55 MG/DL (ref 0.5–1.05)
EGFRCR SERPLBLD CKD-EPI 2021: >90 ML/MIN/1.73M*2
EOSINOPHIL # BLD AUTO: 0.08 X10*3/UL (ref 0–0.7)
EOSINOPHIL NFR BLD AUTO: 0.7 %
ERYTHROCYTE [DISTWIDTH] IN BLOOD BY AUTOMATED COUNT: 12.7 % (ref 11.5–14.5)
GLUCOSE SERPL-MCNC: 95 MG/DL (ref 74–99)
GLUCOSE UR STRIP.AUTO-MCNC: NORMAL MG/DL
HCT VFR BLD AUTO: 41 % (ref 36–46)
HGB BLD-MCNC: 14.1 G/DL (ref 12–16)
IMM GRANULOCYTES # BLD AUTO: 0.03 X10*3/UL (ref 0–0.7)
IMM GRANULOCYTES NFR BLD AUTO: 0.3 % (ref 0–0.9)
KETONES UR STRIP.AUTO-MCNC: ABNORMAL MG/DL
LACTATE SERPL-SCNC: 0.8 MMOL/L (ref 0.4–2)
LEUKOCYTE ESTERASE UR QL STRIP.AUTO: ABNORMAL
LYMPHOCYTES # BLD AUTO: 0.93 X10*3/UL (ref 1.2–4.8)
LYMPHOCYTES NFR BLD AUTO: 8.7 %
MAGNESIUM SERPL-MCNC: 1.95 MG/DL (ref 1.6–2.4)
MCH RBC QN AUTO: 32.1 PG (ref 26–34)
MCHC RBC AUTO-ENTMCNC: 34.4 G/DL (ref 32–36)
MCV RBC AUTO: 93 FL (ref 80–100)
MONOCYTES # BLD AUTO: 0.82 X10*3/UL (ref 0.1–1)
MONOCYTES NFR BLD AUTO: 7.6 %
MUCOUS THREADS #/AREA URNS AUTO: NORMAL /LPF
NEUTROPHILS # BLD AUTO: 8.8 X10*3/UL (ref 1.2–7.7)
NEUTROPHILS NFR BLD AUTO: 82 %
NITRITE UR QL STRIP.AUTO: NEGATIVE
NRBC BLD-RTO: 0 /100 WBCS (ref 0–0)
PH UR STRIP.AUTO: 5.5 [PH]
PLATELET # BLD AUTO: 219 X10*3/UL (ref 150–450)
POTASSIUM SERPL-SCNC: 3.7 MMOL/L (ref 3.5–5.3)
PROT SERPL-MCNC: 6.3 G/DL (ref 6.4–8.2)
PROT UR STRIP.AUTO-MCNC: NEGATIVE MG/DL
RBC # BLD AUTO: 4.39 X10*6/UL (ref 4–5.2)
RBC # UR STRIP.AUTO: NEGATIVE /UL
RBC #/AREA URNS AUTO: NORMAL /HPF
SODIUM SERPL-SCNC: 136 MMOL/L (ref 136–145)
SP GR UR STRIP.AUTO: 1.01
SQUAMOUS #/AREA URNS AUTO: NORMAL /HPF
UROBILINOGEN UR STRIP.AUTO-MCNC: NORMAL MG/DL
WBC # BLD AUTO: 10.7 X10*3/UL (ref 4.4–11.3)
WBC #/AREA URNS AUTO: NORMAL /HPF

## 2024-09-01 PROCEDURE — 99285 EMERGENCY DEPT VISIT HI MDM: CPT | Mod: 25

## 2024-09-01 PROCEDURE — 71045 X-RAY EXAM CHEST 1 VIEW: CPT | Performed by: STUDENT IN AN ORGANIZED HEALTH CARE EDUCATION/TRAINING PROGRAM

## 2024-09-01 PROCEDURE — 83735 ASSAY OF MAGNESIUM: CPT | Performed by: EMERGENCY MEDICINE

## 2024-09-01 PROCEDURE — 71045 X-RAY EXAM CHEST 1 VIEW: CPT

## 2024-09-01 PROCEDURE — 84484 ASSAY OF TROPONIN QUANT: CPT | Performed by: EMERGENCY MEDICINE

## 2024-09-01 PROCEDURE — 70450 CT HEAD/BRAIN W/O DYE: CPT

## 2024-09-01 PROCEDURE — 83605 ASSAY OF LACTIC ACID: CPT | Performed by: EMERGENCY MEDICINE

## 2024-09-01 PROCEDURE — 2550000001 HC RX 255 CONTRASTS: Performed by: EMERGENCY MEDICINE

## 2024-09-01 PROCEDURE — 87086 URINE CULTURE/COLONY COUNT: CPT | Mod: PARLAB | Performed by: EMERGENCY MEDICINE

## 2024-09-01 PROCEDURE — 70450 CT HEAD/BRAIN W/O DYE: CPT | Performed by: STUDENT IN AN ORGANIZED HEALTH CARE EDUCATION/TRAINING PROGRAM

## 2024-09-01 PROCEDURE — 80053 COMPREHEN METABOLIC PANEL: CPT | Performed by: EMERGENCY MEDICINE

## 2024-09-01 PROCEDURE — 81001 URINALYSIS AUTO W/SCOPE: CPT | Performed by: EMERGENCY MEDICINE

## 2024-09-01 PROCEDURE — 99284 EMERGENCY DEPT VISIT MOD MDM: CPT

## 2024-09-01 PROCEDURE — 85025 COMPLETE CBC W/AUTO DIFF WBC: CPT | Performed by: EMERGENCY MEDICINE

## 2024-09-01 PROCEDURE — 71260 CT THORAX DX C+: CPT

## 2024-09-01 PROCEDURE — 93005 ELECTROCARDIOGRAM TRACING: CPT

## 2024-09-01 PROCEDURE — 71260 CT THORAX DX C+: CPT | Performed by: RADIOLOGY

## 2024-09-01 PROCEDURE — 36415 COLL VENOUS BLD VENIPUNCTURE: CPT | Performed by: EMERGENCY MEDICINE

## 2024-09-01 RX ORDER — CEFTRIAXONE 1 G/50ML
1 INJECTION, SOLUTION INTRAVENOUS ONCE
Status: COMPLETED | OUTPATIENT
Start: 2024-09-01 | End: 2024-09-02

## 2024-09-01 RX ORDER — CEFPODOXIME PROXETIL 200 MG/1
200 TABLET, FILM COATED ORAL 2 TIMES DAILY
Qty: 20 TABLET | Refills: 0 | Status: SHIPPED | OUTPATIENT
Start: 2024-09-01 | End: 2024-09-11

## 2024-09-01 ASSESSMENT — LIFESTYLE VARIABLES
TOTAL SCORE: 0
HAVE PEOPLE ANNOYED YOU BY CRITICIZING YOUR DRINKING: NO
EVER FELT BAD OR GUILTY ABOUT YOUR DRINKING: NO
EVER HAD A DRINK FIRST THING IN THE MORNING TO STEADY YOUR NERVES TO GET RID OF A HANGOVER: NO
HAVE YOU EVER FELT YOU SHOULD CUT DOWN ON YOUR DRINKING: NO

## 2024-09-01 ASSESSMENT — PAIN - FUNCTIONAL ASSESSMENT: PAIN_FUNCTIONAL_ASSESSMENT: 0-10

## 2024-09-01 ASSESSMENT — PAIN SCALES - GENERAL
PAINLEVEL_OUTOF10: 0 - NO PAIN
PAINLEVEL_OUTOF10: 0 - NO PAIN

## 2024-09-01 NOTE — ED PROVIDER NOTES
HPI   Chief Complaint   Patient presents with    Weakness, Gen     Pt noticed right arm and leg weakness that has progressively gotten worse since this morning. No slurred speech. A/Ox4 from home. No fall/LOC. No CP/SOB. Pt feels symptoms are similar to history of MS. Took no flare-up med today yet.        This is a 68yF AMS who presents with presyncope - reports generalized weakness and feeling unwell with ?vision change that lasted ~30-45 min.  Patient is now back to baseline (b/l LE weakness, R worse than L, RUE weakness as well) without fever, cough, CP, SOB, abd pain, n/v/d, dysuria, hematuria, dizziness or LOC.  Says her vision is at her baseline and denies that it ever got blurry, tunnel vision, or scotoma.  Reports similar episode in the past that was attributed to her MS.              Patient History   Past Medical History:   Diagnosis Date    Multiple sclerosis (Multi)     History of multiple sclerosis     Past Surgical History:   Procedure Laterality Date    MR HEAD ANGIO WO IV CONTRAST  9/21/2020    MR HEAD ANGIO WO IV CONTRAST 9/21/2020 [facility]    MR HEAD ANGIO WO IV CONTRAST  10/24/2022    MR HEAD ANGIO WO IV CONTRAST 10/24/2022 [facility]    MR NECK ANGIO WO IV CONTRAST  9/21/2020    MR NECK ANGIO WO IV CONTRAST 9/21/2020 [facility]    MR NECK ANGIO WO IV CONTRAST  10/24/2022    MR NECK ANGIO WO IV CONTRAST 10/24/2022 [facility]    OTHER SURGICAL HISTORY  11/26/2019    Tonsillectomy    OTHER SURGICAL HISTORY  11/26/2019    Hysterectomy     No family history on file.  Social History     Tobacco Use    Smoking status: Never    Smokeless tobacco: Never   Vaping Use    Vaping status: Never Used   Substance Use Topics    Alcohol use: Never    Drug use: Never       Physical Exam   ED Triage Vitals [09/01/24 1716]   Temperature Heart Rate Respirations BP   36.6 °C (97.9 °F) 66 18 126/60      Pulse Ox Temp Source Heart Rate Source Patient Position   100 % Temporal  Monitor --      BP Location FiO2 (%)     -- --       Physical Exam  Vitals and nursing note reviewed.   Constitutional:       General: She is not in acute distress.     Appearance: She is not toxic-appearing or diaphoretic.      Comments: Chronically ill appearing but nontoxic   HENT:      Head: Normocephalic and atraumatic.      Nose: Nose normal. No congestion or rhinorrhea.      Mouth/Throat:      Mouth: Mucous membranes are moist.      Pharynx: Oropharynx is clear.   Eyes:      General: No scleral icterus.        Right eye: No discharge.         Left eye: No discharge.      Extraocular Movements: Extraocular movements intact.      Conjunctiva/sclera: Conjunctivae normal.   Cardiovascular:      Rate and Rhythm: Normal rate and regular rhythm.      Heart sounds: No murmur heard.     No friction rub. No gallop.   Pulmonary:      Effort: Pulmonary effort is normal. No respiratory distress.      Breath sounds: Normal breath sounds. No stridor. No wheezing, rhonchi or rales.   Chest:      Chest wall: No tenderness.   Abdominal:      General: There is no distension.      Palpations: Abdomen is soft.      Tenderness: There is no abdominal tenderness. There is no guarding or rebound.   Musculoskeletal:         General: No swelling, tenderness, deformity or signs of injury.      Cervical back: Normal range of motion and neck supple. No rigidity or tenderness.   Skin:     General: Skin is warm and dry.      Coloration: Skin is not jaundiced or pale.      Findings: No bruising, erythema, lesion or rash.   Neurological:      Mental Status: She is alert and oriented to person, place, and time. Mental status is at baseline.      Cranial Nerves: No cranial nerve deficit.      Sensory: No sensory deficit.      Motor: Weakness present.      Coordination: Coordination abnormal.      Comments: +RUE weakness and profound RLE>LLE weakness, +truncal ataxia, all reportedly at baseline as per pt with her chronic MS   Psychiatric:          Mood and Affect: Mood normal.         Behavior: Behavior normal.           ED Course & MDM   ED Course as of 09/02/24 1723   Sun Sep 01, 2024   1743 EKG interpreted by me - NSR normal axis normal intervals nonspecific lateral, inferior ST and TW changes and likely machine error in ST/TW segments in leads V4-5 [EK]   1825 Labs reviewed, no leukocytosis or anemia, normal electrolytes and renal function, neg trop x 1, normal lactate.  Awaiting UA and CTH [EK]   2013 CXR interpreted by me - no pneumothorax or focal pneumonia [EK]   2013 CTH interpreted by me - no ICH or midline shift [EK]   2042 UA reviewed, equivocal for UTI, also with subcutaneous epith cells, likely contaminated but can await culture for definitive management. [EK]      ED Course User Index  [EK] Elyse H Klerman, MD         Diagnoses as of 09/02/24 1723   Acute cystitis without hematuria   Generalized weakness                 No data recorded     Glouster Coma Scale Score: 15 (09/01/24 1718 : Lady Pierre RN)                           Medical Decision Making  This is a 68-year-old female with MS who presents with generalized weakness/presyncope.  Patient now back to baseline with no worsened neurologic deficits from her baseline (chronic RUE and b/l LE weakness) and says symptoms are similar to prior episode attributed to MS flare.  She is chronically ill but nontoxic appearing, afebrile and hemodynamically stable without hypoxia or respiratory distress.  No concern for seizure activity.  EKG without STEMI.  CXR without pneumothorax or pneumonia.  Labs without leukocytosis or anemia, with normal electrolytes and renal function and with neg troponin and lactate.  UA contaminated, not clearly UTI, but discussed with patient and after shared decision making, agreed to treat with IV ceftriaxone and prescription for PO antibiotics given neurology's preference for treating for possible infections prior to managing MS flares (which is the likely cause  of her acute neurologic event today).  CTH without acute pathology.  Discussed with patient and family - she has meds to manage her own MS flares at home, as this is a common presentation for her MS (flares) and she feels ready to go home and family is prepared to help her at home at this time and feels comfortable with discharge.  Ok to dc with close outpatient neurology follow up and return precautions.    Amount and/or Complexity of Data Reviewed  Labs: ordered. Decision-making details documented in ED Course.  Radiology: ordered.  ECG/medicine tests: ordered and independent interpretation performed. Decision-making details documented in ED Course.    Risk  Prescription drug management.  Decision regarding hospitalization.        Procedure  Procedures     Elyse H Klerman, MD  09/02/24 1738       Elyse H Klerman, MD  09/02/24 1730

## 2024-09-02 ENCOUNTER — HOME CARE VISIT (OUTPATIENT)
Dept: HOME HEALTH SERVICES | Facility: HOME HEALTH | Age: 69
End: 2024-09-02
Payer: MEDICARE

## 2024-09-02 LAB — HOLD SPECIMEN: NORMAL

## 2024-09-02 PROCEDURE — 2500000004 HC RX 250 GENERAL PHARMACY W/ HCPCS (ALT 636 FOR OP/ED): Performed by: EMERGENCY MEDICINE

## 2024-09-02 PROCEDURE — 96365 THER/PROPH/DIAG IV INF INIT: CPT | Mod: 59

## 2024-09-02 NOTE — DISCHARGE INSTRUCTIONS
You were seen in the ED for an episode of generalized weakness.  CTH and labs reassuring.  Xray with a possible lung nodule not seen on CT of your chest.  UA is borderline for UTI.  Please take the antibiotic as directed and follow up with your PCP in 3 days if you are not feeling better.  You should also follow up in 2-3 days with your neurologist who is managing your MS.  Return to the ED for further concerns.

## 2024-09-03 LAB
ATRIAL RATE: 63 BPM
BACTERIA UR CULT: NORMAL
P AXIS: 52 DEGREES
P OFFSET: 174 MS
P ONSET: 147 MS
PR INTERVAL: 146 MS
Q ONSET: 220 MS
QRS COUNT: 10 BEATS
QRS DURATION: 74 MS
QT INTERVAL: 394 MS
QTC CALCULATION(BAZETT): 403 MS
QTC FREDERICIA: 400 MS
R AXIS: 0 DEGREES
T AXIS: 38 DEGREES
T OFFSET: 417 MS
VENTRICULAR RATE: 63 BPM

## 2024-09-04 ENCOUNTER — HOME CARE VISIT (OUTPATIENT)
Dept: HOME HEALTH SERVICES | Facility: HOME HEALTH | Age: 69
End: 2024-09-04
Payer: MEDICARE

## 2024-09-04 VITALS
HEART RATE: 51 BPM | TEMPERATURE: 98.5 F | OXYGEN SATURATION: 100 % | RESPIRATION RATE: 20 BRPM | DIASTOLIC BLOOD PRESSURE: 62 MMHG | SYSTOLIC BLOOD PRESSURE: 96 MMHG

## 2024-09-04 PROCEDURE — G0300 HHS/HOSPICE OF LPN EA 15 MIN: HCPCS | Mod: HHH

## 2024-09-04 SDOH — ECONOMIC STABILITY: GENERAL

## 2024-09-04 ASSESSMENT — ENCOUNTER SYMPTOMS
DENIES PAIN: 1
LAST BOWEL MOVEMENT: 67087
CHANGE IN APPETITE: UNCHANGED
APPETITE LEVEL: GOOD

## 2024-09-04 ASSESSMENT — ACTIVITIES OF DAILY LIVING (ADL): MONEY MANAGEMENT (EXPENSES/BILLS): INDEPENDENT

## 2024-09-06 ENCOUNTER — HOME CARE VISIT (OUTPATIENT)
Dept: HOME HEALTH SERVICES | Facility: HOME HEALTH | Age: 69
End: 2024-09-06
Payer: MEDICARE

## 2024-09-06 VITALS
DIASTOLIC BLOOD PRESSURE: 60 MMHG | RESPIRATION RATE: 18 BRPM | TEMPERATURE: 98.4 F | OXYGEN SATURATION: 99 % | SYSTOLIC BLOOD PRESSURE: 100 MMHG | HEART RATE: 62 BPM

## 2024-09-06 PROCEDURE — G0300 HHS/HOSPICE OF LPN EA 15 MIN: HCPCS | Mod: HHH

## 2024-09-06 SDOH — ECONOMIC STABILITY: GENERAL

## 2024-09-06 ASSESSMENT — ENCOUNTER SYMPTOMS
DENIES PAIN: 1
APPETITE LEVEL: GOOD
CHANGE IN APPETITE: UNCHANGED

## 2024-09-06 ASSESSMENT — ACTIVITIES OF DAILY LIVING (ADL): MONEY MANAGEMENT (EXPENSES/BILLS): NEEDS ASSISTANCE

## 2024-09-09 ENCOUNTER — HOME CARE VISIT (OUTPATIENT)
Dept: HOME HEALTH SERVICES | Facility: HOME HEALTH | Age: 69
End: 2024-09-09
Payer: MEDICARE

## 2024-09-09 ENCOUNTER — HOSPITAL ENCOUNTER (EMERGENCY)
Facility: HOSPITAL | Age: 69
Discharge: HOME | End: 2024-09-09
Attending: EMERGENCY MEDICINE
Payer: MEDICARE

## 2024-09-09 ENCOUNTER — APPOINTMENT (OUTPATIENT)
Dept: RADIOLOGY | Facility: HOSPITAL | Age: 69
End: 2024-09-09
Payer: MEDICARE

## 2024-09-09 ENCOUNTER — APPOINTMENT (OUTPATIENT)
Dept: CARDIOLOGY | Facility: HOSPITAL | Age: 69
End: 2024-09-09
Payer: MEDICARE

## 2024-09-09 VITALS
DIASTOLIC BLOOD PRESSURE: 78 MMHG | RESPIRATION RATE: 20 BRPM | SYSTOLIC BLOOD PRESSURE: 124 MMHG | HEART RATE: 89 BPM | OXYGEN SATURATION: 97 % | TEMPERATURE: 98 F

## 2024-09-09 VITALS
RESPIRATION RATE: 15 BRPM | SYSTOLIC BLOOD PRESSURE: 113 MMHG | TEMPERATURE: 97.9 F | DIASTOLIC BLOOD PRESSURE: 58 MMHG | OXYGEN SATURATION: 99 % | HEART RATE: 58 BPM

## 2024-09-09 DIAGNOSIS — R06.02 SHORTNESS OF BREATH: Primary | ICD-10-CM

## 2024-09-09 LAB
ALBUMIN SERPL BCP-MCNC: 4.3 G/DL (ref 3.4–5)
ALP SERPL-CCNC: 79 U/L (ref 33–136)
ALT SERPL W P-5'-P-CCNC: 9 U/L (ref 7–45)
ANION GAP SERPL CALC-SCNC: 11 MMOL/L (ref 10–20)
AST SERPL W P-5'-P-CCNC: 13 U/L (ref 9–39)
BASOPHILS # BLD AUTO: 0.07 X10*3/UL (ref 0–0.1)
BASOPHILS NFR BLD AUTO: 1 %
BILIRUB SERPL-MCNC: 0.4 MG/DL (ref 0–1.2)
BNP SERPL-MCNC: 77 PG/ML (ref 0–99)
BUN SERPL-MCNC: 17 MG/DL (ref 6–23)
CALCIUM SERPL-MCNC: 9.4 MG/DL (ref 8.6–10.3)
CARDIAC TROPONIN I PNL SERPL HS: <3 NG/L (ref 0–13)
CHLORIDE SERPL-SCNC: 103 MMOL/L (ref 98–107)
CO2 SERPL-SCNC: 28 MMOL/L (ref 21–32)
CREAT SERPL-MCNC: 0.55 MG/DL (ref 0.5–1.05)
EGFRCR SERPLBLD CKD-EPI 2021: >90 ML/MIN/1.73M*2
EOSINOPHIL # BLD AUTO: 0.08 X10*3/UL (ref 0–0.7)
EOSINOPHIL NFR BLD AUTO: 1.2 %
ERYTHROCYTE [DISTWIDTH] IN BLOOD BY AUTOMATED COUNT: 12.8 % (ref 11.5–14.5)
GLUCOSE SERPL-MCNC: 92 MG/DL (ref 74–99)
HCT VFR BLD AUTO: 44.5 % (ref 36–46)
HGB BLD-MCNC: 15.2 G/DL (ref 12–16)
IMM GRANULOCYTES # BLD AUTO: 0.03 X10*3/UL (ref 0–0.7)
IMM GRANULOCYTES NFR BLD AUTO: 0.4 % (ref 0–0.9)
LYMPHOCYTES # BLD AUTO: 1.07 X10*3/UL (ref 1.2–4.8)
LYMPHOCYTES NFR BLD AUTO: 15.4 %
MAGNESIUM SERPL-MCNC: 2.14 MG/DL (ref 1.6–2.4)
MCH RBC QN AUTO: 31.9 PG (ref 26–34)
MCHC RBC AUTO-ENTMCNC: 34.2 G/DL (ref 32–36)
MCV RBC AUTO: 93 FL (ref 80–100)
MONOCYTES # BLD AUTO: 0.66 X10*3/UL (ref 0.1–1)
MONOCYTES NFR BLD AUTO: 9.5 %
NEUTROPHILS # BLD AUTO: 5.03 X10*3/UL (ref 1.2–7.7)
NEUTROPHILS NFR BLD AUTO: 72.5 %
NRBC BLD-RTO: 0 /100 WBCS (ref 0–0)
PLATELET # BLD AUTO: 264 X10*3/UL (ref 150–450)
POTASSIUM SERPL-SCNC: 4.2 MMOL/L (ref 3.5–5.3)
PROT SERPL-MCNC: 7 G/DL (ref 6.4–8.2)
RBC # BLD AUTO: 4.77 X10*6/UL (ref 4–5.2)
SARS-COV-2 RNA RESP QL NAA+PROBE: NOT DETECTED
SODIUM SERPL-SCNC: 138 MMOL/L (ref 136–145)
WBC # BLD AUTO: 6.9 X10*3/UL (ref 4.4–11.3)

## 2024-09-09 PROCEDURE — 80053 COMPREHEN METABOLIC PANEL: CPT | Performed by: EMERGENCY MEDICINE

## 2024-09-09 PROCEDURE — 83880 ASSAY OF NATRIURETIC PEPTIDE: CPT | Performed by: EMERGENCY MEDICINE

## 2024-09-09 PROCEDURE — 87635 SARS-COV-2 COVID-19 AMP PRB: CPT | Performed by: EMERGENCY MEDICINE

## 2024-09-09 PROCEDURE — 85025 COMPLETE CBC W/AUTO DIFF WBC: CPT | Performed by: EMERGENCY MEDICINE

## 2024-09-09 PROCEDURE — G0300 HHS/HOSPICE OF LPN EA 15 MIN: HCPCS | Mod: HHH

## 2024-09-09 PROCEDURE — 84484 ASSAY OF TROPONIN QUANT: CPT | Performed by: EMERGENCY MEDICINE

## 2024-09-09 PROCEDURE — 93005 ELECTROCARDIOGRAM TRACING: CPT

## 2024-09-09 PROCEDURE — 71045 X-RAY EXAM CHEST 1 VIEW: CPT

## 2024-09-09 PROCEDURE — 36415 COLL VENOUS BLD VENIPUNCTURE: CPT | Performed by: EMERGENCY MEDICINE

## 2024-09-09 PROCEDURE — 83735 ASSAY OF MAGNESIUM: CPT | Performed by: EMERGENCY MEDICINE

## 2024-09-09 PROCEDURE — 99283 EMERGENCY DEPT VISIT LOW MDM: CPT | Mod: 25 | Performed by: EMERGENCY MEDICINE

## 2024-09-09 PROCEDURE — 71045 X-RAY EXAM CHEST 1 VIEW: CPT | Mod: FOREIGN READ | Performed by: RADIOLOGY

## 2024-09-09 SDOH — ECONOMIC STABILITY: GENERAL

## 2024-09-09 ASSESSMENT — ENCOUNTER SYMPTOMS
DENIES PAIN: 1
LAST BOWEL MOVEMENT: 67092
APPETITE LEVEL: GOOD

## 2024-09-09 ASSESSMENT — LIFESTYLE VARIABLES
EVER HAD A DRINK FIRST THING IN THE MORNING TO STEADY YOUR NERVES TO GET RID OF A HANGOVER: NO
HAVE PEOPLE ANNOYED YOU BY CRITICIZING YOUR DRINKING: NO
TOTAL SCORE: 0
HAVE YOU EVER FELT YOU SHOULD CUT DOWN ON YOUR DRINKING: NO
EVER FELT BAD OR GUILTY ABOUT YOUR DRINKING: NO

## 2024-09-09 ASSESSMENT — COLUMBIA-SUICIDE SEVERITY RATING SCALE - C-SSRS
2. HAVE YOU ACTUALLY HAD ANY THOUGHTS OF KILLING YOURSELF?: NO
6. HAVE YOU EVER DONE ANYTHING, STARTED TO DO ANYTHING, OR PREPARED TO DO ANYTHING TO END YOUR LIFE?: NO
1. IN THE PAST MONTH, HAVE YOU WISHED YOU WERE DEAD OR WISHED YOU COULD GO TO SLEEP AND NOT WAKE UP?: NO

## 2024-09-09 ASSESSMENT — ACTIVITIES OF DAILY LIVING (ADL): MONEY MANAGEMENT (EXPENSES/BILLS): INDEPENDENT

## 2024-09-09 NOTE — ED PROVIDER NOTES
HPI   Chief Complaint   Patient presents with    Shortness of Breath       Patient is a pleasant 68-year-old female, medical history significant for MS, presents to the emergency department for mental shortness of breath.  Patient's  has been recently admitted to the hospital.  She has been at home by herself.  She states that today, she was just feeling more short of breath.  Seem to wax and wane.  She denies any increasing weakness.  She denies any fever or chills.  She states by the time she arrived here, her symptoms have resolved.  She has not had cough.              Patient History   Past Medical History:   Diagnosis Date    Multiple sclerosis (Multi)     History of multiple sclerosis     Past Surgical History:   Procedure Laterality Date    MR HEAD ANGIO WO IV CONTRAST  9/21/2020    MR HEAD ANGIO WO IV CONTRAST 9/21/2020 [facility]    MR HEAD ANGIO WO IV CONTRAST  10/24/2022    MR HEAD ANGIO WO IV CONTRAST 10/24/2022 [facility]    MR NECK ANGIO WO IV CONTRAST  9/21/2020    MR NECK ANGIO WO IV CONTRAST 9/21/2020 [facility]    MR NECK ANGIO WO IV CONTRAST  10/24/2022    MR NECK ANGIO WO IV CONTRAST 10/24/2022 [facility]    OTHER SURGICAL HISTORY  11/26/2019    Tonsillectomy    OTHER SURGICAL HISTORY  11/26/2019    Hysterectomy     No family history on file.  Social History     Tobacco Use    Smoking status: Never    Smokeless tobacco: Never   Vaping Use    Vaping status: Never Used   Substance Use Topics    Alcohol use: Never    Drug use: Never       Physical Exam   ED Triage Vitals   Temp Pulse Resp BP   -- -- -- --      SpO2 Temp src Heart Rate Source Patient Position   -- -- -- --      BP Location FiO2 (%)     -- --       Physical Exam  Vitals and nursing note reviewed.   Constitutional:       General: She is not in acute distress.     Appearance: She is well-developed.   HENT:      Head: Normocephalic and atraumatic.   Eyes:      Conjunctiva/sclera:  Conjunctivae normal.   Cardiovascular:      Rate and Rhythm: Normal rate and regular rhythm.      Heart sounds: No murmur heard.  Pulmonary:      Effort: Pulmonary effort is normal. No respiratory distress.      Breath sounds: Normal breath sounds.   Abdominal:      Palpations: Abdomen is soft.      Tenderness: There is no abdominal tenderness.   Musculoskeletal:         General: No swelling.      Cervical back: Neck supple.   Skin:     General: Skin is warm and dry.      Capillary Refill: Capillary refill takes less than 2 seconds.   Neurological:      General: No focal deficit present.      Mental Status: She is alert. Mental status is at baseline.   Psychiatric:         Mood and Affect: Mood normal.           ED Course & MDM   ED Course as of 09/09/24 1958   Mon Sep 09, 2024   1938 CBC unremarkable [MK]   1938 Chest x-ray demonstrates normal cardiac silhouette without infiltrates or effusions []   1946 Chemistry magnesium unremarkable []   1956 Troponin and BNP normal.  COVID-negative. []      ED Course User Index  [] Adams Alicia MD         Diagnoses as of 09/09/24 1958   Shortness of breath                 No data recorded     Tabitha Coma Scale Score: 15 (09/09/24 1819 : Mackenzie Bal RN)                           Medical Decision Making  Medical Decision Making: Patient presents emergency department intermittent shortness of breath.  She is not tachycardic or hypoxic.  She has no accessory muscle use.  She states that her symptoms have totally resolved.  Broad metabolic workup was pursued.  EKG shows notes of acute ischemia.  Chest x-ray shows no focal return to process.  Labs were essentially unremarkable.  Patient states that she has had no increasing weakness.  She denies any infectious symptoms.  At this point, she is requesting discharge.  I feel this is reasonable given her unremarkable workup.    EKG interpreted by myself (ED attending physician): Sinus rhythm.  Rate of 66.  Normal axis  and intervals.  No acute ischemia.  No STEMI    Differential Diagnoses Considered: COVID, pneumonia, heart failure, NSTEMI    Chronic Medical Conditions Significantly Affecting Care: History of MS    External Records Reviewed: I reviewed recent and relevant outside records including: Outpatient medication reconciliation    Independent Interpretation of Studies:  I independently interpreted: Chest x-ray obtained and reviewed    Escalation of Care:  Appropriate for outpatient management after discussion with    Social Determinants of Health Significantly Affecting Care:  No social determinant    Prescription Drug Consideration: None    Diagnostic testing considered: Noncontributory              Procedure  Procedures     Adams Alicia MD  09/09/24 1958

## 2024-09-09 NOTE — ED TRIAGE NOTES
Patient to ER with complaint of shortness of breath. States that it has been ongoing for a few weeks and has just been getting worse. Also reports increased weakness.

## 2024-09-10 NOTE — ED NOTES
Pt called dtg for ride home, no c/o or SOB. To follow up with PCP.     Tatiana Stanley RN  09/09/24 2040

## 2024-09-11 ENCOUNTER — HOME CARE VISIT (OUTPATIENT)
Dept: HOME HEALTH SERVICES | Facility: HOME HEALTH | Age: 69
End: 2024-09-11
Payer: MEDICARE

## 2024-09-11 VITALS
RESPIRATION RATE: 18 BRPM | HEART RATE: 72 BPM | DIASTOLIC BLOOD PRESSURE: 60 MMHG | OXYGEN SATURATION: 97 % | TEMPERATURE: 98.2 F | SYSTOLIC BLOOD PRESSURE: 90 MMHG

## 2024-09-11 PROCEDURE — G0300 HHS/HOSPICE OF LPN EA 15 MIN: HCPCS | Mod: HHH

## 2024-09-11 SDOH — ECONOMIC STABILITY: GENERAL

## 2024-09-11 ASSESSMENT — ENCOUNTER SYMPTOMS
DENIES PAIN: 1
APPETITE LEVEL: FAIR
LAST BOWEL MOVEMENT: 67093
CHANGE IN APPETITE: UNCHANGED

## 2024-09-11 ASSESSMENT — ACTIVITIES OF DAILY LIVING (ADL): MONEY MANAGEMENT (EXPENSES/BILLS): INDEPENDENT

## 2024-09-13 ENCOUNTER — HOME CARE VISIT (OUTPATIENT)
Dept: HOME HEALTH SERVICES | Facility: HOME HEALTH | Age: 69
End: 2024-09-13
Payer: MEDICARE

## 2024-09-13 VITALS
DIASTOLIC BLOOD PRESSURE: 60 MMHG | RESPIRATION RATE: 18 BRPM | TEMPERATURE: 98.2 F | SYSTOLIC BLOOD PRESSURE: 98 MMHG | HEART RATE: 75 BPM | OXYGEN SATURATION: 98 %

## 2024-09-13 PROCEDURE — G0300 HHS/HOSPICE OF LPN EA 15 MIN: HCPCS | Mod: HHH

## 2024-09-13 SDOH — ECONOMIC STABILITY: GENERAL

## 2024-09-13 ASSESSMENT — ENCOUNTER SYMPTOMS
CHANGE IN APPETITE: UNCHANGED
APPETITE LEVEL: GOOD
DENIES PAIN: 1
LAST BOWEL MOVEMENT: 67096

## 2024-09-13 ASSESSMENT — ACTIVITIES OF DAILY LIVING (ADL): MONEY MANAGEMENT (EXPENSES/BILLS): INDEPENDENT

## 2024-09-15 LAB
ATRIAL RATE: 66 BPM
P AXIS: 57 DEGREES
P OFFSET: 181 MS
P ONSET: 146 MS
PR INTERVAL: 148 MS
Q ONSET: 220 MS
QRS COUNT: 11 BEATS
QRS DURATION: 74 MS
QT INTERVAL: 406 MS
QTC CALCULATION(BAZETT): 425 MS
QTC FREDERICIA: 419 MS
R AXIS: 47 DEGREES
T AXIS: 70 DEGREES
T OFFSET: 423 MS
VENTRICULAR RATE: 66 BPM

## 2024-09-17 ENCOUNTER — HOME CARE VISIT (OUTPATIENT)
Dept: HOME HEALTH SERVICES | Facility: HOME HEALTH | Age: 69
End: 2024-09-17
Payer: MEDICARE

## 2024-09-17 VITALS
RESPIRATION RATE: 18 BRPM | TEMPERATURE: 98.9 F | HEART RATE: 73 BPM | OXYGEN SATURATION: 98 % | SYSTOLIC BLOOD PRESSURE: 100 MMHG | DIASTOLIC BLOOD PRESSURE: 58 MMHG

## 2024-09-17 PROCEDURE — G0299 HHS/HOSPICE OF RN EA 15 MIN: HCPCS | Mod: HHH

## 2024-09-17 ASSESSMENT — ENCOUNTER SYMPTOMS
PERSON REPORTING PAIN: PATIENT
DENIES PAIN: 1
HIGHEST PAIN SEVERITY IN PAST 24 HOURS: 0/10

## 2024-09-17 ASSESSMENT — ACTIVITIES OF DAILY LIVING (ADL)
OASIS_M1830: 00
HOME_HEALTH_OASIS: 00

## 2024-09-27 ENCOUNTER — APPOINTMENT (OUTPATIENT)
Dept: RADIOLOGY | Facility: HOSPITAL | Age: 69
End: 2024-09-27
Payer: MEDICARE

## 2024-09-27 ENCOUNTER — APPOINTMENT (OUTPATIENT)
Dept: CARDIOLOGY | Facility: HOSPITAL | Age: 69
End: 2024-09-27
Payer: MEDICARE

## 2024-09-27 ENCOUNTER — HOSPITAL ENCOUNTER (EMERGENCY)
Facility: HOSPITAL | Age: 69
Discharge: HOME | End: 2024-09-27
Attending: EMERGENCY MEDICINE
Payer: MEDICARE

## 2024-09-27 VITALS
SYSTOLIC BLOOD PRESSURE: 122 MMHG | TEMPERATURE: 97.3 F | RESPIRATION RATE: 20 BRPM | OXYGEN SATURATION: 96 % | HEIGHT: 70 IN | DIASTOLIC BLOOD PRESSURE: 60 MMHG | WEIGHT: 140 LBS | HEART RATE: 53 BPM | BODY MASS INDEX: 20.04 KG/M2

## 2024-09-27 DIAGNOSIS — R42 DIZZINESS: Primary | ICD-10-CM

## 2024-09-27 LAB
ALBUMIN SERPL BCP-MCNC: 4.3 G/DL (ref 3.4–5)
ALP SERPL-CCNC: 76 U/L (ref 33–136)
ALT SERPL W P-5'-P-CCNC: 11 U/L (ref 7–45)
ANION GAP SERPL CALC-SCNC: 10 MMOL/L (ref 10–20)
APPEARANCE UR: CLEAR
AST SERPL W P-5'-P-CCNC: 14 U/L (ref 9–39)
BASOPHILS # BLD AUTO: 0.08 X10*3/UL (ref 0–0.1)
BASOPHILS NFR BLD AUTO: 1.7 %
BILIRUB SERPL-MCNC: 0.8 MG/DL (ref 0–1.2)
BILIRUB UR STRIP.AUTO-MCNC: NEGATIVE MG/DL
BUN SERPL-MCNC: 12 MG/DL (ref 6–23)
CALCIUM SERPL-MCNC: 9.3 MG/DL (ref 8.6–10.3)
CHLORIDE SERPL-SCNC: 104 MMOL/L (ref 98–107)
CO2 SERPL-SCNC: 30 MMOL/L (ref 21–32)
COLOR UR: NORMAL
CREAT SERPL-MCNC: 0.64 MG/DL (ref 0.5–1.05)
EGFRCR SERPLBLD CKD-EPI 2021: >90 ML/MIN/1.73M*2
EOSINOPHIL # BLD AUTO: 0.08 X10*3/UL (ref 0–0.7)
EOSINOPHIL NFR BLD AUTO: 1.7 %
ERYTHROCYTE [DISTWIDTH] IN BLOOD BY AUTOMATED COUNT: 13.2 % (ref 11.5–14.5)
GLUCOSE SERPL-MCNC: 92 MG/DL (ref 74–99)
GLUCOSE UR STRIP.AUTO-MCNC: NORMAL MG/DL
HCT VFR BLD AUTO: 42.7 % (ref 36–46)
HGB BLD-MCNC: 14.8 G/DL (ref 12–16)
HOLD SPECIMEN: NORMAL
IMM GRANULOCYTES # BLD AUTO: 0.02 X10*3/UL (ref 0–0.7)
IMM GRANULOCYTES NFR BLD AUTO: 0.4 % (ref 0–0.9)
KETONES UR STRIP.AUTO-MCNC: NEGATIVE MG/DL
LEUKOCYTE ESTERASE UR QL STRIP.AUTO: NEGATIVE
LYMPHOCYTES # BLD AUTO: 1.11 X10*3/UL (ref 1.2–4.8)
LYMPHOCYTES NFR BLD AUTO: 24.2 %
MCH RBC QN AUTO: 32.6 PG (ref 26–34)
MCHC RBC AUTO-ENTMCNC: 34.7 G/DL (ref 32–36)
MCV RBC AUTO: 94 FL (ref 80–100)
MONOCYTES # BLD AUTO: 0.49 X10*3/UL (ref 0.1–1)
MONOCYTES NFR BLD AUTO: 10.7 %
NEUTROPHILS # BLD AUTO: 2.81 X10*3/UL (ref 1.2–7.7)
NEUTROPHILS NFR BLD AUTO: 61.3 %
NITRITE UR QL STRIP.AUTO: NEGATIVE
NRBC BLD-RTO: 0 /100 WBCS (ref 0–0)
PH UR STRIP.AUTO: 6 [PH]
PLATELET # BLD AUTO: 229 X10*3/UL (ref 150–450)
POTASSIUM SERPL-SCNC: 4 MMOL/L (ref 3.5–5.3)
PROT SERPL-MCNC: 6.9 G/DL (ref 6.4–8.2)
PROT UR STRIP.AUTO-MCNC: NEGATIVE MG/DL
RBC # BLD AUTO: 4.54 X10*6/UL (ref 4–5.2)
RBC # UR STRIP.AUTO: NEGATIVE /UL
SODIUM SERPL-SCNC: 140 MMOL/L (ref 136–145)
SP GR UR STRIP.AUTO: 1.01
UROBILINOGEN UR STRIP.AUTO-MCNC: NORMAL MG/DL
WBC # BLD AUTO: 4.6 X10*3/UL (ref 4.4–11.3)

## 2024-09-27 PROCEDURE — 81003 URINALYSIS AUTO W/O SCOPE: CPT

## 2024-09-27 PROCEDURE — 71046 X-RAY EXAM CHEST 2 VIEWS: CPT

## 2024-09-27 PROCEDURE — 85025 COMPLETE CBC W/AUTO DIFF WBC: CPT

## 2024-09-27 PROCEDURE — 36415 COLL VENOUS BLD VENIPUNCTURE: CPT

## 2024-09-27 PROCEDURE — 71046 X-RAY EXAM CHEST 2 VIEWS: CPT | Performed by: RADIOLOGY

## 2024-09-27 PROCEDURE — 99283 EMERGENCY DEPT VISIT LOW MDM: CPT | Mod: 25

## 2024-09-27 PROCEDURE — 93005 ELECTROCARDIOGRAM TRACING: CPT

## 2024-09-27 PROCEDURE — 82374 ASSAY BLOOD CARBON DIOXIDE: CPT

## 2024-09-27 ASSESSMENT — LIFESTYLE VARIABLES
HAVE YOU EVER FELT YOU SHOULD CUT DOWN ON YOUR DRINKING: NO
EVER FELT BAD OR GUILTY ABOUT YOUR DRINKING: NO
TOTAL SCORE: 0
HAVE PEOPLE ANNOYED YOU BY CRITICIZING YOUR DRINKING: NO
EVER HAD A DRINK FIRST THING IN THE MORNING TO STEADY YOUR NERVES TO GET RID OF A HANGOVER: NO

## 2024-09-27 NOTE — ED TRIAGE NOTES
Patient arrives from home via squad with complaints of shortness of breath and dizziness that has been ongoing for the past 20 minutes or so.  Denies any chest pain at this time.

## 2024-09-27 NOTE — ED NOTES
Pt left with transport. According to family the pt is typically ambulatory with a cane. Pt family sts they have a lift at home. Transported concerned about needing lift assist when at home. Pt and family sts that they have gone back via ambulette. Pt is a one assist with no support devices.  Transported watched that pt got from bed to wheel chair with standby assist. Family sts they have gotten her in bed prior. Dr and transporter updated.     Abimbola Muhammad RN  09/27/24 2720

## 2024-09-27 NOTE — ED PROVIDER NOTES
"Emergency Department Provider Note          History of Present Illness     CC: Dizziness and Shortness of Breath (Patient arrives from home via squad with complaints of shortness of breath and dizziness that has been ongoing for the past 20 minutes or so.  Denies any chest pain at this time.)     History provided by: Patient  Limitations to History: None    HPI:   Akiko Peng is a 68 y.o.female with PMH MS presenting to the Emergency Department for dizziness.  Brought in today by EMS.  Patient reports she woke up this morning and had dizziness that lasted around 20 to 30 minutes.  She was unable to get over to the toilet and called EMS to bring her to the hospital to make sure nothing was wrong.  On arrival to the hospital patient is asymptomatic and feels otherwise clinically well.  She has had multiple ED visits this month with similar presentation and has not eaten or drink anything this morning.  Has right lower extremity weakness 4/5 since may that is unchanged and consistent with her MS flares.  Has not followed up with her neurologist \"in a long time.\" No lower back pain. Does endorse 1 episode of dysuria, no increased frequency, fevers, chills, chest pain, shortness of breath, or changes in stool noted.     Records Reviewed: Recent available ED and inpatient notes reviewed in EMR.    PMHx/PSHx:  Per HPI.   - has a past medical history of Multiple sclerosis (Multi).  - has a past surgical history that includes Other surgical history (11/26/2019); Other surgical history (11/26/2019); MR angio head wo IV contrast (9/21/2020); MR angio neck wo IV contrast (9/21/2020); MR angio head wo IV contrast (10/24/2022); and MR angio neck wo IV contrast (10/24/2022).  - has TIA (transient ischemic attack); Aphasia; and Bradycardia on their problem list.    Medications:  Current Outpatient Medications   Medication Instructions    atorvastatin (LIPITOR) 40 mg, oral, Daily    cholecalciferol (Vitamin D-3) 25 MCG (1000 UT) " capsule 1 capsule, oral, Daily    ergocalciferol (VITAMIN D-2) 1,250 mcg, oral, Once Weekly    levETIRAcetam (KEPPRA) 750 mg, oral, 2 times daily    multivitamin with minerals tablet 1 tablet, oral, Daily    thiamine (VITAMIN B-1) 100 mg, oral, Daily        Allergies:  Patient has no known allergies.    Social History:  - Tobacco:  reports that she has never smoked. She has never used smokeless tobacco.   - Alcohol:  reports no history of alcohol use.   - Illicit Drugs:  reports no history of drug use.     ROS:  Per HPI.       Physical Exam     Triage Vitals:  T 36.3 °C (97.3 °F)  HR 55  /59  RR 18  O2 97 % None (Room air)    General: Awake, alert, in no acute distress  Eyes: Gaze conjugate.  No scleral icterus or injection  HENT: Normo-cephalic, atraumatic. No stridor  CV: Regular rate, regular rhythm. Radial pulses 2+ bilaterally  Resp: Breathing non-labored, speaking in full sentences.  Clear to auscultation bilaterally  GI: Soft, non-distended, non-tender. No rebound or guarding.  MSK/Extremities: No gross bony deformities. Moving all extremities  Skin: Warm. Appropriate color  Neuro: Alert. Oriented. Face symmetric. Speech is fluent.  Gross strength and sensation intact in b/l UE and Les. RLE 4/5 strength at her baseline.  Psych: Appropriate mood and affect          Tabitha Coma Scale Score: 15                    Medical Decision Making & ED Course     EKG: EKG interpreted by myself. Please see ED Course for full interpretation.    Medical Decision Making   Akiko Peng is a 68 y.o.female presenting to the Emergency Department for dizziness.  On arrival, vital signs within normal limits, afebrile for us.  On examination, patient appears otherwise clinically well.  No nystagmus for us today.  Not complaining of dizziness on arrival to the emergency department.  Does have right lower extremity weakness that she reports is at her baseline consistent with her MS but no other acute neurologic changes.   Lower concern for acute strokelike process.  Labs notable for white count of 4.6, lower concern for acute infectious process.  Hemoglobin stable at 14.8, lower concern for acute blood loss anemia.  Chemistry relatively unremarkable.  Urinalysis negative for acute UTI.  Chest x-ray showed no acute cardiopulmonary process. On reassessment, patient feels otherwise clinically well and is requesting home at this time. Will be discharged home in stable condition with recommendation to follow-up closely with her neurologist/PCP.    ED Course as of 09/27/24 1240   Fri Sep 27, 2024   1034 EKG interpreted by attending physician.  Sinus rhythm.  Rate 50.  No acute ischemic changes.  No ST elevation.  Normal QRS duration. [CD]   1109 Comprehensive metabolic panel [AS]      ED Course User Index  [AS] Dre Garcia MD  [CD] Trav Stringer MD         Diagnoses as of 09/27/24 1240   Dizziness       Independent Result Review and Interpretation: Relevant laboratory and radiographic results were reviewed and independently interpreted by myself.  As necessary, they are commented on in the ED Course.    Chronic conditions affecting the patient's care: As documented above in MDM.      Disposition   Discharge    Dre Garcia MD  Emergency Medicine PGY3      Procedures     Procedures ? [employer] last updated 9/27/2024 12:40 PM        Dre Garcia MD  Resident  09/27/24 1241       Trav Stringer MD  09/30/24 1320       Trav Stringer MD  09/30/24 1349       Trav Stringer MD  10/14/24 1235       Trav Stringer MD  10/14/24 1237

## 2024-10-02 ENCOUNTER — HOSPITAL ENCOUNTER (EMERGENCY)
Facility: HOSPITAL | Age: 69
Discharge: HOME | DRG: 058 | End: 2024-10-03
Attending: STUDENT IN AN ORGANIZED HEALTH CARE EDUCATION/TRAINING PROGRAM
Payer: MEDICARE

## 2024-10-02 ENCOUNTER — APPOINTMENT (OUTPATIENT)
Dept: RADIOLOGY | Facility: HOSPITAL | Age: 69
DRG: 058 | End: 2024-10-02
Payer: MEDICARE

## 2024-10-02 ENCOUNTER — APPOINTMENT (OUTPATIENT)
Dept: CARDIOLOGY | Facility: HOSPITAL | Age: 69
DRG: 058 | End: 2024-10-02
Payer: MEDICARE

## 2024-10-02 DIAGNOSIS — J18.9 PNEUMONIA DUE TO INFECTIOUS ORGANISM, UNSPECIFIED LATERALITY, UNSPECIFIED PART OF LUNG: Primary | ICD-10-CM

## 2024-10-02 DIAGNOSIS — R06.02 SOB (SHORTNESS OF BREATH): ICD-10-CM

## 2024-10-02 LAB
ALBUMIN SERPL BCP-MCNC: 4.1 G/DL (ref 3.4–5)
ALP SERPL-CCNC: 75 U/L (ref 33–136)
ALT SERPL W P-5'-P-CCNC: 8 U/L (ref 7–45)
ANION GAP SERPL CALC-SCNC: 10 MMOL/L (ref 10–20)
APTT PPP: 34 SECONDS (ref 27–38)
AST SERPL W P-5'-P-CCNC: 10 U/L (ref 9–39)
BASOPHILS # BLD AUTO: 0.08 X10*3/UL (ref 0–0.1)
BASOPHILS NFR BLD AUTO: 1.3 %
BILIRUB SERPL-MCNC: 0.4 MG/DL (ref 0–1.2)
BUN SERPL-MCNC: 24 MG/DL (ref 6–23)
CALCIUM SERPL-MCNC: 9.1 MG/DL (ref 8.6–10.3)
CARDIAC TROPONIN I PNL SERPL HS: <3 NG/L (ref 0–13)
CHLORIDE SERPL-SCNC: 106 MMOL/L (ref 98–107)
CO2 SERPL-SCNC: 30 MMOL/L (ref 21–32)
CREAT SERPL-MCNC: 0.44 MG/DL (ref 0.5–1.05)
D DIMER PPP FEU-MCNC: <215 NG/ML FEU
EGFRCR SERPLBLD CKD-EPI 2021: >90 ML/MIN/1.73M*2
EOSINOPHIL # BLD AUTO: 0.12 X10*3/UL (ref 0–0.7)
EOSINOPHIL NFR BLD AUTO: 1.9 %
ERYTHROCYTE [DISTWIDTH] IN BLOOD BY AUTOMATED COUNT: 13.2 % (ref 11.5–14.5)
GLUCOSE SERPL-MCNC: 91 MG/DL (ref 74–99)
HCT VFR BLD AUTO: 41.6 % (ref 36–46)
HGB BLD-MCNC: 14.2 G/DL (ref 12–16)
IMM GRANULOCYTES # BLD AUTO: 0.03 X10*3/UL (ref 0–0.7)
IMM GRANULOCYTES NFR BLD AUTO: 0.5 % (ref 0–0.9)
INR PPP: 1.1 (ref 0.9–1.1)
LYMPHOCYTES # BLD AUTO: 1.05 X10*3/UL (ref 1.2–4.8)
LYMPHOCYTES NFR BLD AUTO: 16.5 %
MAGNESIUM SERPL-MCNC: 2.02 MG/DL (ref 1.6–2.4)
MCH RBC QN AUTO: 32.2 PG (ref 26–34)
MCHC RBC AUTO-ENTMCNC: 34.1 G/DL (ref 32–36)
MCV RBC AUTO: 94 FL (ref 80–100)
MONOCYTES # BLD AUTO: 0.58 X10*3/UL (ref 0.1–1)
MONOCYTES NFR BLD AUTO: 9.1 %
NEUTROPHILS # BLD AUTO: 4.51 X10*3/UL (ref 1.2–7.7)
NEUTROPHILS NFR BLD AUTO: 70.7 %
NRBC BLD-RTO: 0 /100 WBCS (ref 0–0)
PLATELET # BLD AUTO: 230 X10*3/UL (ref 150–450)
POTASSIUM SERPL-SCNC: 3.8 MMOL/L (ref 3.5–5.3)
PROT SERPL-MCNC: 6.4 G/DL (ref 6.4–8.2)
PROTHROMBIN TIME: 11.9 SECONDS (ref 9.8–12.8)
RBC # BLD AUTO: 4.41 X10*6/UL (ref 4–5.2)
SARS-COV-2 RNA RESP QL NAA+PROBE: NOT DETECTED
SODIUM SERPL-SCNC: 142 MMOL/L (ref 136–145)
WBC # BLD AUTO: 6.4 X10*3/UL (ref 4.4–11.3)

## 2024-10-02 PROCEDURE — 71045 X-RAY EXAM CHEST 1 VIEW: CPT

## 2024-10-02 PROCEDURE — 87635 SARS-COV-2 COVID-19 AMP PRB: CPT | Performed by: STUDENT IN AN ORGANIZED HEALTH CARE EDUCATION/TRAINING PROGRAM

## 2024-10-02 PROCEDURE — 80053 COMPREHEN METABOLIC PANEL: CPT | Performed by: STUDENT IN AN ORGANIZED HEALTH CARE EDUCATION/TRAINING PROGRAM

## 2024-10-02 PROCEDURE — 85379 FIBRIN DEGRADATION QUANT: CPT | Performed by: STUDENT IN AN ORGANIZED HEALTH CARE EDUCATION/TRAINING PROGRAM

## 2024-10-02 PROCEDURE — 85610 PROTHROMBIN TIME: CPT | Performed by: STUDENT IN AN ORGANIZED HEALTH CARE EDUCATION/TRAINING PROGRAM

## 2024-10-02 PROCEDURE — 84484 ASSAY OF TROPONIN QUANT: CPT | Performed by: STUDENT IN AN ORGANIZED HEALTH CARE EDUCATION/TRAINING PROGRAM

## 2024-10-02 PROCEDURE — 83735 ASSAY OF MAGNESIUM: CPT | Performed by: STUDENT IN AN ORGANIZED HEALTH CARE EDUCATION/TRAINING PROGRAM

## 2024-10-02 PROCEDURE — 36415 COLL VENOUS BLD VENIPUNCTURE: CPT | Performed by: STUDENT IN AN ORGANIZED HEALTH CARE EDUCATION/TRAINING PROGRAM

## 2024-10-02 PROCEDURE — 71045 X-RAY EXAM CHEST 1 VIEW: CPT | Performed by: RADIOLOGY

## 2024-10-02 PROCEDURE — 93005 ELECTROCARDIOGRAM TRACING: CPT

## 2024-10-02 PROCEDURE — 85025 COMPLETE CBC W/AUTO DIFF WBC: CPT | Performed by: STUDENT IN AN ORGANIZED HEALTH CARE EDUCATION/TRAINING PROGRAM

## 2024-10-02 PROCEDURE — 2500000001 HC RX 250 WO HCPCS SELF ADMINISTERED DRUGS (ALT 637 FOR MEDICARE OP): Performed by: STUDENT IN AN ORGANIZED HEALTH CARE EDUCATION/TRAINING PROGRAM

## 2024-10-02 PROCEDURE — 2500000002 HC RX 250 W HCPCS SELF ADMINISTERED DRUGS (ALT 637 FOR MEDICARE OP, ALT 636 FOR OP/ED): Performed by: STUDENT IN AN ORGANIZED HEALTH CARE EDUCATION/TRAINING PROGRAM

## 2024-10-02 PROCEDURE — 99283 EMERGENCY DEPT VISIT LOW MDM: CPT | Mod: 25

## 2024-10-02 RX ORDER — AMOXICILLIN AND CLAVULANATE POTASSIUM 875; 125 MG/1; MG/1
1 TABLET, FILM COATED ORAL EVERY 12 HOURS
Qty: 13 TABLET | Refills: 0 | Status: SHIPPED | OUTPATIENT
Start: 2024-10-02 | End: 2024-10-11 | Stop reason: HOSPADM

## 2024-10-02 RX ORDER — AZITHROMYCIN 250 MG/1
250 TABLET, FILM COATED ORAL DAILY
Qty: 4 TABLET | Refills: 0 | Status: SHIPPED | OUTPATIENT
Start: 2024-10-02 | End: 2024-10-11 | Stop reason: HOSPADM

## 2024-10-02 RX ORDER — AMOXICILLIN AND CLAVULANATE POTASSIUM 875; 125 MG/1; MG/1
1 TABLET, FILM COATED ORAL ONCE
Status: COMPLETED | OUTPATIENT
Start: 2024-10-02 | End: 2024-10-02

## 2024-10-02 RX ORDER — AZITHROMYCIN 250 MG/1
500 TABLET, FILM COATED ORAL ONCE
Status: COMPLETED | OUTPATIENT
Start: 2024-10-02 | End: 2024-10-02

## 2024-10-02 ASSESSMENT — PAIN SCALES - GENERAL
PAINLEVEL_OUTOF10: 0 - NO PAIN

## 2024-10-02 NOTE — ED PROVIDER NOTES
EMERGENCY DEPARTMENT ENCOUNTER      Pt Name: Akiko Peng  MRN: [id number]  Birthdate 1955  Date of evaluation: 10/2/2024  Provider: Adams Corona DO    CHIEF COMPLAINT     No chief complaint on file.      HISTORY OF PRESENT ILLNESS    Akiko Peng is a 68 y.o. female who presents to the emergency department with EMS for shortness of breath.  Patient reports that she was ambulating to her kitchen avoiding her cat today when she felt short of breath.  When she sat down symptoms resolved and have not returned.  She states this occasionally occurs; she feels that it is related to her MS.  She denies any recent flulike symptoms congestion fevers.  There is no associated chest pain or additional symptoms.  She is currently asymptomatic and feels well.  She is a non-smoker denies any history of respiratory disease.          Nursing Notes were reviewed.    REVIEW OF SYSTEMS     CONSTITUTIONAL: Denies fever, sweats, chills.   NEURO: Denies difficulty walking, numbness, weakness, tingling, headache.   HEENT: Denies sore throat, rhinorrhea, changes in vision.   CARDIO: Denies chest pain, palpitations.  PULM: Endorses shortness of breath.  Denies cough.   GI: Denies abdominal pain, nausea, vomiting, diarrhea, constipation, melena, hematochezia.  : Denies painful urination, frequency, hematuria.   MSK: Denies recent trauma.   SKIN: Denies rash, lesions.   ENDOCRINE: Denies unexpected weight-loss.   HEME: Denies bleeding disorder.     PAST MEDICAL HISTORY     Past Medical History:   Diagnosis Date    Multiple sclerosis (Multi)     History of multiple sclerosis       SURGICAL HISTORY       Past Surgical History:   Procedure Laterality Date    MR HEAD ANGIO WO IV CONTRAST  9/21/2020    MR HEAD ANGIO WO IV CONTRAST 9/21/2020 PAR [facility]ENCY [facility]GACY    MR HEAD ANGIO WO IV CONTRAST  10/24/2022    MR HEAD ANGIO WO IV CONTRAST 10/24/2022 [facility]    MR NECK ANGIO WO IV CONTRAST  9/21/2020    MR NECK ANGIO WO IV  CONTRAST 9/21/2020 PAR [facility]Y [facility]GAC[facility]    MR NECK ANGIO WO IV CONTRAST  10/24/2022    MR NECK ANGIO WO IV CONTRAST 10/24/2022 [facility]    OTHER SURGICAL HISTORY  11/26/2019    Tonsillectomy    OTHER SURGICAL HISTORY  11/26/2019    Hysterectomy       ALLERGIES     Patient has no known allergies.    FAMILY HISTORY     No family history on file.     SOCIAL HISTORY       Social History     Socioeconomic History    Marital status:    Tobacco Use    Smoking status: Never    Smokeless tobacco: Never   Vaping Use    Vaping status: Never Used   Substance and Sexual Activity    Alcohol use: Never    Drug use: Never    Sexual activity: Defer     Social Determinants of Health     Financial Resource Strain: Low Risk  (5/15/2024)    Overall Financial Resource Strain (CARDIA)     Difficulty of Paying Living Expenses: Not hard at all   Transportation Needs: No Transportation Needs (9/17/2024)    OASIS : Transportation     Lack of Transportation (Medical): No     Lack of Transportation (Non-Medical): No     Patient Unable or Declines to Respond: No   Social Connections: Feeling Socially Integrated (9/17/2024)    OASIS : Social Isolation     Frequency of experiencing loneliness or isolation: Never   Housing Stability: Low Risk  (5/15/2024)    Housing Stability Vital Sign     Unable to Pay for Housing in the Last Year: No     Number of Places Lived in the Last Year: 1     Unstable Housing in the Last Year: No       PHYSICAL EXAM   VS: As documented in the triage note from today's date and EMR flowsheet were reviewed.  Gen: Well developed. No acute distress. Seated in bed. Appears nontoxic.  Alert and oriented to person time place.  Skin: Warm. Dry. Intact. No rashes or lesions.  Eyes: Pupils equally round and reactive to light. Clear sclera. EOMI.  HENT: Atraumatic appearance. Mucosal membranes moist. No oral lesions, uvula midline, airway patent.   CV: Regular rate and regular rhythm. S1, S2. No pedal  edema. Warm extremities.  Resp: Nonlabored breathing Clear to auscultation bilaterally. No increased work of breathing.  Saturating appropriately on room air.  GI: Soft and nontender. No rebound or guarding. Bowel sounds x4 present. Hcavarria sign McBurney's point tenderness is negative.  MSK: Symmetric muscle bulk. No joint swelling in the extremities. Compartments are soft. Neurovascularly intact x4 extremities. Radial pulses +2 equal bilaterally.  Pedal pulses +2 equal bilaterally.    Neuro: Alert. CN II - XII intact. Speech fluent. Moving all extremities. No focal deficits. Gait normal.  Psych: Appropriate. Kempt.    DIAGNOSTIC RESULTS   RADIOLOGY:   Non-plain film images such as CT, Ultrasound and MRI are read by the radiologist. Plain radiographic images are visualized and preliminarily interpreted by the emergency physician with the below findings: Chest x-ray questionable early pneumonia left basilar opacity.      Interpretation per the Radiologist below, if available at the time of this note:    XR chest 1 view   Final Result   1.  Small hazy left basilar opacity, which may represent a small area   of atelectasis or early pneumonia.                  MACRO:   None        Signed by: Chata Mooney 10/2/2024 5:59 PM   Dictation workstation:   [id number]            ED BEDSIDE ULTRASOUND:   Performed by ED Physician - none    LABS:  Labs Reviewed   CBC WITH AUTO DIFFERENTIAL - Abnormal       Result Value    WBC 6.4      nRBC 0.0      RBC 4.41      Hemoglobin 14.2      Hematocrit 41.6      MCV 94      MCH 32.2      MCHC 34.1      RDW 13.2      Platelets 230      Neutrophils % 70.7      Immature Granulocytes %, Automated 0.5      Lymphocytes % 16.5      Monocytes % 9.1      Eosinophils % 1.9      Basophils % 1.3      Neutrophils Absolute 4.51      Immature Granulocytes Absolute, Automated 0.03      Lymphocytes Absolute 1.05 (*)     Monocytes Absolute 0.58      Eosinophils Absolute 0.12      Basophils Absolute 0.08      COMPREHENSIVE METABOLIC PANEL - Abnormal    Glucose 91      Sodium 142      Potassium 3.8      Chloride 106      Bicarbonate 30      Anion Gap 10      Urea Nitrogen 24 (*)     Creatinine 0.44 (*)     eGFR >90      Calcium 9.1      Albumin 4.1      Alkaline Phosphatase 75      Total Protein 6.4      AST 10      Bilirubin, Total 0.4      ALT 8     MAGNESIUM - Normal    Magnesium 2.02     SARS-COV-2 PCR - Normal    Coronavirus 2019, PCR Not Detected      Narrative:     This assay has received FDA Emergency Use Authorization (EUA) and is only authorized for the duration of time that circumstances exist to justify the authorization of the emergency use of in vitro diagnostic tests for the detection of SARS-CoV-2 virus and/or diagnosis of COVID-19 infection under section 564(b)(1) of the Act, 21 U.S.C. 360bbb-3(b)(1). This assay is an in vitro diagnostic nucleic acid amplification test for the qualitative detection of SARS-CoV-2 from nasopharyngeal specimens and has been validated for use at [facility]. Negative results do not preclude COVID-19 infections and should not be used as the sole basis for diagnosis, treatment, or other management decisions.     TROPONIN I, HIGH SENSITIVITY - Normal    Troponin I, High Sensitivity <3      Narrative:     Less than 99th percentile of normal range cutoff-  Female and children under 18 years old <14 ng/L; Male <21 ng/L: Negative  Repeat testing should be performed if clinically indicated.     Female and children under 18 years old 14-50 ng/L; Male 21-50 ng/L:  Consistent with possible cardiac damage and possible increased clinical   risk. Serial measurements may help to assess extent of myocardial damage.     >50 ng/L: Consistent with cardiac damage, increased clinical risk and  myocardial infarction. Serial measurements may help assess extent of   myocardial damage.      NOTE: Children less than 1 year old may have higher baseline troponin   levels and  "results should be interpreted in conjunction with the overall   clinical context.     NOTE: Troponin I testing is performed using a different   testing methodology at [facility] than at other   [facility]. Direct result comparisons should only   be made within the same method.   COAGULATION SCREEN - Normal    Protime 11.9      INR 1.1      aPTT 34      Narrative:     The APTT is no longer used for monitoring Unfractionated Heparin Therapy. For monitoring Heparin Therapy, use the Heparin Assay.   D-DIMER, VTE EXCLUSION - Normal    D-Dimer, Quantitative VTE Exclusion <215      Narrative:     The VTE Exclusion D-Dimer assay is reported in ng/mL Fibrinogen Equivalent Units (FEU).    Per 's instructions for use, a value of less than 500 ng/mL (FEU) may help to exclude DVT or PE in outpatients when the assay is used with a clinical pretest probability assessment.(AEMR must utilize and document eCalc 'Wells Score Deep Vein Thrombosis Risk' for DVT exclusion only. Emergency Department should utilize  Guidelines for Emergency Department Use of the VTE Exclusion D-Dimer and Clinical Pretest probability assessment model for DVT or PE exclusion.)       All other labs were within normal range or not returned as of this dictation.    EMERGENCY DEPARTMENT COURSE/MDM:   Vitals:    Vitals:    10/02/24 1655 10/02/24 1823 10/02/24 1915   BP: 118/53 113/65 110/63   BP Location: Right arm Right arm Right arm   Patient Position: Standing Sitting Sitting   Pulse: 66 67 67   Resp: 14 14 16   Temp: 36.8 °C (98.2 °F)     TempSrc: Tympanic     SpO2: 100% 100% 100%   Weight: 63.5 kg (140 lb)     Height: 1.778 m (5' 10\")         I reviewed the patient's triage vitals and they are within normal range.    Due to the above findings the following was ordered cardiac workup to include viral swabs D-dimer.    Lab work is highly reassuring no significant electrolyte derangements renal function is appropriate.  Viral " swab for COVID is negative.  No signs of anemia.  No leukocytosis making infectious etiology less likely.  D-dimer within normal range making PE less likely as well.  Cardiac troponin undetectable no acute injury pattern or runs of dysrhythmias on EKG low concern for ACS.  Chest x-ray with questionable pneumonia.  Due to patient's compromised state with MS feel that she would be at risk for pneumonia therefore she was covered with Augmentin azithromycin.  She was ambulated no desaturation she is comfortable being discharged home she is appreciative of care she remains asymptomatic even with exertion.  Provided strict return precautions appropriate follow-up and discharged in stable condition.    See no findings concerning for increased work of breathing or MS compromise state in regards to the breathing/airway.    ED Course as of 10/02/24 2253   Wed Oct 02, 2024   1703 Interpreted by the Emergency Department Attending: ECG revealed normal sinus rhythm at a rate of 64 beats per minute with TX interval 148 , QRS of 76 , QTc of 429.  No acute injury pattern. Previous EKG on September 27 revealed no significant changes.    [MG]      ED Course User Index  [MG] Adams Corona DO         Diagnoses as of 10/02/24 2253   Pneumonia due to infectious organism, unspecified laterality, unspecified part of lung   SOB (shortness of breath)       Patient was counseled regarding labs, imaging, likely diagnosis, and plan. All questions were answered.     ------------------------------------------------------------------  Information provided by the patient and EMS  Past medical history complicating workup MS  Previous medical records reviewed home health visit 9/17/2024  Considered CTA of the chest although not indicated D-dimer within normal range.  Shared medical decision making patient prefers to be discharged home feel that this is reasonable.  ------------------------------------------------------------------  ED Medications  administered this visit:    Medications   azithromycin (Zithromax) tablet 500 mg (500 mg oral Given 10/2/24 1815)   amoxicillin-pot clavulanate (Augmentin) 875-125 mg per tablet 1 tablet (1 tablet oral Given 10/2/24 1815)       New Prescriptions from this visit:    New Prescriptions    AMOXICILLIN-POT CLAVULANATE (AUGMENTIN) 875-125 MG TABLET    Take 1 tablet by mouth every 12 hours for 7 days.    AZITHROMYCIN (ZITHROMAX Z-CARLOS) 250 MG TABLET    Take 1 tablet (250 mg) by mouth once daily for 4 days.       Follow-up:  Adams Craig MD  [address] [facility] [address] [phone]    Schedule an appointment as soon as possible for a visit in 2 days      [facility]  [address] [facility] [place] [address] [phone]  Go to   If symptoms worsen        Final Impression:   1. Pneumonia due to infectious organism, unspecified laterality, unspecified part of lung    2. SOB (shortness of breath)          Adams Corona DO    (Please note that portions of this note were completed with a voice recognition program.  Efforts were made to edit the dictations but occasionally words are mis-transcribed.)     Adams Corona DO  10/02/24 3816

## 2024-10-02 NOTE — DISCHARGE INSTRUCTIONS
You diagnosed with questionable pneumonia feel that is advisable to cover with your history of MS.  Your oxygen levels were appropriate and you are asymptomatic while walking.  I do recommend that you follow-up with your primary physician in the coming days to ensure improvement and resolution of your symptoms.  Should you have  any increased work of breathing shortness of breath symptoms concerning to you including fevers but not limited to call 911 or return to the nearest emergency department immediately if these occur.

## 2024-10-02 NOTE — ED TRIAGE NOTES
Pt from home with complaints of SOB. Pt states it is normal for her to develop SOB with her MS but this episode lasted longer then normal. Back to baseline upon arrival to ED

## 2024-10-03 VITALS
RESPIRATION RATE: 16 BRPM | WEIGHT: 140 LBS | TEMPERATURE: 98.2 F | SYSTOLIC BLOOD PRESSURE: 101 MMHG | HEIGHT: 70 IN | OXYGEN SATURATION: 100 % | HEART RATE: 67 BPM | DIASTOLIC BLOOD PRESSURE: 57 MMHG | BODY MASS INDEX: 20.04 KG/M2

## 2024-10-03 LAB
ATRIAL RATE: 51 BPM
ATRIAL RATE: 64 BPM
P AXIS: 42 DEGREES
P AXIS: 49 DEGREES
P OFFSET: 189 MS
P ONSET: 150 MS
PR INTERVAL: 148 MS
PR INTERVAL: 170 MS
Q ONSET: 224 MS
Q ONSET: 253 MS
QRS COUNT: 10 BEATS
QRS COUNT: 8 BEATS
QRS DURATION: 76 MS
QRS DURATION: 90 MS
QT INTERVAL: 416 MS
QT INTERVAL: 436 MS
QTC CALCULATION(BAZETT): 398 MS
QTC CALCULATION(BAZETT): 429 MS
QTC FREDERICIA: 410 MS
QTC FREDERICIA: 425 MS
R AXIS: 103 DEGREES
R AXIS: 44 DEGREES
T AXIS: 73 DEGREES
T AXIS: 89 DEGREES
T OFFSET: 432 MS
T OFFSET: 471 MS
VENTRICULAR RATE: 50 BPM
VENTRICULAR RATE: 64 BPM

## 2024-10-03 ASSESSMENT — PAIN SCALES - GENERAL: PAINLEVEL_OUTOF10: 0 - NO PAIN

## 2024-10-03 ASSESSMENT — PAIN - FUNCTIONAL ASSESSMENT: PAIN_FUNCTIONAL_ASSESSMENT: 0-10

## 2024-10-04 ENCOUNTER — APPOINTMENT (OUTPATIENT)
Dept: RADIOLOGY | Facility: HOSPITAL | Age: 69
DRG: 058 | End: 2024-10-04
Payer: MEDICARE

## 2024-10-04 ENCOUNTER — HOSPITAL ENCOUNTER (INPATIENT)
Facility: HOSPITAL | Age: 69
LOS: 1 days | Discharge: HOME HEALTH | End: 2024-10-06
Attending: STUDENT IN AN ORGANIZED HEALTH CARE EDUCATION/TRAINING PROGRAM | Admitting: INTERNAL MEDICINE
Payer: MEDICARE

## 2024-10-04 ENCOUNTER — APPOINTMENT (OUTPATIENT)
Dept: CARDIOLOGY | Facility: HOSPITAL | Age: 69
DRG: 058 | End: 2024-10-04
Payer: MEDICARE

## 2024-10-04 DIAGNOSIS — R53.1 RIGHT SIDED WEAKNESS: Primary | ICD-10-CM

## 2024-10-04 DIAGNOSIS — G35 MS (MULTIPLE SCLEROSIS) (MULTI): ICD-10-CM

## 2024-10-04 LAB
ALBUMIN SERPL BCP-MCNC: 4.4 G/DL (ref 3.4–5)
ALP SERPL-CCNC: 76 U/L (ref 33–136)
ALT SERPL W P-5'-P-CCNC: 9 U/L (ref 7–45)
ANION GAP SERPL CALC-SCNC: 11 MMOL/L (ref 10–20)
APTT PPP: 33 SECONDS (ref 27–38)
AST SERPL W P-5'-P-CCNC: 12 U/L (ref 9–39)
BASOPHILS # BLD AUTO: 0.06 X10*3/UL (ref 0–0.1)
BASOPHILS NFR BLD AUTO: 0.6 %
BILIRUB SERPL-MCNC: 0.5 MG/DL (ref 0–1.2)
BUN SERPL-MCNC: 21 MG/DL (ref 6–23)
CALCIUM SERPL-MCNC: 9.2 MG/DL (ref 8.6–10.3)
CARDIAC TROPONIN I PNL SERPL HS: <3 NG/L (ref 0–13)
CHLORIDE SERPL-SCNC: 103 MMOL/L (ref 98–107)
CO2 SERPL-SCNC: 27 MMOL/L (ref 21–32)
CREAT SERPL-MCNC: 0.49 MG/DL (ref 0.5–1.05)
EGFRCR SERPLBLD CKD-EPI 2021: >90 ML/MIN/1.73M*2
EOSINOPHIL # BLD AUTO: 0.08 X10*3/UL (ref 0–0.7)
EOSINOPHIL NFR BLD AUTO: 0.8 %
ERYTHROCYTE [DISTWIDTH] IN BLOOD BY AUTOMATED COUNT: 13.2 % (ref 11.5–14.5)
GLUCOSE SERPL-MCNC: 86 MG/DL (ref 74–99)
HCT VFR BLD AUTO: 43.5 % (ref 36–46)
HGB BLD-MCNC: 14.9 G/DL (ref 12–16)
IMM GRANULOCYTES # BLD AUTO: 0.04 X10*3/UL (ref 0–0.7)
IMM GRANULOCYTES NFR BLD AUTO: 0.4 % (ref 0–0.9)
INR PPP: 1 (ref 0.9–1.1)
LYMPHOCYTES # BLD AUTO: 0.48 X10*3/UL (ref 1.2–4.8)
LYMPHOCYTES NFR BLD AUTO: 4.9 %
MCH RBC QN AUTO: 32.1 PG (ref 26–34)
MCHC RBC AUTO-ENTMCNC: 34.3 G/DL (ref 32–36)
MCV RBC AUTO: 94 FL (ref 80–100)
MONOCYTES # BLD AUTO: 0.67 X10*3/UL (ref 0.1–1)
MONOCYTES NFR BLD AUTO: 6.8 %
NEUTROPHILS # BLD AUTO: 8.53 X10*3/UL (ref 1.2–7.7)
NEUTROPHILS NFR BLD AUTO: 86.5 %
NRBC BLD-RTO: 0 /100 WBCS (ref 0–0)
PLATELET # BLD AUTO: 219 X10*3/UL (ref 150–450)
POTASSIUM SERPL-SCNC: 4.1 MMOL/L (ref 3.5–5.3)
PROT SERPL-MCNC: 6.9 G/DL (ref 6.4–8.2)
PROTHROMBIN TIME: 11.8 SECONDS (ref 9.8–12.8)
RBC # BLD AUTO: 4.64 X10*6/UL (ref 4–5.2)
SODIUM SERPL-SCNC: 137 MMOL/L (ref 136–145)
WBC # BLD AUTO: 9.9 X10*3/UL (ref 4.4–11.3)

## 2024-10-04 PROCEDURE — 36415 COLL VENOUS BLD VENIPUNCTURE: CPT | Performed by: STUDENT IN AN ORGANIZED HEALTH CARE EDUCATION/TRAINING PROGRAM

## 2024-10-04 PROCEDURE — 71045 X-RAY EXAM CHEST 1 VIEW: CPT | Mod: FOREIGN READ | Performed by: RADIOLOGY

## 2024-10-04 PROCEDURE — 70450 CT HEAD/BRAIN W/O DYE: CPT

## 2024-10-04 PROCEDURE — 93005 ELECTROCARDIOGRAM TRACING: CPT

## 2024-10-04 PROCEDURE — 85730 THROMBOPLASTIN TIME PARTIAL: CPT | Performed by: STUDENT IN AN ORGANIZED HEALTH CARE EDUCATION/TRAINING PROGRAM

## 2024-10-04 PROCEDURE — 70450 CT HEAD/BRAIN W/O DYE: CPT | Performed by: RADIOLOGY

## 2024-10-04 PROCEDURE — 84075 ASSAY ALKALINE PHOSPHATASE: CPT | Performed by: STUDENT IN AN ORGANIZED HEALTH CARE EDUCATION/TRAINING PROGRAM

## 2024-10-04 PROCEDURE — 99285 EMERGENCY DEPT VISIT HI MDM: CPT

## 2024-10-04 PROCEDURE — 83735 ASSAY OF MAGNESIUM: CPT

## 2024-10-04 PROCEDURE — 85610 PROTHROMBIN TIME: CPT | Performed by: STUDENT IN AN ORGANIZED HEALTH CARE EDUCATION/TRAINING PROGRAM

## 2024-10-04 PROCEDURE — 71045 X-RAY EXAM CHEST 1 VIEW: CPT

## 2024-10-04 PROCEDURE — 85025 COMPLETE CBC W/AUTO DIFF WBC: CPT | Performed by: STUDENT IN AN ORGANIZED HEALTH CARE EDUCATION/TRAINING PROGRAM

## 2024-10-04 PROCEDURE — 84484 ASSAY OF TROPONIN QUANT: CPT | Performed by: STUDENT IN AN ORGANIZED HEALTH CARE EDUCATION/TRAINING PROGRAM

## 2024-10-04 ASSESSMENT — PAIN SCALES - GENERAL: PAINLEVEL_OUTOF10: 0 - NO PAIN

## 2024-10-04 ASSESSMENT — PAIN - FUNCTIONAL ASSESSMENT: PAIN_FUNCTIONAL_ASSESSMENT: 0-10

## 2024-10-05 ENCOUNTER — APPOINTMENT (OUTPATIENT)
Dept: RADIOLOGY | Facility: HOSPITAL | Age: 69
DRG: 058 | End: 2024-10-05
Payer: MEDICARE

## 2024-10-05 PROBLEM — R53.1 RIGHT SIDED WEAKNESS: Status: ACTIVE | Noted: 2024-10-05

## 2024-10-05 LAB
ALBUMIN SERPL BCP-MCNC: 4 G/DL (ref 3.4–5)
ANION GAP SERPL CALC-SCNC: 10 MMOL/L (ref 10–20)
BUN SERPL-MCNC: 16 MG/DL (ref 6–23)
CALCIUM SERPL-MCNC: 9 MG/DL (ref 8.6–10.3)
CHLORIDE SERPL-SCNC: 105 MMOL/L (ref 98–107)
CO2 SERPL-SCNC: 27 MMOL/L (ref 21–32)
CREAT SERPL-MCNC: 0.47 MG/DL (ref 0.5–1.05)
EGFRCR SERPLBLD CKD-EPI 2021: >90 ML/MIN/1.73M*2
ERYTHROCYTE [DISTWIDTH] IN BLOOD BY AUTOMATED COUNT: 13 % (ref 11.5–14.5)
GLUCOSE BLD MANUAL STRIP-MCNC: 222 MG/DL (ref 74–99)
GLUCOSE SERPL-MCNC: 97 MG/DL (ref 74–99)
HCT VFR BLD AUTO: 41.6 % (ref 36–46)
HGB BLD-MCNC: 14.3 G/DL (ref 12–16)
LEVETIRACETAM SERPL-MCNC: 26 UG/ML (ref 10–40)
MAGNESIUM SERPL-MCNC: 1.96 MG/DL (ref 1.6–2.4)
MAGNESIUM SERPL-MCNC: 1.98 MG/DL (ref 1.6–2.4)
MCH RBC QN AUTO: 32.1 PG (ref 26–34)
MCHC RBC AUTO-ENTMCNC: 34.4 G/DL (ref 32–36)
MCV RBC AUTO: 93 FL (ref 80–100)
NRBC BLD-RTO: 0 /100 WBCS (ref 0–0)
PHOSPHATE SERPL-MCNC: 3.1 MG/DL (ref 2.5–4.9)
PLATELET # BLD AUTO: 207 X10*3/UL (ref 150–450)
POTASSIUM SERPL-SCNC: 3.7 MMOL/L (ref 3.5–5.3)
RBC # BLD AUTO: 4.46 X10*6/UL (ref 4–5.2)
SODIUM SERPL-SCNC: 138 MMOL/L (ref 136–145)
VIT B12 SERPL-MCNC: 407 PG/ML (ref 211–911)
WBC # BLD AUTO: 6.8 X10*3/UL (ref 4.4–11.3)

## 2024-10-05 PROCEDURE — 70553 MRI BRAIN STEM W/O & W/DYE: CPT

## 2024-10-05 PROCEDURE — 85027 COMPLETE CBC AUTOMATED: CPT

## 2024-10-05 PROCEDURE — A9575 INJ GADOTERATE MEGLUMI 0.1ML: HCPCS | Performed by: STUDENT IN AN ORGANIZED HEALTH CARE EDUCATION/TRAINING PROGRAM

## 2024-10-05 PROCEDURE — 36415 COLL VENOUS BLD VENIPUNCTURE: CPT

## 2024-10-05 PROCEDURE — 99222 1ST HOSP IP/OBS MODERATE 55: CPT

## 2024-10-05 PROCEDURE — 84100 ASSAY OF PHOSPHORUS: CPT

## 2024-10-05 PROCEDURE — 80177 DRUG SCRN QUAN LEVETIRACETAM: CPT | Mod: PARLAB

## 2024-10-05 PROCEDURE — 2550000001 HC RX 255 CONTRASTS: Performed by: STUDENT IN AN ORGANIZED HEALTH CARE EDUCATION/TRAINING PROGRAM

## 2024-10-05 PROCEDURE — 2500000004 HC RX 250 GENERAL PHARMACY W/ HCPCS (ALT 636 FOR OP/ED)

## 2024-10-05 PROCEDURE — 72157 MRI CHEST SPINE W/O & W/DYE: CPT

## 2024-10-05 PROCEDURE — 70553 MRI BRAIN STEM W/O & W/DYE: CPT | Performed by: RADIOLOGY

## 2024-10-05 PROCEDURE — 83735 ASSAY OF MAGNESIUM: CPT

## 2024-10-05 PROCEDURE — 72156 MRI NECK SPINE W/O & W/DYE: CPT | Performed by: RADIOLOGY

## 2024-10-05 PROCEDURE — 82607 VITAMIN B-12: CPT | Mod: PARLAB

## 2024-10-05 PROCEDURE — 72156 MRI NECK SPINE W/O & W/DYE: CPT

## 2024-10-05 PROCEDURE — 82947 ASSAY GLUCOSE BLOOD QUANT: CPT

## 2024-10-05 PROCEDURE — 72157 MRI CHEST SPINE W/O & W/DYE: CPT | Performed by: RADIOLOGY

## 2024-10-05 PROCEDURE — 2500000001 HC RX 250 WO HCPCS SELF ADMINISTERED DRUGS (ALT 637 FOR MEDICARE OP)

## 2024-10-05 PROCEDURE — 99223 1ST HOSP IP/OBS HIGH 75: CPT | Performed by: NURSE PRACTITIONER

## 2024-10-05 PROCEDURE — 1200000002 HC GENERAL ROOM WITH TELEMETRY DAILY

## 2024-10-05 RX ORDER — NAPROXEN SODIUM 220 MG/1
81 TABLET, FILM COATED ORAL DAILY
Status: DISCONTINUED | OUTPATIENT
Start: 2024-10-05 | End: 2024-10-06 | Stop reason: HOSPADM

## 2024-10-05 RX ORDER — ACETAMINOPHEN 325 MG/1
650 TABLET ORAL EVERY 4 HOURS PRN
Status: DISCONTINUED | OUTPATIENT
Start: 2024-10-05 | End: 2024-10-06 | Stop reason: HOSPADM

## 2024-10-05 RX ORDER — POLYETHYLENE GLYCOL 3350 17 G/17G
17 POWDER, FOR SOLUTION ORAL DAILY
Status: DISCONTINUED | OUTPATIENT
Start: 2024-10-05 | End: 2024-10-06 | Stop reason: HOSPADM

## 2024-10-05 RX ORDER — ERGOCALCIFEROL 1.25 MG/1
1250 CAPSULE ORAL
Status: DISCONTINUED | OUTPATIENT
Start: 2024-10-06 | End: 2024-10-06 | Stop reason: HOSPADM

## 2024-10-05 RX ORDER — CHOLECALCIFEROL (VITAMIN D3) 25 MCG
1000 TABLET ORAL DAILY
Status: DISCONTINUED | OUTPATIENT
Start: 2024-10-05 | End: 2024-10-06 | Stop reason: HOSPADM

## 2024-10-05 RX ORDER — ATORVASTATIN CALCIUM 40 MG/1
40 TABLET, FILM COATED ORAL DAILY
Status: DISCONTINUED | OUTPATIENT
Start: 2024-10-05 | End: 2024-10-06 | Stop reason: HOSPADM

## 2024-10-05 RX ORDER — ENOXAPARIN SODIUM 100 MG/ML
40 INJECTION SUBCUTANEOUS EVERY 24 HOURS
Status: DISCONTINUED | OUTPATIENT
Start: 2024-10-05 | End: 2024-10-06 | Stop reason: HOSPADM

## 2024-10-05 RX ORDER — ACETAMINOPHEN 650 MG/1
650 SUPPOSITORY RECTAL EVERY 4 HOURS PRN
Status: DISCONTINUED | OUTPATIENT
Start: 2024-10-05 | End: 2024-10-06 | Stop reason: HOSPADM

## 2024-10-05 RX ORDER — LEVETIRACETAM 500 MG/1
750 TABLET ORAL 2 TIMES DAILY
Status: DISCONTINUED | OUTPATIENT
Start: 2024-10-05 | End: 2024-10-06 | Stop reason: HOSPADM

## 2024-10-05 RX ORDER — TALC
6 POWDER (GRAM) TOPICAL NIGHTLY PRN
Status: DISCONTINUED | OUTPATIENT
Start: 2024-10-05 | End: 2024-10-06 | Stop reason: HOSPADM

## 2024-10-05 RX ORDER — LANOLIN ALCOHOL/MO/W.PET/CERES
100 CREAM (GRAM) TOPICAL DAILY
Status: DISCONTINUED | OUTPATIENT
Start: 2024-10-05 | End: 2024-10-06 | Stop reason: HOSPADM

## 2024-10-05 RX ORDER — GADOTERATE MEGLUMINE 376.9 MG/ML
12 INJECTION INTRAVENOUS
Status: COMPLETED | OUTPATIENT
Start: 2024-10-05 | End: 2024-10-05

## 2024-10-05 RX ORDER — ACETAMINOPHEN 160 MG/5ML
650 SOLUTION ORAL EVERY 4 HOURS PRN
Status: DISCONTINUED | OUTPATIENT
Start: 2024-10-05 | End: 2024-10-06 | Stop reason: HOSPADM

## 2024-10-05 RX ADMIN — LEVETIRACETAM 750 MG: 500 TABLET, FILM COATED ORAL at 02:56

## 2024-10-05 RX ADMIN — LEVETIRACETAM 750 MG: 500 TABLET, FILM COATED ORAL at 10:30

## 2024-10-05 RX ADMIN — Medication 1000 UNITS: at 10:30

## 2024-10-05 RX ADMIN — LEVETIRACETAM 750 MG: 500 TABLET, FILM COATED ORAL at 22:02

## 2024-10-05 RX ADMIN — ATORVASTATIN CALCIUM 40 MG: 40 TABLET, FILM COATED ORAL at 10:29

## 2024-10-05 RX ADMIN — ASPIRIN 81 MG CHEWABLE TABLET 81 MG: 81 TABLET CHEWABLE at 10:29

## 2024-10-05 RX ADMIN — ENOXAPARIN SODIUM 40 MG: 40 INJECTION SUBCUTANEOUS at 02:57

## 2024-10-05 RX ADMIN — GADOTERATE MEGLUMINE 12 ML: 376.9 INJECTION INTRAVENOUS at 15:10

## 2024-10-05 RX ADMIN — THIAMINE HCL TAB 100 MG 100 MG: 100 TAB at 10:28

## 2024-10-05 RX ADMIN — SODIUM CHLORIDE 1000 MG: 9 INJECTION, SOLUTION INTRAVENOUS at 02:57

## 2024-10-05 SDOH — HEALTH STABILITY: MENTAL HEALTH: HOW OFTEN DO YOU HAVE 6 OR MORE DRINKS ON ONE OCCASION?: NEVER

## 2024-10-05 SDOH — SOCIAL STABILITY: SOCIAL INSECURITY: HAVE YOU HAD THOUGHTS OF HARMING ANYONE ELSE?: NO

## 2024-10-05 SDOH — SOCIAL STABILITY: SOCIAL INSECURITY: HAS ANYONE EVER THREATENED TO HURT YOUR FAMILY OR YOUR PETS?: NO

## 2024-10-05 SDOH — HEALTH STABILITY: MENTAL HEALTH: HOW OFTEN DO YOU HAVE SIX OR MORE DRINKS ON ONE OCCASION?: NEVER

## 2024-10-05 SDOH — SOCIAL STABILITY: SOCIAL INSECURITY: WITHIN THE LAST YEAR, HAVE YOU BEEN HUMILIATED OR EMOTIONALLY ABUSED IN OTHER WAYS BY YOUR PARTNER OR EX-PARTNER?: NO

## 2024-10-05 SDOH — SOCIAL STABILITY: SOCIAL INSECURITY
WITHIN THE LAST YEAR, HAVE YOU BEEN RAPED OR FORCED TO HAVE ANY KIND OF SEXUAL ACTIVITY BY YOUR PARTNER OR EX-PARTNER?: NO

## 2024-10-05 SDOH — HEALTH STABILITY: PHYSICAL HEALTH: ON AVERAGE, HOW MANY DAYS PER WEEK DO YOU ENGAGE IN MODERATE TO STRENUOUS EXERCISE (LIKE A BRISK WALK)?: 0 DAYS

## 2024-10-05 SDOH — ECONOMIC STABILITY: TRANSPORTATION INSECURITY: IN THE PAST 12 MONTHS, HAS LACK OF TRANSPORTATION KEPT YOU FROM MEDICAL APPOINTMENTS OR FROM GETTING MEDICATIONS?: NO

## 2024-10-05 SDOH — ECONOMIC STABILITY: FOOD INSECURITY: WITHIN THE PAST 12 MONTHS, YOU WORRIED THAT YOUR FOOD WOULD RUN OUT BEFORE YOU GOT MONEY TO BUY MORE.: NEVER TRUE

## 2024-10-05 SDOH — ECONOMIC STABILITY: HOUSING INSECURITY: IN THE PAST 12 MONTHS, HOW MANY TIMES HAVE YOU MOVED WHERE YOU WERE LIVING?: 1

## 2024-10-05 SDOH — ECONOMIC STABILITY: INCOME INSECURITY: IN THE PAST 12 MONTHS HAS THE ELECTRIC, GAS, OIL, OR WATER COMPANY THREATENED TO SHUT OFF SERVICES IN YOUR HOME?: NO

## 2024-10-05 SDOH — ECONOMIC STABILITY: HOUSING INSECURITY: AT ANY TIME IN THE PAST 12 MONTHS, WERE YOU HOMELESS OR LIVING IN A SHELTER (INCLUDING NOW)?: NO

## 2024-10-05 SDOH — ECONOMIC STABILITY: INCOME INSECURITY: IN THE PAST 12 MONTHS, HAS THE ELECTRIC, GAS, OIL, OR WATER COMPANY THREATENED TO SHUT OFF SERVICE IN YOUR HOME?: NO

## 2024-10-05 SDOH — HEALTH STABILITY: MENTAL HEALTH
HOW OFTEN DO YOU NEED TO HAVE SOMEONE HELP YOU WHEN YOU READ INSTRUCTIONS, PAMPHLETS, OR OTHER WRITTEN MATERIAL FROM YOUR DOCTOR OR PHARMACY?: NEVER

## 2024-10-05 SDOH — ECONOMIC STABILITY: FOOD INSECURITY: WITHIN THE PAST 12 MONTHS, THE FOOD YOU BOUGHT JUST DIDN'T LAST AND YOU DIDN'T HAVE MONEY TO GET MORE.: NEVER TRUE

## 2024-10-05 SDOH — ECONOMIC STABILITY: INCOME INSECURITY: IN THE LAST 12 MONTHS, WAS THERE A TIME WHEN YOU WERE NOT ABLE TO PAY THE MORTGAGE OR RENT ON TIME?: NO

## 2024-10-05 SDOH — ECONOMIC STABILITY: FOOD INSECURITY: WITHIN THE PAST 12 MONTHS, YOU WORRIED THAT YOUR FOOD WOULD RUN OUT BEFORE YOU GOT THE MONEY TO BUY MORE.: NEVER TRUE

## 2024-10-05 SDOH — SOCIAL STABILITY: SOCIAL INSECURITY: WITHIN THE LAST YEAR, HAVE YOU BEEN AFRAID OF YOUR PARTNER OR EX-PARTNER?: NO

## 2024-10-05 SDOH — HEALTH STABILITY: MENTAL HEALTH: HOW MANY DRINKS CONTAINING ALCOHOL DO YOU HAVE ON A TYPICAL DAY WHEN YOU ARE DRINKING?: PATIENT DOES NOT DRINK

## 2024-10-05 SDOH — SOCIAL STABILITY: SOCIAL INSECURITY: ARE THERE ANY APPARENT SIGNS OF INJURIES/BEHAVIORS THAT COULD BE RELATED TO ABUSE/NEGLECT?: NO

## 2024-10-05 SDOH — SOCIAL STABILITY: SOCIAL INSECURITY: ABUSE: ADULT

## 2024-10-05 SDOH — HEALTH STABILITY: PHYSICAL HEALTH: ON AVERAGE, HOW MANY MINUTES DO YOU ENGAGE IN EXERCISE AT THIS LEVEL?: 0 MIN

## 2024-10-05 SDOH — ECONOMIC STABILITY: HOUSING INSECURITY: IN THE LAST 12 MONTHS, WAS THERE A TIME WHEN YOU WERE NOT ABLE TO PAY THE MORTGAGE OR RENT ON TIME?: NO

## 2024-10-05 SDOH — SOCIAL STABILITY: SOCIAL INSECURITY: DOES ANYONE TRY TO KEEP YOU FROM HAVING/CONTACTING OTHER FRIENDS OR DOING THINGS OUTSIDE YOUR HOME?: NO

## 2024-10-05 SDOH — SOCIAL STABILITY: SOCIAL INSECURITY: WERE YOU ABLE TO COMPLETE ALL THE BEHAVIORAL HEALTH SCREENINGS?: YES

## 2024-10-05 SDOH — ECONOMIC STABILITY: FOOD INSECURITY: HOW HARD IS IT FOR YOU TO PAY FOR THE VERY BASICS LIKE FOOD, HOUSING, MEDICAL CARE, AND HEATING?: NOT VERY HARD

## 2024-10-05 SDOH — SOCIAL STABILITY: SOCIAL INSECURITY: DO YOU FEEL ANYONE HAS EXPLOITED OR TAKEN ADVANTAGE OF YOU FINANCIALLY OR OF YOUR PERSONAL PROPERTY?: NO

## 2024-10-05 SDOH — SOCIAL STABILITY: SOCIAL INSECURITY
WITHIN THE LAST YEAR, HAVE TO BEEN RAPED OR FORCED TO HAVE ANY KIND OF SEXUAL ACTIVITY BY YOUR PARTNER OR EX-PARTNER?: NO

## 2024-10-05 SDOH — HEALTH STABILITY: MENTAL HEALTH: HOW OFTEN DO YOU HAVE A DRINK CONTAINING ALCOHOL?: NEVER

## 2024-10-05 SDOH — SOCIAL STABILITY: SOCIAL INSECURITY
WITHIN THE LAST YEAR, HAVE YOU BEEN KICKED, HIT, SLAPPED, OR OTHERWISE PHYSICALLY HURT BY YOUR PARTNER OR EX-PARTNER?: NO

## 2024-10-05 SDOH — ECONOMIC STABILITY: INCOME INSECURITY: HOW HARD IS IT FOR YOU TO PAY FOR THE VERY BASICS LIKE FOOD, HOUSING, MEDICAL CARE, AND HEATING?: NOT VERY HARD

## 2024-10-05 SDOH — ECONOMIC STABILITY: TRANSPORTATION INSECURITY
IN THE PAST 12 MONTHS, HAS LACK OF TRANSPORTATION KEPT YOU FROM MEETINGS, WORK, OR FROM GETTING THINGS NEEDED FOR DAILY LIVING?: NO

## 2024-10-05 SDOH — ECONOMIC STABILITY: TRANSPORTATION INSECURITY
IN THE PAST 12 MONTHS, HAS THE LACK OF TRANSPORTATION KEPT YOU FROM MEDICAL APPOINTMENTS OR FROM GETTING MEDICATIONS?: NO

## 2024-10-05 SDOH — SOCIAL STABILITY: SOCIAL INSECURITY: HAVE YOU HAD ANY THOUGHTS OF HARMING ANYONE ELSE?: NO

## 2024-10-05 SDOH — HEALTH STABILITY: MENTAL HEALTH: HOW MANY STANDARD DRINKS CONTAINING ALCOHOL DO YOU HAVE ON A TYPICAL DAY?: PATIENT DOES NOT DRINK

## 2024-10-05 SDOH — SOCIAL STABILITY: SOCIAL INSECURITY: DO YOU FEEL UNSAFE GOING BACK TO THE PLACE WHERE YOU ARE LIVING?: NO

## 2024-10-05 SDOH — SOCIAL STABILITY: SOCIAL INSECURITY: ARE YOU OR HAVE YOU BEEN THREATENED OR ABUSED PHYSICALLY, EMOTIONALLY, OR SEXUALLY BY ANYONE?: NO

## 2024-10-05 ASSESSMENT — LIFESTYLE VARIABLES
AUDIT-C TOTAL SCORE: 0
HOW OFTEN DO YOU HAVE A DRINK CONTAINING ALCOHOL: NEVER
AUDIT-C TOTAL SCORE: 0
HOW MANY STANDARD DRINKS CONTAINING ALCOHOL DO YOU HAVE ON A TYPICAL DAY: PATIENT DOES NOT DRINK
AUDIT-C TOTAL SCORE: 0
SKIP TO QUESTIONS 9-10: 1
SKIP TO QUESTIONS 9-10: 1
HOW OFTEN DO YOU HAVE 6 OR MORE DRINKS ON ONE OCCASION: NEVER

## 2024-10-05 ASSESSMENT — COGNITIVE AND FUNCTIONAL STATUS - GENERAL
DRESSING REGULAR UPPER BODY CLOTHING: TOTAL
PERSONAL GROOMING: A LOT
STANDING UP FROM CHAIR USING ARMS: TOTAL
DAILY ACTIVITIY SCORE: 9
MOBILITY SCORE: 8
HELP NEEDED FOR BATHING: TOTAL
WALKING IN HOSPITAL ROOM: TOTAL
TURNING FROM BACK TO SIDE WHILE IN FLAT BAD: TOTAL
WALKING IN HOSPITAL ROOM: TOTAL
MOVING TO AND FROM BED TO CHAIR: TOTAL
TURNING FROM BACK TO SIDE WHILE IN FLAT BAD: A LOT
PATIENT BASELINE BEDBOUND: NO
DRESSING REGULAR UPPER BODY CLOTHING: TOTAL
HELP NEEDED FOR BATHING: TOTAL
CLIMB 3 TO 5 STEPS WITH RAILING: TOTAL
STANDING UP FROM CHAIR USING ARMS: TOTAL
MOVING FROM LYING ON BACK TO SITTING ON SIDE OF FLAT BED WITH BEDRAILS: A LOT
MOBILITY SCORE: 8
MOVING FROM LYING ON BACK TO SITTING ON SIDE OF FLAT BED WITH BEDRAILS: TOTAL
DAILY ACTIVITIY SCORE: 12
STANDING UP FROM CHAIR USING ARMS: TOTAL
EATING MEALS: A LITTLE
EATING MEALS: A LITTLE
TOILETING: TOTAL
WALKING IN HOSPITAL ROOM: TOTAL
TOILETING: TOTAL
MOVING TO AND FROM BED TO CHAIR: TOTAL
DRESSING REGULAR LOWER BODY CLOTHING: TOTAL
HELP NEEDED FOR BATHING: TOTAL
TOILETING: A LOT
MOVING TO AND FROM BED TO CHAIR: TOTAL
PERSONAL GROOMING: A LOT
PERSONAL GROOMING: A LITTLE
DRESSING REGULAR LOWER BODY CLOTHING: TOTAL
MOBILITY SCORE: 6
EATING MEALS: A LITTLE
CLIMB 3 TO 5 STEPS WITH RAILING: TOTAL
DRESSING REGULAR UPPER BODY CLOTHING: A LOT
DAILY ACTIVITIY SCORE: 9
DRESSING REGULAR LOWER BODY CLOTHING: TOTAL
TURNING FROM BACK TO SIDE WHILE IN FLAT BAD: A LOT
MOVING FROM LYING ON BACK TO SITTING ON SIDE OF FLAT BED WITH BEDRAILS: A LOT
CLIMB 3 TO 5 STEPS WITH RAILING: TOTAL

## 2024-10-05 ASSESSMENT — ACTIVITIES OF DAILY LIVING (ADL)
LACK_OF_TRANSPORTATION: NO
FEEDING YOURSELF: NEEDS ASSISTANCE
ADEQUATE_TO_COMPLETE_ADL: YES
HEARING - RIGHT EAR: FUNCTIONAL
JUDGMENT_ADEQUATE_SAFELY_COMPLETE_DAILY_ACTIVITIES: YES
ASSISTIVE_DEVICE: WALKER;WHEELCHAIR
BATHING: NEEDS ASSISTANCE
GROOMING: NEEDS ASSISTANCE
WALKS IN HOME: NEEDS ASSISTANCE
TOILETING: NEEDS ASSISTANCE
HEARING - LEFT EAR: FUNCTIONAL
DRESSING YOURSELF: NEEDS ASSISTANCE
PATIENT'S MEMORY ADEQUATE TO SAFELY COMPLETE DAILY ACTIVITIES?: YES

## 2024-10-05 ASSESSMENT — PAIN - FUNCTIONAL ASSESSMENT
PAIN_FUNCTIONAL_ASSESSMENT: 0-10

## 2024-10-05 ASSESSMENT — PAIN SCALES - GENERAL
PAINLEVEL_OUTOF10: 0 - NO PAIN

## 2024-10-05 ASSESSMENT — PATIENT HEALTH QUESTIONNAIRE - PHQ9
2. FEELING DOWN, DEPRESSED OR HOPELESS: NOT AT ALL
SUM OF ALL RESPONSES TO PHQ9 QUESTIONS 1 & 2: 0
1. LITTLE INTEREST OR PLEASURE IN DOING THINGS: NOT AT ALL

## 2024-10-05 ASSESSMENT — VISUAL ACUITY: VA_NORMAL: 1

## 2024-10-05 ASSESSMENT — PAIN SCALES - WONG BAKER: WONGBAKER_NUMERICALRESPONSE: NO HURT

## 2024-10-05 NOTE — H&P
Subjective/History     Akiko Peng is a 68 y.o. female with a history of multiple sclerosis, rheumatoid arthritis, seizures presents with right side upper and lower extremity weakness. Patient says she noticed weakness early yesterday afternoon. She believes the symptoms are similar to MS flare ups that she has had in the past. Endorses vision changes in right eye resulting in occasional double vision. Denies falls, numbness/tingling, jerking movements, confusion, aphasia. Says that she has had lower extremity weakness for a while because of her MS but she is normally able to ambulate with the help of a cane. Patient says that she is compliant with her medications. Says that she took her prescribed medication for MS flares yesterday morning. In the ED vitals were stable. Labs were unremarkable. EKG showed normal sinus rhythm. CXR unremarkable. CT head shows cerebral atrophy and chronic periventricular white matter small vessel ischemic change. There was no evidence of acute intracranial hemorrhage. Patient was recently in the ED a few weeks ago for SOB suspected to be due to Pneumonia. Patient denies SOB, chest pain, abdominal pain, urinary/bowel changes.    Surgical history: As below  Family history: As below  Risk/Social factors: Denies smoking, alcohol, or illicit drug use, lives at home with      Past Medical History:   Diagnosis Date    Multiple sclerosis (Multi)     History of multiple sclerosis       Past Surgical History:   Procedure Laterality Date    MR HEAD ANGIO WO IV CONTRAST  9/21/2020    MR HEAD ANGIO WO IV CONTRAST 9/21/2020 PAR EMERGENCY LEGACY    MR HEAD ANGIO WO IV CONTRAST  10/24/2022    MR HEAD ANGIO WO IV CONTRAST 10/24/2022 [facility]CTOR OFFICE [facility]GAC[facility]    MR NECK ANGIO WO IV CONTRAST  9/21/2020    MR NECK ANGIO WO IV CONTRAST 9/21/2020 PAR [facility]GENCY LEGACY    MR NECK ANGIO WO IV CONTRAST  10/24/2022    MR NECK ANGIO WO IV CONTRAST 10/24/2022 [facility]CTOR OFFICE [facility]GAC[facility]    OTHER SURGICAL HISTORY   "11/26/2019    Tonsillectomy    OTHER SURGICAL HISTORY  11/26/2019    Hysterectomy       Family history: ALS and MS.    Objective     Physical Exam:  General:  Pleasant and cooperative. No apparent distress.  HEENT:  Normocephalic, atraumatic  Chest:  Clear to auscultation bilaterally. No wheezes, rales, or rhonchi.  CV:  Regular rate and rhythm. No murmurs appreciated.    Abdomen: Abdomen is soft, non-tender, non-distended. BS +   Extremities:  No lower extremity edema or cyanosis. Patient has chronic lower extremity weakness. Unable to lift right arm and leg up on command.  Neurological: AAOx3, CN II - XII intact.   Skin:  Warm and dry.    Last Recorded Vitals  Blood pressure 124/50, pulse 79, temperature 37.3 °C (99.1 °F), resp. rate 20, height 1.778 m (5' 10\"), weight 63.5 kg (139 lb 15.9 oz), SpO2 94%.  Intake/Output last 3 Shifts:  No intake/output data recorded.    Last CBC & BMP  Lab Results   Component Value Date    GLUCOSE 86 10/04/2024    CALCIUM 9.2 10/04/2024     10/04/2024    K 4.1 10/04/2024    CO2 27 10/04/2024     10/04/2024    BUN 21 10/04/2024    CREATININE 0.49 (L) 10/04/2024     Lab Results   Component Value Date    WBC 9.9 10/04/2024    HGB 14.9 10/04/2024    HCT 43.5 10/04/2024    MCV 94 10/04/2024     10/04/2024         Assessment / Plan        Akiko Peng is a 68 y.o. female with a history of multiple sclerosis, rheumatoid arthritis, seizures presents with right-sided upper and lower extremity weakness.    #TIA VS MS Flare  #Right-sided upper and lower extremity weakness  -Echo 5/16/24: LVEF 60-65%. No regional wall motion abnormalities.   -CT head: shows cerebral atrophy and chronic periventricular white matter small vessel ischemic change. There was no evidence of acute intracranial hemorrhage.  -NIH +6 on examination patient presented with right upper and bilateral lower extremity weakness, however, patient has chronic lower extremity weakness from MS.   Plan:  - IV " methylprednisolone ordered  - MRI Brain ordered  - Consult Neurology  - Seizure and Fall Precautions  - PT/OT     Chronic Medical conditions  # Rheumatoid arthritis   # Seizures  Plan:  - Continue home meds  - Unclear of patient's compliance with Keppra at home. Keppra level ordered.    DVT: Lovenox  IVF: --  Diet: Adult regular diet  Code: Full code  Consults: Neurology       Michael Langley, DO   PGY-1, Internal Medicine  This is a preliminary note, please await attending attestation for final A/P

## 2024-10-05 NOTE — CONSULTS
[facility] Consult Note    Inpatient consult to Neurology  Consult performed by: Consuelo Vargas, TRUPTI-CNP  Consult ordered by: Adams Corona, DO         Subjective   Akiko Peng is a 68 y.o. female on day 0 of admission with a history of MS, RA, and seizures presenting with Right sided weakness. Neurology was consulted for MS flare.    History obtained from patient and chart review.     Pt presented for RUE and RLE weakness more than her normal. States this seems to be consistent with MS flares that she has had in the past. Pt is questionable historian overall, states she is on oral medication for her MS but does not know what. Unable to tell me who she sees with neurology. H&P mentioned R vision changes. Pt denies any new vision changes. States she always gets some double vision in her right eye that comes and goes so this was not new for her. Gets around with wheelchair and holding on to things at home. Denies any recent falls. Normally needs to lift her legs with her arms, able to do that today with LLE as normal but RLE flaccid. Denies and sensory changes. No R facial weakness.     Of note, pt was recently seen in the ED 3 days ago for SOB and started on abx for suspected pneumonia.     Per chart review, she has been seen by Dr. Taylor with  Neuro-immunology for her MS, most recent visit February 2024. Per notes, she has not been on DMT for MS since being on Avonex in 2015.     Pt was admitted to [facility] in May after having R sided weakness and aphasia, was given TNK at that time. She had a seizure shortly after getting the IV TNK. Her home Keppra dosing was increased to 750 mg BID (from 500 mg) and she was discharged on 81 mg aspirin daily.     Past Medical History:   Diagnosis Date    Multiple sclerosis (Multi)     History of multiple sclerosis     Past Surgical History:   Procedure Laterality Date    MR HEAD ANGIO WO IV CONTRAST  9/21/2020    MR HEAD ANGIO WO IV CONTRAST 9/21/2020 PAR EMERGENCY  "[facility]    MR HEAD ANGIO WO IV CONTRAST  10/24/2022    MR HEAD ANGIO WO IV CONTRAST 10/24/2022 [facility]    MR NECK ANGIO WO IV CONTRAST  9/21/2020    MR NECK ANGIO WO IV CONTRAST 9/21/2020 PAR [facility]    MR NECK ANGIO WO IV CONTRAST  10/24/2022    MR NECK ANGIO WO IV CONTRAST 10/24/2022 DO[facility]    OTHER SURGICAL HISTORY  11/26/2019    Tonsillectomy    OTHER SURGICAL HISTORY  11/26/2019    Hysterectomy       Social History     Substance and Sexual Activity   Drug Use Never     Tobacco Use: Low Risk  (5/14/2024)    Patient History     Smoking Tobacco Use: Never     Smokeless Tobacco Use: Never     Passive Exposure: Not on file     Alcohol Use: Not At Risk (10/5/2024)    AUDIT-C     Frequency of Alcohol Consumption: Never     Average Number of Drinks: Patient does not drink     Frequency of Binge Drinking: Never       10 point review of systems performed and is negative except as noted in the HPI.        Objective   Vitals:    10/05/24 0321 10/05/24 0343 10/05/24 0825 10/05/24 1131   BP: 124/50 124/50 103/60 110/56   BP Location: Left arm  Left arm Left arm   Patient Position: Lying  Lying Lying   Pulse: 79 79 72 80   Resp: 20 20 19 18   Temp: 37.3 °C (99.1 °F) 37.3 °C (99.1 °F) 36.5 °C (97.7 °F) 36.5 °C (97.7 °F)   TempSrc: Temporal Temporal Tympanic Temporal   SpO2: 94%  96% 98%   Weight: 63.5 kg (139 lb 15.9 oz)      Height: 1.778 m (5' 10\")            Physical Exam  Vitals reviewed.   Eyes:      General: Lids are normal.      Extraocular Movements: Extraocular movements intact.      Pupils: Pupils are equal, round, and reactive to light.   Psychiatric:         Speech: Speech normal.       Neurological Exam  Mental Status  Awake, alert and oriented to person, place and time. Speech is normal. Language is fluent with no aphasia.    Cranial Nerves  CN II: Visual acuity is normal. Visual fields full to confrontation.  CN III, IV, VI: Extraocular movements intact bilaterally. Normal lids " and orbits bilaterally. Pupils equal round and reactive to light bilaterally.  CN V: Facial sensation is normal.  CN VII: Full and symmetric facial movement.  CN VIII: Hearing is normal.  CN IX, X: Palate elevates symmetrically. Normal gag reflex.  CN XI: Shoulder shrug strength is normal.  CN XII: Tongue midline without atrophy or fasciculations.    Motor  Decreased muscle bulk throughout. No fasciculations present. Decreased muscle tone. RLE. No abnormal involuntary movements.  Strength 5/5 to LUE, 4/5 to RUE, 2+/5 to proximal LLE and 5-/5 distal LLE, 1/5 to entire RLE.    Sensory  Light touch is normal in upper and lower extremities.     Coordination  Left: Finger-to-nose normal.    Gait    Not tested, fall risk.      Scheduled medications  aspirin, 81 mg, oral, Daily  atorvastatin, 40 mg, oral, Daily  cholecalciferol, 1,000 Units, oral, Daily  enoxaparin, 40 mg, subcutaneous, q24h  [START ON 10/6/2024] ergocalciferol, 1,250 mcg, oral, Every Sunday  levETIRAcetam, 750 mg, oral, BID  polyethylene glycol, 17 g, oral, Daily  thiamine, 100 mg, oral, Daily      Continuous medications     PRN medications  PRN medications: acetaminophen **OR** acetaminophen **OR** acetaminophen, melatonin     Lab Results   Component Value Date    WBC 6.8 10/05/2024    RBC 4.46 10/05/2024    HGB 14.3 10/05/2024    HCT 41.6 10/05/2024     10/05/2024     10/05/2024    K 3.7 10/05/2024     10/05/2024    BUN 16 10/05/2024    CREATININE 0.47 (L) 10/05/2024    EGFR >90 10/05/2024    CALCIUM 9.0 10/05/2024    ALKPHOS 76 10/04/2024    AST 12 10/04/2024    ALT 9 10/04/2024    MG 1.98 10/05/2024    LYITKBAD61 407 10/05/2024    HGBA1C 4.8 05/14/2024    LDLCALC 97 05/14/2024    CHOL 159 05/14/2024    HDL 52.5 05/14/2024    TRIG 46 05/14/2024    TSH 2.04 09/20/2020       Below CT and MRI images and reports have been personally reviewed in PACS, agree with interpretations.    CT head wo IV contrast  10/04/2024    Narrative  Interpreted By:  Abe Baer,  STUDY:  CT HEAD WO IV CONTRAST;  10/4/2024 9:34 pm    INDICATION:  Signs/Symptoms:Right-sided weakness.    COMPARISON:  9/1/2024    ACCESSION NUMBER(S):  [id number]    ORDERING CLINICIAN:  DOMINGO CABRERA    TECHNIQUE:  Contiguous axial images of the head were obtained without intravenous  contrast.    FINDINGS:  BRAIN PARENCHYMA:  There is cerebral atrophy and chronic  periventricular white matter small vessel ischemic change. The gray  white matter differentiation is preserved.  No mass effect or midline  shift.    HEMORRHAGE:  No evidence of acute intracranial hemorrhage.  VENTRICLES AND EXTRA-AXIAL SPACES:  The ventricles are within normal  limits in size for brain volume.  No evidence of abnormal extraaxial  fluid collection. EXTRACRANIAL SOFT TISSUES:  Within normal limits.  PARANASAL SINUSES/MASTOIDS:  The visualized paranasal sinuses and  mastoid air cells are clear and well pneumatized. CALVARIUM:  No  evidence of depressed calvarial fracture.    OTHER FINDINGS:  None    Impression  Cerebral atrophy and chronic periventricular white matter small  vessel ischemic change.    No evidence of acute intracranial hemorrhage.    If there is persistent concern for acute intracranial process, MRI  may be obtained for further evaluation as clinically indicated.        MACRO:  None    Signed by: Abe Baer 10/4/2024 10:09 PM  Dictation workstation:   [id number]      CT head wo IV contrast 09/01/2024    Narrative  Interpreted By:  Dano Coleman,  STUDY:  CT HEAD WO IV CONTRAST;  9/1/2024 7:24 pm    INDICATION:  Signs/Symptoms:generalized weakness/MS flare.    COMPARISON:  05/14/2024    ACCESSION NUMBER(S):  [id number]    ORDERING CLINICIAN:  ELYSE KLERMAN    TECHNIQUE:  Noncontrast axial CT images of head were obtained with coronal and  sagittal reconstructed images.    FINDINGS:  BRAIN PARENCHYMA: Moderate periventricular and  subcortical  hemispheric white matter hypodensities are most compatible with  chronic small vessel ischemic disease. No acute intraparenchymal  hemorrhage or parenchymal evidence of acute large territory ischemic  infarct. No mass-effect. Gray-white matter distinction is preserved.    VENTRICLES and EXTRA-AXIAL SPACES:  No acute extra-axial or  intraventricular hemorrhage. No effacement of cerebral sulci. The  ventricles and sulci are disproportionately prominent for age due to  cerebral and cerebellar volume loss.    PARANASAL SINUSES/MASTOIDS:  No hemorrhage or air-fluid levels within  the visualized paranasal sinuses. The mastoids are well aerated.    CALVARIUM/ORBITS:  No skull fracture.  The orbits and globes are  intact to the extent visualized.    EXTRACRANIAL SOFT TISSUES: No discernible abnormality.    Impression  No acute intracranial abnormality.    Age disproportionate cerebral and cerebellar volume loss and moderate  chronic small vessel ischemic changes.    MACRO:  None.    Signed by: Dano Coleman 9/1/2024 7:57 PM  Dictation workstation:   [id number]      NIH Stroke Scale  1A. Level of Consciousness: Alert, Keenly Responsive  1B. Ask Month and Age: Both Questions Right  1C. Blink Eyes & Squeeze Hands: Performs Both Tasks  2. Best Gaze: Normal  3. Visual: No Visual Loss  4. Facial Palsy: Normal Symmetrical Movements  5A. Motor - Left Arm: No Drift  5B. Motor - Right Arm: Drift  6A. Motor - Left Leg: Some Effort Against Gravity  6B. Motor - Right Leg: No Effort Against [place]  7. Limb Ataxia: Absent  8. Sensory Loss: Normal  9. Best Language: No Aphasia  10. Dysarthria: Normal  11. Extinction and Inattention: No Abnormality  NIH Stroke Scale: 6      Tabitha Coma Scale  Best Eye Response: Spontaneous  Best Verbal Response: Oriented  Best Motor Response: Follows commands  Tabitha Coma Scale Score: 15        Assessment/Plan   This patient currently has cardiac telemetry ordered; if you would like to  modify or discontinue the telemetry order, click here to go to the orders activity to modify/discontinue the order.  Principal Problem:    Right sided weakness      IMPRESSION:  Akiko Peng is a 68 y.o. female with a history of MS, RA, and seizures presenting with Right sided weakness. Neurology was consulted for MS flare.    RUE<RLE weakness, acutely worse  Pt states hx of BLE weakness but almost no movement to RLE which is new for her. Per chart review, multiple episodes of R sided weakness this year.   DDx includes CVA vs TIA vs seizure with Jens's Paralysis vs MS flare  B12 [id number]  Hx of MS, not on DMT  Hx of seizure disorder  On Keppra 750 mg BID, keppra level wnl at 26    RECOMMENDATIONS:  Continue supportive care  MRI brain, c-spine, and t-spine wwo contrast - discussed with patient, agreeable to testing  Defer IV steroids until/unless flare confirmed on MRI  Defer vessel imaging for now, had CTAs done in May with similar presentation  Continue vascular risk factor monitoring and management.   Continue home 81 mg aspirin and home Keppra 750 mg BID.  PT/OT consults pending. Pt was recommended for rehab with prior admissions and declined.  Continue HI statin  Will need to follow up with Dr. Taylor on discharge for her MS management    Discussed with patient, all questions answered.   Will continue to follow.        I personally spent 75 minutes today, exclusive of procedures, providing care for this patient, including preparation, face to face time, documentation and other services such as review of medical records, diagnostic result, patient education, counseling, coordination of care as specified in the encounter.    Consuelo Vargas, APRN-CNP

## 2024-10-05 NOTE — CARE PLAN
The patient's goals for the shift include rest    The clinical goals for the shift include stable neuro checks    Over the shift, the patient did not make progress toward the following goals. Barriers to progression include ***. Recommendations to address these barriers include ***.

## 2024-10-05 NOTE — CARE PLAN
Problem: Skin  Goal: Participates in plan/prevention/treatment measures  Outcome: Progressing  Flowsheets (Taken 10/5/2024 1118)  Participates in plan/prevention/treatment measures: Discuss with provider PT/OT consult  Goal: Prevent/manage excess moisture  Outcome: Progressing  Flowsheets (Taken 10/5/2024 1118)  Prevent/manage excess moisture: Moisturize dry skin  Goal: Prevent/minimize sheer/friction injuries  Outcome: Progressing  Flowsheets (Taken 10/5/2024 1118)  Prevent/minimize sheer/friction injuries: Use pull sheet  Goal: Promote/optimize nutrition  Outcome: Progressing  Flowsheets (Taken 10/5/2024 1118)  Promote/optimize nutrition: Consume > 50% meals/supplements  Goal: Promote skin healing  Outcome: Progressing  Flowsheets (Taken 10/5/2024 1118)  Promote skin healing: Assess skin/pad under line(s)/device(s)     Problem: Fall/Injury  Goal: Not fall by end of shift  Outcome: Progressing  Goal: Be free from injury by end of the shift  Outcome: Progressing  Goal: Verbalize understanding of personal risk factors for fall in the hospital  Outcome: Progressing  Goal: Verbalize understanding of risk factor reduction measures to prevent injury from fall in the home  Outcome: Progressing  Goal: Use assistive devices by end of the shift  Outcome: Progressing  Goal: Pace activities to prevent fatigue by end of the shift  Outcome: Progressing     Problem: Pain - Adult  Goal: Verbalizes/displays adequate comfort level or baseline comfort level  Outcome: Progressing     Problem: Safety - Adult  Goal: Free from fall injury  Outcome: Progressing     Problem: Discharge Planning  Goal: Discharge to home or other facility with appropriate resources  Outcome: Progressing     Problem: Chronic Conditions and Co-morbidities  Goal: Patient's chronic conditions and co-morbidity symptoms are monitored and maintained or improved  Outcome: Progressing   The patient's goals for the shift include rest    The clinical goals for the  shift include stable neuro checks    Over the shift, the patient did not make progress toward the following goals. Barriers to progression include motivation. Recommendations to address these barriers include reinforcement.

## 2024-10-05 NOTE — ED PROVIDER NOTES
EMERGENCY DEPARTMENT ENCOUNTER      Pt Name: Akiko Peng  MRN: [id number]  Birthdate 1955  Date of evaluation: 10/4/2024  Provider: Adams Corona DO    CHIEF COMPLAINT       Chief Complaint   Patient presents with    Extremity Weakness       HISTORY OF PRESENT ILLNESS    Akiko Peng is a 68 y.o. female who presents to the emergency department with EMS for right-sided weakness.  Patient states that she woke up this way.  She typically does have lower extremity weakness secondary to her MS.  She does note new right upper extremity weakness and more profound right lower extremity weakness.  Denies any history of CVA although there was mention of TIA in her chart review.  Patient denies any difficulty with word finding or vision changes.  No numbness tingling or weakness.  She does have a history of MS for which she has been compliant with all medications.  She is uncertain if this is related to an MS flare or not she denies any difficulty breathing at this time.          Nursing Notes were reviewed.    REVIEW OF SYSTEMS     CONSTITUTIONAL: Denies fever, sweats, chills.   NEURO: Endorses weakness.  Denies numbness, tingling, headache.   HEENT: Denies sore throat, rhinorrhea, changes in vision.   CARDIO: Denies chest pain, palpitations.  PULM: Denies shortness of breath, cough.   GI: Denies abdominal pain, nausea, vomiting, diarrhea, constipation, melena, hematochezia.  : Denies painful urination, frequency, hematuria.   MSK: Denies recent trauma.   SKIN: Denies rash, lesions.   ENDOCRINE: Denies unexpected weight-loss.   HEME: Denies bleeding disorder.     PAST MEDICAL HISTORY     Past Medical History:   Diagnosis Date    Multiple sclerosis (Multi)     History of multiple sclerosis       SURGICAL HISTORY       Past Surgical History:   Procedure Laterality Date    MR HEAD ANGIO WO IV CONTRAST  9/21/2020    MR HEAD ANGIO WO IV CONTRAST 9/21/2020 [facility]    MR HEAD ANGIO WO IV CONTRAST   10/24/2022    MR HEAD ANGIO WO IV CONTRAST 10/24/2022 [facility]    MR NECK ANGIO WO IV CONTRAST  9/21/2020    MR NECK ANGIO WO IV CONTRAST 9/21/2020 [facility]    MR NECK ANGIO WO IV CONTRAST  10/24/2022    MR NECK ANGIO WO IV CONTRAST 10/24/2022 [facility]    OTHER SURGICAL HISTORY  11/26/2019    Tonsillectomy    OTHER SURGICAL HISTORY  11/26/2019    Hysterectomy       ALLERGIES     Patient has no known allergies.    FAMILY HISTORY     No family history on file.     SOCIAL HISTORY       Social History     Socioeconomic History    Marital status:    Tobacco Use    Smoking status: Never    Smokeless tobacco: Never   Vaping Use    Vaping status: Never Used   Substance and Sexual Activity    Alcohol use: Never    Drug use: Never    Sexual activity: Defer     Social Determinants of Health     Financial Resource Strain: Low Risk  (5/15/2024)    Overall Financial Resource Strain (CARDIA)     Difficulty of Paying Living Expenses: Not hard at all   Transportation Needs: No Transportation Needs (9/17/2024)    OASIS : Transportation     Lack of Transportation (Medical): No     Lack of Transportation (Non-Medical): No     Patient Unable or Declines to Respond: No   Social Connections: Feeling Socially Integrated (9/17/2024)    OASIS : Social Isolation     Frequency of experiencing loneliness or isolation: Never   Housing Stability: Low Risk  (5/15/2024)    Housing Stability Vital Sign     Unable to Pay for Housing in the Last Year: No     Number of Places Lived in the Last Year: 1     Unstable Housing in the Last Year: No       PHYSICAL EXAM   VS: As documented in the triage note from today's date and EMR flowsheet were reviewed.  Gen: Well developed. No acute distress. Seated in bed. Appears nontoxic.  GCS 15  Skin: Warm. Dry. Intact. No rashes or lesions.  Eyes: Pupils equally round and reactive to light. Clear sclera. EOMI.  HENT: Atraumatic appearance. Mucosal membranes  moist. No oral lesions, uvula midline, airway patent.   CV: Regular rate and regular rhythm. S1, S2. No pedal edema. Warm extremities.  Resp: Nonlabored breathing Clear to auscultation bilaterally. No increased work of breathing.   GI: Soft and nontender. No rebound or guarding. Bowel sounds x4 present.   MSK: Symmetric muscle bulk. No joint swelling in the extremities. Compartments are soft. Neurovascularly intact x4 extremities with the exception as below. Radial pulses +2 equal bilaterally.  Pulses +2 equal bilaterally.  Neuro: Alert. CN II - XII intact. Speech fluent. Moving all extremities with the exception of.  NIH +6; +2 left lower extremity weakness, +2 right lower extremity weakness, +2 right upper extremity weakness Van negative.  Patient does have chronic lower extremity weakness from MS.  Psych: Appropriate. Kempt.    DIAGNOSTIC RESULTS   RADIOLOGY:   Non-plain film images such as CT, Ultrasound and MRI are read by the radiologist. Plain radiographic images are visualized and preliminarily interpreted by the emergency physician with the below findings: Chest x-ray is clear.  No evidence of pneumonia or widened mediastinum.      Interpretation per the Radiologist below, if available at the time of this note:    CT head wo IV contrast   Final Result   Cerebral atrophy and chronic periventricular white matter small   vessel ischemic change.        No evidence of acute intracranial hemorrhage.        If there is persistent concern for acute intracranial process, MRI   may be obtained for further evaluation as clinically indicated.                  MACRO:   None        Signed by: Abe Baer 10/4/2024 10:09 PM   Dictation workstation:   [id number]      XR chest 1 view   Final Result   No acute cardiopulmonary abnormality.   Signed by Blaine Chowdhury MD            ED BEDSIDE ULTRASOUND:   Performed by ED Physician - none    LABS:  Labs Reviewed   CBC WITH AUTO DIFFERENTIAL - Abnormal       Result Value     WBC 9.9      nRBC 0.0      RBC 4.64      Hemoglobin 14.9      Hematocrit 43.5      MCV 94      MCH 32.1      MCHC 34.3      RDW 13.2      Platelets 219      Neutrophils % 86.5      Immature Granulocytes %, Automated 0.4      Lymphocytes % 4.9      Monocytes % 6.8      Eosinophils % 0.8      Basophils % 0.6      Neutrophils Absolute 8.53 (*)     Immature Granulocytes Absolute, Automated 0.04      Lymphocytes Absolute 0.48 (*)     Monocytes Absolute 0.67      Eosinophils Absolute 0.08      Basophils Absolute 0.06     COMPREHENSIVE METABOLIC PANEL - Abnormal    Glucose 86      Sodium 137      Potassium 4.1      Chloride 103      Bicarbonate 27      Anion Gap 11      Urea Nitrogen 21      Creatinine 0.49 (*)     eGFR >90      Calcium 9.2      Albumin 4.4      Alkaline Phosphatase 76      Total Protein 6.9      AST 12      Bilirubin, Total 0.5      ALT 9     TROPONIN I, HIGH SENSITIVITY - Normal    Troponin I, High Sensitivity <3      Narrative:     Less than 99th percentile of normal range cutoff-  Female and children under 18 years old <14 ng/L; Male <21 ng/L: Negative  Repeat testing should be performed if clinically indicated.     Female and children under 18 years old 14-50 ng/L; Male 21-50 ng/L:  Consistent with possible cardiac damage and possible increased clinical   risk. Serial measurements may help to assess extent of myocardial damage.     >50 ng/L: Consistent with cardiac damage, increased clinical risk and  myocardial infarction. Serial measurements may help assess extent of   myocardial damage.      NOTE: Children less than 1 year old may have higher baseline troponin   levels and results should be interpreted in conjunction with the overall   clinical context.     NOTE: Troponin I testing is performed using a different   testing methodology at [facility] than at other   [facility] hospitals. Direct result comparisons should only   be made within the same method.   PROTIME-INR - Normal    Protime  11.8      INR 1.0     APTT - Normal    aPTT 33      Narrative:     The APTT is no longer used for monitoring Unfractionated Heparin Therapy. For monitoring Heparin Therapy, use the Heparin Assay.   POCT GLUCOSE METER       All other labs were within normal range or not returned as of this dictation.    EMERGENCY DEPARTMENT COURSE/MDM:   Vitals:    Vitals:    10/04/24 2330 10/04/24 2345 10/05/24 0000 10/05/24 0015   BP: 104/54  122/58    Pulse: 78 76 75 71   Resp: 20 20 20 16   Temp:       SpO2: 97% 97% 97% 95%   Weight:       Height:           I reviewed the patient's triage vitals and they are hemodynamically stable.    Due to the above findings the following was ordered stroke alert was not activated as patient did wake up with symptoms.  Her last known well was approximately 24 hours ago.  Her clinical picture is skewed due to the history of MS.  Uncertain if this is CVA related or MS.    Lab work is grossly unremarkable no evidence of leukocytosis making infectious etiology less likely.  No signs of anemia.  Cardiac troponin undetectable no acute injury pattern or runs of dysrhythmias on EKG.  Renal function is appropriate.  CT imaging of the head shows nonspecific ischemic changes no intracranial bleed.  Due to patient's persistent symptoms she will need to be admitted for further CVA versus MS workup.  She is appreciative of care and agreeable with this plan.  Admission was delayed as we were uncertain who was covering for patient's primary physician service  as he is on vacation.  I was able to reach out to the accepting physician covering for his service she did accept care and did take over care at time of admission order.    ED Course as of 10/05/24 0042   Fri Oct 04, 2024   2140 Interpreted by the Emergency Department Attending: ECG revealed normal sinus rhythm at a rate of 78 beats per minute with MS interval 160 , QRS of 76 , QTc of 428.  No acute injury pattern. Previous EKG on October 2  revealed no significant changes.    [MG]      ED Course User Index  [MG] Adams Corona DO         Diagnoses as of 10/05/24 0042   Right sided weakness       Patient was counseled regarding labs, imaging, likely diagnosis, and plan. All questions were answered.     ------------------------------------------------------------------  Information provided by the patient and EMS  Past medical history complicating workup MS  Previous medical records reviewed MRI imaging 3/17/2024  Considered TNK although not candidate outside of window  Shared medical decision making patient agreeable with hospital admission for further workup.  ------------------------------------------------------------------  ED Medications administered this visit:  Medications - No data to display     Final Impression:   1. Right sided weakness          Adams Corona DO    (Please note that portions of this note were completed with a voice recognition program.  Efforts were made to edit the dictations but occasionally words are mis-transcribed.)     Adams Corona DO  10/05/24 0042

## 2024-10-05 NOTE — PROGRESS NOTES
MEDICINE PROGRESS NOTE    Subjective     Patient seen and examined. NAEO. Patient stated she no longer has any changes in vision.  States she feels as though her admission was unnecessary but would rather be cautious.  No other complaints att his time.      Assessment / Plan   Akiko Peng is a 68 y.o. female with a PMHx of multiple sclerosis, rheumatoid arthritis, and seizures   on day 0 of admission presenting with right side upper     Acute Conditions:  Right sided upper and lower extremity weakness   -Patient had a round of IV methylprednisolone overnight. We will hold off on any additional until neurology recommendations  -Neurology Consulted  -Seizure and fall precautions  -PT/OT consulted    Chronic Conditions  Seizures:  -Unclear of patient's compliance with Keppra at home. Keppra levels show normal level (26)  - Continue Keppra 750mg BID    Rheumatoid Arthritis:     Checklist:  DVT ppx: lovenox  Diet: Adult regular diet  Consults: Neurology, PT/OT  Code Status: Full Code     Talha Thapa, ASHIA  PGY-1     This is a preliminary note, please await attending attestation for final recommendations    Objective   Physical Exam  General:  Pleasant and cooperative. No apparent distress.  HEENT:  Normocephalic, atraumatic  Chest:  Clear to auscultation bilaterally. No wheezes, rales, or rhonchi.  CV:  Regular rate and rhythm. No murmurs appreciated.    Abdomen: Abdomen is soft, non-tender, non-distended. BS +   Extremities:  No lower extremity edema or cyanosis. Patient has chronic lower extremity weakness. Unable to lift right arm and leg up on command. Left leg was barely able to be lifted off the bed  Neurological: AAOx3, CN II - XII intact.   Skin:  Warm and dry.  Last Recorded Vitals  Vitals:    10/05/24 0238 10/05/24 0321 10/05/24 0343 10/05/24 0825   BP: 124/58 124/50 124/50 103/60   BP Location:  Left arm  Left arm   Patient Position:  Lying  Lying   Pulse: 79 79 79 72   Resp:  20 20 19   Temp: 37.3 °C  "(99.1 °F) 37.3 °C (99.1 °F) 37.3 °C (99.1 °F) 36.5 °C (97.7 °F)   TempSrc:  Temporal Temporal Tympanic   SpO2: 98% 94%  96%   Weight:  63.5 kg (139 lb 15.9 oz)     Height:  1.778 m (5' 10\")       Intake/Output last 3 Shifts:  No intake/output data recorded.  Labs:   Results from last 7 days   Lab Units 10/05/24  0511 10/04/24  2058 10/02/24  1716   SODIUM mmol/L 138 137 142   POTASSIUM mmol/L 3.7 4.1 3.8   CHLORIDE mmol/L 105 103 106   CO2 mmol/L 27 27 30   BUN mg/dL 16 21 24*   CREATININE mg/dL 0.47* 0.49* 0.44*   GLUCOSE mg/dL 97 86 91   CALCIUM mg/dL 9.0 9.2 9.1     Results from last 7 days   Lab Units 10/05/24  0511   WBC AUTO x10*3/uL 6.8   HEMOGLOBIN g/dL 14.3   HEMATOCRIT % 41.6   PLATELETS AUTO x10*3/uL 207         CT head wo IV contrast   Final Result   Cerebral atrophy and chronic periventricular white matter small   vessel ischemic change.        No evidence of acute intracranial hemorrhage.        If there is persistent concern for acute intracranial process, MRI   may be obtained for further evaluation as clinically indicated.                  MACRO:   None        Signed by: Abe Baer 10/4/2024 10:09 PM   Dictation workstation:   [id number]      XR chest 1 view   Final Result   No acute cardiopulmonary abnormality.   Signed by Blaine Chowdhury MD        PHYSICIAN INTERPRETATION:  Left Ventricle: The left ventricular systolic function is normal, with an estimated ejection fraction of 60-65%. There are no regional wall motion abnormalities. The left ventricular cavity size is normal. Left ventricular diastolic filling was not assessed.  Left Atrium: The left atrium was not assessed.  Right Ventricle: The right ventricle was not assessed. Right ventricular systolic function not assessed.  Right Atrium: The right atrium was not assessed.  Aortic Valve: The aortic valve is trileaflet. Aortic valve regurgitation was not assessed.  Mitral Valve: The mitral valve was not assessed. Mitral valve regurgitation " was not assessed.  Tricuspid Valve: The tricuspid valve was not assessed. Tricuspid regurgitation was not assessed.  Pulmonic Valve: The pulmonic valve was not assessed. Pulmonic valve regurgitation was not assessed.  Pericardium: Pericardial effusion was not assessed.  Aorta: The aortic root was not assessed.      CONCLUSIONS:  1. Left ventricular systolic function is normal with a 60-65% estimated ejection fraction.    QUANTITATIVE DATA SUMMARY:  2D MEASUREMENTS:  Normal Ranges:  IVSd:          0.80 cm   (0.6-1.1cm)  LVPWd:         0.60 cm   (0.6-1.1cm)  LVIDd:         4.20 cm   (3.9-5.9cm)  LVIDs:         2.30 cm  LV Mass Index: 48.5 g/m2  LV % FS        45.2 %    LV SYSTOLIC FUNCTION BY 2D PLANIMETRY (MOD):  Normal Ranges:  EF-A4C View: 74.8 % (>=55%)  EF-A2C View: 76.7 %  EF-Biplane:  76.0 %    TRICUSPID VALVE/RVSP:  Normal Ranges:  IVC Diam: 1.90 cm      [id number] Savita Marshall MD  Electronically signed on 5/16/2024 at 9:20:16 AM        ** Final **

## 2024-10-06 ENCOUNTER — HOSPITAL ENCOUNTER (OUTPATIENT)
Facility: HOSPITAL | Age: 69
Setting detail: OBSERVATION
Discharge: HOME HEALTH | DRG: 058 | End: 2024-10-11
Attending: EMERGENCY MEDICINE | Admitting: STUDENT IN AN ORGANIZED HEALTH CARE EDUCATION/TRAINING PROGRAM
Payer: MEDICARE

## 2024-10-06 ENCOUNTER — DOCUMENTATION (OUTPATIENT)
Dept: HOME HEALTH SERVICES | Facility: HOME HEALTH | Age: 69
End: 2024-10-06
Payer: MEDICARE

## 2024-10-06 ENCOUNTER — APPOINTMENT (OUTPATIENT)
Dept: RADIOLOGY | Facility: HOSPITAL | Age: 69
DRG: 058 | End: 2024-10-06
Payer: MEDICARE

## 2024-10-06 ENCOUNTER — HOME HEALTH ADMISSION (OUTPATIENT)
Dept: HOME HEALTH SERVICES | Facility: HOME HEALTH | Age: 69
End: 2024-10-06
Payer: MEDICARE

## 2024-10-06 VITALS
HEIGHT: 70 IN | TEMPERATURE: 96.8 F | WEIGHT: 139.99 LBS | HEART RATE: 63 BPM | BODY MASS INDEX: 20.04 KG/M2 | SYSTOLIC BLOOD PRESSURE: 107 MMHG | DIASTOLIC BLOOD PRESSURE: 57 MMHG | OXYGEN SATURATION: 100 % | RESPIRATION RATE: 18 BRPM

## 2024-10-06 DIAGNOSIS — M25.551 RIGHT HIP PAIN: ICD-10-CM

## 2024-10-06 DIAGNOSIS — R53.1 RIGHT SIDED WEAKNESS: ICD-10-CM

## 2024-10-06 DIAGNOSIS — G45.9 TIA (TRANSIENT ISCHEMIC ATTACK): ICD-10-CM

## 2024-10-06 DIAGNOSIS — R56.9 SEIZURE (MULTI): ICD-10-CM

## 2024-10-06 DIAGNOSIS — U07.1 COVID-19: Primary | ICD-10-CM

## 2024-10-06 LAB
ALBUMIN SERPL BCP-MCNC: 3.7 G/DL (ref 3.4–5)
ALP SERPL-CCNC: 73 U/L (ref 33–136)
ALT SERPL W P-5'-P-CCNC: 8 U/L (ref 7–45)
ANION GAP SERPL CALC-SCNC: 11 MMOL/L (ref 10–20)
AST SERPL W P-5'-P-CCNC: 10 U/L (ref 9–39)
BILIRUB SERPL-MCNC: 0.4 MG/DL (ref 0–1.2)
BUN SERPL-MCNC: 17 MG/DL (ref 6–23)
CALCIUM SERPL-MCNC: 8.7 MG/DL (ref 8.6–10.3)
CHLORIDE SERPL-SCNC: 107 MMOL/L (ref 98–107)
CO2 SERPL-SCNC: 28 MMOL/L (ref 21–32)
CREAT SERPL-MCNC: 0.5 MG/DL (ref 0.5–1.05)
EGFRCR SERPLBLD CKD-EPI 2021: >90 ML/MIN/1.73M*2
ERYTHROCYTE [DISTWIDTH] IN BLOOD BY AUTOMATED COUNT: 13.1 % (ref 11.5–14.5)
GLUCOSE SERPL-MCNC: 87 MG/DL (ref 74–99)
HCT VFR BLD AUTO: 41 % (ref 36–46)
HGB BLD-MCNC: 13.9 G/DL (ref 12–16)
MAGNESIUM SERPL-MCNC: 2.05 MG/DL (ref 1.6–2.4)
MCH RBC QN AUTO: 32 PG (ref 26–34)
MCHC RBC AUTO-ENTMCNC: 33.9 G/DL (ref 32–36)
MCV RBC AUTO: 95 FL (ref 80–100)
NRBC BLD-RTO: 0 /100 WBCS (ref 0–0)
PLATELET # BLD AUTO: 199 X10*3/UL (ref 150–450)
POTASSIUM SERPL-SCNC: 4.4 MMOL/L (ref 3.5–5.3)
PROT SERPL-MCNC: 5.7 G/DL (ref 6.4–8.2)
RBC # BLD AUTO: 4.34 X10*6/UL (ref 4–5.2)
SODIUM SERPL-SCNC: 142 MMOL/L (ref 136–145)
WBC # BLD AUTO: 10.8 X10*3/UL (ref 4.4–11.3)

## 2024-10-06 PROCEDURE — 87636 SARSCOV2 & INF A&B AMP PRB: CPT | Performed by: EMERGENCY MEDICINE

## 2024-10-06 PROCEDURE — 2500000004 HC RX 250 GENERAL PHARMACY W/ HCPCS (ALT 636 FOR OP/ED)

## 2024-10-06 PROCEDURE — 97166 OT EVAL MOD COMPLEX 45 MIN: CPT | Mod: GO

## 2024-10-06 PROCEDURE — 73552 X-RAY EXAM OF FEMUR 2/>: CPT | Mod: RT

## 2024-10-06 PROCEDURE — 99239 HOSP IP/OBS DSCHRG MGMT >30: CPT

## 2024-10-06 PROCEDURE — 99285 EMERGENCY DEPT VISIT HI MDM: CPT

## 2024-10-06 PROCEDURE — 73502 X-RAY EXAM HIP UNI 2-3 VIEWS: CPT | Mod: RT

## 2024-10-06 PROCEDURE — 99233 SBSQ HOSP IP/OBS HIGH 50: CPT | Performed by: NURSE PRACTITIONER

## 2024-10-06 PROCEDURE — 85027 COMPLETE CBC AUTOMATED: CPT

## 2024-10-06 PROCEDURE — 84075 ASSAY ALKALINE PHOSPHATASE: CPT

## 2024-10-06 PROCEDURE — 2500000001 HC RX 250 WO HCPCS SELF ADMINISTERED DRUGS (ALT 637 FOR MEDICARE OP)

## 2024-10-06 PROCEDURE — 83735 ASSAY OF MAGNESIUM: CPT

## 2024-10-06 PROCEDURE — 36415 COLL VENOUS BLD VENIPUNCTURE: CPT

## 2024-10-06 PROCEDURE — 97162 PT EVAL MOD COMPLEX 30 MIN: CPT | Mod: GP

## 2024-10-06 RX ADMIN — LEVETIRACETAM 750 MG: 500 TABLET, FILM COATED ORAL at 09:12

## 2024-10-06 RX ADMIN — ERGOCALCIFEROL 1250 MCG: 1.25 CAPSULE ORAL at 09:14

## 2024-10-06 RX ADMIN — ENOXAPARIN SODIUM 40 MG: 40 INJECTION SUBCUTANEOUS at 01:35

## 2024-10-06 RX ADMIN — ATORVASTATIN CALCIUM 40 MG: 40 TABLET, FILM COATED ORAL at 09:11

## 2024-10-06 RX ADMIN — THIAMINE HCL TAB 100 MG 100 MG: 100 TAB at 09:11

## 2024-10-06 RX ADMIN — ASPIRIN 81 MG CHEWABLE TABLET 81 MG: 81 TABLET CHEWABLE at 09:11

## 2024-10-06 RX ADMIN — Medication 1000 UNITS: at 09:11

## 2024-10-06 ASSESSMENT — COGNITIVE AND FUNCTIONAL STATUS - GENERAL
PERSONAL GROOMING: A LOT
DRESSING REGULAR UPPER BODY CLOTHING: A LITTLE
TURNING FROM BACK TO SIDE WHILE IN FLAT BAD: A LOT
PERSONAL GROOMING: A LITTLE
MOVING TO AND FROM BED TO CHAIR: A LOT
MOVING FROM LYING ON BACK TO SITTING ON SIDE OF FLAT BED WITH BEDRAILS: A LITTLE
MOVING TO AND FROM BED TO CHAIR: TOTAL
EATING MEALS: A LITTLE
STANDING UP FROM CHAIR USING ARMS: TOTAL
HELP NEEDED FOR BATHING: TOTAL
TURNING FROM BACK TO SIDE WHILE IN FLAT BAD: A LOT
MOBILITY SCORE: 12
TOILETING: TOTAL
DRESSING REGULAR UPPER BODY CLOTHING: TOTAL
DAILY ACTIVITIY SCORE: 17
DAILY ACTIVITIY SCORE: 9
WALKING IN HOSPITAL ROOM: TOTAL
DRESSING REGULAR LOWER BODY CLOTHING: TOTAL
CLIMB 3 TO 5 STEPS WITH RAILING: TOTAL
TOILETING: A LOT
HELP NEEDED FOR BATHING: A LITTLE
MOVING FROM LYING ON BACK TO SITTING ON SIDE OF FLAT BED WITH BEDRAILS: A LOT
CLIMB 3 TO 5 STEPS WITH RAILING: TOTAL
DRESSING REGULAR LOWER BODY CLOTHING: A LOT
STANDING UP FROM CHAIR USING ARMS: A LOT
WALKING IN HOSPITAL ROOM: A LOT
MOBILITY SCORE: 8

## 2024-10-06 ASSESSMENT — PAIN SCALES - GENERAL
PAINLEVEL_OUTOF10: 0 - NO PAIN
PAINLEVEL_OUTOF10: 9
PAINLEVEL_OUTOF10: 0 - NO PAIN
PAINLEVEL_OUTOF10: 0 - NO PAIN

## 2024-10-06 ASSESSMENT — PAIN - FUNCTIONAL ASSESSMENT
PAIN_FUNCTIONAL_ASSESSMENT: 0-10

## 2024-10-06 ASSESSMENT — PAIN DESCRIPTION - PAIN TYPE: TYPE: ACUTE PAIN

## 2024-10-06 ASSESSMENT — PAIN DESCRIPTION - ORIENTATION: ORIENTATION: RIGHT

## 2024-10-06 ASSESSMENT — PAIN DESCRIPTION - FREQUENCY: FREQUENCY: CONSTANT/CONTINUOUS

## 2024-10-06 ASSESSMENT — PAIN DESCRIPTION - DESCRIPTORS: DESCRIPTORS: ACHING

## 2024-10-06 ASSESSMENT — ACTIVITIES OF DAILY LIVING (ADL): BATHING_ASSISTANCE: STAND BY

## 2024-10-06 ASSESSMENT — PAIN DESCRIPTION - LOCATION: LOCATION: HIP

## 2024-10-06 NOTE — PROGRESS NOTES
Physical Therapy    Physical Therapy Evaluation    Patient Name: Akiko Peng  MRN: [id number]  Today's Date: 10/6/2024   Time Calculation  Start Time: 0950  Stop Time: 1015  Time Calculation (min): 25 min  914/914-B    Assessment/Plan   PT Assessment  PT Assessment Results: Decreased strength, Decreased endurance, Impaired balance, Decreased mobility, Decreased range of motion, Decreased safety awareness, Impaired judgement  Rehab Prognosis: Good  Evaluation/Treatment Tolerance: Patient limited by fatigue  End of Session Communication: Bedside nurse, Physician  Assessment Comment: Pt admitted 10/6 with RUE/RLE weakness, visual changes. Recent admission about 2 weeks ago with SOB and PNA. CT negative for acute infarct. Likely MS exacerbation. Pt demos increased weakness R>L, decreased endurance and decreased safety awareness. Recommend moderate intensity therapy.  End of Session Patient Position: Bed, 2 rail up, Alarm on  IP OR SWING BED PT PLAN  Inpatient or Swing Bed: Inpatient  PT Plan  Treatment/Interventions: Bed mobility, Transfer training, Gait training, Balance training, Strengthening, Endurance training, Therapeutic exercise, Therapeutic activity, Positioning  PT Plan: Ongoing PT  PT Frequency: 3 times per week  PT Discharge Recommendations: Moderate intensity level of continued care  PT - OK to Discharge: Yes (once medically cleared)    Subjective     Current Problem:  1. Right sided weakness  Referral to Home Health      2. MS (multiple sclerosis) (Multi)  Referral to Home Health        Patient Active Problem List   Diagnosis    TIA (transient ischemic attack)    Aphasia    Bradycardia    Right sided weakness       General Visit Information:  General  Reason for Referral: PT eval and treat; impaired mobility  Past Medical History Relevant to Rehab: PMH MS, RA, seizures  Co-Treatment: OT  Prior to Session Communication: Bedside nurse  Patient Position Received: Bed, 3 rail up  General Comment: Pt pleasant  and agreeable to therapy.    Home Living:  Home Living  Home Living Comments: Pt lives at home with her  in a 2 level house with a liftchair to enter. Pt sleeps on main level in reg bed and upstairs 12 steps with HR for shower with tub/shower, bench and rails. Pt uses a SPC inside and furniture to steady. Owns FWW as well. Daughter assists with laundry and shopping.  cooks and drives.    Prior Level of Function:  Prior Function Per Pt/Caregiver Report  Level of [place]: Independent with ADLs and functional transfers, Needs assistance with homemaking    Precautions:  Precautions  Medical Precautions: Fall precautions  Precautions Comment: purewick intact     Objective     Pain:  Pain Assessment  Pain Assessment: 0-10  0-10 (Numeric) Pain Score: 0 - No pain    Cognition:  Cognition  Overall Cognitive Status: Impaired (increased time to answer questions and follow commands; decreased safety awareness and insight into deficits)  Orientation Level: Disoriented to time    General Assessments:  General Observation  General Observation: purewick intact   Activity Tolerance  Endurance: Decreased tolerance for upright activites  Sensation  Light Touch: No apparent deficits  Strength  Strength Comments: Demos 3-/5 in RLE and 4-/5 in LLE    Functional Assessments:     Bed Mobility  Bed Mobility:  (demos supine <> sit with ModA x1 and increased time to complete.)  Transfers  Transfer:  (from EOB to FWW with modA x1 and demos increased time and wide PATTIE. Use of UEs to adjust LE placement prior to standing.)  Ambulation/Gait Training  Ambulation/Gait Training Performed:  (completed a few steps forward/backward and laterally with use of FWW. Demos decreased balance, wide PATTIE and rapid fatigue. ModA for balance.)        Outcome Measures:     [facility] Basic Mobility  Turning from your back to your side while in a flat bed without using bedrails: A little  Moving from lying on your back to sitting on the side of a flat  bed without using bedrails: A lot  Moving to and from bed to chair (including a wheelchair): A lot  Standing up from a chair using your arms (e.g. wheelchair or bedside chair): A lot  To walk in hospital room: A lot  Climbing 3-5 steps with railing: Total  Basic Mobility - Total Score: 12     Goals:  Encounter Problems       Encounter Problems (Active)       PT Problem       PT Goal 1 STG - Pt will transition supine <> sitting with supevision  (Progressing)       Start:  10/06/24    Expected End:  10/20/24            PT Goal 2 STG - Pt will transfer STS with sup  (Progressing)       Start:  10/06/24    Expected End:  10/20/24            PT Goal 3 STG - Pt will amb 100' using LRD with sup  (Progressing)       Start:  10/06/24    Expected End:  10/20/24            PT Goal 4 STG - Pt will perform a B LE ther ex program of 2-3 sets of 10  (Progressing)       Start:  10/06/24    Expected End:  10/20/24                  Encounter Problems (Resolved)       Pain - Adult            Education Documentation  Mobility Training, taught by Ayaka Haywood PT at 10/6/2024  2:33 PM.  Learner: Patient  Readiness: Acceptance  Method: Explanation  Response: Verbalizes Understanding, Needs Reinforcement    Education Comments  No comments found.

## 2024-10-06 NOTE — DISCHARGE INSTRUCTIONS
Medication changes:   No new med changes     Follow-up appointments:  -Please follow-up with your PCP for continued management of your chronic medical conditions  -Please follow-up with Dr. Taylor as soon as you can for continued management of your MS

## 2024-10-06 NOTE — DISCHARGE SUMMARY
Discharge diagnosis:  #Right sided upper and lower extremity weakness   #TIA vs MS Flare   #Rheumatoid arthritis   #Seizures    Hospital course:  Akiko Peng is a 68 y.o. female with a history of multiple sclerosis, rheumatoid arthritis, seizures presents with right side upper and lower extremity weakness. Patient said she noticed weakness starting on the day prior to admission during the afternoon. She believed the symptoms are similar to MS flare ups that she has had in the past. Endorses vision changes in right eye resulting in occasional double vision. Denies falls, numbness/tingling, jerking movements, confusion, aphasia. Says that she has had lower extremity weakness for a while because of her MS but she is normally able to ambulate with the help of a cane.  Gets around with wheelchair and holding on to things at home as well.  Patient says that she is compliant with her medications. Says that she took her prescribed medication for MS flares. Per chart review, she has been seen by Dr. Taylor with  Neuro-immunology for her MS, most recent visit February 2024.  Per notes, she has not been on DMT for MS since being on Avonex in 2015. In the ED vitals were stable. Labs were unremarkable. EKG showed normal sinus rhythm. CXR unremarkable. CT head shows cerebral atrophy and chronic periventricular white matter small vessel ischemic change. There was no evidence of acute intracranial hemorrhage. NIH +6 on examination patient presented with right upper and bilateral lower extremity weakness, however, patient has chronic lower extremity weakness from MS. Patient was given 1 g methylprednisolone on admission.  Neuro consult eval as below.  Patient was determined to be stable for discharge, was discharged with [facility] for PT/OT.     NEURO CONSULT ASSESSMENT:   RUE<RLE weakness, acutely worse  Pt states hx of BLE weakness but almost no movement to RLE which is new for her. Per chart review, multiple episodes of R sided  weakness this year.   DDx includes CVA vs TIA vs seizure with Jens's Paralysis vs MS flare  B12 WNL  Hx of MS, not on DMT  Hx of seizure disorder  On Keppra 750 mg BID, keppra level WNL at 26     NEURO RECOMMENDATIONS:  Continue supportive care  MRI brain, c-spine, and t-spine wwo contrast - discussed with patient, agreeable to testing  MRI brain: No clear pathologic enhancement to suggest active demyelination   MRI c-spine: No pathologic enhancement to suggest active demyelination   MRI t-spine: No pathologic enhancement to suggest active demyelination   Defer IV steroids until/unless flare confirmed on MRI  Defer vessel imaging for now, had CTAs done in May with similar presentation  Continue vascular risk factor monitoring and management.   Continue home 81 mg aspirin and home Keppra 750 mg BID  PT/OT consults pending. Pt was recommended for rehab with prior admissions and declined.  Continue HI statin  Will need to follow up with Dr. Taylor on discharge for her MS management    Vitals:    10/05/24 2359 10/06/24 0340 10/06/24 0342 10/06/24 0741   BP: 111/56 (!) 95/46 96/51 107/57   BP Location: Left arm Left arm Left arm Right arm   Patient Position: Lying Lying Lying Lying   Pulse: 65 70  63   Resp: 18 18  18   Temp: 36.2 °C (97.2 °F) 36.1 °C (97 °F)  36 °C (96.8 °F)   TempSrc: Temporal Temporal  Temporal   SpO2: 99% 96%  100%   Weight:       Height:          Physical Exam:   General:  Pleasant and cooperative. No apparent distress.  HEENT:  Normocephalic, atraumatic  Chest:  Clear to auscultation bilaterally. No wheezes, rales, or rhonchi.  CV:  Regular rate and rhythm. No murmurs appreciated.    Abdomen: Abdomen is soft, non-tender, non-distended. BS +   Extremities:  No lower extremity edema or cyanosis. Patient has chronic lower extremity weakness. Unable to lift right arm and leg up on command. Left leg was barely able to be lifted off the bed.  Neurological: AAOx3, CN II - XII intact.   Skin:  Warm and  dry.    Outpatient follow-up instructions and medication changes:  Medication changes:   No new med changes     Follow-up appointments:  -Please follow-up with your PCP for continued management of your chronic medical conditions  -Please follow-up with Dr. Taylor as soon as you can for continued management of your MS    Dr. Martín Conrad, DO   Internal Medicine, PGY-2

## 2024-10-06 NOTE — PROGRESS NOTES
"[facility] Progress Note    Akiko Peng is a 68 y.o. female on day 1 of admission presenting with Right sided weakness.  Subjective   Pt seen this morning. Resting in bed. States her ride is coming to pick her up. Pt states no improvement to her RLE weakness but is actually able to dorsiflex and plantar flex her foot today. When I noted this, she states \"oh that is because I am holding it up with my other foot.\" However was able to do it prior to putting L foot there.     MRI findings consistent with MS demyelination but no active lesions noted.        Objective     Vitals:    10/05/24 2359 10/06/24 0340 10/06/24 0342 10/06/24 0741   BP: 111/56 (!) 95/46 96/51 107/57   BP Location: Left arm Left arm Left arm Right arm   Patient Position: Lying Lying Lying Lying   Pulse: 65 70  63   Resp: 18 18  18   Temp: 36.2 °C (97.2 °F) 36.1 °C (97 °F)  36 °C (96.8 °F)   TempSrc: Temporal Temporal  Temporal   SpO2: 99% 96%  100%   Weight:       Height:             Physical Exam  Vitals reviewed.   Psychiatric:         Speech: Speech normal.       Neurological Exam  Mental Status  Awake, alert and oriented to person, place and time. Speech is normal. Language is fluent with no aphasia.    Cranial Nerves  CN II-XII grossly intact.    Motor  Normal muscle bulk throughout. No fasciculations present. Normal muscle tone. No abnormal involuntary movements.  Able to dorsiflex and plantar flex R ankle today on exam.    Sensory  Light touch is normal in upper and lower extremities.       Scheduled medications  aspirin, 81 mg, oral, Daily  atorvastatin, 40 mg, oral, Daily  cholecalciferol, 1,000 Units, oral, Daily  enoxaparin, 40 mg, subcutaneous, q24h  ergocalciferol, 1,250 mcg, oral, Every Sunday  levETIRAcetam, 750 mg, oral, BID  polyethylene glycol, 17 g, oral, Daily  thiamine, 100 mg, oral, Daily      Continuous medications     PRN medications  PRN medications: acetaminophen **OR** acetaminophen **OR** acetaminophen, melatonin "     Lab Results   Component Value Date    WBC 10.8 10/06/2024    RBC 4.34 10/06/2024    HGB 13.9 10/06/2024    HCT 41.0 10/06/2024     10/06/2024     10/06/2024    K 4.4 10/06/2024     10/06/2024    BUN 17 10/06/2024    CREATININE 0.50 10/06/2024    EGFR >90 10/06/2024    CALCIUM 8.7 10/06/2024    ALKPHOS 73 10/06/2024    AST 10 10/06/2024    ALT 8 10/06/2024    MG 2.05 10/06/2024    UOWWFODW03 407 10/05/2024    HGBA1C 4.8 05/14/2024    LDLCALC 97 05/14/2024    CHOL 159 05/14/2024    HDL 52.5 05/14/2024    TRIG 46 05/14/2024    TSH 2.04 09/20/2020       Below CT and MRI images and reports have been personally reviewed in PACS, agree with interpretations.    MR brain w and wo IV contrast 10/05/2024  MR cervical spine w and wo IV contrast 10/05/2024  MR thoracic spine w and wo IV contrast 10/05/2024    Narrative  Interpreted By:  Charlie Zamudio,  STUDY:  MR BRAIN W AND WO IV CONTRAST; MR THORACIC SPINE W AND WO IV  CONTRAST; MR CERVICAL SPINE W AND WO IV CONTRAST;  10/5/2024 3:31 pm    INDICATION:  Signs/Symptoms:R weakness, hx of MS, ? flare; Signs/Symptoms:r  weakness, hx of MS; Signs/Symptoms:R weakness, hx of MS.      COMPARISON:  10/04/2024 head CT and 05/15/2024 head MRI.    ACCESSION NUMBER(S):  [id number]; [id number]; [id number]    ORDERING CLINICIAN:  MARIELLE SOMMERS    TECHNIQUE:  Axial T2, FLAIR, DWI, gradient echo T2 and sagittal and coronal T1  weighted images of brain were acquired. Post contrast T1 weighted  images were acquired after administration of N/A N/A gadolinium based  intravenous contrast.  Multiplanar multisequence MRI through the  cervical and thoracic spine without intravenous contrast followed by  postcontrast imaging after the administration of intravenous  gadolinium based contrast agent.    FINDINGS:  Findings head MRI:  Moderate periventricular T2 FLAIR hyperintensities radiating from the  ependymal surface of the lateral ventricles with regions central  FLAIR  hypointensity compatible with clinical diagnosis of multiple  sclerosis with so-called black holes. Additional foci of FLAIR  hyperintensity involving most prominently the region of the genu of  the internal capsule on the right-greater-than-left side and the left  greater than right thalamus. No clear pathologic enhancement to  suggest active demyelination. No acute intracranial hemorrhage mass  effect or midline shift. Moderate dilatation of the lateral and 3rd  greater than 4th ventricles the basis of global parenchymal volume  loss. Major intracranial flow voids are patent. Except for minimal  mucoperiosteal thickening in the left maxillary sinus the visualized  paranasal sinuses and mastoid air cells are clear.    Findings cervical spine:  Height, alignment, and signal of the cervical vertebral bodies are  preserved. Craniocervical junction is appropriately aligned.    There is a suggestion of patchy T2 hyperintensity in the cervical  spinal cord for instance in the central aspect of the cervical spinal  cord at the C1-2 level and involving the lateral funiculi at the C4-5  in C5-6 levels probably with involvement of the lateral cortical  spinal tracts. No pathologic enhancement to suggest active  demyelination.    Disc osteophyte complexes minimally indent the ventral thecal sac at  C3-4 C4-5 C5-6 with uncovertebral osteophytes minimally to moderately  narrowing the neuroforamina most prominently at C4-5 and C5-6.    Findings thoracic spine:  No significant abnormality of height alignment or signal of the  thoracic vertebral bodies except for patchy T1 and T2 hyperintense  foci probably due to focal fat or hemangiomas in minimal anterior  wedging of the T7 vertebral body without evidence of retropulsion or  edema. Prominent disc osteophyte complex minimally indents the  ventral thecal sac at T1-2 with minimal associated deformity of the  right ventral spinal cord without spinal cord signal  abnormality  identified. No other significant spinal canal or foraminal stenosis  in the thoracic region. No definite spinal cord signal abnormality  was identified. No pathologic enhancement to suggest active  demyelination. 1.5 cm nonspecific T2 hyperintense focus left kidney  most likely reflecting a renal cyst on limited examination of the  prevertebral soft tissue structures.    Impression  Stable intracranial findings diagnosis multiple sclerosis without  evidence of pathologic enhancement to suggest active demyelination.    Likely patchy demyelination in the cervical spinal cord as above  described stable without evidence of pathologic enhancement to  suggest active demyelination. No clear demyelination in the region of  the thoracic spinal cord. Degenerative changes of the spinal column  as above.    MACRO:  None    Signed by: Charlie Zamudio 10/5/2024 4:09 PM  Dictation workstation:   [id number]      CT head wo IV contrast 10/04/2024    Narrative  Interpreted By:  Abe Baer,  STUDY:  CT HEAD WO IV CONTRAST;  10/4/2024 9:34 pm    INDICATION:  Signs/Symptoms:Right-sided weakness.    COMPARISON:  9/1/2024    ACCESSION NUMBER(S):  [id number]    ORDERING CLINICIAN:  DOMINGO CABRERA    TECHNIQUE:  Contiguous axial images of the head were obtained without intravenous  contrast.    FINDINGS:  BRAIN PARENCHYMA:  There is cerebral atrophy and chronic  periventricular white matter small vessel ischemic change. The gray  white matter differentiation is preserved.  No mass effect or midline  shift.    HEMORRHAGE:  No evidence of acute intracranial hemorrhage.  VENTRICLES AND EXTRA-AXIAL SPACES:  The ventricles are within normal  limits in size for brain volume.  No evidence of abnormal extraaxial  fluid collection. EXTRACRANIAL SOFT TISSUES:  Within normal limits.  PARANASAL SINUSES/MASTOIDS:  The visualized paranasal sinuses and  mastoid air cells are clear and well pneumatized. CALVARIUM:  No  evidence of  depressed calvarial fracture.    OTHER FINDINGS:  None    Impression  Cerebral atrophy and chronic periventricular white matter small  vessel ischemic change.    No evidence of acute intracranial hemorrhage.    If there is persistent concern for acute intracranial process, MRI  may be obtained for further evaluation as clinically indicated.        MACRO:  None    Signed by: Abe Baer 10/4/2024 10:09 PM  Dictation workstation:   [id number]      CT head wo IV contrast 09/01/2024    Narrative  Interpreted By:  Dano Coleman,  STUDY:  CT HEAD WO IV CONTRAST;  9/1/2024 7:24 pm    INDICATION:  Signs/Symptoms:generalized weakness/MS flare.    COMPARISON:  05/14/2024    ACCESSION NUMBER(S):  [id number]    ORDERING CLINICIAN:  ELYSE KLERMAN    TECHNIQUE:  Noncontrast axial CT images of head were obtained with coronal and  sagittal reconstructed images.    FINDINGS:  BRAIN PARENCHYMA: Moderate periventricular and subcortical  hemispheric white matter hypodensities are most compatible with  chronic small vessel ischemic disease. No acute intraparenchymal  hemorrhage or parenchymal evidence of acute large territory ischemic  infarct. No mass-effect. Gray-white matter distinction is preserved.    VENTRICLES and EXTRA-AXIAL SPACES:  No acute extra-axial or  intraventricular hemorrhage. No effacement of cerebral sulci. The  ventricles and sulci are disproportionately prominent for age due to  cerebral and cerebellar volume loss.    PARANASAL SINUSES/MASTOIDS:  No hemorrhage or air-fluid levels within  the visualized paranasal sinuses. The mastoids are well aerated.    CALVARIUM/ORBITS:  No skull fracture.  The orbits and globes are  intact to the extent visualized.    EXTRACRANIAL SOFT TISSUES: No discernible abnormality.    Impression  No acute intracranial abnormality.    Age disproportionate cerebral and cerebellar volume loss and moderate  chronic small vessel ischemic changes.    MACRO:  None.    Signed by: Dano  Jared 9/1/2024 7:57 PM  Dictation workstation:   [id number]    Tabitha Coma Scale  Best Eye Response: Spontaneous  Best Verbal Response: Oriented  Best Motor Response: Follows commands  Romayor Coma Scale Score: 15        Assessment/Plan   This patient currently has cardiac telemetry ordered; if you would like to modify or discontinue the telemetry order, click here to go to the orders activity to modify/discontinue the order.  Principal Problem:    Right sided weakness      IMPRESSION:  Akiko Peng is a 68 y.o. female with a history of MS, RA, and seizures presenting with Right sided weakness. Neurology was consulted for MS flare.     RUE<RLE weakness, acutely worse  Pt states hx of BLE weakness but almost no movement to RLE which is new for her. Per chart review, multiple episodes of R sided weakness this year.   DDx includes CVA vs TIA vs seizure with Jens's Paralysis vs MS flare  B12 407  Hx of MS, not on DMT  Hx of seizure disorder  On Keppra 750 mg BID, keppra level wnl at 26    RECOMMENDATIONS:  Continue supportive care  Continue vascular risk factor monitoring and management.   Continue home 81 mg aspirin and home Keppra 750 mg BID.  Recommend outpatient PT/OT  Continue HI statin  Will need to follow up with Dr. Taylor on discharge for her MS management    Discussed with patient, all questions answered.   Will sign off from neurology.        I personally spent 55 minutes today, exclusive of procedures, providing care for this patient, including preparation, face to face time, documentation and other services such as review of medical records, diagnostic result, patient education, counseling, coordination of care as specified in the encounter.    Consuelo Vargas, APRN-CNP

## 2024-10-06 NOTE — CARE PLAN
Problem: Skin  Goal: Participates in plan/prevention/treatment measures  10/6/2024 1125 by Mery Birch RN  Outcome: Progressing  Flowsheets (Taken 10/6/2024 1125)  Participates in plan/prevention/treatment measures: Increase activity/out of bed for meals  10/6/2024 1125 by Mery Birch RN  Outcome: Progressing  Flowsheets (Taken 10/6/2024 1125)  Participates in plan/prevention/treatment measures: Increase activity/out of bed for meals  Goal: Prevent/manage excess moisture  10/6/2024 1125 by Mery Birch RN  Outcome: Progressing  Flowsheets (Taken 10/6/2024 1125)  Prevent/manage excess moisture: Moisturize dry skin  10/6/2024 1125 by Mery Birch RN  Outcome: Progressing  Flowsheets (Taken 10/6/2024 1125)  Prevent/manage excess moisture: Moisturize dry skin  Goal: Prevent/minimize sheer/friction injuries  10/6/2024 1125 by Mery Birch RN  Outcome: Progressing  Flowsheets (Taken 10/6/2024 1125)  Prevent/minimize sheer/friction injuries: Use pull sheet  10/6/2024 1125 by Mery Birch RN  Outcome: Progressing  Flowsheets (Taken 10/6/2024 1125)  Prevent/minimize sheer/friction injuries: Use pull sheet  Goal: Promote/optimize nutrition  10/6/2024 1125 by Mery Birch RN  Outcome: Progressing  Flowsheets (Taken 10/6/2024 1125)  Promote/optimize nutrition: Consume > 50% meals/supplements  10/6/2024 1125 by Mery Birch RN  Outcome: Progressing  Flowsheets (Taken 10/6/2024 1125)  Promote/optimize nutrition: Consume > 50% meals/supplements  Goal: Promote skin healing  10/6/2024 1125 by Mery Birch RN  Outcome: Progressing  Flowsheets (Taken 10/6/2024 1125)  Promote skin healing: Assess skin/pad under line(s)/device(s)  10/6/2024 1125 by Mery Birch RN  Outcome: Progressing  Flowsheets (Taken 10/6/2024 1125)  Promote skin healing: Assess skin/pad under line(s)/device(s)     Problem: Fall/Injury  Goal: Not fall by end of shift  10/6/2024 1125 by Mery Birch RN  Outcome: Progressing  10/6/2024 1125 by Mery  JASWINDER Birch  Outcome: Progressing  Goal: Be free from injury by end of the shift  10/6/2024 1125 by Mery Birch RN  Outcome: Progressing  10/6/2024 1125 by Mery Birch RN  Outcome: Progressing  Goal: Verbalize understanding of personal risk factors for fall in the hospital  10/6/2024 1125 by Mery Birch RN  Outcome: Progressing  10/6/2024 1125 by Mery Birch RN  Outcome: Progressing  Goal: Verbalize understanding of risk factor reduction measures to prevent injury from fall in the home  10/6/2024 1125 by Mery Birch RN  Outcome: Progressing  10/6/2024 1125 by Mery Birch RN  Outcome: Progressing  Goal: Use assistive devices by end of the shift  10/6/2024 1125 by Mery Birch RN  Outcome: Progressing  10/6/2024 1125 by Mery Birch RN  Outcome: Progressing  Goal: Pace activities to prevent fatigue by end of the shift  10/6/2024 1125 by Mery Birch RN  Outcome: Progressing  10/6/2024 1125 by Mery Birch RN  Outcome: Progressing     Problem: Pain - Adult  Goal: Verbalizes/displays adequate comfort level or baseline comfort level  10/6/2024 1125 by Mery Birch RN  Outcome: Progressing  10/6/2024 1125 by Mery Birch RN  Outcome: Progressing     Problem: Safety - Adult  Goal: Free from fall injury  10/6/2024 1125 by Mery Birch RN  Outcome: Progressing  10/6/2024 1125 by Mery Birch RN  Outcome: Progressing     Problem: Discharge Planning  Goal: Discharge to home or other facility with appropriate resources  10/6/2024 1125 by Mery Birch RN  Outcome: Progressing  10/6/2024 1125 by Mery Birch RN  Outcome: Progressing     Problem: Chronic Conditions and Co-morbidities  Goal: Patient's chronic conditions and co-morbidity symptoms are monitored and maintained or improved  10/6/2024 1125 by Mery Birch RN  Outcome: Progressing  10/6/2024 1125 by Mery Birch RN  Outcome: Progressing   The patient's goals for the shift include rest    The clinical goals for the shift include safety    Over the  shift, the patient did not make progress toward the following goals. Barriers to progression include motivation. Recommendations to address these barriers include reinforcement.

## 2024-10-06 NOTE — CARE PLAN
Problem: Skin  Goal: Participates in plan/prevention/treatment measures  Outcome: Progressing  Flowsheets (Taken 10/5/2024 2346)  Participates in plan/prevention/treatment measures: Increase activity/out of bed for meals  Goal: Prevent/manage excess moisture  Outcome: Progressing  Flowsheets (Taken 10/5/2024 2346)  Prevent/manage excess moisture: Moisturize dry skin  Goal: Prevent/minimize sheer/friction injuries  Outcome: Progressing  Flowsheets (Taken 10/5/2024 2346)  Prevent/minimize sheer/friction injuries: Use pull sheet  Goal: Promote/optimize nutrition  Outcome: Progressing  Flowsheets (Taken 10/5/2024 2346)  Promote/optimize nutrition: Monitor/record intake including meals  Goal: Promote skin healing  Outcome: Progressing  Flowsheets (Taken 10/5/2024 2346)  Promote skin healing: Turn/reposition every 2 hours/use positioning/transfer devices     Problem: Fall/Injury  Goal: Not fall by end of shift  Outcome: Progressing  Goal: Be free from injury by end of the shift  Outcome: Progressing  Goal: Verbalize understanding of personal risk factors for fall in the hospital  Outcome: Progressing  Goal: Verbalize understanding of risk factor reduction measures to prevent injury from fall in the home  Outcome: Progressing  Goal: Use assistive devices by end of the shift  Outcome: Progressing  Goal: Pace activities to prevent fatigue by end of the shift  Outcome: Progressing     Problem: Pain - Adult  Goal: Verbalizes/displays adequate comfort level or baseline comfort level  Outcome: Progressing     Problem: Safety - Adult  Goal: Free from fall injury  Outcome: Progressing     Problem: Discharge Planning  Goal: Discharge to home or other facility with appropriate resources  Outcome: Progressing     Problem: Chronic Conditions and Co-morbidities  Goal: Patient's chronic conditions and co-morbidity symptoms are monitored and maintained or improved  Outcome: Progressing   The patient's goals for the shift include  rest    The clinical goals for the shift include safety

## 2024-10-06 NOTE — PROGRESS NOTES
Occupational Therapy    Occupational Therapy    Evaluation    Patient Name: Akiko Peng  MRN: [id number]  Today's Date: 10/6/2024  Time Calculation  Start Time: 1000  Stop Time: 1030  Time Calculation (min): 30 min  914/914-B    Assessment  IP OT Assessment  OT Assessment: OVERALL PATIENT NEEDED MODERATE ASSIST WITH OVERALL MOBILITY WITH R-LE WEAKNESS AND NOTED DECREASED BALANCE WITH LIMTED ACTIVITY TOLERANCE IN STANDING - SUGGEST ONGOING THERAPY POST HOSPITAL STAY - CONCERN WIOTH SAFETY AS PATIENT LACKS INSIGHT WITH SAFETY CHALLENGES - SUGGEST ONGOING 24/7 SUPERVSION AND SUPPORT ALONG WITH ONGOING THERAPY  POST HOSPITAL STAY  Prognosis: Fair  Evaluation/Treatment Tolerance: Patient limited by fatigue  Medical Staff Made Aware: Yes  End of Session Communication: Bedside nurse  End of Session Patient Position: Bed, 2 rail up    Plan:  Treatment Interventions: ADL retraining, Functional transfer training, Patient/family training, Endurance training, UE strengthening/ROM  OT Frequency: 5 times per week  OT Discharge Recommendations: Moderate intensity level of continued care    Subjective     Current Problem:  1. Right sided weakness  Referral to Home Health      2. MS (multiple sclerosis) (Multi)  Referral to Home Health          General:  General  Reason for Referral: OT EVAL AND TREAT - ADMITTED WITH RUE WEAKNESS AND R VISION CHANGES ( CT WAS (-) FOR ACUTE HEMMORRHAGE)- PATIENT HAD BEEN DISCHARGED 2 WEEKS PRIOR WITH PNEUMONIA  Referred By: LAURA THOMAS  Past Medical History Relevant to Rehab: MS, RA, SEIZURES  Co-Treatment: PT  Prior to Session Communication: Bedside nurse  Patient Position Received: Bed, 3 rail up    Precautions:  Medical Precautions: Fall precautions  Precautions Comment: PUREWICK INTACT    Vital Signs:       Pain:  Pain Assessment  Pain Assessment: 0-10  0-10 (Numeric) Pain Score: 0 - No pain    Objective     Cognition:  Orientation Level: Disoriented to time  Processing Speed: Delayed              Home Living:  Type of Home: House  Lives With: Spouse  Home Adaptive Equipment: Walker rolling or standard, Wheelchair-manual, Cane  Home Layout: Laundry in basement, Able to live on main level with bedroom/bathroom  Home Access:  (HAS AN ELECTRIC LIFT)  Bathroom Shower/Tub: Tub/shower unit  Bathroom Toilet: Standard  Bathroom Equipment: Tub transfer bench     Prior Function:  Level of [place]: Independent with ADLs and functional transfers (DAUGHTER HELPS WITH BATHING AS NEEDED)  Receives Help From: Family  Homemaking Assistance: Independent (HUSBAMD HANDLES THE COOKING AND DAUGHTER HELPS WITH THE WASHING AND CLEANING)  Ambulatory Assistance:  (PATIENT USED A CANE PTA)  Hand Dominance: Right    IADL History:  Homemaking Responsibilities: No  Current License: No  Mode of Transportation: Car ( DRIVES)    ADL:  Eating Assistance: Independent  Grooming Assistance: Independent  Bathing Assistance: Stand by  UE Dressing Assistance: Independent  LE Dressing Assistance: Moderate  Toileting Assistance with Device: Minimal    Activity Tolerance:  Endurance: Tolerates less than 10 min exercise, no significant change in vital signs    Bed Mobility/Transfers:   Bed Mobility  Bed Mobility: Yes  Bed Mobility 1  Bed Mobility 1: Side lying right to sit  Level of Assistance 1: Moderate assistance  Transfers  Transfer: Yes  Transfer 1  Transfer From 1: Bed to  Transfer to 1: Stand  Technique 1: Sit to stand  Transfer Level of Assistance 1: Moderate assistance, +2    Ambulation/Gait Training:  Functional Mobility  Functional Mobility Performed: Yes  Functional Mobility 1  Surface 1: Level tile  Device 1: Rolling walker  Assistance 1: Moderate assistance  Quality of Functional Mobility 1: Inconsistent stride length  Comments 1:  (PATIENT C/O RLE WEAKNESS)    Sitting Balance:  Static Sitting Balance  Static Sitting-Balance Support: Feet supported    Standing Balance:       Vision: Vision - Basic Assessment  Current  Vision: No visual deficits   and Vision - Complex Assessment  Ocular Range of Motion: Within Functional Limits  Head Position: WFL  Tracking: WFL    Sensation:  Light Touch: No apparent deficits    Strength:  Strength Comments: RUE STRENGTH 3+/5 , LUE 4-/5    Perception:  Inattention/Neglect: Appears intact    Coordination:  Finger to Nose: Intact     Hand Function:  Hand Function  Gross Grasp: Functional  Coordination: Functional    Extremities: RUE   RUE : Within Functional Limits and LUE   LUE: Within Functional Limits    Outcome Measures: [facility] Daily Activity  Putting on and taking off regular lower body clothing: A lot  Bathing (including washing, rinsing, drying): A little  Putting on and taking off regular upper body clothing: A little  Toileting, which includes using toilet, bedpan or urinal: A lot  Taking care of personal grooming such as brushing teeth: A little  Eating Meals: None  Daily Activity - Total Score: 17                       EDUCATION:     Education Documentation  No documentation found.  Education Comments  No comments found.        Goals:   Encounter Problems       Encounter Problems (Active)       ADLs       Patient will perform UB and LB bathing with set-up level of assistance and grab bars and shower chair.       Start:  10/06/24    Expected End:  10/20/24            Patient with complete upper body dressing with set-up level of assistance donning and doffing all UE clothes with no adaptive equipment while edge of bed        Start:  10/06/24    Expected End:  10/20/24            Patient with complete lower body dressing with modified independent level of assistance donning and doffing all LE clothes  with PRN adaptive equipment while edge of bed  and standing       Start:  10/06/24    Expected End:  10/20/24            Patient will complete toileting including hygiene clothing management/hygiene with modified independent level of assistance and raised toilet seat.       Start:  10/06/24     Expected End:  10/20/24               EXERCISE/STRENGTHENING       Patient will complete RUE exercises for  in order to improve strength and activity for ADL performance.        Start:  10/06/24    Expected End:  10/20/24               MOBILITY       Patient will perform Functional mobility mod  Household distances/Community Distances with modified independent level of assistance and least restrictive device in order to improve safety and functional mobility.       Start:  10/06/24    Expected End:  10/20/24               TRANSFERS       Patient will perform bed mobility modified independent level of assistance and bed rails in order to improve safety and independence with mobility       Start:  10/06/24    Expected End:  10/20/24

## 2024-10-06 NOTE — HH CARE COORDINATION
Home Care received a Referral for Physical Therapy and Occupational Therapy. We have processed the referral for a Start of Care on 10/7-10/8/24.     If you have any questions or concerns, please feel free to contact us at [phone]. Follow the prompts, enter your five digit zip code, and you will be directed to your care team on [facility] 3.

## 2024-10-07 PROBLEM — U07.1 COVID-19: Status: ACTIVE | Noted: 2024-10-07

## 2024-10-07 LAB
ANION GAP SERPL CALC-SCNC: 11 MMOL/L (ref 10–20)
BUN SERPL-MCNC: 25 MG/DL (ref 6–23)
CALCIUM SERPL-MCNC: 9.1 MG/DL (ref 8.6–10.3)
CHLORIDE SERPL-SCNC: 102 MMOL/L (ref 98–107)
CO2 SERPL-SCNC: 29 MMOL/L (ref 21–32)
CREAT SERPL-MCNC: 0.58 MG/DL (ref 0.5–1.05)
EGFRCR SERPLBLD CKD-EPI 2021: >90 ML/MIN/1.73M*2
ERYTHROCYTE [DISTWIDTH] IN BLOOD BY AUTOMATED COUNT: 13.2 % (ref 11.5–14.5)
FLUAV RNA RESP QL NAA+PROBE: NOT DETECTED
FLUBV RNA RESP QL NAA+PROBE: NOT DETECTED
GLUCOSE SERPL-MCNC: 75 MG/DL (ref 74–99)
HCT VFR BLD AUTO: 43.8 % (ref 36–46)
HGB BLD-MCNC: 14.8 G/DL (ref 12–16)
MCH RBC QN AUTO: 31.8 PG (ref 26–34)
MCHC RBC AUTO-ENTMCNC: 33.8 G/DL (ref 32–36)
MCV RBC AUTO: 94 FL (ref 80–100)
NRBC BLD-RTO: 0 /100 WBCS (ref 0–0)
PLATELET # BLD AUTO: 161 X10*3/UL (ref 150–450)
POTASSIUM SERPL-SCNC: 3.7 MMOL/L (ref 3.5–5.3)
RBC # BLD AUTO: 4.65 X10*6/UL (ref 4–5.2)
SARS-COV-2 RNA RESP QL NAA+PROBE: DETECTED
SODIUM SERPL-SCNC: 138 MMOL/L (ref 136–145)
WBC # BLD AUTO: 9 X10*3/UL (ref 4.4–11.3)

## 2024-10-07 PROCEDURE — 73552 X-RAY EXAM OF FEMUR 2/>: CPT | Mod: RIGHT SIDE | Performed by: STUDENT IN AN ORGANIZED HEALTH CARE EDUCATION/TRAINING PROGRAM

## 2024-10-07 PROCEDURE — 97165 OT EVAL LOW COMPLEX 30 MIN: CPT | Mod: GO

## 2024-10-07 PROCEDURE — 97161 PT EVAL LOW COMPLEX 20 MIN: CPT | Mod: GP

## 2024-10-07 PROCEDURE — 2500000001 HC RX 250 WO HCPCS SELF ADMINISTERED DRUGS (ALT 637 FOR MEDICARE OP)

## 2024-10-07 PROCEDURE — 2500000001 HC RX 250 WO HCPCS SELF ADMINISTERED DRUGS (ALT 637 FOR MEDICARE OP): Performed by: EMERGENCY MEDICINE

## 2024-10-07 PROCEDURE — 99222 1ST HOSP IP/OBS MODERATE 55: CPT

## 2024-10-07 PROCEDURE — G0378 HOSPITAL OBSERVATION PER HR: HCPCS

## 2024-10-07 PROCEDURE — 73502 X-RAY EXAM HIP UNI 2-3 VIEWS: CPT | Mod: RIGHT SIDE | Performed by: STUDENT IN AN ORGANIZED HEALTH CARE EDUCATION/TRAINING PROGRAM

## 2024-10-07 PROCEDURE — 36415 COLL VENOUS BLD VENIPUNCTURE: CPT

## 2024-10-07 PROCEDURE — 80048 BASIC METABOLIC PNL TOTAL CA: CPT

## 2024-10-07 PROCEDURE — 85027 COMPLETE CBC AUTOMATED: CPT

## 2024-10-07 RX ORDER — ERGOCALCIFEROL 1.25 MG/1
1250 CAPSULE ORAL
Status: DISCONTINUED | OUTPATIENT
Start: 2024-10-13 | End: 2024-10-11 | Stop reason: HOSPADM

## 2024-10-07 RX ORDER — LANOLIN ALCOHOL/MO/W.PET/CERES
100 CREAM (GRAM) TOPICAL DAILY
Status: DISCONTINUED | OUTPATIENT
Start: 2024-10-07 | End: 2024-10-11 | Stop reason: HOSPADM

## 2024-10-07 RX ORDER — NAPROXEN SODIUM 220 MG/1
81 TABLET, FILM COATED ORAL DAILY
Status: DISCONTINUED | OUTPATIENT
Start: 2024-10-07 | End: 2024-10-11 | Stop reason: HOSPADM

## 2024-10-07 RX ORDER — ACETAMINOPHEN 325 MG/1
650 TABLET ORAL EVERY 4 HOURS PRN
Status: DISCONTINUED | OUTPATIENT
Start: 2024-10-07 | End: 2024-10-11 | Stop reason: HOSPADM

## 2024-10-07 RX ORDER — ATORVASTATIN CALCIUM 40 MG/1
40 TABLET, FILM COATED ORAL DAILY
Status: DISCONTINUED | OUTPATIENT
Start: 2024-10-07 | End: 2024-10-11 | Stop reason: HOSPADM

## 2024-10-07 RX ORDER — POLYETHYLENE GLYCOL 3350 17 G/17G
17 POWDER, FOR SOLUTION ORAL DAILY
Status: DISCONTINUED | OUTPATIENT
Start: 2024-10-07 | End: 2024-10-11 | Stop reason: HOSPADM

## 2024-10-07 RX ORDER — ACETAMINOPHEN 325 MG/1
975 TABLET ORAL ONCE
Status: COMPLETED | OUTPATIENT
Start: 2024-10-07 | End: 2024-10-07

## 2024-10-07 RX ORDER — ACETAMINOPHEN 500 MG
10 TABLET ORAL NIGHTLY
Status: DISCONTINUED | OUTPATIENT
Start: 2024-10-07 | End: 2024-10-11 | Stop reason: HOSPADM

## 2024-10-07 RX ORDER — ENOXAPARIN SODIUM 100 MG/ML
40 INJECTION SUBCUTANEOUS EVERY 24 HOURS
Status: DISCONTINUED | OUTPATIENT
Start: 2024-10-07 | End: 2024-10-11 | Stop reason: HOSPADM

## 2024-10-07 RX ORDER — LIDOCAINE 560 MG/1
1 PATCH PERCUTANEOUS; TOPICAL; TRANSDERMAL DAILY
Status: DISCONTINUED | OUTPATIENT
Start: 2024-10-07 | End: 2024-10-11 | Stop reason: HOSPADM

## 2024-10-07 RX ORDER — LEVETIRACETAM 500 MG/1
750 TABLET ORAL 2 TIMES DAILY
Status: DISCONTINUED | OUTPATIENT
Start: 2024-10-07 | End: 2024-10-11 | Stop reason: HOSPADM

## 2024-10-07 RX ORDER — CHOLECALCIFEROL (VITAMIN D3) 25 MCG
1000 TABLET ORAL DAILY
Status: DISCONTINUED | OUTPATIENT
Start: 2024-10-07 | End: 2024-10-11 | Stop reason: HOSPADM

## 2024-10-07 SDOH — ECONOMIC STABILITY: INCOME INSECURITY: HOW HARD IS IT FOR YOU TO PAY FOR THE VERY BASICS LIKE FOOD, HOUSING, MEDICAL CARE, AND HEATING?: NOT VERY HARD

## 2024-10-07 SDOH — ECONOMIC STABILITY: FOOD INSECURITY: WITHIN THE PAST 12 MONTHS, YOU WORRIED THAT YOUR FOOD WOULD RUN OUT BEFORE YOU GOT MONEY TO BUY MORE.: NEVER TRUE

## 2024-10-07 SDOH — ECONOMIC STABILITY: HOUSING INSECURITY: AT ANY TIME IN THE PAST 12 MONTHS, WERE YOU HOMELESS OR LIVING IN A SHELTER (INCLUDING NOW)?: NO

## 2024-10-07 SDOH — ECONOMIC STABILITY: INCOME INSECURITY: IN THE PAST 12 MONTHS HAS THE ELECTRIC, GAS, OIL, OR WATER COMPANY THREATENED TO SHUT OFF SERVICES IN YOUR HOME?: NO

## 2024-10-07 SDOH — SOCIAL STABILITY: SOCIAL INSECURITY: WITHIN THE LAST YEAR, HAVE YOU BEEN HUMILIATED OR EMOTIONALLY ABUSED IN OTHER WAYS BY YOUR PARTNER OR EX-PARTNER?: NO

## 2024-10-07 SDOH — ECONOMIC STABILITY: INCOME INSECURITY: IN THE PAST 12 MONTHS, HAS THE ELECTRIC, GAS, OIL, OR WATER COMPANY THREATENED TO SHUT OFF SERVICE IN YOUR HOME?: NO

## 2024-10-07 SDOH — SOCIAL STABILITY: SOCIAL INSECURITY: DO YOU FEEL ANYONE HAS EXPLOITED OR TAKEN ADVANTAGE OF YOU FINANCIALLY OR OF YOUR PERSONAL PROPERTY?: NO

## 2024-10-07 SDOH — ECONOMIC STABILITY: INCOME INSECURITY: IN THE LAST 12 MONTHS, WAS THERE A TIME WHEN YOU WERE NOT ABLE TO PAY THE MORTGAGE OR RENT ON TIME?: NO

## 2024-10-07 SDOH — ECONOMIC STABILITY: FOOD INSECURITY: WITHIN THE PAST 12 MONTHS, THE FOOD YOU BOUGHT JUST DIDN'T LAST AND YOU DIDN'T HAVE MONEY TO GET MORE.: NEVER TRUE

## 2024-10-07 SDOH — ECONOMIC STABILITY: FOOD INSECURITY: HOW HARD IS IT FOR YOU TO PAY FOR THE VERY BASICS LIKE FOOD, HOUSING, MEDICAL CARE, AND HEATING?: NOT VERY HARD

## 2024-10-07 SDOH — SOCIAL STABILITY: SOCIAL INSECURITY: HAVE YOU HAD THOUGHTS OF HARMING ANYONE ELSE?: NO

## 2024-10-07 SDOH — ECONOMIC STABILITY: HOUSING INSECURITY: IN THE LAST 12 MONTHS, WAS THERE A TIME WHEN YOU WERE NOT ABLE TO PAY THE MORTGAGE OR RENT ON TIME?: NO

## 2024-10-07 SDOH — SOCIAL STABILITY: SOCIAL INSECURITY: WITHIN THE LAST YEAR, HAVE YOU BEEN AFRAID OF YOUR PARTNER OR EX-PARTNER?: NO

## 2024-10-07 SDOH — SOCIAL STABILITY: SOCIAL INSECURITY: ARE THERE ANY APPARENT SIGNS OF INJURIES/BEHAVIORS THAT COULD BE RELATED TO ABUSE/NEGLECT?: NO

## 2024-10-07 SDOH — ECONOMIC STABILITY: HOUSING INSECURITY: IN THE PAST 12 MONTHS, HOW MANY TIMES HAVE YOU MOVED WHERE YOU WERE LIVING?: 1

## 2024-10-07 SDOH — SOCIAL STABILITY: SOCIAL INSECURITY: HAS ANYONE EVER THREATENED TO HURT YOUR FAMILY OR YOUR PETS?: NO

## 2024-10-07 SDOH — ECONOMIC STABILITY: FOOD INSECURITY: WITHIN THE PAST 12 MONTHS, YOU WORRIED THAT YOUR FOOD WOULD RUN OUT BEFORE YOU GOT THE MONEY TO BUY MORE.: NEVER TRUE

## 2024-10-07 SDOH — SOCIAL STABILITY: SOCIAL INSECURITY: DO YOU FEEL UNSAFE GOING BACK TO THE PLACE WHERE YOU ARE LIVING?: NO

## 2024-10-07 SDOH — SOCIAL STABILITY: SOCIAL INSECURITY: ARE YOU OR HAVE YOU BEEN THREATENED OR ABUSED PHYSICALLY, EMOTIONALLY, OR SEXUALLY BY ANYONE?: NO

## 2024-10-07 SDOH — SOCIAL STABILITY: SOCIAL INSECURITY: HAVE YOU HAD ANY THOUGHTS OF HARMING ANYONE ELSE?: NO

## 2024-10-07 SDOH — SOCIAL STABILITY: SOCIAL INSECURITY: DOES ANYONE TRY TO KEEP YOU FROM HAVING/CONTACTING OTHER FRIENDS OR DOING THINGS OUTSIDE YOUR HOME?: NO

## 2024-10-07 SDOH — SOCIAL STABILITY: SOCIAL INSECURITY: WERE YOU ABLE TO COMPLETE ALL THE BEHAVIORAL HEALTH SCREENINGS?: YES

## 2024-10-07 SDOH — SOCIAL STABILITY: SOCIAL INSECURITY: ABUSE: ADULT

## 2024-10-07 SDOH — ECONOMIC STABILITY: TRANSPORTATION INSECURITY: IN THE PAST 12 MONTHS, HAS LACK OF TRANSPORTATION KEPT YOU FROM MEDICAL APPOINTMENTS OR FROM GETTING MEDICATIONS?: NO

## 2024-10-07 ASSESSMENT — COGNITIVE AND FUNCTIONAL STATUS - GENERAL
TURNING FROM BACK TO SIDE WHILE IN FLAT BAD: TOTAL
MOVING FROM LYING ON BACK TO SITTING ON SIDE OF FLAT BED WITH BEDRAILS: A LITTLE
DRESSING REGULAR LOWER BODY CLOTHING: A LITTLE
PERSONAL GROOMING: TOTAL
TOILETING: A LITTLE
TOILETING: TOTAL
CLIMB 3 TO 5 STEPS WITH RAILING: A LOT
WALKING IN HOSPITAL ROOM: A LOT
CLIMB 3 TO 5 STEPS WITH RAILING: TOTAL
DRESSING REGULAR UPPER BODY CLOTHING: A LITTLE
MOVING TO AND FROM BED TO CHAIR: A LOT
HELP NEEDED FOR BATHING: A LITTLE
MOBILITY SCORE: 14
MOVING TO AND FROM BED TO CHAIR: TOTAL
MOBILITY SCORE: 6
PATIENT BASELINE BEDBOUND: NO
HELP NEEDED FOR BATHING: TOTAL
EATING MEALS: TOTAL
DRESSING REGULAR LOWER BODY CLOTHING: TOTAL
WALKING IN HOSPITAL ROOM: TOTAL
DAILY ACTIVITIY SCORE: 6
DAILY ACTIVITIY SCORE: 19
TURNING FROM BACK TO SIDE WHILE IN FLAT BAD: A LITTLE
STANDING UP FROM CHAIR USING ARMS: TOTAL
PERSONAL GROOMING: A LITTLE
MOVING FROM LYING ON BACK TO SITTING ON SIDE OF FLAT BED WITH BEDRAILS: TOTAL
DRESSING REGULAR UPPER BODY CLOTHING: TOTAL
STANDING UP FROM CHAIR USING ARMS: A LOT

## 2024-10-07 ASSESSMENT — PATIENT HEALTH QUESTIONNAIRE - PHQ9
SUM OF ALL RESPONSES TO PHQ9 QUESTIONS 1 & 2: 0
1. LITTLE INTEREST OR PLEASURE IN DOING THINGS: NOT AT ALL
2. FEELING DOWN, DEPRESSED OR HOPELESS: NOT AT ALL

## 2024-10-07 ASSESSMENT — LIFESTYLE VARIABLES
HOW OFTEN DO YOU HAVE 6 OR MORE DRINKS ON ONE OCCASION: NEVER
HOW MANY STANDARD DRINKS CONTAINING ALCOHOL DO YOU HAVE ON A TYPICAL DAY: PATIENT DOES NOT DRINK
SUBSTANCE_ABUSE_PAST_12_MONTHS: NO
EVER HAD A DRINK FIRST THING IN THE MORNING TO STEADY YOUR NERVES TO GET RID OF A HANGOVER: NO
SKIP TO QUESTIONS 9-10: 1
HOW OFTEN DO YOU HAVE A DRINK CONTAINING ALCOHOL: NEVER
PRESCIPTION_ABUSE_PAST_12_MONTHS: NO
TOTAL SCORE: 0
HAVE YOU EVER FELT YOU SHOULD CUT DOWN ON YOUR DRINKING: NO
EVER FELT BAD OR GUILTY ABOUT YOUR DRINKING: NO
AUDIT-C TOTAL SCORE: 0
HAVE PEOPLE ANNOYED YOU BY CRITICIZING YOUR DRINKING: NO
AUDIT-C TOTAL SCORE: 0

## 2024-10-07 ASSESSMENT — PAIN SCALES - GENERAL
PAINLEVEL_OUTOF10: 0 - NO PAIN

## 2024-10-07 ASSESSMENT — ACTIVITIES OF DAILY LIVING (ADL)
BATHING: NEEDS ASSISTANCE
WALKS IN HOME: NEEDS ASSISTANCE
PATIENT'S MEMORY ADEQUATE TO SAFELY COMPLETE DAILY ACTIVITIES?: YES
HEARING - LEFT EAR: FUNCTIONAL
FEEDING YOURSELF: NEEDS ASSISTANCE
TOILETING: NEEDS ASSISTANCE
JUDGMENT_ADEQUATE_SAFELY_COMPLETE_DAILY_ACTIVITIES: YES
DRESSING YOURSELF: NEEDS ASSISTANCE
LACK_OF_TRANSPORTATION: NO
ADEQUATE_TO_COMPLETE_ADL: YES
GROOMING: NEEDS ASSISTANCE
HEARING - RIGHT EAR: FUNCTIONAL
LACK_OF_TRANSPORTATION: NO

## 2024-10-07 ASSESSMENT — PAIN SCALES - WONG BAKER: WONGBAKER_NUMERICALRESPONSE: NO HURT

## 2024-10-07 NOTE — PROGRESS NOTES
Occupational Therapy    Evaluation    Patient Name: Akiko Peng  MRN: [id number]  Department: [facility] ED  Room: [place] [address]  Today's Date: 10/7/2024  Time Calculation  Start Time: 1037  Stop Time: 1050  Time Calculation (min): 13 min        Assessment:  End of Session Communication: Bedside nurse  End of Session Patient Position: Up in chair, Alarm off, not on at start of session     Plan:  Treatment Interventions: ADL retraining, Functional transfer training, Compensatory technique education  OT Frequency: 3 times per week  OT Discharge Recommendations: Moderate intensity level of continued care  OT - OK to Discharge: Yes (to next level of care when cleared by medical team)  Treatment Interventions: ADL retraining, Functional transfer training, Compensatory technique education    Subjective   Current Problem:  1. COVID-19        2. Right hip pain          General:  General  Reason for Referral: impaired adl; pt. discharged yesterday, then returned to the hospital with RUE weakness, pneumonia, found to be covid (+), reports R eye occasional double vision, ct head:  (-) for acute process, xray:  (-) fx/dislocation  Past Medical History Relevant to Rehab: ms, ra, seizures, R side upper and lower extremity weakness  Prior to Session Communication: Bedside nurse  Patient Position Received: Bed, 2 rail up, Alarm off, not on at start of session  General Comment: pt. agreeable to therapy intervention, although pt. lethargic  Precautions:  Precautions Comment: MS, purewick, isolation due to covid (+)    Vital Sign (Past 2hrs)        Date/Time Vitals Session Patient Position Pulse Resp SpO2 BP MAP (mmHg)    10/07/24 0930 --  --  64  --  100 %  --  --     10/07/24 1038 --  --  --  --  --  --  --                         Pain:  Pain Assessment  Pain Assessment:  (no pain complaints)    Objective   Cognition:  Overall Cognitive Status:  (pt. lethargic, eyes closed for most of session, formal orientation not assessed)           Home  Living:  Home Living Comments: pt. lives at home with her  in a 2 floor home, lift chair to enter.  pt. sleeps on main level in regular bed and upstairs 12 steps with rail for shoewr with tub/shower, bench and rails.  pt. uses st. cane inside and furniture to steady self.  owns front wh. walker as well, daughter assists with laundry and shopping,  cooks and drives  Prior Function:     IADL History:     ADL:  ADL Comments: dependent for all adl at this point due to increased tone all 4 limbs/max assist for sitting balance.  in supine, pt. dependent to don gown  Activity Tolerance:  Endurance: Decreased tolerance for upright activites  Early Mobility/Exercise Safety Screen: Proceed with mobilization - No exclusion criteria met  Bed Mobility/Transfers: Bed Mobility  Bed Mobility:  (dependent assist x 2 supine <> sit with use of draw sheet, pt. did not participate during transfer)       Sitting Balance:  Static Sitting Balance  Static Sitting-Comment/Number of Minutes: max assist sitting edge of cart  Sensation:  Sensation Comment: not assessed  Strength:  Strength Comments: not formally assessed    Outcome Measures:[facility] Daily Activity  Putting on and taking off regular lower body clothing: Total  Bathing (including washing, rinsing, drying): Total  Putting on and taking off regular upper body clothing: Total  Toileting, which includes using toilet, bedpan or urinal: Total  Taking care of personal grooming such as brushing teeth: Total  Eating Meals: Total  Daily Activity - Total Score: 6         and Early Mobility/Exercise Safety Screen: Proceed with mobilization - No exclusion criteria met       Education Documentation  ADL Training, taught by Mayda Steele OT at 10/7/2024 11:10 AM.  Learner: Patient  Readiness: Acceptance  Method: Explanation  Response: Needs Reinforcement      Goals:  Encounter Problems       Encounter Problems (Active)       OT Goals       Increase functional mobility and  functional  transfers to min assist x 1 for bed/chair/toilet with dme prn   (Progressing)       Start:  10/07/24    Expected End:  10/21/24            increase bue ther ex/activity x 7-10 minutes and increase standing tolerance x 3-5 minutes with min assist x 1 to promote greater activity tolerance for assist with adl.   (Progressing)       Start:  10/07/24    Expected End:  10/21/24            Increase grooming to min assist x 1 sitting eob (Progressing)       Start:  10/07/24    Expected End:  10/21/24            Increase ub/lb dressing to min assist x 1 with dme prn  (Progressing)       Start:  10/07/24    Expected End:  10/21/24               OT Goals       Pt will be alert and oriented x 3 and follow >50% simple 1-step commands with stimulation.  Pt will maintain attention >8 min with min cueing for active participation in functional tasks in preparation for ADLs.         Start:  10/07/24    Expected End:  10/21/24

## 2024-10-07 NOTE — PROGRESS NOTES
Physical Therapy    Physical Therapy Evaluation    Patient Name: Akiko Peng  MRN: [id number]  Today's Date: 10/7/2024   Time Calculation  Start Time: 1037  Stop Time: 1050  Time Calculation (min): 13 min  AC24/AC24    Assessment/Plan   PT Assessment  PT Assessment Results: Decreased strength, Decreased endurance, Impaired balance, Decreased mobility  Rehab Prognosis: Good  End of Session Patient Position: Up in chair, Alarm off, not on at start of session  IP OR SWING BED PT PLAN  Inpatient or Swing Bed: Inpatient  PT Plan  Treatment/Interventions: Transfer training, Bed mobility, Gait training, Balance training, Strengthening, Endurance training, Therapeutic exercise, Therapeutic activity  PT Plan: Ongoing PT  PT Frequency: 3 times per week  PT Discharge Recommendations: Moderate intensity level of continued care  PT - OK to Discharge: Yes (when cleared by medical team)    Subjective     Current Problem:  1. COVID-19        2. Right hip pain          Patient Active Problem List   Diagnosis    TIA (transient ischemic attack)    Aphasia    Bradycardia    Right sided weakness    COVID-19       General Visit Information:  General  Reason for Referral: PT eval and treat; COVID +, pneumonia; hip pain, xray negative for acute fracture or dislocation, CThead negative for acute process. Pt was discharged from [facility] yesterday and came back to hospital a few hours later for hip pain  Referred By: Klerman  Past Medical History Relevant to Rehab: MS, RA, seizures with right side upper and lower extremity weakness  Prior to Session Communication: Bedside nurse  Patient Position Received: On cart, Alarm off, not on at start of session (in ED room)  General Comment: Pt resting in bed upon entering, pt keeps eyes closed throughout session with limited participation.    Home Living:  Home Living  Home Living Comments: Pt lives at home with her  in a 2 level house with a liftchair to enter. Pt sleeps on main level in reg bed  and upstairs 12 steps with HR for shower with tub/shower, bench and rails. Pt uses a SPC inside and furniture to steady. Owns FWW as well. Daughter assists with laundry and shopping.  cooks and drives.    Prior Level of Function:  Prior Function Per Pt/Caregiver Report  Level of [place]: Independent with ADLs and functional transfers, Needs assistance with homemaking    Precautions:  Precautions  Medical Precautions: Fall precautions  Precautions Comment: telemetry, COVID +    Vital Signs:  Vital Signs  Vitals Session:  (VSS throughout session)  Objective     Pain:  Pain Assessment  Pain Assessment:  (pt denies pain)    Cognition:  Cognition  Overall Cognitive Status:  (pt with flat affect, limited participation)    General Assessments:      Activity Tolerance  Endurance: Decreased tolerance for upright activites  Sensation  Light Touch:  (not assessed)  Strength  Strength Comments:  (Pt would not participate in BLE exam. Pt sat with BLE extended and sitting and would not put them on the floor)           Static Sitting Balance  Static Sitting-Level of Assistance: Maximum assistance (Pt with no attempts to sit upright at this time requiring max assist)  Dynamic Sitting Balance  Dynamic Sitting-Level of Assistance:  (unable)  Static Standing Balance  Static Standing-Level of Assistance:  (unable)  Dynamic Standing Balance  Dynamic Standing-Level of Assistance:  (unable)    Functional Assessments:     Bed Mobility  Bed Mobility:  (supine<>sit with total assist x 2)  Transfers  Transfer:  (unable to stand this date due to poor sitting balance)  Ambulation/Gait Training  Ambulation/Gait Training Performed:  (unable at this time)      Outcome Measures:     [facility] Basic Mobility  Turning from your back to your side while in a flat bed without using bedrails: Total  Moving from lying on your back to sitting on the side of a flat bed without using bedrails: Total  Moving to and from bed to chair (including a  wheelchair): Total  Standing up from a chair using your arms (e.g. wheelchair or bedside chair): Total  To walk in hospital room: Total  Climbing 3-5 steps with railing: Total  Basic Mobility - Total Score: 6      Goals:  Encounter Problems       Encounter Problems (Active)       PT Problem       PT Goal 1 STG - Pt will amb 10' using WW with MIN A  (Progressing)       Start:  10/07/24    Expected End:  10/21/24            PT Goal 2 STG - Pt will transition supine <> sitting with MIN A (Progressing)       Start:  10/07/24    Expected End:  10/21/24            PT Goal 3 STG - Pt will SPT bed <> chair with MIN A  (Progressing)       Start:  10/07/24    Expected End:  10/21/24            PT Goal 4 STG - Pt will transfer STS with MIN A  (Progressing)       Start:  10/07/24    Expected End:  10/21/24                 Education Documentation  Body Mechanics, taught by Jessica Infante PT at 10/7/2024 11:12 AM.  Learner: Patient  Readiness: Acceptance  Method: Explanation  Response: Needs Reinforcement    Mobility Training, taught by Jessica Infante PT at 10/7/2024 11:12 AM.  Learner: Patient  Readiness: Acceptance  Method: Explanation  Response: Needs Reinforcement    Education Comments  No comments found.

## 2024-10-07 NOTE — H&P
Internal Medicine History & Physical             PATIENT NAME: Akiko Peng  MRN: [id number]    SERVICE DATE:  10/7/2024  SERVICE TIME:  12:46 AM  HPI  Akiko Peng is a 68 y.o. female on day 0 of admission presenting with No Principal Problem: There is no principal problem currently on the Problem List. Please update the Problem List and refresh..    Akiko Peng is a 68 y.o. female with a history of multiple sclerosis, rheumatoid arthritis, seizures who presented to [facility] for right hip/thigh pain and sore throat.  Patient reports being discharged home this morning.  She states she felt fine until this afternoon and she developed right hip pain and bilateral lower extremity weakness.  Patient describes the pain as constant and sharp.  She rates the pain an 8 out of 10.  She denies trauma to the right hip.  She also complains of sore throat that started this morning while she was still at the hospital.  She admits to headache but denies shortness of breath, chest pain, palpitations or dizziness.    Of note, patient was recently admitted on 10/5 for right sided upper and lower extremity weakness.  Neurology was consulted and recommended supportive care.  Patient was determined to be stable and was discharged on 10/6 with home health care for PT/OT.    ED Course: On arrival patient was afebrile 99 °F, HR 80, RR 16, 129/61, 100% on room air.  CBC and CMP were largely unremarkable.  Patient COVID positive.  UA obtained and results pending.  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  Patient received Tylenol 975 mg.  Droplet plus isolation was initiated.    Past Medical History: As listed above  Surgical History: Tonsillectomy, hysterectomy  Social History: Denies smoking, alcohol or illicit drug use.  Lives at home with  " and daughter.   Family History: ALS and MSChucky GREEN  ROS completed.  Pertinent positives and negatives as stated in HPI.    OBJECTIVE  Vitals:    10/06/24 2244   BP: 129/61   Pulse: 80   Resp: 16   Temp: 37.2 °C (99 °F)   SpO2: 100%   Weight: 63.5 kg (140 lb)   Height: 1.778 m (5' 10\")      Results from last 7 days   Lab Units 10/06/24  0731 10/05/24  0511 10/04/24  2058   WBC AUTO x10*3/uL 10.8   < > 9.9   HEMOGLOBIN g/dL 13.9   < > 14.9   HEMATOCRIT % 41.0   < > 43.5   PLATELETS AUTO x10*3/uL 199   < > 219   NEUTROS PCT AUTO %  --   --  86.5   LYMPHS PCT AUTO %  --   --  4.9   MONOS PCT AUTO %  --   --  6.8   EOS PCT AUTO %  --   --  0.8    < > = values in this interval not displayed.      Results from last 7 days   Lab Units 10/06/24  0731   SODIUM mmol/L 142   POTASSIUM mmol/L 4.4   CHLORIDE mmol/L 107   CO2 mmol/L 28   BUN mg/dL 17   CREATININE mg/dL 0.50   CALCIUM mg/dL 8.7   PROTEIN TOTAL g/dL 5.7*   BILIRUBIN TOTAL mg/dL 0.4   ALK PHOS U/L 73   ALT U/L 8   AST U/L 10   GLUCOSE mg/dL 87       Physical Exam  General: Cooperative and pleasant.  No acute distress.  Cardiovascular: Regular rate and rhythm.  No murmurs appreciated  Respiratory: CTAB, no wheezes, rhonchi or rales appreciated.  Abdomen: Soft, nontender, nondistended.  Extremities: No edema.  Unable to assess strength as patient is unwilling to move lower extremity secondary to pain.  Patient has tenderness to deep palpation of right hip.  Skin: Warm and dry  Neuro: Patient is alert and oriented x 2.  Motor weakness of lower extremities present.  Sensation intact.     ASSESSMENT & PLAN  Akiko Peng is a 68 y.o. female with a history of multiple sclerosis, rheumatoid arthritis, seizures who presented to [facility] for right hip/thigh pain and sore throat.     Right hip pain  Bilateral lower extremity weakness  Ambulatory dysfunction  :: Previously hospitalized for BL LE weakness. Neurology was consulted and recommended supportive care, deferred IV " steroids until flare confirmed with MRI, and follow up with neurology for MS management.   :: Hip imaging negative for acute fracture or dislocation.   PLAN:  -PT/OT evaluation  -pain management: tylenol PRN for mild pain, lidocaine patch PRN    COVID-19  :: Positive 10/6/2024  :: Patient does not have increased oxygen requirement. She had a benign respiratory exam. At this time I do not believe she requires treatment with remdesivir or steroids.   PLAN:  -Supportive care with lozenges PRN for sore throat     Chronic Conditions  Multiple sclerosis- continue aspirin, statin  Seizures- continue home levetiracetam      DVT ppx: subcutaneous lovenox  IVF: -  Diet: Regular  Consults: -  CODE STATUS: Full code    Kaur Brody DO PGY-1 Internal Medicine  Disclaimer: This note was dictated by speech recognition. Minor errors in transcription may be present.   Please SecureChat for any further questions  This is a preliminary note, please await attending attestation for final A/P

## 2024-10-07 NOTE — ED TRIAGE NOTES
Patient presents to ED via EMS from home reporting right hip/thigh pain. Patient was recently discharged this morning from inpatient admission for pneumonia. She denies any falls or injuries. Denies any complications during transfer home. Upon assessment, patient localizes pain to where an unknown medication patch is present. Patch removed on arrival. Patient additionally reports a sore throat.

## 2024-10-07 NOTE — CARE PLAN
The patient's goals for the shift include      The clinical goals for the shift include   Problem: Pain - Adult  Goal: Verbalizes/displays adequate comfort level or baseline comfort level  Outcome: Progressing     Problem: Safety - Adult  Goal: Free from fall injury  Outcome: Progressing     Problem: Discharge Planning  Goal: Discharge to home or other facility with appropriate resources  Outcome: Progressing     Problem: Chronic Conditions and Co-morbidities  Goal: Patient's chronic conditions and co-morbidity symptoms are monitored and maintained or improved  Outcome: Progressing     Problem: Fall/Injury  Goal: Not fall by end of shift  Outcome: Progressing  Goal: Be free from injury by end of the shift  Outcome: Progressing  Goal: Verbalize understanding of personal risk factors for fall in the hospital  Outcome: Progressing  Goal: Verbalize understanding of risk factor reduction measures to prevent injury from fall in the home  Outcome: Progressing  Goal: Use assistive devices by end of the shift  Outcome: Progressing  Goal: Pace activities to prevent fatigue by end of the shift  Outcome: Progressing     Problem: Pain  Goal: Takes deep breaths with improved pain control throughout the shift  Outcome: Progressing  Goal: Turns in bed with improved pain control throughout the shift  Outcome: Progressing  Goal: Walks with improved pain control throughout the shift  Outcome: Progressing  Goal: Performs ADL's with improved pain control throughout shift  Outcome: Progressing  Goal: Participates in PT with improved pain control throughout the shift  Outcome: Progressing  Goal: Free from opioid side effects throughout the shift  Outcome: Progressing  Goal: Free from acute confusion related to pain meds throughout the shift  Outcome: Progressing     Problem: Skin  Goal: Decreased wound size/increased tissue granulation at next dressing change  Outcome: Progressing  Goal: Participates in plan/prevention/treatment  measures  Outcome: Progressing  Goal: Prevent/manage excess moisture  Outcome: Progressing  Goal: Prevent/minimize sheer/friction injuries  Outcome: Progressing  Goal: Promote/optimize nutrition  Outcome: Progressing  Goal: Promote skin healing  Outcome: Progressing

## 2024-10-07 NOTE — ED PROVIDER NOTES
HPI   Chief Complaint   Patient presents with    Hip Pain       This is a 68-year-old female MS just discharged earlier today after several days in the hospital for worsening bilateral lower extremity weakness and essentially paraplegia on the right side status post IV Solu-Medrol though MRI showed no flare.  Patient reports that over the few hours that she has been home, she has developed severe pain in her right hip.  No fall or traumatic injury at home.  No fever.  This is not a typical part of her MS flare.      History provided by:  Medical records and patient   used: No            Patient History   Past Medical History:   Diagnosis Date    Multiple sclerosis (Multi)     History of multiple sclerosis     Past Surgical History:   Procedure Laterality Date    MR HEAD ANGIO WO IV CONTRAST  9/21/2020    MR HEAD ANGIO WO IV CONTRAST 9/21/2020 [facility]    MR HEAD ANGIO WO IV CONTRAST  10/24/2022    MR HEAD ANGIO WO IV CONTRAST 10/24/2022 [facility]    MR NECK ANGIO WO IV CONTRAST  9/21/2020    MR NECK ANGIO WO IV CONTRAST 9/21/2020 [facility]    MR NECK ANGIO WO IV CONTRAST  10/24/2022    MR NECK ANGIO WO IV CONTRAST 10/24/2022 [facility]    OTHER SURGICAL HISTORY  11/26/2019    Tonsillectomy    OTHER SURGICAL HISTORY  11/26/2019    Hysterectomy     No family history on file.  Social History     Tobacco Use    Smoking status: Never    Smokeless tobacco: Never   Vaping Use    Vaping status: Never Used   Substance Use Topics    Alcohol use: Never    Drug use: Never       Physical Exam   ED Triage Vitals [10/06/24 2244]   Temperature Heart Rate Respirations BP   37.2 °C (99 °F) 80 16 129/61      Pulse Ox Temp src Heart Rate Source Patient Position   100 % -- -- --      BP Location FiO2 (%)     -- --       Physical Exam  Vitals and nursing note reviewed.   Constitutional:       General: She is not in acute distress.     Appearance: She is ill-appearing. She is  not toxic-appearing or diaphoretic.   HENT:      Head: Normocephalic and atraumatic.      Nose: Congestion present. No rhinorrhea.      Mouth/Throat:      Mouth: Mucous membranes are moist.   Eyes:      General: No scleral icterus.        Right eye: No discharge.         Left eye: No discharge.      Extraocular Movements: Extraocular movements intact.      Conjunctiva/sclera: Conjunctivae normal.   Cardiovascular:      Rate and Rhythm: Normal rate and regular rhythm.      Heart sounds: No murmur heard.     No friction rub. No gallop.   Pulmonary:      Effort: Pulmonary effort is normal. No respiratory distress.      Breath sounds: Normal breath sounds. No stridor. No wheezing, rhonchi or rales.   Abdominal:      General: Abdomen is flat. There is no distension.      Palpations: Abdomen is soft.      Tenderness: There is no abdominal tenderness. There is no guarding or rebound.   Musculoskeletal:         General: No swelling. Normal range of motion.      Cervical back: Normal range of motion and neck supple. No rigidity or tenderness.      Right lower leg: No edema.      Left lower leg: No edema.      Comments: +diffuse muscle loss charlie b/l LE, very weak in b/l LE, consistent with exam earlier today at time of hospital discharge, +tenderness at R hip, unable to assess ROM given underlying neurologic deficits stable since discharge earlier today   Skin:     General: Skin is warm and dry.      Coloration: Skin is not jaundiced or pale.      Findings: No bruising, erythema, lesion or rash.   Neurological:      Mental Status: She is alert and oriented to person, place, and time.      Cranial Nerves: No cranial nerve deficit.      Sensory: No sensory deficit.      Motor: Weakness present.   Psychiatric:         Mood and Affect: Mood normal.         Behavior: Behavior normal.           ED Course & MDM   ED Course as of 10/07/24 0156   Mon Oct 07, 2024   0013 X-ray hip, pelvis and right femur interpreted by me without  fracture or dislocation. [EK]   0013 Sars-CoV-2 PCR(!)  Labs reviewed, notable for COVID-positive, flu negative [EK]      ED Course User Index  [EK] Elyse H Klerman, MD         Diagnoses as of 10/07/24 0156   COVID-19   Right hip pain                 No data recorded                                 Medical Decision Making  68yF MS presents with R hip pain hours after hospital discharge, also with congestion without fever, cough or CP/SOB.  Pt chronically ill and with profound b/l LE and RUE weakness consistent with exam as reported in hospital discharge from hours ago, attributed to MS flare and already started on solumedrol.  Xray without fracture or dislocation.  UA pending.  Patch of unknown substance removed from R anterior inguinal area at site of her discomfort - she cannot say what it is and there is no medication charted from prior to hospital discharge that would match the patch.  Swab notable for covid+, flu neg.  Pt does not feel comfortable going home given her pain (which we will treat with tylenol in the ED).  Discussed with Dr. Hagan who admitted her last week and will readmit at this time with plan for PT/rehab if needed.    Amount and/or Complexity of Data Reviewed  External Data Reviewed: notes.  Labs: ordered. Decision-making details documented in ED Course.  Radiology: ordered and independent interpretation performed. Decision-making details documented in ED Course.        Procedure  Procedures     Elyse H Klerman, MD  10/07/24 0158

## 2024-10-07 NOTE — PROGRESS NOTES
MEDICINE PROGRESS NOTE    Subjective     Patient seen and examined. NAEO. Patient stated she came back to the ED because of right hip pain.  She also complains of back pain.  Patient states she feels very weak.  No other complaints.      Assessment / Plan   Akiko Peng is a 68 y.o. female with a PMHx of  PMHx of multiple sclerosis, rheumatoid arthritis, and seizures on day 0 of admission presenting with right hip/thigh pain and sore throat.     Acute conditions:  Right hip pain  Bilateral lower extremity weakness  Ambulatory dysfunction  -PT/OT evaluation, deemed okay to discharge  -pain management: tylenol PRN for mild pain, lidocaine patch PRN    COVID-19   -Supportive care with lozenges PRN for sore throat     Chronic Conditions  Multiple sclerosis- continue aspirin, statin  Seizures- continue home levetiracetam     DVT ppx: subcutaneous lovenox  IVF: -  Diet: Regular  Consults: PT/OT  CODE STATUS: Full code    Talha Thapa, DPM  PGY-1,    This is a preliminary note, please await attending attestation for final recommendations    Objective   Physical Exam  General: Cooperative and pleasant.  No acute distress.  Cardiovascular: Regular rate and rhythm.  No murmurs appreciated  Respiratory: CTAB, no wheezes, rhonchi or rales appreciated.  Abdomen: Soft, nontender, nondistended.  Extremities: No edema.  Unable to assess strength as patient is unwilling to move lower extremity secondary to pain.  Patient has tenderness to deep palpation of right hip.  Skin: Warm and dry  Neuro: Patient is alert and oriented x 2.  Motor weakness of lower extremities present.  Sensation intact.  Last Recorded Vitals  Vitals:    10/07/24 0930 10/07/24 1115 10/07/24 1215 10/07/24 1430   BP:       Pulse: 64 64 72 79   Resp:  16 16 16   Temp:       SpO2: 100% 100% 98% 99%   Weight:       Height:         Intake/Output last 3 Shifts:  No intake/output data recorded.  Labs:   Results from last 7 days   Lab Units 10/07/24  3222  10/06/24  0731 10/05/24  0511   SODIUM mmol/L 138 142 138   POTASSIUM mmol/L 3.7 4.4 3.7   CHLORIDE mmol/L 102 107 105   CO2 mmol/L 29 28 27   BUN mg/dL 25* 17 16   CREATININE mg/dL 0.58 0.50 0.47*   GLUCOSE mg/dL 75 87 97   CALCIUM mg/dL 9.1 8.7 9.0     Results from last 7 days   Lab Units 10/07/24  0434   WBC AUTO x10*3/uL 9.0   HEMOGLOBIN g/dL 14.8   HEMATOCRIT % 43.8   PLATELETS AUTO x10*3/uL 161         XR femur right 2+ views   Final Result   No evidence of acute fracture or dislocation. Mild-to-moderate   arthrosis of the right femoroacetabular joint which appears slightly   increased from 2019.             MACRO:   None        Signed by: Robert Aguilera 10/7/2024 1:03 AM   Dictation workstation:   [id number]      XR hip right with pelvis when performed 2 or 3 views   Final Result   No evidence of acute fracture or dislocation. Mild-to-moderate   arthrosis of the right femoroacetabular joint which appears slightly   increased from 2019.             MACRO:   None        Signed by: Robert Aguilera 10/7/2024 1:03 AM   Dictation workstation:   [id number]        Transthoracic Echo (TTE) Limited 05/16/2024     Results for orders placed during the hospital encounter of 05/14/24    Transthoracic Echo (TTE) Limited    [facility], [address] [place] [id number] [phone] and  Fax [phone]    TRANSTHORACIC ECHOCARDIOGRAM REPORT      Patient Name:      TORSTEN Matthews Physician:    [id number] Savita Marshall MD  Study Date:        5/16/2024            Ordering Provider:    [id number] LEN HURD  MRN/PID:           [id number]             Fellow:  Accession#:        [id number]         Nurse:  Date of Birth/Age: 1955 / 68 years Sonographer:          Pradeep CHRISTOPHER,  WILMA DURON  Gender:            F                    Additional Staff:  Height:            177.80 cm            Admit Date:           5/14/2024  Weight:            60.33 kg             Admission Status:     Inpatient  -  Routine  BSA / BMI:         1.76 m2 / 19.08      Encounter#:           [id number]  kg/m2  Department Location:  [address]  floor/ICU  Blood Pressure: 143 /63 mmHg    Study Type:    TRANSTHORACIC ECHO (TTE) LIMITED  Diagnosis/ICD: Bradycardia, unspecified-R00.1  Indication:    Abnormal EKG  CPT Code:      Echo Limited-19815    Patient History:  Pertinent History: Bradycardia.    Study Detail: The following Echo studies were performed: 2D. Technically  challenging study due to body habitus.      PHYSICIAN INTERPRETATION:  Left Ventricle: The left ventricular systolic function is normal, with an estimated ejection fraction of 60-65%. There are no regional wall motion abnormalities. The left ventricular cavity size is normal. Left ventricular diastolic filling was not assessed.  Left Atrium: The left atrium was not assessed.  Right Ventricle: The right ventricle was not assessed. Right ventricular systolic function not assessed.  Right Atrium: The right atrium was not assessed.  Aortic Valve: The aortic valve is trileaflet. Aortic valve regurgitation was not assessed.  Mitral Valve: The mitral valve was not assessed. Mitral valve regurgitation was not assessed.  Tricuspid Valve: The tricuspid valve was not assessed. Tricuspid regurgitation was not assessed.  Pulmonic Valve: The pulmonic valve was not assessed. Pulmonic valve regurgitation was not assessed.  Pericardium: Pericardial effusion was not assessed.  Aorta: The aortic root was not assessed.      CONCLUSIONS:  1. Left ventricular systolic function is normal with a 60-65% estimated ejection fraction.    QUANTITATIVE DATA SUMMARY:  2D MEASUREMENTS:  Normal Ranges:  IVSd:          0.80 cm   (0.6-1.1cm)  LVPWd:         0.60 cm   (0.6-1.1cm)  LVIDd:         4.20 cm   (3.9-5.9cm)  LVIDs:         2.30 cm  LV Mass Index: 48.5 g/m2  LV % FS        45.2 %    LV SYSTOLIC FUNCTION BY 2D PLANIMETRY (MOD):  Normal Ranges:  EF-A4C View: 74.8 % (>=55%)  EF-A2C View: 76.7  %  EF-Biplane:  76.0 %    TRICUSPID VALVE/RVSP:  Normal Ranges:  IVC Diam: 1.90 cm      [id number] Savita Marshall MD  Electronically signed on 5/16/2024 at 9:20:16 AM        ** Final **

## 2024-10-07 NOTE — PROGRESS NOTES
Pharmacy Medication History Review    Akiko Peng is a 68 y.o. female admitted for COVID-19. Pharmacy reviewed the patient's [id number] medications and allergies for accuracy.    The list below reflectives the updated PTA list. Please review each medication in order reconciliation for additional clarification and justification.  Prior to Admission medications    Medication Sig Start Date End Date Taking? Authorizing Provider   amoxicillin-pot clavulanate (Augmentin) 875-125 mg tablet Take 1 tablet by mouth every 12 hours for 7 days. 10/2/24 10/9/24 Yes Adams Corona, DO   atorvastatin (Lipitor) 40 mg tablet Take 1 tablet (40 mg) by mouth once daily. 2/11/24 10/7/24 Yes Dereje Hunter, DO   cholecalciferol (Vitamin D-3) 25 MCG (1000 UT) capsule Take 1 capsule (25 mcg) by mouth once daily.   Yes Historical Provider, MD   ergocalciferol (Vitamin D-2) 1.25 MG (35532 UT) capsule Take 1 capsule (1,250 mcg) by mouth 1 (one) time per week.  Patient taking differently: Take 1 capsule (1,250 mcg) by mouth 1 (one) time per week. Every Sunday 1/25/24 1/24/25 Yes CINTHIA Soler   levETIRAcetam (Keppra) 750 mg tablet Take 1 tablet (750 mg) by mouth 2 times a day. 5/17/24 10/7/24 Yes Adrian Arreola,    thiamine (Vitamin B-1) 100 mg tablet Take 1 tablet (100 mg) by mouth once daily. 1/25/24 1/24/25 Yes CINTHIA Soler   azithromycin (Zithromax Z-Elder) 250 mg tablet Take 1 tablet (250 mg) by mouth once daily for 4 days.  Patient not taking: Reported on 10/7/2024 10/2/24 10/6/24 no Adams Corona, DO   multivitamin with minerals tablet Take 1 tablet by mouth once daily.   Yes Historical Provider, MD        The list below reflectives the updated allergy list. Please review each documented allergy for additional clarification and justification.  Allergies  Reviewed by Elyse H Klerman, MD on 10/6/2024   No Known Allergies         Below are additional concerns with the patient's PTA list.      Yolanda  Lenny

## 2024-10-08 PROCEDURE — 96372 THER/PROPH/DIAG INJ SC/IM: CPT

## 2024-10-08 PROCEDURE — 2500000005 HC RX 250 GENERAL PHARMACY W/O HCPCS

## 2024-10-08 PROCEDURE — G0378 HOSPITAL OBSERVATION PER HR: HCPCS

## 2024-10-08 PROCEDURE — 2500000001 HC RX 250 WO HCPCS SELF ADMINISTERED DRUGS (ALT 637 FOR MEDICARE OP)

## 2024-10-08 PROCEDURE — 2500000004 HC RX 250 GENERAL PHARMACY W/ HCPCS (ALT 636 FOR OP/ED)

## 2024-10-08 PROCEDURE — 99232 SBSQ HOSP IP/OBS MODERATE 35: CPT

## 2024-10-08 ASSESSMENT — PAIN SCALES - GENERAL
PAINLEVEL_OUTOF10: 0 - NO PAIN
PAINLEVEL_OUTOF10: 0 - NO PAIN

## 2024-10-08 ASSESSMENT — PAIN - FUNCTIONAL ASSESSMENT: PAIN_FUNCTIONAL_ASSESSMENT: 0-10

## 2024-10-08 NOTE — PROGRESS NOTES
MEDICINE PROGRESS NOTE    Subjective     Patient seen and examined. NAEO. Patient simran complains of right hip pain.  States her back pain is better than yesterday.  Not very talkative this morning.  No other complaints.      Assessment / Plan   Akiko Peng is a 68 y.o. female with a PMHx of multiple sclerosis, rheumatoid arthritis, and seizures   on day 0 of admission presenting with right hip/thigh pain and sore throat.     Acute conditions:  Right hip pain  Bilateral lower extremity weakness  Ambulatory dysfunction  -PT/OT evaluation, deemed okay to discharge  -pain management: tylenol PRN for mild pain, lidocaine patch PRN  - awaiting potential snif placement     COVID-19   -Supportive care with lozenges PRN for sore throat      Chronic Conditions  Multiple sclerosis- continue aspirin, statin  Seizures- continue home levetiracetam     DVT ppx: subcutaneous lovenox  IVF: -  Diet: Regular  Consults: PT/OT  CODE STATUS: Full code     Talha Thapa, DPM  PGY-1,     This is a preliminary note, please await attending attestation for final recommendation      Objective   Physical Exam  General: Cooperative and pleasant.  No acute distress.  Cardiovascular: Regular rate and rhythm.  No murmurs appreciated  Respiratory: CTAB, no wheezes, rhonchi or rales appreciated.  Abdomen: Soft, nontender, nondistended.  Extremities: No edema.  Unable to assess strength as patient is unwilling to move lower extremity secondary to pain.  Patient still has tenderness to deep palpation of right hip.  Skin: Warm and dry  Neuro: Patient is alert and oriented x 2.  Motor weakness of lower extremities present.  Sensation intact.  Last Recorded Vitals  Vitals:    10/07/24 2031 10/07/24 2338 10/08/24 0442 10/08/24 0732   BP: 115/61 105/58 118/56 110/54   BP Location:   Left arm Left arm   Patient Position:   Lying Lying   Pulse: 78 80 72 70   Resp:   16 18   Temp: 37.2 °C (99 °F) 36.7 °C (98.1 °F) 37 °C (98.6 °F) 36.8 °C (98.2 °F)    TempSrc:   Temporal Temporal   SpO2: 94% 96% 100% 98%   Weight:       Height:         Intake/Output last 3 Shifts:  I/O last 3 completed shifts:  In: - (0 mL/kg)   Out: 150 (2.4 mL/kg) [Urine:150 (0.1 mL/kg/hr)]  Weight: 63.5 kg   Labs:   Results from last 7 days   Lab Units 10/07/24  0434 10/06/24  0731 10/05/24  0511   SODIUM mmol/L 138 142 138   POTASSIUM mmol/L 3.7 4.4 3.7   CHLORIDE mmol/L 102 107 105   CO2 mmol/L 29 28 27   BUN mg/dL 25* 17 16   CREATININE mg/dL 0.58 0.50 0.47*   GLUCOSE mg/dL 75 87 97   CALCIUM mg/dL 9.1 8.7 9.0     Results from last 7 days   Lab Units 10/07/24  0434   WBC AUTO x10*3/uL 9.0   HEMOGLOBIN g/dL 14.8   HEMATOCRIT % 43.8   PLATELETS AUTO x10*3/uL 161         XR femur right 2+ views   Final Result   No evidence of acute fracture or dislocation. Mild-to-moderate   arthrosis of the right femoroacetabular joint which appears slightly   increased from 2019.             MACRO:   None        Signed by: Robert Aguilera 10/7/2024 1:03 AM   Dictation workstation:   [id number]      XR hip right with pelvis when performed 2 or 3 views   Final Result   No evidence of acute fracture or dislocation. Mild-to-moderate   arthrosis of the right femoroacetabular joint which appears slightly   increased from 2019.             MACRO:   None        Signed by: Robert Aguilera 10/7/2024 1:03 AM   Dictation workstation:   [id number]        Transthoracic Echo (TTE) Limited 05/16/2024     Results for orders placed during the hospital encounter of 05/14/24    Transthoracic Echo (TTE) Limited    [facility], [address] [place] [id number] [phone] and  Fax [phone]    TRANSTHORACIC ECHOCARDIOGRAM REPORT      Patient Name:      TORSTEN Matthews Physician:    [id number] Savita Marshall MD  Study Date:        5/16/2024            Ordering Provider:    [id number] LEN HURD  MRN/PID:           [id number]             Fellow:  Accession#:        [id number]         Nurse:  Date  of Birth/Age: 1955 / 68 years Sonographer:          Pradeep Pabon [facility],  [facility], WILMA  Gender:            F                    Additional Staff:  Height:            177.80 cm            Admit Date:           5/14/2024  Weight:            60.33 kg             Admission Status:     Inpatient -  Routine  BSA / BMI:         1.76 m2 / 19.08      Encounter#:           [id number]  kg/m2  Department Location:  [address]  floor/ICU  Blood Pressure: 143 /63 mmHg    Study Type:    TRANSTHORACIC ECHO (TTE) LIMITED  Diagnosis/ICD: Bradycardia, unspecified-R00.1  Indication:    Abnormal EKG  CPT Code:      Echo Limited-53430    Patient History:  Pertinent History: Bradycardia.    Study Detail: The following Echo studies were performed: 2D. Technically  challenging study due to body habitus.      PHYSICIAN INTERPRETATION:  Left Ventricle: The left ventricular systolic function is normal, with an estimated ejection fraction of 60-65%. There are no regional wall motion abnormalities. The left ventricular cavity size is normal. Left ventricular diastolic filling was not assessed.  Left Atrium: The left atrium was not assessed.  Right Ventricle: The right ventricle was not assessed. Right ventricular systolic function not assessed.  Right Atrium: The right atrium was not assessed.  Aortic Valve: The aortic valve is trileaflet. Aortic valve regurgitation was not assessed.  Mitral Valve: The mitral valve was not assessed. Mitral valve regurgitation was not assessed.  Tricuspid Valve: The tricuspid valve was not assessed. Tricuspid regurgitation was not assessed.  Pulmonic Valve: The pulmonic valve was not assessed. Pulmonic valve regurgitation was not assessed.  Pericardium: Pericardial effusion was not assessed.  Aorta: The aortic root was not assessed.      CONCLUSIONS:  1. Left ventricular systolic function is normal with a 60-65% estimated ejection fraction.    QUANTITATIVE DATA SUMMARY:  2D MEASUREMENTS:  Normal Ranges:  IVSd:           0.80 cm   (0.6-1.1cm)  LVPWd:         0.60 cm   (0.6-1.1cm)  LVIDd:         4.20 cm   (3.9-5.9cm)  LVIDs:         2.30 cm  LV Mass Index: 48.5 g/m2  LV % FS        45.2 %    LV SYSTOLIC FUNCTION BY 2D PLANIMETRY (MOD):  Normal Ranges:  EF-A4C View: 74.8 % (>=55%)  EF-A2C View: 76.7 %  EF-Biplane:  76.0 %    TRICUSPID VALVE/RVSP:  Normal Ranges:  IVC Diam: 1.90 cm      [id number] Savita Marshall MD  Electronically signed on 5/16/2024 at 9:20:16 AM        ** Final **

## 2024-10-08 NOTE — CARE PLAN
Problem: Pain - Adult  Goal: Verbalizes/displays adequate comfort level or baseline comfort level  Outcome: Progressing     Problem: Safety - Adult  Goal: Free from fall injury  Outcome: Progressing     Problem: Discharge Planning  Goal: Discharge to home or other facility with appropriate resources  Outcome: Progressing     Problem: Chronic Conditions and Co-morbidities  Goal: Patient's chronic conditions and co-morbidity symptoms are monitored and maintained or improved  Outcome: Progressing     Problem: Fall/Injury  Goal: Not fall by end of shift  Outcome: Progressing  Goal: Be free from injury by end of the shift  Outcome: Progressing  Goal: Verbalize understanding of personal risk factors for fall in the hospital  Outcome: Progressing  Goal: Verbalize understanding of risk factor reduction measures to prevent injury from fall in the home  Outcome: Progressing  Goal: Use assistive devices by end of the shift  Outcome: Progressing  Goal: Pace activities to prevent fatigue by end of the shift  Outcome: Progressing     Problem: Pain  Goal: Takes deep breaths with improved pain control throughout the shift  Outcome: Progressing  Goal: Turns in bed with improved pain control throughout the shift  Outcome: Progressing  Goal: Walks with improved pain control throughout the shift  Outcome: Progressing  Goal: Performs ADL's with improved pain control throughout shift  Outcome: Progressing  Goal: Participates in PT with improved pain control throughout the shift  Outcome: Progressing  Goal: Free from opioid side effects throughout the shift  Outcome: Progressing  Goal: Free from acute confusion related to pain meds throughout the shift  Outcome: Progressing     Problem: Skin  Goal: Decreased wound size/increased tissue granulation at next dressing change  Outcome: Progressing  Goal: Participates in plan/prevention/treatment measures  Outcome: Progressing  Goal: Prevent/manage excess moisture  Outcome: Progressing  Goal:  Prevent/minimize sheer/friction injuries  Outcome: Progressing  Goal: Promote/optimize nutrition  Outcome: Progressing  Goal: Promote skin healing  Outcome: Progressing   The patient's goals for the shift include      The clinical goals for the shift include pt will be hemodynamically stable throughout the shift; pt's pain level will be within tolerable limits

## 2024-10-08 NOTE — CARE PLAN
Problem: Pain - Adult  Goal: Verbalizes/displays adequate comfort level or baseline comfort level  10/8/2024 0943 by Shayla Spivey RN  Outcome: Progressing  10/8/2024 0941 by Shayla Spivey RN  Outcome: Progressing     Problem: Safety - Adult  Goal: Free from fall injury  10/8/2024 0943 by Shayla Spivey RN  Outcome: Progressing  10/8/2024 0941 by Shayal Spivey RN  Outcome: Progressing     Problem: Discharge Planning  Goal: Discharge to home or other facility with appropriate resources  10/8/2024 0943 by Shayla Spivey RN  Outcome: Progressing  10/8/2024 0941 by Shalya Spivey RN  Outcome: Progressing     Problem: Chronic Conditions and Co-morbidities  Goal: Patient's chronic conditions and co-morbidity symptoms are monitored and maintained or improved  10/8/2024 0943 by Shayla Spivye RN  Outcome: Progressing  10/8/2024 0941 by Shayla Spivey RN  Outcome: Progressing     Problem: Fall/Injury  Goal: Not fall by end of shift  10/8/2024 0943 by Shayla Spivey RN  Outcome: Progressing  10/8/2024 0941 by Shayla Spivey RN  Outcome: Progressing  Goal: Be free from injury by end of the shift  10/8/2024 0943 by Shayla Spivey RN  Outcome: Progressing  10/8/2024 0941 by Shayla Spivey RN  Outcome: Progressing  Goal: Verbalize understanding of personal risk factors for fall in the hospital  10/8/2024 0943 by Shayla Spivey RN  Outcome: Progressing  10/8/2024 0941 by Shayla Spivey RN  Outcome: Progressing  Goal: Verbalize understanding of risk factor reduction measures to prevent injury from fall in the home  10/8/2024 0943 by Shayla Spivey RN  Outcome: Progressing  10/8/2024 0941 by Shayla Spivey RN  Outcome: Progressing  Goal: Use assistive devices by end of the shift  10/8/2024 0943 by Shayla Spivey RN  Outcome: Progressing  10/8/2024 0941 by Shayla Spivey, RN  Outcome: Progressing  Goal: Pace activities to prevent fatigue by end of the shift  10/8/2024 0943 by  Shayla Spivey RN  Outcome: Progressing  10/8/2024 0941 by Shayla Spivey RN  Outcome: Progressing     Problem: Pain  Goal: Takes deep breaths with improved pain control throughout the shift  10/8/2024 0943 by Shayla Spivey RN  Outcome: Progressing  10/8/2024 0941 by Shayla Spivey RN  Outcome: Progressing  Goal: Turns in bed with improved pain control throughout the shift  10/8/2024 0943 by Shayla Spivey RN  Outcome: Progressing  10/8/2024 0941 by Shayla Spivey RN  Outcome: Progressing  Goal: Walks with improved pain control throughout the shift  10/8/2024 0943 by Shayla Spivey RN  Outcome: Progressing  10/8/2024 0941 by hSayla Spivey RN  Outcome: Progressing  Goal: Performs ADL's with improved pain control throughout shift  10/8/2024 0943 by Shayla Spivey RN  Outcome: Progressing  10/8/2024 0941 by Shayla Spivey RN  Outcome: Progressing  Goal: Participates in PT with improved pain control throughout the shift  10/8/2024 0943 by Shayla Spivey RN  Outcome: Progressing  10/8/2024 0941 by Shayla Spivey RN  Outcome: Progressing  Goal: Free from opioid side effects throughout the shift  10/8/2024 0943 by Shayla Spivey RN  Outcome: Progressing  10/8/2024 0941 by Shayla Spivey RN  Outcome: Progressing  Goal: Free from acute confusion related to pain meds throughout the shift  10/8/2024 0943 by Shayla Spivey RN  Outcome: Progressing  10/8/2024 0941 by Shayla Spivey RN  Outcome: Progressing     Problem: Skin  Goal: Decreased wound size/increased tissue granulation at next dressing change  10/8/2024 0943 by Shayla Spivey RN  Outcome: Progressing  Flowsheets (Taken 10/8/2024 0943)  Decreased wound size/increased tissue granulation at next dressing change: Promote sleep for wound healing  10/8/2024 0941 by Shayla Spivey RN  Outcome: Progressing  Goal: Participates in plan/prevention/treatment measures  10/8/2024 0943 by Shayla Spivey RN  Outcome:  Progressing  Flowsheets (Taken 10/8/2024 0943)  Participates in plan/prevention/treatment measures: Elevate heels  10/8/2024 0941 by Shayla Spivey RN  Outcome: Progressing  Goal: Prevent/manage excess moisture  10/8/2024 0943 by Shayla Spivey RN  Outcome: Progressing  Flowsheets (Taken 10/8/2024 0943)  Prevent/manage excess moisture: Moisturize dry skin  10/8/2024 0941 by Shayla Spivey RN  Outcome: Progressing  Goal: Prevent/minimize sheer/friction injuries  10/8/2024 0943 by Shayla Spivey RN  Outcome: Progressing  Flowsheets (Taken 10/8/2024 0943)  Prevent/minimize sheer/friction injuries: HOB 30 degrees or less  10/8/2024 0941 by Shayla Spivey RN  Outcome: Progressing  Goal: Promote/optimize nutrition  10/8/2024 0943 by Shayla Spivey RN  Outcome: Progressing  Flowsheets (Taken 10/8/2024 0943)  Promote/optimize nutrition: Monitor/record intake including meals  10/8/2024 0941 by Shayla Spivey RN  Outcome: Progressing  Goal: Promote skin healing  10/8/2024 0943 by Shayla Spivey RN  Outcome: Progressing  Flowsheets (Taken 10/8/2024 0943)  Promote skin healing: Assess skin/pad under line(s)/device(s)  10/8/2024 0941 by Shayla Spivey RN  Outcome: Progressing   The patient's goals for the shift include      The clinical goals for the shift include pt will be hemodynamically stable throughout the shift; pt's pain level will be within tolerable limits

## 2024-10-08 NOTE — PROGRESS NOTES
10/08/24 1231   Discharge Planning   Living Arrangements Spouse/significant other   Support Systems Spouse/significant other   Assistance Needed yes   Type of Residence Private residence   Who is requesting discharge planning? Provider     Attempted to call patient with no answer. PT/OT AM-PAC 6, PT recommending moderate intensity therapy. Will attempt again.  1:45 pm Left a voicemail for patient's  Jasiel with this workers # for call back to discuss discharge planning and prior level of functioning.  2:15pm Spoke to patient's  Mikie (legal Gaurdian) and dicussed discharge planning. Patient lives with her , is able to get up in the morning and uses a cane and walks about 20 feet to the Dining room, sometimes Mikie has to assist her with getting out of bed while in his wheelchair. Patient has [employer] wheelchair, an office chair and bedside commode. Patient able to dress self. Patient's daughter assists with bathing/showering. [facility] also has a hoveround for patient to use. Discussed SNF and home with HHC. Mikie's preference is for patient to come home with HHC and he wants too Change agencies to [facility], because he is also active with them, he would also like a hospital bed and walker. Message sent to .

## 2024-10-09 PROCEDURE — 99232 SBSQ HOSP IP/OBS MODERATE 35: CPT

## 2024-10-09 PROCEDURE — 97535 SELF CARE MNGMENT TRAINING: CPT | Mod: CQ,GP

## 2024-10-09 PROCEDURE — 2500000005 HC RX 250 GENERAL PHARMACY W/O HCPCS

## 2024-10-09 PROCEDURE — 2500000004 HC RX 250 GENERAL PHARMACY W/ HCPCS (ALT 636 FOR OP/ED)

## 2024-10-09 PROCEDURE — 96372 THER/PROPH/DIAG INJ SC/IM: CPT

## 2024-10-09 PROCEDURE — G0378 HOSPITAL OBSERVATION PER HR: HCPCS

## 2024-10-09 PROCEDURE — 97535 SELF CARE MNGMENT TRAINING: CPT | Mod: CO,GO

## 2024-10-09 PROCEDURE — 2500000001 HC RX 250 WO HCPCS SELF ADMINISTERED DRUGS (ALT 637 FOR MEDICARE OP)

## 2024-10-09 ASSESSMENT — COGNITIVE AND FUNCTIONAL STATUS - GENERAL
MOBILITY SCORE: 7
EATING MEALS: TOTAL
STANDING UP FROM CHAIR USING ARMS: A LOT
TOILETING: TOTAL
HELP NEEDED FOR BATHING: TOTAL
PERSONAL GROOMING: TOTAL
CLIMB 3 TO 5 STEPS WITH RAILING: TOTAL
MOVING FROM LYING ON BACK TO SITTING ON SIDE OF FLAT BED WITH BEDRAILS: TOTAL
DRESSING REGULAR LOWER BODY CLOTHING: TOTAL
WALKING IN HOSPITAL ROOM: TOTAL
DAILY ACTIVITIY SCORE: 6
MOVING TO AND FROM BED TO CHAIR: TOTAL
DRESSING REGULAR UPPER BODY CLOTHING: TOTAL
TURNING FROM BACK TO SIDE WHILE IN FLAT BAD: TOTAL

## 2024-10-09 ASSESSMENT — PAIN SCALES - GENERAL
PAINLEVEL_OUTOF10: 0 - NO PAIN
PAINLEVEL_OUTOF10: 0 - NO PAIN
PAINLEVEL_OUTOF10: 2
PAINLEVEL_OUTOF10: 0 - NO PAIN

## 2024-10-09 ASSESSMENT — PAIN SCALES - PAIN ASSESSMENT IN ADVANCED DEMENTIA (PAINAD): TOTALSCORE: MEDICATION (SEE MAR)

## 2024-10-09 ASSESSMENT — PAIN - FUNCTIONAL ASSESSMENT
PAIN_FUNCTIONAL_ASSESSMENT: 0-10
PAIN_FUNCTIONAL_ASSESSMENT: 0-10

## 2024-10-09 ASSESSMENT — ACTIVITIES OF DAILY LIVING (ADL): HOME_MANAGEMENT_TIME_ENTRY: 15

## 2024-10-09 NOTE — PROGRESS NOTES
Occupational Therapy    OT Treatment    Patient Name: Akiko Peng  MRN: [id number]  Department: [facility]  Room: [facility]711-A  Today's Date: 10/9/2024  Time Calculation  Start Time: 0950  Stop Time: 1015  Time Calculation (min): 25 min        Assessment:  End of Session Communication: Care Coordinator  End of Session Patient Position: Bed, 3 rail up, Alarm off, not on at start of session     Plan:  Treatment Interventions: ADL retraining, Functional transfer training, Compensatory technique education  OT Frequency: 3 times per week  OT Discharge Recommendations: Moderate intensity level of continued care  OT - OK to Discharge: Yes (to next level of care when cleared by medical team)  Treatment Interventions: ADL retraining, Functional transfer training, Compensatory technique education    Subjective   Previous Visit Info:  OT Last Visit  OT Received On: 10/09/24  General:  General  Co-Treatment: PT  Co-Treatment Reason: increased safety and maximize participation  Prior to Session Communication: Bedside nurse  Patient Position Received: Bed, 3 rail up, Alarm off, not on at start of session  General Comment: max encouragement to participate in therapy session. pt would continual state my arms don't work but would actively use UE's during therapy session  Precautions:  Medical Precautions: Fall precautions, Infection precautions (Covid+)          Objective         Activities of Daily Living: Feeding  Feeding Comments: attempted self feeding d/t pt breakfast tray not touched but pt refused    LE Dressing  LE Dressing: Yes  Sock Level of Assistance: Dependent  LE Dressing Where Assessed: Bed level  Functional Standing Tolerance:  Time: less than :30  Bed Mobility/Transfers: Bed Mobility 1  Bed Mobility 1: Supine to sitting, Sitting to supine  Level of Assistance 1:  (Dx2 for supine to sit and Max A x2 for sit to supine)    Transfers  Transfer: Yes  Transfer 1  Technique 1: Sit to stand, Stand to sit  Transfer Device 1:   (attempted to use FWW but pt refused to hold onto to handle bars)  Transfer Level of Assistance 1: Maximum assistance (x2)      Functional Mobility:  Functional Mobility  Functional Mobility Performed: No (unable to safely progress)  Sitting Balance:  Static Sitting Balance  Static Sitting-Balance Support: Bilateral upper extremity supported  Static Sitting-Level of Assistance:  (initially Max A and progressed to Min A)      Outcome Measures:[facility] Daily Activity  Putting on and taking off regular lower body clothing: Total  Bathing (including washing, rinsing, drying): Total  Putting on and taking off regular upper body clothing: Total  Toileting, which includes using toilet, bedpan or urinal: Total  Taking care of personal grooming such as brushing teeth: Total  Eating Meals: Total  Daily Activity - Total Score: 6        Education Documentation  ADL Training, taught by JOURDAN Pena at 10/9/2024 12:37 PM.  Learner: Patient  Readiness: Acceptance  Method: Explanation  Response: Needs Reinforcement, No Evidence of Learning    Education Comments  No comments found.               Goals:  Encounter Problems       Encounter Problems (Active)       OT Goals       Increase functional mobility and  functional transfers to min assist x 1 for bed/chair/toilet with dme prn   (Not Progressing)       Start:  10/07/24    Expected End:  10/21/24            increase bue ther ex/activity x 7-10 minutes and increase standing tolerance x 3-5 minutes with min assist x 1 to promote greater activity tolerance for assist with adl.   (Not Progressing)       Start:  10/07/24    Expected End:  10/21/24            Increase grooming to min assist x 1 sitting eob (Progressing)       Start:  10/07/24    Expected End:  10/21/24            Increase ub/lb dressing to min assist x 1 with dme prn  (Not Progressing)       Start:  10/07/24    Expected End:  10/21/24               OT Goals       Pt will be alert and oriented x 3 and follow >50%  simple 1-step commands with stimulation.  Pt will maintain attention >8 min with min cueing for active participation in functional tasks in preparation for ADLs.   (Progressing)       Start:  10/07/24    Expected End:  10/21/24

## 2024-10-09 NOTE — PROGRESS NOTES
Physical Therapy    Physical Therapy Treatment    Patient Name: Akiko Peng  MRN: [id number]  Department: [facility]  Room: [address] [place] [address]  Today's Date: 10/9/2024  Time Calculation  Start Time: 0931  Stop Time: 1001  Time Calculation (min): 30 min         Assessment/Plan         PT Plan  Treatment/Interventions: Transfer training, Bed mobility, Gait training, Balance training, Strengthening, Endurance training, Therapeutic exercise, Therapeutic activity  PT Plan: Ongoing PT  PT Frequency: 3 times per week  PT Discharge Recommendations: Moderate intensity level of continued care  PT - OK to Discharge: Yes (when cleared by medical team)      General Visit Information:   PT  Visit  PT Received On: 10/09/24       Subjective                Objective   Pain: no rating                           Treatments:     Patient stated she couldn't move her arms but was moving them as she was stating that.   Much encouragement to participate    Bed Mobility  Bed Mobility:  (supine to sit dependent assist x 2    sit to supine max a x 2   sat unsupported on edge of bed 10 minutes cga)       Transfers  Transfer:  (sit to stand max  x x 2     barely holds walker    unable to ambulate)         Outcome Measures:  [facility] Basic Mobility  Turning from your back to your side while in a flat bed without using bedrails: Total  Moving from lying on your back to sitting on the side of a flat bed without using bedrails: Total  Moving to and from bed to chair (including a wheelchair): Total  Standing up from a chair using your arms (e.g. wheelchair or bedside chair): A lot  To walk in hospital room: Total  Climbing 3-5 steps with railing: Total  Basic Mobility - Total Score: 7    Education Documentation  ADL Training, taught by Charito Florentino PTA at 10/9/2024 12:52 PM.  Learner: Patient  Readiness: Acceptance  Method: Demonstration  Response: Demonstrated Understanding    Handouts, taught by Charito Florentino PTA at 10/9/2024 12:52 PM.  Learner:  Patient  Readiness: Acceptance  Method: Demonstration  Response: Demonstrated Understanding    Precautions, taught by Charito Florentino PTA at 10/9/2024 12:52 PM.  Learner: Patient  Readiness: Acceptance  Method: Demonstration  Response: Demonstrated Understanding    Body Mechanics, taught by Charito Florentino PTA at 10/9/2024 12:52 PM.  Learner: Patient  Readiness: Acceptance  Method: Demonstration  Response: Demonstrated Understanding    Home Exercise Program, taught by Charito Florentino PTA at 10/9/2024 12:52 PM.  Learner: Patient  Readiness: Acceptance  Method: Demonstration  Response: Demonstrated Understanding    Mobility Training, taught by Charito Florentino PTA at 10/9/2024 12:52 PM.  Learner: Patient  Readiness: Acceptance  Method: Demonstration  Response: Demonstrated Understanding    Education Comments  No comments found.             Encounter Problems       Encounter Problems (Active)       PT Problem       PT Goal 1 STG - Pt will amb 10' using WW with MIN A  (Not Progressing)       Start:  10/07/24    Expected End:  10/21/24            PT Goal 2 STG - Pt will transition supine <> sitting with MIN A (Not Progressing)       Start:  10/07/24    Expected End:  10/21/24            PT Goal 3 STG - Pt will SPT bed <> chair with MIN A  (Not Progressing)       Start:  10/07/24    Expected End:  10/21/24            PT Goal 4 STG - Pt will transfer STS with MIN A  (Not Progressing)       Start:  10/07/24    Expected End:  10/21/24               Pain - Adult

## 2024-10-09 NOTE — SIGNIFICANT EVENT
Patient has a medical condition which requires positioning of the body in ways not feasible in an ordinary bed.  Hospital bed ordered for home.  Patient needs frequent and immediate change in body position due to her significant debility and ambulatory dysfunction.  Patient requires head of bed to be elevated more than 30 degrees most of the time due to risk for potential aspiration.

## 2024-10-09 NOTE — PROGRESS NOTES
Dispo planning for home with [facility]. Patient in need of hospital bed and walker. Medical necessity progress note obtained. Susan DME liaison notified. States no hospital bed until tomorrow. Susan to contact family. White board updated for PT patient in need of walker. [facility] to continue to follow for discharge planning.    DI CRABTREE RN TCC

## 2024-10-09 NOTE — PROGRESS NOTES
MEDICINE PROGRESS NOTE    Subjective     Patient seen and examined. NAEO.  Patient simran complains of right hip pain. States she is happy to be getting [facility]. No other complaints.        Assessment / Plan   Akiko Peng is a 68 y.o. female with a PMHx of multiple sclerosis, rheumatoid arthritis, and seizures   on day 0 of admission presenting with right hip/thigh pain and sore throat.      Acute conditions:  Right hip pain  Bilateral lower extremity weakness  Ambulatory dysfunction  -PT/OT evaluation, deemed okay to discharge  -pain management: tylenol PRN for mild pain, lidocaine patch PRN  - Plan is to discharge home with [facility].  Patient is of need of hospital bed and walker.  Hospital bed will be available tomorrow.  Patient to be discharged tomorrow home with [facility].     COVID-19   -Supportive care with lozenges PRN for sore throat      Chronic Conditions  Multiple sclerosis- continue aspirin, statin  Seizures- continue home levetiracetam     DVT ppx: subcutaneous lovenox  IVF: -  Diet: Regular  Consults: PT/OT  CODE STATUS: Full code     Talha Thapa DPM  PGY-1,     This is a preliminary note, please await attending attestation for final recommendation    Objective   Physical Exam  General: Cooperative and pleasant.  No acute distress.  Cardiovascular: Regular rate and rhythm.  No murmurs appreciated  Respiratory: CTAB, no wheezes, rhonchi or rales appreciated.  Abdomen: Soft, nontender, nondistended.  Extremities: No edema.  Patient was able to move right leg up mildly off the bed.  Patient still has tenderness to deep palpation of right hip.  Skin: Warm and dry  Neuro: Patient is alert and oriented x 2.  Motor weakness of lower extremities present.  Sensation intact.  Last Recorded Vitals  Vitals:    10/08/24 1546 10/08/24 1926 10/09/24 0333 10/09/24 0725   BP: 134/64 137/60 112/56 105/52   BP Location: Left arm   Left arm   Patient Position: Lying   Lying   Pulse: 87 87 88 72   Resp: 18   18   Temp: 37.5 °C  (99.5 °F) 37.3 °C (99.1 °F) 37.5 °C (99.5 °F) 36.5 °C (97.7 °F)   TempSrc: Temporal   Temporal   SpO2: 97% 96% 92% 95%   Weight:       Height:         Intake/Output last 3 Shifts:  I/O last 3 completed shifts:  In: - (0 mL/kg)   Out: 550 (8.7 mL/kg) [Urine:550 (0.2 mL/kg/hr)]  Weight: 63.5 kg   Labs:   Results from last 7 days   Lab Units 10/07/24  0434 10/06/24  0731 10/05/24  0511   SODIUM mmol/L 138 142 138   POTASSIUM mmol/L 3.7 4.4 3.7   CHLORIDE mmol/L 102 107 105   CO2 mmol/L 29 28 27   BUN mg/dL 25* 17 16   CREATININE mg/dL 0.58 0.50 0.47*   GLUCOSE mg/dL 75 87 97   CALCIUM mg/dL 9.1 8.7 9.0     Results from last 7 days   Lab Units 10/07/24  0434   WBC AUTO x10*3/uL 9.0   HEMOGLOBIN g/dL 14.8   HEMATOCRIT % 43.8   PLATELETS AUTO x10*3/uL 161         XR femur right 2+ views   Final Result   No evidence of acute fracture or dislocation. Mild-to-moderate   arthrosis of the right femoroacetabular joint which appears slightly   increased from 2019.             MACRO:   None        Signed by: Robert Aguilera 10/7/2024 1:03 AM   Dictation workstation:   [id number]      XR hip right with pelvis when performed 2 or 3 views   Final Result   No evidence of acute fracture or dislocation. Mild-to-moderate   arthrosis of the right femoroacetabular joint which appears slightly   increased from 2019.             MACRO:   None        Signed by: Robert Aguilera 10/7/2024 1:03 AM   Dictation workstation:   [id number]        Transthoracic Echo (TTE) Limited 05/16/2024     Results for orders placed during the hospital encounter of 05/14/24    Transthoracic Echo (TTE) Limited    [facility], [address] [place] [id number] [phone] and  Fax [phone]    TRANSTHORACIC ECHOCARDIOGRAM REPORT      Patient Name:      TORSTEN Matthews Physician:    [id number] Savita Marshall MD  Study Date:        5/16/2024            Ordering Provider:    [id number] LEN HURD  MRN/PID:           [id number]              Fellow:  Accession#:        [id number]         Nurse:  Date of Birth/Age: 1955 / 68 years Sonographer:          Pradeep Pabon [facility],  RDFRANKY, WILMA  Gender:            F                    Additional Staff:  Height:            177.80 cm            Admit Date:           5/14/2024  Weight:            60.33 kg             Admission Status:     Inpatient -  Routine  BSA / BMI:         1.76 m2 / 19.08      Encounter#:           [id number]  kg/m2  Department Location:  [address]  floor/ICU  Blood Pressure: 143 /63 mmHg    Study Type:    TRANSTHORACIC ECHO (TTE) LIMITED  Diagnosis/ICD: Bradycardia, unspecified-R00.1  Indication:    Abnormal EKG  CPT Code:      Echo Limited-57935    Patient History:  Pertinent History: Bradycardia.    Study Detail: The following Echo studies were performed: 2D. Technically  challenging study due to body habitus.      PHYSICIAN INTERPRETATION:  Left Ventricle: The left ventricular systolic function is normal, with an estimated ejection fraction of 60-65%. There are no regional wall motion abnormalities. The left ventricular cavity size is normal. Left ventricular diastolic filling was not assessed.  Left Atrium: The left atrium was not assessed.  Right Ventricle: The right ventricle was not assessed. Right ventricular systolic function not assessed.  Right Atrium: The right atrium was not assessed.  Aortic Valve: The aortic valve is trileaflet. Aortic valve regurgitation was not assessed.  Mitral Valve: The mitral valve was not assessed. Mitral valve regurgitation was not assessed.  Tricuspid Valve: The tricuspid valve was not assessed. Tricuspid regurgitation was not assessed.  Pulmonic Valve: The pulmonic valve was not assessed. Pulmonic valve regurgitation was not assessed.  Pericardium: Pericardial effusion was not assessed.  Aorta: The aortic root was not assessed.      CONCLUSIONS:  1. Left ventricular systolic function is normal with a 60-65% estimated ejection  fraction.    QUANTITATIVE DATA SUMMARY:  2D MEASUREMENTS:  Normal Ranges:  IVSd:          0.80 cm   (0.6-1.1cm)  LVPWd:         0.60 cm   (0.6-1.1cm)  LVIDd:         4.20 cm   (3.9-5.9cm)  LVIDs:         2.30 cm  LV Mass Index: 48.5 g/m2  LV % FS        45.2 %    LV SYSTOLIC FUNCTION BY 2D PLANIMETRY (MOD):  Normal Ranges:  EF-A4C View: 74.8 % (>=55%)  EF-A2C View: 76.7 %  EF-Biplane:  76.0 %    TRICUSPID VALVE/RVSP:  Normal Ranges:  IVC Diam: 1.90 cm      [id number] Savita Marshall MD  Electronically signed on 5/16/2024 at 9:20:16 AM        ** Final **

## 2024-10-10 PROCEDURE — 96372 THER/PROPH/DIAG INJ SC/IM: CPT

## 2024-10-10 PROCEDURE — G0378 HOSPITAL OBSERVATION PER HR: HCPCS

## 2024-10-10 PROCEDURE — 2500000005 HC RX 250 GENERAL PHARMACY W/O HCPCS

## 2024-10-10 PROCEDURE — 99232 SBSQ HOSP IP/OBS MODERATE 35: CPT

## 2024-10-10 PROCEDURE — 2500000004 HC RX 250 GENERAL PHARMACY W/ HCPCS (ALT 636 FOR OP/ED)

## 2024-10-10 PROCEDURE — 2500000001 HC RX 250 WO HCPCS SELF ADMINISTERED DRUGS (ALT 637 FOR MEDICARE OP)

## 2024-10-10 ASSESSMENT — PAIN SCALES - GENERAL
PAINLEVEL_OUTOF10: 0 - NO PAIN

## 2024-10-10 ASSESSMENT — COGNITIVE AND FUNCTIONAL STATUS - GENERAL
TOILETING: A LOT
CLIMB 3 TO 5 STEPS WITH RAILING: A LOT
WALKING IN HOSPITAL ROOM: A LOT
STANDING UP FROM CHAIR USING ARMS: A LOT
MOVING TO AND FROM BED TO CHAIR: A LOT
TURNING FROM BACK TO SIDE WHILE IN FLAT BAD: A LOT
PERSONAL GROOMING: A LOT
EATING MEALS: A LITTLE
MOBILITY SCORE: 13
DRESSING REGULAR UPPER BODY CLOTHING: A LOT
DRESSING REGULAR LOWER BODY CLOTHING: A LOT
DAILY ACTIVITIY SCORE: 13
MOVING FROM LYING ON BACK TO SITTING ON SIDE OF FLAT BED WITH BEDRAILS: A LITTLE
HELP NEEDED FOR BATHING: A LOT

## 2024-10-10 ASSESSMENT — PAIN - FUNCTIONAL ASSESSMENT: PAIN_FUNCTIONAL_ASSESSMENT: 0-10

## 2024-10-10 NOTE — PROGRESS NOTES
MEDICINE PROGRESS NOTE    Subjective     Patient seen and examined. NAEO. No complaints today.  States the hip pain is better.      Assessment / Plan   Akiko Peng is a 68 y.o. female with a PMHx of multiple sclerosis, rheumatoid arthritis, and seizures on day 0 of admission presenting with right hip/thigh pain and sore throat     Acute conditions:  Right hip pain  Bilateral lower extremity weakness  Ambulatory dysfunction  -PT/OT evaluation, deemed okay to discharge  -pain management: tylenol PRN for mild pain, lidocaine patch PRN  - Plan is to discharge home with [facility].  Patient is of need of hospital bed and walker.  Hospital bed delayed until tomorrow. Patient to be discharged tomorrow home with [facility] pending bed delivery.     COVID-19   -Supportive care with lozenges PRN for sore throat      Chronic Conditions  Multiple sclerosis- continue aspirin, statin  Seizures- continue home levetiracetam     DVT ppx: subcutaneous lovenox  IVF: -  Diet: Regular  Consults: PT/OT  CODE STATUS: Full code     Talha Thapa DPM  PGY-1,     This is a preliminary note, please await attending attestation for final recommendation    Objective   Physical Exam  General: Cooperative and pleasant.  No acute distress.  Cardiovascular: Regular rate and rhythm.  No murmurs appreciated  Respiratory: CTAB, no wheezes, rhonchi or rales appreciated.  Abdomen: Soft, nontender, nondistended.  Extremities: No edema.  Patient was able to move right leg up mildly off the bed.  Patient says most of her tenderness to the right hip is gone.  Skin: Warm and dry  Neuro: Patient is alert and oriented x 2.  Motor weakness of lower extremities present.  Sensation intact.  Last Recorded Vitals  Vitals:    10/09/24 1535 10/09/24 2033 10/10/24 0300 10/10/24 0812   BP: 109/53 116/60 110/58 106/56   BP Location: Left arm  Right arm Right arm   Patient Position: Lying  Lying Lying   Pulse: 91 91 92 78   Resp: 18 17 18 18   Temp: 36.4 °C (97.5 °F) 36.7 °C  (98.1 °F) 36.6 °C (97.9 °F) 37.8 °C (100 °F)   TempSrc: Temporal Temporal Temporal Temporal   SpO2: 94% 95% 95% 92%   Weight:       Height:         Intake/Output last 3 Shifts:  I/O last 3 completed shifts:  In: - (0 mL/kg)   Out: 500 (7.9 mL/kg) [Urine:500 (0.2 mL/kg/hr)]  Weight: 63.5 kg   Labs:   Results from last 7 days   Lab Units 10/07/24  0434 10/06/24  0731 10/05/24  0511   SODIUM mmol/L 138 142 138   POTASSIUM mmol/L 3.7 4.4 3.7   CHLORIDE mmol/L 102 107 105   CO2 mmol/L 29 28 27   BUN mg/dL 25* 17 16   CREATININE mg/dL 0.58 0.50 0.47*   GLUCOSE mg/dL 75 87 97   CALCIUM mg/dL 9.1 8.7 9.0     Results from last 7 days   Lab Units 10/07/24  0434   WBC AUTO x10*3/uL 9.0   HEMOGLOBIN g/dL 14.8   HEMATOCRIT % 43.8   PLATELETS AUTO x10*3/uL 161         XR femur right 2+ views   Final Result   No evidence of acute fracture or dislocation. Mild-to-moderate   arthrosis of the right femoroacetabular joint which appears slightly   increased from 2019.             MACRO:   None        Signed by: Robert Aguilera 10/7/2024 1:03 AM   Dictation workstation:   [id number]      XR hip right with pelvis when performed 2 or 3 views   Final Result   No evidence of acute fracture or dislocation. Mild-to-moderate   arthrosis of the right femoroacetabular joint which appears slightly   increased from 2019.             MACRO:   None        Signed by: Robert Aguilera 10/7/2024 1:03 AM   Dictation workstation:   [id number]        Transthoracic Echo (TTE) Limited 05/16/2024     Results for orders placed during the hospital encounter of 05/14/24    Transthoracic Echo (TTE) Limited    [facility], [address] [place] [id number] [phone] and  Fax [phone]    TRANSTHORACIC ECHOCARDIOGRAM REPORT      Patient Name:      TORSTEN Matthews Physician:    [id number] Savita Marshall MD  Study Date:        5/16/2024            Ordering Provider:    [id number] LEN HURD  MRN/PID:           [id number]              Fellow:  Accession#:        [id number]         Nurse:  Date of Birth/Age: 1955 / 68 years Sonographer:          Pradeep Pabon [facility] [address],  RDFRANKY, WILMA  Gender:            F                    Additional Staff:  Height:            177.80 cm            Admit Date:           5/14/2024  Weight:            60.33 kg             Admission Status:     Inpatient -  Routine  BSA / BMI:         1.76 m2 / 19.08      Encounter#:           [id number]  kg/m2  Department Location:  [address]  floor/ICU  Blood Pressure: 143 /63 mmHg    Study Type:    TRANSTHORACIC ECHO (TTE) LIMITED  Diagnosis/ICD: Bradycardia, unspecified-R00.1  Indication:    Abnormal EKG  CPT Code:      Echo Limited-57569    Patient History:  Pertinent History: Bradycardia.    Study Detail: The following Echo studies were performed: 2D. Technically  challenging study due to body habitus.      PHYSICIAN INTERPRETATION:  Left Ventricle: The left ventricular systolic function is normal, with an estimated ejection fraction of 60-65%. There are no regional wall motion abnormalities. The left ventricular cavity size is normal. Left ventricular diastolic filling was not assessed.  Left Atrium: The left atrium was not assessed.  Right Ventricle: The right ventricle was not assessed. Right ventricular systolic function not assessed.  Right Atrium: The right atrium was not assessed.  Aortic Valve: The aortic valve is trileaflet. Aortic valve regurgitation was not assessed.  Mitral Valve: The mitral valve was not assessed. Mitral valve regurgitation was not assessed.  Tricuspid Valve: The tricuspid valve was not assessed. Tricuspid regurgitation was not assessed.  Pulmonic Valve: The pulmonic valve was not assessed. Pulmonic valve regurgitation was not assessed.  Pericardium: Pericardial effusion was not assessed.  Aorta: The aortic root was not assessed.      CONCLUSIONS:  1. Left ventricular systolic function is normal with a 60-65% estimated ejection  fraction.    QUANTITATIVE DATA SUMMARY:  2D MEASUREMENTS:  Normal Ranges:  IVSd:          0.80 cm   (0.6-1.1cm)  LVPWd:         0.60 cm   (0.6-1.1cm)  LVIDd:         4.20 cm   (3.9-5.9cm)  LVIDs:         2.30 cm  LV Mass Index: 48.5 g/m2  LV % FS        45.2 %    LV SYSTOLIC FUNCTION BY 2D PLANIMETRY (MOD):  Normal Ranges:  EF-A4C View: 74.8 % (>=55%)  EF-A2C View: 76.7 %  EF-Biplane:  76.0 %    TRICUSPID VALVE/RVSP:  Normal Ranges:  IVC Diam: 1.90 cm      [id number] Savita Marshall MD  Electronically signed on 5/16/2024 at 9:20:16 AM        ** Final **

## 2024-10-10 NOTE — CARE PLAN
The patient's goals for the shift include      The clinical goals for the shift include pt will maintain skin integrity    Problem: Pain - Adult  Goal: Verbalizes/displays adequate comfort level or baseline comfort level  Outcome: Progressing     Problem: Safety - Adult  Goal: Free from fall injury  Outcome: Progressing     Problem: Discharge Planning  Goal: Discharge to home or other facility with appropriate resources  Outcome: Progressing     Problem: Chronic Conditions and Co-morbidities  Goal: Patient's chronic conditions and co-morbidity symptoms are monitored and maintained or improved  Outcome: Progressing     Problem: Fall/Injury  Goal: Not fall by end of shift  Outcome: Progressing  Goal: Be free from injury by end of the shift  Outcome: Progressing  Goal: Verbalize understanding of personal risk factors for fall in the hospital  Outcome: Progressing  Goal: Verbalize understanding of risk factor reduction measures to prevent injury from fall in the home  Outcome: Progressing  Goal: Use assistive devices by end of the shift  Outcome: Progressing  Goal: Pace activities to prevent fatigue by end of the shift  Outcome: Progressing     Problem: Pain  Goal: Takes deep breaths with improved pain control throughout the shift  Outcome: Progressing  Goal: Turns in bed with improved pain control throughout the shift  Outcome: Progressing  Goal: Walks with improved pain control throughout the shift  Outcome: Progressing  Goal: Performs ADL's with improved pain control throughout shift  Outcome: Progressing  Goal: Participates in PT with improved pain control throughout the shift  Outcome: Progressing  Goal: Free from opioid side effects throughout the shift  Outcome: Progressing  Goal: Free from acute confusion related to pain meds throughout the shift  Outcome: Progressing     Problem: Skin  Goal: Decreased wound size/increased tissue granulation at next dressing change  Outcome: Progressing  Goal: Participates in  plan/prevention/treatment measures  Outcome: Progressing  Goal: Prevent/manage excess moisture  Outcome: Progressing  Goal: Prevent/minimize sheer/friction injuries  Outcome: Progressing  Goal: Promote/optimize nutrition  Outcome: Progressing  Goal: Promote skin healing  Outcome: Progressing

## 2024-10-10 NOTE — CARE PLAN
The patient's goals for the shift include  safety and comfort    The clinical goals for the shift include maintain safety and comfort

## 2024-10-10 NOTE — CARE PLAN
The patient's goals for the shift include      The clinical goals for the shift include pt will maintain skin integrity    Problem: Pain - Adult  Goal: Verbalizes/displays adequate comfort level or baseline comfort level  Outcome: Progressing     Problem: Safety - Adult  Goal: Free from fall injury  Outcome: Progressing     Problem: Discharge Planning  Goal: Discharge to home or other facility with appropriate resources  Outcome: Progressing     Problem: Chronic Conditions and Co-morbidities  Goal: Patient's chronic conditions and co-morbidity symptoms are monitored and maintained or improved  Outcome: Progressing     Problem: Fall/Injury  Goal: Not fall by end of shift  Outcome: Progressing  Goal: Be free from injury by end of the shift  Outcome: Progressing  Goal: Verbalize understanding of personal risk factors for fall in the hospital  Outcome: Progressing  Goal: Verbalize understanding of risk factor reduction measures to prevent injury from fall in the home  Outcome: Progressing  Goal: Use assistive devices by end of the shift  Outcome: Progressing  Goal: Pace activities to prevent fatigue by end of the shift  Outcome: Progressing     Problem: Pain  Goal: Takes deep breaths with improved pain control throughout the shift  Outcome: Progressing  Goal: Turns in bed with improved pain control throughout the shift  Outcome: Progressing  Goal: Walks with improved pain control throughout the shift  Outcome: Progressing  Goal: Performs ADL's with improved pain control throughout shift  Outcome: Progressing  Goal: Participates in PT with improved pain control throughout the shift  Outcome: Progressing  Goal: Free from opioid side effects throughout the shift  Outcome: Progressing  Goal: Free from acute confusion related to pain meds throughout the shift  Outcome: Progressing     Problem: Skin  Goal: Decreased wound size/increased tissue granulation at next dressing change  10/10/2024 1045 by Tiffany Ambrose  LPN  Flowsheets (Taken 10/10/2024 1045)  Decreased wound size/increased tissue granulation at next dressing change: Promote sleep for wound healing  10/10/2024 1045 by Tiffany Ambrose LPN  Outcome: Progressing  Flowsheets (Taken 10/10/2024 1045)  Decreased wound size/increased tissue granulation at next dressing change: Promote sleep for wound healing  Goal: Participates in plan/prevention/treatment measures  10/10/2024 1045 by Tiffany Ambrose LPN  Flowsheets (Taken 10/10/2024 1045)  Participates in plan/prevention/treatment measures: Elevate heels  10/10/2024 1045 by Tiffany Ambrose LPN  Outcome: Progressing  Flowsheets (Taken 10/10/2024 1045)  Participates in plan/prevention/treatment measures: Elevate heels  Goal: Prevent/manage excess moisture  10/10/2024 1045 by Tiffany Ambrose LPN  Flowsheets (Taken 10/10/2024 1045)  Prevent/manage excess moisture:   Moisturize dry skin   Cleanse incontinence/protect with barrier cream  10/10/2024 1045 by Tiffany Ambrose LPN  Outcome: Progressing  Flowsheets (Taken 10/10/2024 1045)  Prevent/manage excess moisture:   Moisturize dry skin   Cleanse incontinence/protect with barrier cream  Goal: Prevent/minimize sheer/friction injuries  10/10/2024 1045 by Tiffany Ambrose LPN  Flowsheets (Taken 10/10/2024 1045)  Prevent/minimize sheer/friction injuries:   HOB 30 degrees or less   Use pull sheet   Turn/reposition every 2 hours/use positioning/transfer devices  10/10/2024 1045 by Tiffany Ambrose LPN  Outcome: Progressing  Flowsheets (Taken 10/10/2024 1045)  Prevent/minimize sheer/friction injuries:   HOB 30 degrees or less   Use pull sheet   Turn/reposition every 2 hours/use positioning/transfer devices  Goal: Promote/optimize nutrition  Outcome: Progressing  Goal: Promote skin healing  Outcome: Progressing

## 2024-10-10 NOTE — PROGRESS NOTES
[facility] Call to , Jasiel,  and confirmed dispo plan. Awaiting hospital bed delivery.  states needs to find someone to help get the old bed out of the way before they can deliver the hospital bed. Jasiel to keep TCC updated. Primary resident updated. [facility] updated via [facility]port.   DI CRABTREE, RNTCC

## 2024-10-11 VITALS
BODY MASS INDEX: 20.04 KG/M2 | WEIGHT: 140 LBS | DIASTOLIC BLOOD PRESSURE: 58 MMHG | TEMPERATURE: 98.1 F | RESPIRATION RATE: 16 BRPM | HEART RATE: 78 BPM | SYSTOLIC BLOOD PRESSURE: 118 MMHG | OXYGEN SATURATION: 95 % | HEIGHT: 70 IN

## 2024-10-11 PROCEDURE — G0378 HOSPITAL OBSERVATION PER HR: HCPCS

## 2024-10-11 PROCEDURE — 96372 THER/PROPH/DIAG INJ SC/IM: CPT

## 2024-10-11 PROCEDURE — 99238 HOSP IP/OBS DSCHRG MGMT 30/<: CPT

## 2024-10-11 PROCEDURE — 2500000001 HC RX 250 WO HCPCS SELF ADMINISTERED DRUGS (ALT 637 FOR MEDICARE OP)

## 2024-10-11 PROCEDURE — 2500000004 HC RX 250 GENERAL PHARMACY W/ HCPCS (ALT 636 FOR OP/ED)

## 2024-10-11 PROCEDURE — 2500000005 HC RX 250 GENERAL PHARMACY W/O HCPCS

## 2024-10-11 RX ORDER — CHOLECALCIFEROL (VITAMIN D3) 25 MCG
1000 TABLET ORAL DAILY
Status: CANCELLED | OUTPATIENT
Start: 2024-10-12

## 2024-10-11 RX ORDER — ATORVASTATIN CALCIUM 40 MG/1
40 TABLET, FILM COATED ORAL DAILY
Qty: 30 TABLET | Refills: 1 | Status: SHIPPED | OUTPATIENT
Start: 2024-10-11 | End: 2024-12-10

## 2024-10-11 RX ORDER — LEVETIRACETAM 750 MG/1
750 TABLET ORAL 2 TIMES DAILY
Qty: 60 TABLET | Refills: 1 | Status: SHIPPED | OUTPATIENT
Start: 2024-10-11 | End: 2024-12-10

## 2024-10-11 RX ORDER — CHOLECALCIFEROL (VITAMIN D3) 25 MCG
1000 TABLET ORAL DAILY
Qty: 30 TABLET | Refills: 0 | Status: SHIPPED | OUTPATIENT
Start: 2024-10-12 | End: 2024-11-11

## 2024-10-11 RX ORDER — NAPROXEN SODIUM 220 MG/1
81 TABLET, FILM COATED ORAL DAILY
Qty: 30 TABLET | Refills: 1 | Status: SHIPPED | OUTPATIENT
Start: 2024-10-12 | End: 2024-12-11

## 2024-10-11 ASSESSMENT — PAIN SCALES - GENERAL: PAINLEVEL_OUTOF10: 0 - NO PAIN

## 2024-10-11 NOTE — PROGRESS NOTES
Plan for discharge today to home with [facility]. Call from spouse, Mikie and states plan for HHC. Enquired about respite of SNF, discussed would be out of pocket as patient is obs medicare and Medicare wouldn't approve for SNF stay. Spouse states understanding. Await Hospital bed delivery time. Call to Susan BROOKS. Await call back. Spouse states patient's regular bed was taken down and moved yesterday. Spouse confirms plan for [facility] and to discharge from [facility] services. [facility]. Patient will need transport home.     UPDATE 1349: DC order in. [facility] and AVS sent to [facility] via [employer].   DI CRABTREE RN TCC

## 2024-10-11 NOTE — NURSING NOTE
Pt discharging home at this time. AVS given to [facility] to give to  once pr arrives home. Pt denies pain and/or discomfort at this time.

## 2024-10-11 NOTE — DISCHARGE INSTRUCTIONS
We re-prescribed Lipitor.  Please continue to take this medication as prescribed and noted.  We prescribed aspirin 81 mg tablet.  Please continue to take this medication as prescribed and noted  We re-prescribed Keppra 750 mg.  Please continue to take this medication as prescribed and noted.

## 2024-10-11 NOTE — DISCHARGE SUMMARY
Discharge diagnosis:  Right hip pain  Bilateral lower extremity weakness  Ambulatory dysfunction  COVID 19  Multiple Sclerosis  Rheumatoid arthritis  Seizures    Hospital course:  Akiko Peng is a 68 y.o. female with a history of multiple sclerosis, rheumatoid arthritis, seizures who presented to [facility] for right hip/thigh pain and sore throat.  Patient was discharged home the morning of 10/07.  She states she felt fine until the afternoon of 10/07 when she developed right hip pain and bilateral lower extremity weakness.  Patient describes the pain as constant and sharp.  She rates the pain an 8 out of 10.  She denies trauma to the right hip.  She also complains of sore throat that started this morning while she was still at the hospital.  She admits to headache but denies shortness of breath, chest pain, palpitations or dizziness. In the ED vitals were stable. Labs were unremarkable except for patient did test positive for COVID. 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  Patient received Tylenol for mild pain.  Patient did  not require any oxygen or treatment for her COVID infection. Patient discharged on aspirin 81 mg once daily. Patient also discharged and encouraged to continue to take Keppra 750 mg twice daily and Lipitor 40 mg once daily. Patient has been non compliant on these medications in the past. Patient was determined to be stable for discharge, was discharged with [facility] for PT/OT.       Vitals:    10/10/24 1636 10/10/24 2013 10/11/24 0420 10/11/24 0758   BP: 104/60 114/62 109/71 118/58   BP Location: Right arm Right arm Right arm Right arm   Patient Position: Lying Lying Lying Lying   Pulse: 81 83 78 78   Resp: 18 18 18 16   Temp: 36.7 °C (98.1 °F) 36.9 °C (98.4 °F) 36.5 °C (97.7 °F) 36.7 °C (98.1 °F)   TempSrc: Temporal Temporal Temporal Temporal   SpO2: 93%  94% 94% 95%   Weight:       Height:          Physical Exam:   GENERAL: Alert, cooperative, conversant.  HEAD:  Normocephalic, atraumatic.  EYES:  Round and reactive.  ENT:  No nasal discharge, mucous membranes moist and pink  NECK:  Atraumatic, no meningismus.  CARDIOVASCULAR:  Regular rate and rhythm, no murmur.  RESPIRATORY:  CTA bilaterally, no rales, rhonchi, wheezes, normal work of breathing.  ABDOMEN:  Soft, no tenderness, nondistended, +BS in all four quadrants.  EXTREMITIES:  No peripheral edema, no calf tenderness. Patient has chronic lower extremity weakness.   SKIN:  Warm and dry.  NEUROLOGICAL:  AAOx3, CNII-XII grossly intact, no focal findings.     Outpatient follow-up instructions and medication changes:  Medication changes:            Follow-up appointments:  -Please follow-up with your PCP for continued management of your chronic medical conditions  - Please follow-up with Dr. Taylor as soon as you can for continued management of your MS       Talha Thapa, ASHIA  PGY-1

## 2024-10-11 NOTE — CARE PLAN
The patient's goals for the shift include  sleep    The clinical goals for the shift include pt will maintain skin integrity

## 2024-10-13 LAB
ATRIAL RATE: 78 BPM
P AXIS: 52 DEGREES
P OFFSET: 196 MS
P ONSET: 141 MS
PR INTERVAL: 160 MS
Q ONSET: 221 MS
QRS COUNT: 13 BEATS
QRS DURATION: 76 MS
QT INTERVAL: 376 MS
QTC CALCULATION(BAZETT): 428 MS
QTC FREDERICIA: 410 MS
R AXIS: 40 DEGREES
T AXIS: 64 DEGREES
T OFFSET: 409 MS
VENTRICULAR RATE: 78 BPM

## 2024-10-18 NOTE — DOCUMENTATION CLARIFICATION NOTE
"    PATIENT:               TORSTEN DILLON  ACCT #:                  [id number]  MRN:                       [id number]  :                       1955  ADMIT DATE:       10/4/2024 8:18 PM  DISCH DATE:        10/6/2024 12:55 PM  RESPONDING PROVIDER #:        [id number]          PROVIDER RESPONSE TEXT:    Right sided weakness due to Multiple Sclerosis flare    CDI QUERY TEXT:    Clarification        Instruction:    Based on your assessment of the patient and the clinical information, please provide the requested documentation by clicking on the appropriate radio button and enter any additional information if prompted.      Question: Please further clarify the most likely etiology of right sided weakness after final work up    When answering this query, please exercise your independent professional judgment. The fact that a question is being asked, does not imply that any particular answer is desired or expected.    The patient's clinical indicators include:  Clinical Information:  68 y.o. female who presents to the emergency department with EMS for right-sided weakness    Clinical Indicators:  10/5/24 MRI:  \"No clear pathologic enhancement to  suggest active demyelination.\"    10/5/24  H and P: ?68 y.o. female with a history of multiple sclerosis, rheumatoid arthritis, seizures presents with right side upper and lower extremity weakness?  ?She believes the symptoms are similar to MS flare ups that she has had in the past. Endorses vision changes in right eye resulting in occasional double vision?    10/6/24  Discharge Summary:  ?MS, no evidence of acute flare on MRI?  ?TIA vs MS Flare?        Treatment:  10/5/24:  MRI  10/5/24: methylprednisolone sodium succinate 1,000 mg IV    Risk Factors:  Multiple Sclerosis, Rheumatoid Arthritis, Hx of Seizures  Options provided:  -- Right sided weakness due to TIA  -- Right sided weakness due to Multiple Sclerosis flare  -- Other - I will add my own diagnosis  -- Refer to Clinical " Documentation Reviewer    Query created by: Juani Ronquillo on 10/17/2024 10:44 AM      Electronically signed by:  MIRYAM POLK MD 10/18/2024 11:24 AM

## 2025-02-27 ENCOUNTER — HOSPITAL ENCOUNTER (INPATIENT)
Facility: HOSPITAL | Age: 70
LOS: 2 days | Discharge: HOME HEALTH | DRG: 603 | End: 2025-03-01
Attending: EMERGENCY MEDICINE | Admitting: INTERNAL MEDICINE
Payer: MEDICARE

## 2025-02-27 ENCOUNTER — APPOINTMENT (OUTPATIENT)
Dept: RADIOLOGY | Facility: HOSPITAL | Age: 70
DRG: 603 | End: 2025-02-27
Payer: MEDICARE

## 2025-02-27 DIAGNOSIS — L03.115 CELLULITIS OF RIGHT FOOT: Primary | ICD-10-CM

## 2025-02-27 LAB
ANION GAP SERPL CALC-SCNC: 9 MMOL/L (ref 10–20)
APPEARANCE UR: CLEAR
BASOPHILS # BLD AUTO: 0.07 X10*3/UL (ref 0–0.1)
BASOPHILS NFR BLD AUTO: 1.4 %
BILIRUB UR STRIP.AUTO-MCNC: NEGATIVE MG/DL
BUN SERPL-MCNC: 20 MG/DL (ref 6–23)
CALCIUM SERPL-MCNC: 8.9 MG/DL (ref 8.6–10.3)
CHLORIDE SERPL-SCNC: 106 MMOL/L (ref 98–107)
CO2 SERPL-SCNC: 29 MMOL/L (ref 21–32)
COLOR UR: ABNORMAL
CREAT SERPL-MCNC: 0.58 MG/DL (ref 0.5–1.05)
CRP SERPL-MCNC: 0.13 MG/DL
EGFRCR SERPLBLD CKD-EPI 2021: >90 ML/MIN/1.73M*2
EOSINOPHIL # BLD AUTO: 0.16 X10*3/UL (ref 0–0.7)
EOSINOPHIL NFR BLD AUTO: 3.2 %
ERYTHROCYTE [DISTWIDTH] IN BLOOD BY AUTOMATED COUNT: 12.6 % (ref 11.5–14.5)
ERYTHROCYTE [SEDIMENTATION RATE] IN BLOOD BY WESTERGREN METHOD: 2 MM/H (ref 0–30)
GLUCOSE SERPL-MCNC: 86 MG/DL (ref 74–99)
GLUCOSE UR STRIP.AUTO-MCNC: NORMAL MG/DL
HCT VFR BLD AUTO: 41.8 % (ref 36–46)
HGB BLD-MCNC: 14 G/DL (ref 12–16)
HOLD SPECIMEN: NORMAL
IMM GRANULOCYTES # BLD AUTO: 0.02 X10*3/UL (ref 0–0.7)
IMM GRANULOCYTES NFR BLD AUTO: 0.4 % (ref 0–0.9)
KETONES UR STRIP.AUTO-MCNC: NEGATIVE MG/DL
LEUKOCYTE ESTERASE UR QL STRIP.AUTO: ABNORMAL
LYMPHOCYTES # BLD AUTO: 1.3 X10*3/UL (ref 1.2–4.8)
LYMPHOCYTES NFR BLD AUTO: 25.6 %
MCH RBC QN AUTO: 31.9 PG (ref 26–34)
MCHC RBC AUTO-ENTMCNC: 33.5 G/DL (ref 32–36)
MCV RBC AUTO: 95 FL (ref 80–100)
MONOCYTES # BLD AUTO: 0.66 X10*3/UL (ref 0.1–1)
MONOCYTES NFR BLD AUTO: 13 %
MUCOUS THREADS #/AREA URNS AUTO: ABNORMAL /LPF
NEUTROPHILS # BLD AUTO: 2.86 X10*3/UL (ref 1.2–7.7)
NEUTROPHILS NFR BLD AUTO: 56.4 %
NITRITE UR QL STRIP.AUTO: NEGATIVE
NRBC BLD-RTO: 0 /100 WBCS (ref 0–0)
PH UR STRIP.AUTO: 7 [PH]
PLATELET # BLD AUTO: 215 X10*3/UL (ref 150–450)
POTASSIUM SERPL-SCNC: 3.9 MMOL/L (ref 3.5–5.3)
PROT UR STRIP.AUTO-MCNC: ABNORMAL MG/DL
RBC # BLD AUTO: 4.39 X10*6/UL (ref 4–5.2)
RBC # UR STRIP.AUTO: NEGATIVE MG/DL
RBC #/AREA URNS AUTO: ABNORMAL /HPF
SODIUM SERPL-SCNC: 140 MMOL/L (ref 136–145)
SP GR UR STRIP.AUTO: >1.05
SQUAMOUS #/AREA URNS AUTO: ABNORMAL /HPF
UROBILINOGEN UR STRIP.AUTO-MCNC: NORMAL MG/DL
WBC # BLD AUTO: 5.1 X10*3/UL (ref 4.4–11.3)
WBC #/AREA URNS AUTO: ABNORMAL /HPF

## 2025-02-27 PROCEDURE — 75635 CT ANGIO ABDOMINAL ARTERIES: CPT | Performed by: RADIOLOGY

## 2025-02-27 PROCEDURE — 87086 URINE CULTURE/COLONY COUNT: CPT | Mod: PARLAB | Performed by: EMERGENCY MEDICINE

## 2025-02-27 PROCEDURE — 81001 URINALYSIS AUTO W/SCOPE: CPT | Performed by: EMERGENCY MEDICINE

## 2025-02-27 PROCEDURE — 85025 COMPLETE CBC W/AUTO DIFF WBC: CPT | Performed by: EMERGENCY MEDICINE

## 2025-02-27 PROCEDURE — 2550000001 HC RX 255 CONTRASTS: Performed by: EMERGENCY MEDICINE

## 2025-02-27 PROCEDURE — 75635 CT ANGIO ABDOMINAL ARTERIES: CPT

## 2025-02-27 PROCEDURE — 85652 RBC SED RATE AUTOMATED: CPT | Performed by: EMERGENCY MEDICINE

## 2025-02-27 PROCEDURE — 80048 BASIC METABOLIC PNL TOTAL CA: CPT | Performed by: EMERGENCY MEDICINE

## 2025-02-27 PROCEDURE — 2500000001 HC RX 250 WO HCPCS SELF ADMINISTERED DRUGS (ALT 637 FOR MEDICARE OP)

## 2025-02-27 PROCEDURE — 2500000004 HC RX 250 GENERAL PHARMACY W/ HCPCS (ALT 636 FOR OP/ED)

## 2025-02-27 PROCEDURE — 86140 C-REACTIVE PROTEIN: CPT | Performed by: EMERGENCY MEDICINE

## 2025-02-27 PROCEDURE — 36415 COLL VENOUS BLD VENIPUNCTURE: CPT | Performed by: EMERGENCY MEDICINE

## 2025-02-27 PROCEDURE — 99285 EMERGENCY DEPT VISIT HI MDM: CPT | Mod: 25 | Performed by: EMERGENCY MEDICINE

## 2025-02-27 PROCEDURE — 96360 HYDRATION IV INFUSION INIT: CPT

## 2025-02-27 PROCEDURE — 2500000004 HC RX 250 GENERAL PHARMACY W/ HCPCS (ALT 636 FOR OP/ED): Performed by: EMERGENCY MEDICINE

## 2025-02-27 PROCEDURE — 1100000001 HC PRIVATE ROOM DAILY

## 2025-02-27 RX ORDER — ATORVASTATIN CALCIUM 40 MG/1
40 TABLET, FILM COATED ORAL DAILY
Status: DISCONTINUED | OUTPATIENT
Start: 2025-02-27 | End: 2025-03-01 | Stop reason: HOSPADM

## 2025-02-27 RX ORDER — CEFAZOLIN SODIUM 1 G/50ML
1 SOLUTION INTRAVENOUS EVERY 8 HOURS
Status: DISCONTINUED | OUTPATIENT
Start: 2025-02-27 | End: 2025-03-01 | Stop reason: HOSPADM

## 2025-02-27 RX ORDER — LEVETIRACETAM 500 MG/1
500 TABLET ORAL 2 TIMES DAILY
Status: DISCONTINUED | OUTPATIENT
Start: 2025-02-27 | End: 2025-03-01 | Stop reason: HOSPADM

## 2025-02-27 RX ORDER — ENOXAPARIN SODIUM 100 MG/ML
40 INJECTION SUBCUTANEOUS EVERY 24 HOURS
Status: DISCONTINUED | OUTPATIENT
Start: 2025-02-27 | End: 2025-03-01 | Stop reason: HOSPADM

## 2025-02-27 RX ORDER — POLYETHYLENE GLYCOL 3350 17 G/17G
17 POWDER, FOR SOLUTION ORAL DAILY
Status: DISCONTINUED | OUTPATIENT
Start: 2025-02-27 | End: 2025-03-01 | Stop reason: HOSPADM

## 2025-02-27 RX ORDER — ASPIRIN 325 MG
325 TABLET ORAL DAILY PRN
COMMUNITY

## 2025-02-27 RX ORDER — LEVETIRACETAM 500 MG/1
500 TABLET ORAL 2 TIMES DAILY
COMMUNITY

## 2025-02-27 RX ADMIN — CEFAZOLIN SODIUM 1 G: 1 INJECTION, SOLUTION INTRAVENOUS at 23:22

## 2025-02-27 RX ADMIN — ATORVASTATIN CALCIUM 40 MG: 40 TABLET, FILM COATED ORAL at 15:29

## 2025-02-27 RX ADMIN — ENOXAPARIN SODIUM 40 MG: 40 INJECTION SUBCUTANEOUS at 15:29

## 2025-02-27 RX ADMIN — LEVETIRACETAM 500 MG: 500 TABLET, FILM COATED ORAL at 15:29

## 2025-02-27 RX ADMIN — CEFAZOLIN SODIUM 1 G: 1 INJECTION, SOLUTION INTRAVENOUS at 15:29

## 2025-02-27 RX ADMIN — IOHEXOL 100 ML: 350 INJECTION, SOLUTION INTRAVENOUS at 05:54

## 2025-02-27 RX ADMIN — SODIUM CHLORIDE 1000 ML: 9 INJECTION, SOLUTION INTRAVENOUS at 04:46

## 2025-02-27 SDOH — SOCIAL STABILITY: SOCIAL INSECURITY: WITHIN THE LAST YEAR, HAVE YOU BEEN AFRAID OF YOUR PARTNER OR EX-PARTNER?: NO

## 2025-02-27 SDOH — SOCIAL STABILITY: SOCIAL INSECURITY: HAVE YOU HAD ANY THOUGHTS OF HARMING ANYONE ELSE?: NO

## 2025-02-27 SDOH — ECONOMIC STABILITY: INCOME INSECURITY: IN THE PAST 12 MONTHS HAS THE ELECTRIC, GAS, OIL, OR WATER COMPANY THREATENED TO SHUT OFF SERVICES IN YOUR HOME?: NO

## 2025-02-27 SDOH — SOCIAL STABILITY: SOCIAL INSECURITY: WITHIN THE LAST YEAR, HAVE YOU BEEN HUMILIATED OR EMOTIONALLY ABUSED IN OTHER WAYS BY YOUR PARTNER OR EX-PARTNER?: NO

## 2025-02-27 SDOH — ECONOMIC STABILITY: FOOD INSECURITY: WITHIN THE PAST 12 MONTHS, YOU WORRIED THAT YOUR FOOD WOULD RUN OUT BEFORE YOU GOT THE MONEY TO BUY MORE.: NEVER TRUE

## 2025-02-27 SDOH — SOCIAL STABILITY: SOCIAL INSECURITY: ARE YOU OR HAVE YOU BEEN THREATENED OR ABUSED PHYSICALLY, EMOTIONALLY, OR SEXUALLY BY ANYONE?: NO

## 2025-02-27 SDOH — SOCIAL STABILITY: SOCIAL INSECURITY: HAVE YOU HAD THOUGHTS OF HARMING ANYONE ELSE?: NO

## 2025-02-27 SDOH — ECONOMIC STABILITY: FOOD INSECURITY: WITHIN THE PAST 12 MONTHS, THE FOOD YOU BOUGHT JUST DIDN'T LAST AND YOU DIDN'T HAVE MONEY TO GET MORE.: NEVER TRUE

## 2025-02-27 SDOH — SOCIAL STABILITY: SOCIAL INSECURITY: DO YOU FEEL UNSAFE GOING BACK TO THE PLACE WHERE YOU ARE LIVING?: NO

## 2025-02-27 SDOH — SOCIAL STABILITY: SOCIAL INSECURITY: WERE YOU ABLE TO COMPLETE ALL THE BEHAVIORAL HEALTH SCREENINGS?: YES

## 2025-02-27 SDOH — SOCIAL STABILITY: SOCIAL INSECURITY: DOES ANYONE TRY TO KEEP YOU FROM HAVING/CONTACTING OTHER FRIENDS OR DOING THINGS OUTSIDE YOUR HOME?: NO

## 2025-02-27 SDOH — SOCIAL STABILITY: SOCIAL INSECURITY: DO YOU FEEL ANYONE HAS EXPLOITED OR TAKEN ADVANTAGE OF YOU FINANCIALLY OR OF YOUR PERSONAL PROPERTY?: NO

## 2025-02-27 SDOH — SOCIAL STABILITY: SOCIAL INSECURITY: ABUSE: ADULT

## 2025-02-27 SDOH — SOCIAL STABILITY: SOCIAL INSECURITY: ARE THERE ANY APPARENT SIGNS OF INJURIES/BEHAVIORS THAT COULD BE RELATED TO ABUSE/NEGLECT?: NO

## 2025-02-27 SDOH — SOCIAL STABILITY: SOCIAL INSECURITY: HAS ANYONE EVER THREATENED TO HURT YOUR FAMILY OR YOUR PETS?: NO

## 2025-02-27 ASSESSMENT — COGNITIVE AND FUNCTIONAL STATUS - GENERAL
TURNING FROM BACK TO SIDE WHILE IN FLAT BAD: A LITTLE
STANDING UP FROM CHAIR USING ARMS: A LITTLE
TOILETING: A LITTLE
HELP NEEDED FOR BATHING: A LITTLE
MOVING TO AND FROM BED TO CHAIR: A LITTLE
CLIMB 3 TO 5 STEPS WITH RAILING: A LOT
PATIENT BASELINE BEDBOUND: NO
WALKING IN HOSPITAL ROOM: A LITTLE
MOBILITY SCORE: 18
DRESSING REGULAR LOWER BODY CLOTHING: A LITTLE
DAILY ACTIVITIY SCORE: 21

## 2025-02-27 ASSESSMENT — COLUMBIA-SUICIDE SEVERITY RATING SCALE - C-SSRS
1. IN THE PAST MONTH, HAVE YOU WISHED YOU WERE DEAD OR WISHED YOU COULD GO TO SLEEP AND NOT WAKE UP?: NO
6. HAVE YOU EVER DONE ANYTHING, STARTED TO DO ANYTHING, OR PREPARED TO DO ANYTHING TO END YOUR LIFE?: NO
2. HAVE YOU ACTUALLY HAD ANY THOUGHTS OF KILLING YOURSELF?: NO
1. IN THE PAST MONTH, HAVE YOU WISHED YOU WERE DEAD OR WISHED YOU COULD GO TO SLEEP AND NOT WAKE UP?: NO
2. HAVE YOU ACTUALLY HAD ANY THOUGHTS OF KILLING YOURSELF?: NO
6. HAVE YOU EVER DONE ANYTHING, STARTED TO DO ANYTHING, OR PREPARED TO DO ANYTHING TO END YOUR LIFE?: NO

## 2025-02-27 ASSESSMENT — LIFESTYLE VARIABLES
SUBSTANCE_ABUSE_PAST_12_MONTHS: NO
HOW MANY STANDARD DRINKS CONTAINING ALCOHOL DO YOU HAVE ON A TYPICAL DAY: PATIENT DOES NOT DRINK
HOW OFTEN DO YOU HAVE 6 OR MORE DRINKS ON ONE OCCASION: NEVER
PRESCIPTION_ABUSE_PAST_12_MONTHS: NO
HOW OFTEN DO YOU HAVE A DRINK CONTAINING ALCOHOL: NEVER
AUDIT-C TOTAL SCORE: 0
SKIP TO QUESTIONS 9-10: 1
AUDIT-C TOTAL SCORE: 0

## 2025-02-27 ASSESSMENT — ACTIVITIES OF DAILY LIVING (ADL)
FEEDING YOURSELF: INDEPENDENT
HEARING - LEFT EAR: FUNCTIONAL
PATIENT'S MEMORY ADEQUATE TO SAFELY COMPLETE DAILY ACTIVITIES?: YES
GROOMING: INDEPENDENT
JUDGMENT_ADEQUATE_SAFELY_COMPLETE_DAILY_ACTIVITIES: YES
BATHING: NEEDS ASSISTANCE
ADEQUATE_TO_COMPLETE_ADL: YES
WALKS IN HOME: NEEDS ASSISTANCE
LACK_OF_TRANSPORTATION: NO
HEARING - RIGHT EAR: FUNCTIONAL
TOILETING: NEEDS ASSISTANCE
DRESSING YOURSELF: INDEPENDENT

## 2025-02-27 ASSESSMENT — PAIN - FUNCTIONAL ASSESSMENT: PAIN_FUNCTIONAL_ASSESSMENT: 0-10

## 2025-02-27 ASSESSMENT — PAIN SCALES - GENERAL: PAINLEVEL_OUTOF10: 0 - NO PAIN

## 2025-02-27 NOTE — ED PROVIDER NOTES
Patient was turned over to me.  CTA with runoff negative for ischemia.  Urine does not appear infected.  I reevaluated her.  To me, her right foot appears erythematous and warm.  Definite asymmetry compared to the left.  She is not able to bear weight.  She feels generally weak.  She states she is unable to care for herself.  I discussed the case with Dr. Welch and will admit for antibiotics and PT/OT evaluation     Trav Stringer MD  02/27/25 7503

## 2025-02-27 NOTE — PROGRESS NOTES
Pharmacy Medication History Review    Akiko Peng is a 69 y.o. female admitted for Cellulitis of right foot. Pharmacy reviewed the patient's [id number]-em-iuzwyygsp medications and allergies for accuracy.    The list below reflectives the updated PTA list. Please review each medication in order reconciliation for additional clarification and justification.  Prior to Admission medications    Medication Sig Start Date End Date Authorizing Provider   atorvastatin (Lipitor) 40 mg tablet Take 1 tablet (40 mg) by mouth once daily.  Last filled October for 30ds   Talha Thapa DPM   levETIRAcetam (Keppra) 500 mg tablet Take 1 tablet (500 mg) by mouth 2 times a day.   Historical Provider, MD   aspirin 325 mg tablet Take 1 tablet (325 mg) by mouth once daily as needed for mild pain (1 - 3).   Historical Provider, MD   multivitamin with minerals tablet Take 1 tablet by mouth once daily.   Historical Provider, MD        The list below reflectives the updated allergy list. Please review each documented allergy for additional clarification and justification.  Allergies  Reviewed by Darleen Pavon RN on 2/27/2025   No Known Allergies         Below are additional concerns with the patient's PTA list.      Olivia Ramirez

## 2025-02-27 NOTE — H&P
"  Subjective/History     Akiko Peng is a 69 y.o. female with a history of MS, rheumatoid arthritis and seizures who presented 2/27 for bilateral foot pain and burning starting earlier today.  Patient states that she does have right foot pain that is on and off \"like a light switch \"which is more of a chronic issue, but she does not normally have erythema and swelling like she noticed this morning.  She was having difficulty walking and putting weight on her right leg which prompted her to come to the emergency room.  Patient denies any recent fevers, chills, nausea or vomiting, chest pain, abdominal pain.  Endorses burning with urination that is also a chronic issue and comes and goes.  Does not have any diabetes, denies any recent injuries to the right lower extremity or wounds.  Denies any recent changes in vision or other side effects from her MS.    ROS: Negative unless stated above    ED Course: Vital signs stable, no fever.  CBC and BMP insignificant.  ESR, CRP unremarkable.  UA showing 6-10 white blood cells, 75 leuk esterase.  CT of both lower extremities did not show any significant stenosis.  She was given a 1 L bolus of normal saline.    Surgical history: As below  Family history: As below  Risk/Social factors:     Past Medical History:   Diagnosis Date    Multiple sclerosis (Multi)     History of multiple sclerosis       Past Surgical History:   Procedure Laterality Date    MR HEAD ANGIO WO IV CONTRAST  9/21/2020    MR HEAD ANGIO WO IV CONTRAST 9/21/2020 PAR [facility]Y [facility]GAC[facility]    MR HEAD ANGIO WO IV CONTRAST  10/24/2022    MR HEAD ANGIO WO IV CONTRAST 10/24/2022 [facility]    MR NECK ANGIO WO IV CONTRAST  9/21/2020    MR NECK ANGIO WO IV CONTRAST 9/21/2020 [facility]    MR NECK ANGIO WO IV CONTRAST  10/24/2022    MR NECK ANGIO WO IV CONTRAST 10/24/2022 [facility]    OTHER SURGICAL HISTORY  11/26/2019    Tonsillectomy    OTHER SURGICAL HISTORY  11/26/2019    Hysterectomy " "      Family history:family history is not on file.    Objective     Physical Exam  Vitals and nursing note reviewed.   Constitutional:       Appearance: Normal appearance.   Cardiovascular:      Rate and Rhythm: Normal rate and regular rhythm.   Pulmonary:      Effort: Pulmonary effort is normal.      Breath sounds: Normal breath sounds. No wheezing or rales.   Abdominal:      General: Abdomen is flat. There is no distension.      Palpations: Abdomen is soft.      Tenderness: There is no abdominal tenderness.   Musculoskeletal:      Right lower leg: Edema present.      Left lower leg: No edema.   Skin:     General: Skin is warm and dry.      Findings: Erythema (Right foot) present.   Neurological:      General: No focal deficit present.      Mental Status: She is alert and oriented to person, place, and time.      Motor: Weakness (Right lower extremity) present.       Last Recorded Vitals  Blood pressure 118/58, pulse 72, temperature 35.7 °C (96.3 °F), temperature source Temporal, resp. rate 18, height 1.778 m (5' 10\"), weight 63.5 kg (140 lb), SpO2 98%.  Intake/Output last 3 Shifts:  No intake/output data recorded.    Last CBC & BMP  Lab Results   Component Value Date    GLUCOSE 86 02/27/2025    CALCIUM 8.9 02/27/2025     02/27/2025    K 3.9 02/27/2025    CO2 29 02/27/2025     02/27/2025    BUN 20 02/27/2025    CREATININE 0.58 02/27/2025     Lab Results   Component Value Date    WBC 5.1 02/27/2025    HGB 14.0 02/27/2025    HCT 41.8 02/27/2025    MCV 95 02/27/2025     02/27/2025         Assessment / Plan   Akiko Peng is a 69 y.o. female with a history of MS, rheumatoid arthritis and seizures who presented 2/27 for bilateral foot pain and burning starting earlier today.    #Right foot cellulitis  -Patient presented with right foot pain, erythema and swelling  -White blood cell count normal, ESR and CRP unremarkable.  No significant risk factors  -CTA lower extremities negative for " stenosis  -Will start patient on Ancef and monitor for response, PT/OT    #UTI  -UA with 6010 white blood cells, 75 leuk esterase, urine culture pending  -Given her symptoms we will cover with Ancef as above    #MS  #Seizure disorder  -Continue Keppra    DVT: Lovenox  Diet: Regular  IVF: None  Antibiotics: Ancef  Consults: PT/OT    Pierce Juares MD  PGY-1, Internal Medicine    This is a preliminary note, please await attending attestation for final A/P

## 2025-02-27 NOTE — ED PROVIDER NOTES
HPI   Chief Complaint   Patient presents with    Leg Swelling       69-year-old female with history of MS presenting with concern for bilateral foot pain/burning.  It started this morning.  Patient denies any trauma.  No history of DVT/PE.  Patient states her feet are not numb.  Patient also complaining of urinary tract infection symptoms.                Patient History   Past Medical History:   Diagnosis Date    Multiple sclerosis (Multi)     History of multiple sclerosis     Past Surgical History:   Procedure Laterality Date    MR HEAD ANGIO WO IV CONTRAST  9/21/2020    MR HEAD ANGIO WO IV CONTRAST 9/21/2020 PAR [facility]    MR HEAD ANGIO WO IV CONTRAST  10/24/2022    MR HEAD ANGIO WO IV CONTRAST 10/24/2022 [facility]    MR NECK ANGIO WO IV CONTRAST  9/21/2020    MR NECK ANGIO WO IV CONTRAST 9/21/2020 [facility]    MR NECK ANGIO WO IV CONTRAST  10/24/2022    MR NECK ANGIO WO IV CONTRAST 10/24/2022 [facility]    OTHER SURGICAL HISTORY  11/26/2019    Tonsillectomy    OTHER SURGICAL HISTORY  11/26/2019    Hysterectomy     No family history on file.  Social History     Tobacco Use    Smoking status: Never    Smokeless tobacco: Never   Vaping Use    Vaping status: Never Used   Substance Use Topics    Alcohol use: Never    Drug use: Never       Physical Exam   ED Triage Vitals [02/27/25 0320]   Temperature Heart Rate Respirations BP   35.7 °C (96.3 °F) 72 18 118/58      Pulse Ox Temp Source Heart Rate Source Patient Position   98 % Temporal -- --      BP Location FiO2 (%)     -- --       Physical Exam  Vitals and nursing note reviewed.   Constitutional:       General: She is not in acute distress.     Appearance: She is well-developed.   HENT:      Head: Normocephalic and atraumatic.   Eyes:      Conjunctiva/sclera: Conjunctivae normal.   Cardiovascular:      Rate and Rhythm: Normal rate and regular rhythm.      Pulses:           Dorsalis pedis pulses are 1+ on the right side and 2+  on the left side.        Posterior tibial pulses are 2+ on the right side and 2+ on the left side.   Pulmonary:      Effort: Pulmonary effort is normal. No respiratory distress.      Breath sounds: Normal breath sounds.   Abdominal:      Palpations: Abdomen is soft.      Tenderness: There is no abdominal tenderness.   Musculoskeletal:      Cervical back: Neck supple.   Feet:      Right foot:      Skin integrity: Erythema present. No warmth.      Left foot:      Skin integrity: Erythema present. No warmth.      Comments: Bilateral feet are erythematous however they are cool to the touch.  Pulses are dopplerable.  Skin:     General: Skin is warm and dry.      Capillary Refill: Capillary refill takes less than 2 seconds.   Neurological:      Mental Status: She is alert.   Psychiatric:         Mood and Affect: Mood normal.           ED Course & MDM   Diagnoses as of 02/27/25 2110   Cellulitis of right foot                 No data recorded                                 Medical Decision Making  Patient presenting with bilateral foot pain.  I was able to Doppler her pulses.  She is only 1+ dorsalis pedis on the right foot.  She is 2+ on posterior tibials bilaterally.  She 2+ on the left foot for dorsalis pedis.  Skin is cool not warm however there is some erythema.  Differential includes cellulitis, arterial occlusion, DVT.  IV line was established.  Patient declines analgesia.  Will obtain a CBC to assess white blood cell count, hemoglobin, platelets.  BMP to assess renal function, electrolytes.  ESR and CRP will be obtained as inflammatory markers.  Urinalysis will be obtained due to patient's dysuria and urinary frequency since.  CT angiogram of the aorta with runoffs bilaterally will be obtained.CBC, BMP unremarkable.  ESR and CRP are also normal.  CT of the aorta with runoffs does not show any vascular occlusion.  I have low suspicion for cellulitis.  Patient currently still trying to make urine and she was given a  liter of IV fluids.  She is concerned she has a UTI.  Patient signed out to Dr. Stringer pending urinalysis.        Procedure  Procedures     Andres Wilson, DO  02/27/25 2512       Andres Wilson, DO  02/27/25 7426

## 2025-02-27 NOTE — ED TRIAGE NOTES
BIBA from home for Right foot redness/swelling/cold to touch x 1 day. Normal sensation, able to move toes.  Aox4

## 2025-02-28 LAB
ANION GAP SERPL CALC-SCNC: 10 MMOL/L (ref 10–20)
BACTERIA UR CULT: NORMAL
BUN SERPL-MCNC: 9 MG/DL (ref 6–23)
CALCIUM SERPL-MCNC: 9.1 MG/DL (ref 8.6–10.3)
CHLORIDE SERPL-SCNC: 109 MMOL/L (ref 98–107)
CO2 SERPL-SCNC: 27 MMOL/L (ref 21–32)
CREAT SERPL-MCNC: 0.57 MG/DL (ref 0.5–1.05)
EGFRCR SERPLBLD CKD-EPI 2021: >90 ML/MIN/1.73M*2
ERYTHROCYTE [DISTWIDTH] IN BLOOD BY AUTOMATED COUNT: 12.5 % (ref 11.5–14.5)
GLUCOSE SERPL-MCNC: 90 MG/DL (ref 74–99)
HCT VFR BLD AUTO: 41.2 % (ref 36–46)
HGB BLD-MCNC: 13.9 G/DL (ref 12–16)
MCH RBC QN AUTO: 32.1 PG (ref 26–34)
MCHC RBC AUTO-ENTMCNC: 33.7 G/DL (ref 32–36)
MCV RBC AUTO: 95 FL (ref 80–100)
NRBC BLD-RTO: 0 /100 WBCS (ref 0–0)
PLATELET # BLD AUTO: 228 X10*3/UL (ref 150–450)
POTASSIUM SERPL-SCNC: 3.7 MMOL/L (ref 3.5–5.3)
RBC # BLD AUTO: 4.33 X10*6/UL (ref 4–5.2)
SODIUM SERPL-SCNC: 142 MMOL/L (ref 136–145)
WBC # BLD AUTO: 6 X10*3/UL (ref 4.4–11.3)

## 2025-02-28 PROCEDURE — 2500000004 HC RX 250 GENERAL PHARMACY W/ HCPCS (ALT 636 FOR OP/ED)

## 2025-02-28 PROCEDURE — 36415 COLL VENOUS BLD VENIPUNCTURE: CPT

## 2025-02-28 PROCEDURE — 1100000001 HC PRIVATE ROOM DAILY

## 2025-02-28 PROCEDURE — 85027 COMPLETE CBC AUTOMATED: CPT

## 2025-02-28 PROCEDURE — 80048 BASIC METABOLIC PNL TOTAL CA: CPT

## 2025-02-28 PROCEDURE — 97165 OT EVAL LOW COMPLEX 30 MIN: CPT | Mod: GO

## 2025-02-28 PROCEDURE — 2500000001 HC RX 250 WO HCPCS SELF ADMINISTERED DRUGS (ALT 637 FOR MEDICARE OP)

## 2025-02-28 PROCEDURE — 97161 PT EVAL LOW COMPLEX 20 MIN: CPT | Mod: GP

## 2025-02-28 RX ORDER — AMOXICILLIN AND CLAVULANATE POTASSIUM 500; 125 MG/1; MG/1
1 TABLET, FILM COATED ORAL 2 TIMES DAILY
Qty: 16 TABLET | Refills: 0 | Status: SHIPPED | OUTPATIENT
Start: 2025-02-28 | End: 2025-03-08

## 2025-02-28 RX ORDER — ACETAMINOPHEN 325 MG/1
650 TABLET ORAL EVERY 6 HOURS PRN
Status: DISCONTINUED | OUTPATIENT
Start: 2025-02-28 | End: 2025-03-01 | Stop reason: HOSPADM

## 2025-02-28 RX ADMIN — LEVETIRACETAM 500 MG: 500 TABLET, FILM COATED ORAL at 08:11

## 2025-02-28 RX ADMIN — LEVETIRACETAM 500 MG: 500 TABLET, FILM COATED ORAL at 20:00

## 2025-02-28 RX ADMIN — ENOXAPARIN SODIUM 40 MG: 40 INJECTION SUBCUTANEOUS at 20:00

## 2025-02-28 RX ADMIN — ATORVASTATIN CALCIUM 40 MG: 40 TABLET, FILM COATED ORAL at 08:11

## 2025-02-28 RX ADMIN — CEFAZOLIN SODIUM 1 G: 1 INJECTION, SOLUTION INTRAVENOUS at 16:19

## 2025-02-28 RX ADMIN — CEFAZOLIN SODIUM 1 G: 1 INJECTION, SOLUTION INTRAVENOUS at 23:42

## 2025-02-28 RX ADMIN — CEFAZOLIN SODIUM 1 G: 1 INJECTION, SOLUTION INTRAVENOUS at 08:11

## 2025-02-28 ASSESSMENT — COGNITIVE AND FUNCTIONAL STATUS - GENERAL
EATING MEALS: A LITTLE
CLIMB 3 TO 5 STEPS WITH RAILING: TOTAL
MOBILITY SCORE: 10
DRESSING REGULAR LOWER BODY CLOTHING: TOTAL
TOILETING: A LITTLE
CLIMB 3 TO 5 STEPS WITH RAILING: A LOT
DAILY ACTIVITIY SCORE: 11
TOILETING: TOTAL
STANDING UP FROM CHAIR USING ARMS: A LITTLE
STANDING UP FROM CHAIR USING ARMS: A LOT
MOBILITY SCORE: 18
TURNING FROM BACK TO SIDE WHILE IN FLAT BAD: A LITTLE
DRESSING REGULAR UPPER BODY CLOTHING: A LOT
WALKING IN HOSPITAL ROOM: TOTAL
TURNING FROM BACK TO SIDE WHILE IN FLAT BAD: A LOT
WALKING IN HOSPITAL ROOM: A LITTLE
HELP NEEDED FOR BATHING: A LOT
MOVING FROM LYING ON BACK TO SITTING ON SIDE OF FLAT BED WITH BEDRAILS: A LOT
MOVING TO AND FROM BED TO CHAIR: A LOT
PERSONAL GROOMING: A LOT
MOVING TO AND FROM BED TO CHAIR: A LITTLE
HELP NEEDED FOR BATHING: A LITTLE
DAILY ACTIVITIY SCORE: 21
DRESSING REGULAR LOWER BODY CLOTHING: A LITTLE

## 2025-02-28 ASSESSMENT — PAIN SCALES - GENERAL
PAINLEVEL_OUTOF10: 0 - NO PAIN

## 2025-02-28 ASSESSMENT — PAIN - FUNCTIONAL ASSESSMENT
PAIN_FUNCTIONAL_ASSESSMENT: 0-10

## 2025-02-28 NOTE — HOSPITAL COURSE
69-year-old female with a past medical history of MS, seizures who presented with right lower extremity pain and swelling.  Infectious workup largely negative, presumed to be cellulitis.  Started on IV Ancef for antibiotic treatment.  Symptoms resolving, evaluated by PT/OT recommended SNF placement

## 2025-02-28 NOTE — PROGRESS NOTES
Occupational Therapy    Evaluation    Patient Name: Akiko Peng  MRN: [id number]  Today's Date: 2/28/2025  Time Calculation  Start Time: 1359  Stop Time: 1425  Time Calculation (min): 26 min    Assessment  IP OT Assessment  OT Assessment: Patient presents with a decline in functional status and requires extensive assist for all ADLs/transfers; Patient is at high risk of falls and would benefit from 24 hour care with O.T. services at a moderate intensity to improve independence with ADLs, transfers & mobility.  Prognosis: Good  Barriers to Discharge Home: Physical needs, Caregiver assistance  Caregiver Assistance: Caregiver assistance needed per identified barriers - however, level of patient's required assistance exceeds assistance available at home  Physical Needs: 24hr mobility assistance needed, 24hr ADL assistance needed  End of Session Communication: Bedside nurse  End of Session Patient Position: Bed, 2 rail up, Alarm on (call light in reach)    Plan:  Treatment Interventions: ADL retraining, Functional transfer training, UE strengthening/ROM, Endurance training, Neuromuscular reeducation, Compensatory technique education  OT Frequency: 3 times per week  OT Discharge Recommendations: Moderate intensity level of continued care (24 hour care)  OT - OK to Discharge: Yes (from an O.T. standpoint)    Subjective     Current Problem:  Patient Active Problem List   Diagnosis    TIA (transient ischemic attack)    Aphasia    Bradycardia    Right sided weakness    COVID-19    Cellulitis of right foot       General:  General  Reason for Referral: OT eval & treat for ADLs/safety (Dx: cellulitis R foot)  Referred By: Pierce Juares MD  Past Medical History Relevant to Rehab: 2/27/25: B foot pain/burning, R > L, unable to bear weight. (PMH: MS, RA, bradycardia, seizures, TIA, covid 10/2024,)  Co-Treatment: PT  Co-Treatment Reason: To maximize patient safety & mobility  Prior to Session Communication: Bedside  "nurse  Patient Position Received: Bed, 2 rail up, Alarm on  General Comment: Patient cleared for therapy by nursing.    Precautions:  Precautions Comment: Activity: BSC, fall/safety, bed alarm    Pain:  Pain Assessment  Pain Assessment: 0-10  0-10 (Numeric) Pain Score: 0 - No pain    Objective     Cognition:  Overall Cognitive Status:  (follows one step commands with cues to remain on task, distractible, questionable historian re: baseline functional status as patient providing conflicting info.)        Home Living:  Home Living Comments: Per patient, lives with  who uses a w/c and daughter who works; son local also; stays on 1st floor unless \"son carries\" her to 2nd floor; Patient reports she was independent ADLs, transfers & mobility with cane, denies falls. Family manages IADLs. Has bedside commode, tub/shower with grab bar & seat.       ADL:  Grooming Assistance: Moderate  UE Dressing Assistance: Moderate  LE Dressing Assistance: Total    Activity Tolerance:  Endurance: Decreased tolerance for upright activites    Bed Mobility/Transfers:   Bed Mobility  Bed Mobility:  (max assist x 1 supine to sit with rail; max assist x 2 sit to supine with rail.)  Transfers  Transfer:  (max assist x 2 sit to partial stand with gait belt & walker; unable to extend LE's to stand; high fall risk)      Sitting Balance:  Static Sitting Balance  Static Sitting-Level of Assistance: Contact guard  Dynamic Sitting Balance  Dynamic Sitting-Balance Support: Feet supported, Bilateral upper extremity supported  Dynamic Sitting-Level of Assistance: Minimum assistance      Vision:   Vision - Basic Assessment  Patient Visual Report:  (Patient reports impaired vision, worsening as days go on.)   and      Sensation:  Sensation Comment: chronic intermitted numbness/tingling right foot    Strength:  Strength Comments: RUE 2-/5 proximally, 3/5 distally; LUE 3-/5 proximally, 3+/5 distally    Coordination:  Coordination Comment: ataxic like " movements BUE, R > L       Outcome Measures: [facility] Daily Activity  Putting on and taking off regular lower body clothing: Total  Bathing (including washing, rinsing, drying): A lot  Putting on and taking off regular upper body clothing: A lot  Toileting, which includes using toilet, bedpan or urinal: Total  Taking care of personal grooming such as brushing teeth: A lot  Eating Meals: A little  Daily Activity - Total Score: 11           EDUCATION:     Education Documentation  ADL Training, taught by Mery Jaramillo OT at 2/28/2025  3:01 PM.  Learner: Patient  Readiness: Acceptance  Method: Explanation  Response: Verbalizes Understanding, Needs Reinforcement    Education Comments  No comments found.        Goals:   Encounter Problems       Encounter Problems (Active)       OT Goals       Increase bed mobility & functional transfers to/from bed, chair & commode to min assist with DME for safety  (Progressing)       Start:  02/28/25    Expected End:  03/14/25            Tolerate 15 minutes UE therex with rest periods prn to promote increased activity tolerance and strength for ADLs / functional transfers (Progressing)       Start:  02/28/25    Expected End:  03/14/25            Increase UE grooming and dressing to min assist with adaptive equipment as needed.  (Progressing)       Start:  02/28/25    Expected End:  03/14/25            Increase LE dressing & toileting to mod assist with adaptive equipment as needed.  (Progressing)       Start:  02/28/25    Expected End:  03/14/25

## 2025-02-28 NOTE — PROGRESS NOTES
02/28/25 1309   Discharge Planning   Living Arrangements Spouse/significant other;Children   Support Systems Children   Assistance Needed cane   Type of Residence Private residence   Number of Stairs to Enter Residence 2  (has lift in back of home if needed)   Number of Stairs Within Residence 3   Expected Discharge Disposition Home   Does the patient need discharge transport arranged? No   Intensity of Service   Intensity of Service 0-30 min     2/28/2025  Met with pt in room, introduced self and explained role.  Verified insurance, address, phone.   PCP- Dr Craig  Pharmacy- [facility] in [place]      Able to obtain and afford medications, takes as prescribed.    Not sure if she would like meds to Bed- would like to think about it.   Pt is from home, lives with spouse (who is in a wheelchair) and daughter.  Pt stated she uses a cane.  Has a walker if needed.  Stated she is able to go up/down the steps.  Performs own ADL's.  Has grab bars and a shower seat.  Daughter does the driving.  Therapies ordered.    Ct Team will follow for needs upon discharge.    Nasra Leong Rn TCC    1600  [facility] - 10, mod recommended.  Followed up with pt in room and dicussed with her.  MD also in to discuss with pt.  Pt would like to speak with her family.  Snf list from [facility] left with pt.  Support provided, questions answered.    Nasra Leong Rn TCC

## 2025-02-28 NOTE — PROGRESS NOTES
Physical Therapy    Physical Therapy Evaluation    Patient Name: Akiko Peng  MRN: [id number]  Today's Date: 2/28/2025   Time Calculation  Start Time: 1358  Stop Time: 1424  Time Calculation (min): 26 min  706/706-A    Assessment/Plan   PT Assessment  PT Assessment Results: Decreased strength, Decreased endurance, Impaired balance, Decreased mobility, Decreased safety awareness, Impaired vision, Impaired sensation  Rehab Prognosis: Good  Evaluation/Treatment Tolerance: Patient limited by fatigue  Medical Staff Made Aware: Yes  End of Session Communication: Bedside nurse  Assessment Comment: Pt presents /c above impairments and decline from functional baseline 2nd acute on chronic medical conditions. Pt will benefit from continued PT services at mod intensity to address above and maximize functional mobility.  End of Session Patient Position: Bed, 2 rail up, Alarm on  IP OR SWING BED PT PLAN  Inpatient or Swing Bed: Inpatient  PT Plan  Treatment/Interventions: Bed mobility, Transfer training, Gait training, Balance training, Neuromuscular re-education, Strengthening, Endurance training, Therapeutic exercise, Therapeutic activity  PT Plan: Ongoing PT  PT Frequency: 4 times per week  PT Discharge Recommendations: Moderate intensity level of continued care  PT - OK to Discharge: Yes    Subjective     Current Problem:  1. Cellulitis of right foot  amoxicillin-pot clavulanate (Augmentin) 500-125 mg tablet        Patient Active Problem List   Diagnosis    TIA (transient ischemic attack)    Aphasia    Bradycardia    Right sided weakness    COVID-19    Cellulitis of right foot       General Visit Information:  General  Reason for Referral: PT eval and treat  Referred By: Rafa  Past Medical History Relevant to Rehab: MS,RA, seizure, TIA  Co-Treatment: OT  Co-Treatment Reason: possible two person assist, maximize functional mobility  Prior to Session Communication: Bedside nurse  Patient Position Received: Bed, 2 rail up,  Alarm on  General Comment: Pt presents with c/o R foot redness/swelling/pain. Pt being managed for RLE cellulitis. Pt cleared for therapy by nursing. Pt supine, agreeable.    Home Living:  Home Living  Home Living Comments: Pt lives /c spouse and dtr. Pt reports 2STE /c rail or lift if needed. 2 story home. States full bath and bedroom upstairs, but she needs to be carried. At times sleeps on main level.    Prior Level of Function:  Prior Function Per Pt/Caregiver Report  Prior Function Comments: Questionable historian as pt initially states being indep /c ADLs, shares IADLs. Reports sleeping in a regular bed /c rail. -drive. States prn use of straight cane vs furniture walking in the home. Pt also then reports only pivoting to BSC from bed.   Reports her spouse receives HHC and uses W/C vs walker.    Precautions:  Precautions  Precautions Comment: RLE weakness/MS, decreased vision R, falls, questionable insight/historian       Objective     Pain:  Pain Assessment  Pain Assessment: 0-10  0-10 (Numeric) Pain Score: 0 - No pain    Cognition:  Cognition  Overall Cognitive Status:  (Pt pleasant, cooperative. Needing max safety cues throughout session. Follows simple commands /c delay in timing/sequencing. Questionable insight as pt providing conflicting information regarding PLOF.)    General Assessments:  General Observation  General Observation: lines: piv  skin integrity: WFL   Activity Tolerance  Endurance: Decreased tolerance for upright activites  Sensation  Sensation Comment: reports RLE n/t and decreased sensation to light touch; chronic  Strength  Strength Comments: LLE knee ext, DF 4-/5, PF 4/5; RLE knee ext 3-/5, DF 2/5, PF 3-/5           Dynamic Sitting Balance  Dynamic Sitting-Comments: F+       Functional Assessments:     Bed Mobility  Bed Mobility: Yes  Bed Mobility 1  Bed Mobility Comments 1: Supine>sit /c maxAx1, HOB elevated, trunk support and increased time. Sit>supine maxAx2 for trunk and BLE  support.  Transfers  Transfer: Yes  Transfer 1  Trials/Comments 1: Sit<>Stand /c multiple trials. Pt only able to complete partial stand at WW /c gait belt; maxAx2, blocking to LEs. Pt using UEs to assist in LE placement. Cues for widening PATTIE, UE placement; fair return demo. Pt able to side scoot /c minAx1, increased time.          Outcome Measures:     [facility] Basic Mobility  Turning from your back to your side while in a flat bed without using bedrails: A lot  Moving from lying on your back to sitting on the side of a flat bed without using bedrails: A lot  Moving to and from bed to chair (including a wheelchair): A lot  Standing up from a chair using your arms (e.g. wheelchair or bedside chair): A lot  To walk in hospital room: Total  Climbing 3-5 steps with railing: Total  Basic Mobility - Total Score: 10          Goals:  Encounter Problems       Encounter Problems (Active)       PT Problem       STG - Pt will transition supine <> sitting with minAx1 (Progressing)       Start:  02/28/25    Expected End:  03/14/25            STG - Pt will transfer STS with minAx1 (Progressing)       Start:  02/28/25    Expected End:  03/14/25            STG - Pt will amb >/=10' using LRAD with minAx1 (Progressing)       Start:  02/28/25    Expected End:  03/14/25            STG - Pt will maintain Fstanding balance to decrease risk of falls (Progressing)       Start:  02/28/25    Expected End:  03/14/25                 Education Documentation  Mobility Training, taught by Charito Hoffman, PT at 2/28/2025  2:51 PM.  Learner: Patient  Readiness: Acceptance  Method: Explanation  Response: Verbalizes Understanding, Needs Reinforcement    Education Comments  No comments found.

## 2025-02-28 NOTE — CARE PLAN
The patient's goals for the shift include  comfort     The clinical goals for the shift include comfort       pt has a history of heart failure.  she is here at the direction of her heart failure doctor for chest pain.  she is expecting to stay throught till monday.  pt has no SOB and a cough  she is on telemetry

## 2025-02-28 NOTE — DISCHARGE INSTRUCTIONS
1.  We are discharging you with an antibiotic called Augmentin.  Please take this as directed for the next 8 days for your foot infection  2.  Please follow-up with your PCP within 2 weeks of discharge

## 2025-02-28 NOTE — DISCHARGE SUMMARY
Discharge Diagnosis  Cellulitis of right foot  Seizure disorder  MS  Issues Requiring Follow-Up  Complete course of Augmentin    Discharge Meds     Medication List      START taking these medications     amoxicillin-pot clavulanate 500-125 mg tablet; Commonly known as:   Augmentin; Take 1 tablet by mouth 2 times a day for 8 days.     CONTINUE taking these medications     aspirin 325 mg tablet   atorvastatin 40 mg tablet; Commonly known as: Lipitor; Take 1 tablet (40   mg) by mouth once daily.   * levETIRAcetam 500 mg tablet; Commonly known as: Keppra   * levETIRAcetam 750 mg tablet; Commonly known as: Keppra; Take 1 tablet   (750 mg) by mouth 2 times a day.   multivitamin with minerals tablet  * This list has 2 medication(s) that are the same as other medications   prescribed for you. Read the directions carefully, and ask your doctor or   other care provider to review them with you.       Test Results Pending At Discharge  Pending Labs       No current pending labs.          Hospital Course   69-year-old female with a past medical history of MS, rheumatoid arthritis, seizures who presented on 2/27 for bilateral foot pain and burning.  On examination patient's right foot and ankle were erythematous, swollen and somewhat painful.  She was started on Ancef for presumed cellulitis.  Infectious workup was largely negative including a lack of fevers, leukocytosis, CTA with runoff negative for significant stenosis in the lower extremities.  Her symptoms improved and she feels well enough to be discharged at this time.  She will be discharged with an additional 8 days of Augmentin to complete outpatient.    Pertinent Physical Exam At Time of Discharge  Physical Exam  Vitals and nursing note reviewed.   Constitutional:       Appearance: Normal appearance.   Cardiovascular:      Rate and Rhythm: Normal rate and regular rhythm.   Pulmonary:      Effort: Pulmonary effort is normal.      Breath sounds: Normal breath sounds.    Abdominal:      General: Abdomen is flat. There is no distension.      Palpations: Abdomen is soft.      Tenderness: There is no abdominal tenderness.   Musculoskeletal:      Right lower leg: Edema present.      Left lower leg: No edema.   Skin:     Findings: Erythema (Right foot, improved) present.   Neurological:      General: No focal deficit present.      Mental Status: She is alert and oriented to person, place, and time.       Outpatient Follow-Up  No future appointments.      Pierce Juares MD

## 2025-03-01 VITALS
TEMPERATURE: 98.1 F | OXYGEN SATURATION: 98 % | DIASTOLIC BLOOD PRESSURE: 73 MMHG | WEIGHT: 140 LBS | HEART RATE: 87 BPM | BODY MASS INDEX: 20.04 KG/M2 | SYSTOLIC BLOOD PRESSURE: 121 MMHG | HEIGHT: 70 IN | RESPIRATION RATE: 18 BRPM

## 2025-03-01 PROCEDURE — 2500000001 HC RX 250 WO HCPCS SELF ADMINISTERED DRUGS (ALT 637 FOR MEDICARE OP)

## 2025-03-01 PROCEDURE — 2500000004 HC RX 250 GENERAL PHARMACY W/ HCPCS (ALT 636 FOR OP/ED)

## 2025-03-01 RX ORDER — AMOXICILLIN AND CLAVULANATE POTASSIUM 500; 125 MG/1; MG/1
1 TABLET, FILM COATED ORAL 2 TIMES DAILY
Qty: 16 TABLET | Refills: 0 | Status: SHIPPED | OUTPATIENT
Start: 2025-03-01 | End: 2025-03-09

## 2025-03-01 RX ADMIN — ACETAMINOPHEN 650 MG: 325 TABLET, FILM COATED ORAL at 03:00

## 2025-03-01 RX ADMIN — ATORVASTATIN CALCIUM 40 MG: 40 TABLET, FILM COATED ORAL at 08:21

## 2025-03-01 RX ADMIN — LEVETIRACETAM 500 MG: 500 TABLET, FILM COATED ORAL at 08:21

## 2025-03-01 RX ADMIN — CEFAZOLIN SODIUM 1 G: 1 INJECTION, SOLUTION INTRAVENOUS at 10:35

## 2025-03-01 ASSESSMENT — COGNITIVE AND FUNCTIONAL STATUS - GENERAL
EATING MEALS: A LITTLE
DRESSING REGULAR UPPER BODY CLOTHING: A LITTLE
STANDING UP FROM CHAIR USING ARMS: A LOT
TURNING FROM BACK TO SIDE WHILE IN FLAT BAD: A LITTLE
DAILY ACTIVITIY SCORE: 15
HELP NEEDED FOR BATHING: A LOT
CLIMB 3 TO 5 STEPS WITH RAILING: TOTAL
MOBILITY SCORE: 13
PERSONAL GROOMING: A LITTLE
WALKING IN HOSPITAL ROOM: A LOT
MOVING FROM LYING ON BACK TO SITTING ON SIDE OF FLAT BED WITH BEDRAILS: A LITTLE
MOVING TO AND FROM BED TO CHAIR: A LOT
DRESSING REGULAR LOWER BODY CLOTHING: A LOT
TOILETING: A LOT

## 2025-03-01 ASSESSMENT — PAIN DESCRIPTION - LOCATION: LOCATION: BACK

## 2025-03-01 ASSESSMENT — PAIN SCALES - GENERAL
PAINLEVEL_OUTOF10: 6
PAINLEVEL_OUTOF10: 0 - NO PAIN
PAINLEVEL_OUTOF10: 0 - NO PAIN

## 2025-03-01 ASSESSMENT — PAIN - FUNCTIONAL ASSESSMENT: PAIN_FUNCTIONAL_ASSESSMENT: 0-10

## 2025-03-01 ASSESSMENT — PAIN DESCRIPTION - ORIENTATION: ORIENTATION: RIGHT

## 2025-03-01 NOTE — CARE PLAN
The patient's goals for the shift include      The clinical goals for the shift include comfort and safety      Problem: Pain - Adult  Goal: Verbalizes/displays adequate comfort level or baseline comfort level  Outcome: Progressing     Problem: Safety - Adult  Goal: Free from fall injury  Outcome: Progressing     Problem: Discharge Planning  Goal: Discharge to home or other facility with appropriate resources  Outcome: Progressing     Problem: Chronic Conditions and Co-morbidities  Goal: Patient's chronic conditions and co-morbidity symptoms are monitored and maintained or improved  Outcome: Progressing     Problem: Nutrition  Goal: Nutrient intake appropriate for maintaining nutritional needs  Outcome: Progressing     Problem: Fall/Injury  Goal: Not fall by end of shift  Outcome: Progressing  Goal: Be free from injury by end of the shift  Outcome: Progressing  Goal: Verbalize understanding of personal risk factors for fall in the hospital  Outcome: Progressing  Goal: Verbalize understanding of risk factor reduction measures to prevent injury from fall in the home  Outcome: Progressing  Goal: Use assistive devices by end of the shift  Outcome: Progressing  Goal: Pace activities to prevent fatigue by end of the shift  Outcome: Progressing     Problem: Skin  Goal: Decreased wound size/increased tissue granulation at next dressing change  Outcome: Progressing  Goal: Participates in plan/prevention/treatment measures  Outcome: Progressing  Goal: Prevent/manage excess moisture  Outcome: Progressing  Goal: Prevent/minimize sheer/friction injuries  Outcome: Progressing  Goal: Promote/optimize nutrition  Outcome: Progressing  Goal: Promote skin healing  Outcome: Progressing

## 2025-03-01 NOTE — CARE PLAN
The patient's goals for the shift include      The clinical goals for the shift include comfort and safety    Problem: Pain - Adult  Goal: Verbalizes/displays adequate comfort level or baseline comfort level  Outcome: Progressing     Problem: Safety - Adult  Goal: Free from fall injury  Outcome: Progressing     Problem: Discharge Planning  Goal: Discharge to home or other facility with appropriate resources  Outcome: Progressing     Problem: Chronic Conditions and Co-morbidities  Goal: Patient's chronic conditions and co-morbidity symptoms are monitored and maintained or improved  Outcome: Progressing     Problem: Nutrition  Goal: Nutrient intake appropriate for maintaining nutritional needs  Outcome: Progressing     Problem: Fall/Injury  Goal: Not fall by end of shift  Outcome: Progressing  Goal: Be free from injury by end of the shift  Outcome: Progressing  Goal: Verbalize understanding of personal risk factors for fall in the hospital  Outcome: Progressing  Goal: Verbalize understanding of risk factor reduction measures to prevent injury from fall in the home  Outcome: Progressing  Goal: Use assistive devices by end of the shift  Outcome: Progressing  Goal: Pace activities to prevent fatigue by end of the shift  Outcome: Progressing     Problem: Skin  Goal: Decreased wound size/increased tissue granulation at next dressing change  Outcome: Progressing  Flowsheets (Taken 2/28/2025 2322)  Decreased wound size/increased tissue granulation at next dressing change: Promote sleep for wound healing  Goal: Participates in plan/prevention/treatment measures  Outcome: Progressing  Flowsheets (Taken 2/28/2025 2322)  Participates in plan/prevention/treatment measures: Elevate heels  Goal: Prevent/manage excess moisture  Outcome: Progressing  Flowsheets (Taken 2/28/2025 2322)  Prevent/manage excess moisture: Monitor for/manage infection if present  Goal: Prevent/minimize sheer/friction injuries  Outcome:  Progressing  Flowsheets (Taken 2/28/2025 2322)  Prevent/minimize sheer/friction injuries: Use pull sheet  Goal: Promote/optimize nutrition  Outcome: Progressing  Flowsheets (Taken 2/28/2025 2322)  Promote/optimize nutrition: Consume > 50% meals/supplements  Goal: Promote skin healing  Outcome: Progressing  Flowsheets (Taken 2/28/2025 2322)  Promote skin healing: Assess skin/pad under line(s)/device(s)

## 2025-03-01 NOTE — PROGRESS NOTES
Internal Medicine Progress Note         Akiko Peng is a 69 y.o. female on day 2 of admission presenting with Cellulitis of right foot.    SUBJECTIVE    Patient seen and evaluated resting comfortably in bed this morning. Discussed PT/OT evaluations and recommendation for SNF at discharge, patient declines and prefers home with [facility].    OBJECTIVE    Vitals:    02/28/25 1931 02/28/25 2355 03/01/25 0317 03/01/25 0759   BP: 129/58 123/58 135/63 135/60   BP Location:       Patient Position:       Pulse: 75 75 70 58   Resp:    18   Temp: 36.6 °C (97.9 °F) 36.6 °C (97.9 °F)  35.9 °C (96.6 °F)   TempSrc: Temporal      SpO2: 95% 98% 95% 96%   Weight:       Height:          Results from last 7 days   Lab Units 02/28/25  0555 02/27/25  0424   WBC AUTO x10*3/uL 6.0 5.1   HEMOGLOBIN g/dL 13.9 14.0   HEMATOCRIT % 41.2 41.8   PLATELETS AUTO x10*3/uL 228 215   NEUTROS PCT AUTO %  --  56.4   LYMPHS PCT AUTO %  --  25.6   MONOS PCT AUTO %  --  13.0   EOS PCT AUTO %  --  3.2     Results from last 7 days   Lab Units 02/28/25  0555   SODIUM mmol/L 142   POTASSIUM mmol/L 3.7   CHLORIDE mmol/L 109*   CO2 mmol/L 27   BUN mg/dL 9   CREATININE mg/dL 0.57   CALCIUM mg/dL 9.1   GLUCOSE mg/dL 90       Scheduled Medications  atorvastatin, 40 mg, oral, Daily  ceFAZolin, 1 g, intravenous, q8h  enoxaparin, 40 mg, subcutaneous, q24h  levETIRAcetam, 500 mg, oral, BID  polyethylene glycol, 17 g, oral, Daily         Physical Exam  Constitutional: Well developed, A&Ox3, no acute distress, alert and cooperative  Eyes: EOMI, clear sclera  Respiratory/Thorax: CTAB, good chest expansion  Cardiovascular: Regular rate, regular rhythm  Gastrointestinal: Nondistended, soft, non-tender  Extremities: Improved right foot erythema and edema.  Peripheral pulses intact  Skin: Warm and dry           ASSESSMENT & PLAN    Akiko Peng is a 69 year old female with PMH significant for MS, RA, and seizure  disorder who presented to [facility] for chief complaint of bilateral foot pain and burning and admitted for RLE cellulitis.     Daily Progress  -KING. VSS. Discussed PT/OT scores and recommendation for SNF with patient, she declines and [facility]. Discharge order and HHC order were placed.     #Right foot cellulitis  #UTI  -Patient presented with right foot pain, erythema, and swelling. Labs were unremarkable. CTA with runoff without occlusion  -UA + for WBC and LE. Urine cx pending  PLAN:  -Ancef while inpatient, discharge home with Augmentin    Chronic Conditions  #Seizures- continue home leveitracetam     DVT ppx: LMWH  GI ppx: none  IVF: none  Diet: Regular  Consults: none  CODE STATUS: Full code    Eddi Ortiz, DO   Please SecureChat for any further questions  This is a preliminary note, please await attending attestation for final A/P

## 2025-03-01 NOTE — PROGRESS NOTES
MSW contacted by DO reporting the pt is refusing SNF and wants to dispo home with hhc. MSW met with the pt at bedside and she verbalized choicing [facility] for HHC. MSW completed referral via [employer]. MSW to outreach the pt's daughter, Starr [phone]. Pt reports Starr is rea to transport the pt home.      12:29 pm Starr returns phone call to this MSW, reporting she is currently at work but will reach out to the pt's spouse to verify transport home.     12:57 pm [facility] reporting they would like an ambulate arranged for the pt to be transported home. MSW to coordinate with unit secretary on this after after hhc has been confirmed.     1:00pm Via [employer], [facility] is unable to accept the pt at this time. MSW to follow up with CARTER MEREDITH.     2:16 pm [facility] willing to accept the pt with a tart date of 3/6. MSW to task the unit secretary to arrange transport home.     2:59pm The pt to be transported home by ambulance at 5:45pm.    3:01 pm MSW placed phone call to the pt's dtr Starr to make her aware of the  time and start of services for hhc. No answer. MSW left voicemail. MSW to also make call to the pt's spouse. MSW placed phone call to the spouse in effort to shares the above information. No answer. MSW left voicemail message.

## 2025-04-26 ENCOUNTER — APPOINTMENT (OUTPATIENT)
Dept: CARDIOLOGY | Facility: HOSPITAL | Age: 70
DRG: 194 | End: 2025-04-26
Payer: MEDICARE

## 2025-04-26 ENCOUNTER — APPOINTMENT (OUTPATIENT)
Dept: RADIOLOGY | Facility: HOSPITAL | Age: 70
DRG: 194 | End: 2025-04-26
Payer: MEDICARE

## 2025-04-26 ENCOUNTER — HOSPITAL ENCOUNTER (INPATIENT)
Facility: HOSPITAL | Age: 70
LOS: 1 days | Discharge: HOME HEALTH | DRG: 194 | End: 2025-04-28
Attending: STUDENT IN AN ORGANIZED HEALTH CARE EDUCATION/TRAINING PROGRAM | Admitting: INTERNAL MEDICINE
Payer: MEDICARE

## 2025-04-26 DIAGNOSIS — G35 MULTIPLE SCLEROSIS (MULTI): ICD-10-CM

## 2025-04-26 DIAGNOSIS — R56.9 SEIZURE (MULTI): ICD-10-CM

## 2025-04-26 DIAGNOSIS — J18.9 PNEUMONIA OF LEFT LUNG DUE TO INFECTIOUS ORGANISM, UNSPECIFIED PART OF LUNG: ICD-10-CM

## 2025-04-26 DIAGNOSIS — J18.9 PNEUMONIA OF LEFT LOWER LOBE DUE TO INFECTIOUS ORGANISM: ICD-10-CM

## 2025-04-26 DIAGNOSIS — R10.12 LEFT UPPER QUADRANT ABDOMINAL PAIN: Primary | ICD-10-CM

## 2025-04-26 DIAGNOSIS — G45.9 TIA (TRANSIENT ISCHEMIC ATTACK): ICD-10-CM

## 2025-04-26 LAB
ALBUMIN SERPL BCP-MCNC: 4 G/DL (ref 3.4–5)
ALP SERPL-CCNC: 101 U/L (ref 33–136)
ALT SERPL W P-5'-P-CCNC: 4 U/L (ref 7–45)
ANION GAP SERPL CALC-SCNC: 12 MMOL/L (ref 10–20)
APPEARANCE UR: CLEAR
AST SERPL W P-5'-P-CCNC: 16 U/L (ref 9–39)
BASOPHILS # BLD AUTO: 0.06 X10*3/UL (ref 0–0.1)
BASOPHILS NFR BLD AUTO: 0.7 %
BILIRUB SERPL-MCNC: 0.6 MG/DL (ref 0–1.2)
BILIRUB UR STRIP.AUTO-MCNC: NEGATIVE MG/DL
BUN SERPL-MCNC: 14 MG/DL (ref 6–23)
CALCIUM SERPL-MCNC: 8.7 MG/DL (ref 8.6–10.3)
CARDIAC TROPONIN I PNL SERPL HS: 3 NG/L (ref 0–13)
CARDIAC TROPONIN I PNL SERPL HS: <3 NG/L (ref 0–13)
CHLORIDE SERPL-SCNC: 106 MMOL/L (ref 98–107)
CO2 SERPL-SCNC: 24 MMOL/L (ref 21–32)
COLOR UR: ABNORMAL
CREAT SERPL-MCNC: 0.55 MG/DL (ref 0.5–1.05)
EGFRCR SERPLBLD CKD-EPI 2021: >90 ML/MIN/1.73M*2
EOSINOPHIL # BLD AUTO: 0.15 X10*3/UL (ref 0–0.7)
EOSINOPHIL NFR BLD AUTO: 1.9 %
ERYTHROCYTE [DISTWIDTH] IN BLOOD BY AUTOMATED COUNT: 12.1 % (ref 11.5–14.5)
GLUCOSE SERPL-MCNC: 88 MG/DL (ref 74–99)
GLUCOSE UR STRIP.AUTO-MCNC: NORMAL MG/DL
HCT VFR BLD AUTO: 42.5 % (ref 36–46)
HGB BLD-MCNC: 13.4 G/DL (ref 12–16)
HOLD SPECIMEN: NORMAL
HOLD SPECIMEN: NORMAL
IMM GRANULOCYTES # BLD AUTO: 0.04 X10*3/UL (ref 0–0.7)
IMM GRANULOCYTES NFR BLD AUTO: 0.5 % (ref 0–0.9)
KETONES UR STRIP.AUTO-MCNC: NEGATIVE MG/DL
LEUKOCYTE ESTERASE UR QL STRIP.AUTO: NEGATIVE
LIPASE SERPL-CCNC: 41 U/L (ref 9–82)
LYMPHOCYTES # BLD AUTO: 1.37 X10*3/UL (ref 1.2–4.8)
LYMPHOCYTES NFR BLD AUTO: 16.9 %
MCH RBC QN AUTO: 30.8 PG (ref 26–34)
MCHC RBC AUTO-ENTMCNC: 31.5 G/DL (ref 32–36)
MCV RBC AUTO: 98 FL (ref 80–100)
MONOCYTES # BLD AUTO: 1.01 X10*3/UL (ref 0.1–1)
MONOCYTES NFR BLD AUTO: 12.5 %
NEUTROPHILS # BLD AUTO: 5.47 X10*3/UL (ref 1.2–7.7)
NEUTROPHILS NFR BLD AUTO: 67.5 %
NITRITE UR QL STRIP.AUTO: NEGATIVE
NRBC BLD-RTO: 0 /100 WBCS (ref 0–0)
PH UR STRIP.AUTO: 7.5 [PH]
PLATELET # BLD AUTO: 194 X10*3/UL (ref 150–450)
POTASSIUM SERPL-SCNC: 4.6 MMOL/L (ref 3.5–5.3)
PROT SERPL-MCNC: 6.5 G/DL (ref 6.4–8.2)
PROT UR STRIP.AUTO-MCNC: ABNORMAL MG/DL
RBC # BLD AUTO: 4.35 X10*6/UL (ref 4–5.2)
RBC # UR STRIP.AUTO: NEGATIVE MG/DL
RBC #/AREA URNS AUTO: NORMAL /HPF
SODIUM SERPL-SCNC: 137 MMOL/L (ref 136–145)
SP GR UR STRIP.AUTO: >1.05
SQUAMOUS #/AREA URNS AUTO: NORMAL /HPF
UROBILINOGEN UR STRIP.AUTO-MCNC: NORMAL MG/DL
WBC # BLD AUTO: 8.1 X10*3/UL (ref 4.4–11.3)
WBC #/AREA URNS AUTO: NORMAL /HPF

## 2025-04-26 PROCEDURE — G0378 HOSPITAL OBSERVATION PER HR: HCPCS

## 2025-04-26 PROCEDURE — 84075 ASSAY ALKALINE PHOSPHATASE: CPT | Performed by: STUDENT IN AN ORGANIZED HEALTH CARE EDUCATION/TRAINING PROGRAM

## 2025-04-26 PROCEDURE — 83690 ASSAY OF LIPASE: CPT | Performed by: STUDENT IN AN ORGANIZED HEALTH CARE EDUCATION/TRAINING PROGRAM

## 2025-04-26 PROCEDURE — 81003 URINALYSIS AUTO W/O SCOPE: CPT | Performed by: STUDENT IN AN ORGANIZED HEALTH CARE EDUCATION/TRAINING PROGRAM

## 2025-04-26 PROCEDURE — 86738 MYCOPLASMA ANTIBODY: CPT

## 2025-04-26 PROCEDURE — 84484 ASSAY OF TROPONIN QUANT: CPT | Performed by: STUDENT IN AN ORGANIZED HEALTH CARE EDUCATION/TRAINING PROGRAM

## 2025-04-26 PROCEDURE — 99285 EMERGENCY DEPT VISIT HI MDM: CPT | Mod: 25 | Performed by: STUDENT IN AN ORGANIZED HEALTH CARE EDUCATION/TRAINING PROGRAM

## 2025-04-26 PROCEDURE — 74177 CT ABD & PELVIS W/CONTRAST: CPT | Performed by: RADIOLOGY

## 2025-04-26 PROCEDURE — 74177 CT ABD & PELVIS W/CONTRAST: CPT

## 2025-04-26 PROCEDURE — 96367 TX/PROPH/DG ADDL SEQ IV INF: CPT

## 2025-04-26 PROCEDURE — 87075 CULTR BACTERIA EXCEPT BLOOD: CPT | Mod: PARLAB | Performed by: EMERGENCY MEDICINE

## 2025-04-26 PROCEDURE — 2500000004 HC RX 250 GENERAL PHARMACY W/ HCPCS (ALT 636 FOR OP/ED): Mod: JZ | Performed by: EMERGENCY MEDICINE

## 2025-04-26 PROCEDURE — 71045 X-RAY EXAM CHEST 1 VIEW: CPT

## 2025-04-26 PROCEDURE — 2550000001 HC RX 255 CONTRASTS: Performed by: STUDENT IN AN ORGANIZED HEALTH CARE EDUCATION/TRAINING PROGRAM

## 2025-04-26 PROCEDURE — 2500000004 HC RX 250 GENERAL PHARMACY W/ HCPCS (ALT 636 FOR OP/ED): Mod: JZ

## 2025-04-26 PROCEDURE — 87899 AGENT NOS ASSAY W/OPTIC: CPT | Mod: PARLAB

## 2025-04-26 PROCEDURE — 96372 THER/PROPH/DIAG INJ SC/IM: CPT

## 2025-04-26 PROCEDURE — 96365 THER/PROPH/DIAG IV INF INIT: CPT | Mod: 59

## 2025-04-26 PROCEDURE — 87449 NOS EACH ORGANISM AG IA: CPT | Mod: PARLAB

## 2025-04-26 PROCEDURE — 2500000004 HC RX 250 GENERAL PHARMACY W/ HCPCS (ALT 636 FOR OP/ED): Mod: JZ | Performed by: STUDENT IN AN ORGANIZED HEALTH CARE EDUCATION/TRAINING PROGRAM

## 2025-04-26 PROCEDURE — 36415 COLL VENOUS BLD VENIPUNCTURE: CPT | Performed by: STUDENT IN AN ORGANIZED HEALTH CARE EDUCATION/TRAINING PROGRAM

## 2025-04-26 PROCEDURE — 96375 TX/PRO/DX INJ NEW DRUG ADDON: CPT

## 2025-04-26 PROCEDURE — 71045 X-RAY EXAM CHEST 1 VIEW: CPT | Performed by: RADIOLOGY

## 2025-04-26 PROCEDURE — 85025 COMPLETE CBC W/AUTO DIFF WBC: CPT | Performed by: STUDENT IN AN ORGANIZED HEALTH CARE EDUCATION/TRAINING PROGRAM

## 2025-04-26 PROCEDURE — 2500000001 HC RX 250 WO HCPCS SELF ADMINISTERED DRUGS (ALT 637 FOR MEDICARE OP)

## 2025-04-26 PROCEDURE — 87040 BLOOD CULTURE FOR BACTERIA: CPT | Mod: PARLAB | Performed by: EMERGENCY MEDICINE

## 2025-04-26 PROCEDURE — 93005 ELECTROCARDIOGRAM TRACING: CPT

## 2025-04-26 RX ORDER — ENOXAPARIN SODIUM 100 MG/ML
40 INJECTION SUBCUTANEOUS EVERY 24 HOURS
Status: DISCONTINUED | OUTPATIENT
Start: 2025-04-26 | End: 2025-04-29 | Stop reason: HOSPADM

## 2025-04-26 RX ORDER — GUAIFENESIN 600 MG/1
600 TABLET, EXTENDED RELEASE ORAL 2 TIMES DAILY PRN
Status: DISCONTINUED | OUTPATIENT
Start: 2025-04-26 | End: 2025-04-29 | Stop reason: HOSPADM

## 2025-04-26 RX ORDER — POLYETHYLENE GLYCOL 3350 17 G/17G
17 POWDER, FOR SOLUTION ORAL DAILY
Status: DISCONTINUED | OUTPATIENT
Start: 2025-04-26 | End: 2025-04-29 | Stop reason: HOSPADM

## 2025-04-26 RX ORDER — ATORVASTATIN CALCIUM 40 MG/1
40 TABLET, FILM COATED ORAL DAILY
Status: DISCONTINUED | OUTPATIENT
Start: 2025-04-26 | End: 2025-04-29 | Stop reason: HOSPADM

## 2025-04-26 RX ORDER — MORPHINE SULFATE 4 MG/ML
4 INJECTION, SOLUTION INTRAMUSCULAR; INTRAVENOUS ONCE
Status: COMPLETED | OUTPATIENT
Start: 2025-04-26 | End: 2025-04-26

## 2025-04-26 RX ORDER — CEFTRIAXONE 1 G/50ML
1 INJECTION, SOLUTION INTRAVENOUS EVERY 24 HOURS
Status: DISCONTINUED | OUTPATIENT
Start: 2025-04-27 | End: 2025-04-29 | Stop reason: HOSPADM

## 2025-04-26 RX ORDER — ONDANSETRON HYDROCHLORIDE 2 MG/ML
4 INJECTION, SOLUTION INTRAVENOUS EVERY 8 HOURS PRN
Status: DISCONTINUED | OUTPATIENT
Start: 2025-04-26 | End: 2025-04-29 | Stop reason: HOSPADM

## 2025-04-26 RX ORDER — ASPIRIN 325 MG
325 TABLET ORAL DAILY
Status: DISCONTINUED | OUTPATIENT
Start: 2025-04-26 | End: 2025-04-26

## 2025-04-26 RX ORDER — CEFTRIAXONE 2 G/50ML
2 INJECTION, SOLUTION INTRAVENOUS ONCE
Status: COMPLETED | OUTPATIENT
Start: 2025-04-26 | End: 2025-04-26

## 2025-04-26 RX ORDER — LEVETIRACETAM 500 MG/1
750 TABLET ORAL 2 TIMES DAILY
Status: DISCONTINUED | OUTPATIENT
Start: 2025-04-26 | End: 2025-04-29 | Stop reason: HOSPADM

## 2025-04-26 RX ORDER — IPRATROPIUM BROMIDE AND ALBUTEROL SULFATE 2.5; .5 MG/3ML; MG/3ML
3 SOLUTION RESPIRATORY (INHALATION) EVERY 6 HOURS PRN
Status: DISCONTINUED | OUTPATIENT
Start: 2025-04-26 | End: 2025-04-29 | Stop reason: HOSPADM

## 2025-04-26 RX ORDER — TALC
6 POWDER (GRAM) TOPICAL NIGHTLY PRN
Status: DISCONTINUED | OUTPATIENT
Start: 2025-04-26 | End: 2025-04-29 | Stop reason: HOSPADM

## 2025-04-26 RX ORDER — ACETAMINOPHEN 325 MG/1
650 TABLET ORAL EVERY 4 HOURS PRN
Status: DISCONTINUED | OUTPATIENT
Start: 2025-04-26 | End: 2025-04-29 | Stop reason: HOSPADM

## 2025-04-26 RX ORDER — ONDANSETRON HYDROCHLORIDE 2 MG/ML
4 INJECTION, SOLUTION INTRAVENOUS ONCE
Status: COMPLETED | OUTPATIENT
Start: 2025-04-26 | End: 2025-04-26

## 2025-04-26 RX ORDER — DOXYCYCLINE 100 MG/1
100 TABLET ORAL 2 TIMES DAILY
Qty: 20 TABLET | Refills: 0 | Status: SHIPPED | OUTPATIENT
Start: 2025-04-26 | End: 2025-04-28 | Stop reason: HOSPADM

## 2025-04-26 RX ADMIN — ENOXAPARIN SODIUM 40 MG: 40 INJECTION SUBCUTANEOUS at 17:06

## 2025-04-26 RX ADMIN — MORPHINE SULFATE 4 MG: 4 INJECTION, SOLUTION INTRAMUSCULAR; INTRAVENOUS at 05:52

## 2025-04-26 RX ADMIN — LEVETIRACETAM 750 MG: 500 TABLET, FILM COATED ORAL at 20:00

## 2025-04-26 RX ADMIN — AZITHROMYCIN MONOHYDRATE 500 MG: 500 INJECTION, POWDER, LYOPHILIZED, FOR SOLUTION INTRAVENOUS at 10:16

## 2025-04-26 RX ADMIN — IOHEXOL 75 ML: 350 INJECTION, SOLUTION INTRAVENOUS at 07:03

## 2025-04-26 RX ADMIN — ONDANSETRON 4 MG: 2 INJECTION INTRAMUSCULAR; INTRAVENOUS at 05:52

## 2025-04-26 RX ADMIN — CEFTRIAXONE SODIUM 2 G: 2 INJECTION, SOLUTION INTRAVENOUS at 09:07

## 2025-04-26 SDOH — HEALTH STABILITY: PHYSICAL HEALTH: ON AVERAGE, HOW MANY DAYS PER WEEK DO YOU ENGAGE IN MODERATE TO STRENUOUS EXERCISE (LIKE A BRISK WALK)?: 0 DAYS

## 2025-04-26 SDOH — ECONOMIC STABILITY: INCOME INSECURITY: IN THE PAST 12 MONTHS HAS THE ELECTRIC, GAS, OIL, OR WATER COMPANY THREATENED TO SHUT OFF SERVICES IN YOUR HOME?: NO

## 2025-04-26 SDOH — SOCIAL STABILITY: SOCIAL INSECURITY: HAS ANYONE EVER THREATENED TO HURT YOUR FAMILY OR YOUR PETS?: NO

## 2025-04-26 SDOH — SOCIAL STABILITY: SOCIAL INSECURITY: WITHIN THE LAST YEAR, HAVE YOU BEEN HUMILIATED OR EMOTIONALLY ABUSED IN OTHER WAYS BY YOUR PARTNER OR EX-PARTNER?: NO

## 2025-04-26 SDOH — SOCIAL STABILITY: SOCIAL INSECURITY: WITHIN THE LAST YEAR, HAVE YOU BEEN AFRAID OF YOUR PARTNER OR EX-PARTNER?: NO

## 2025-04-26 SDOH — SOCIAL STABILITY: SOCIAL INSECURITY: HAVE YOU HAD THOUGHTS OF HARMING ANYONE ELSE?: NO

## 2025-04-26 SDOH — ECONOMIC STABILITY: HOUSING INSECURITY: IN THE LAST 12 MONTHS, WAS THERE A TIME WHEN YOU WERE NOT ABLE TO PAY THE MORTGAGE OR RENT ON TIME?: NO

## 2025-04-26 SDOH — ECONOMIC STABILITY: FOOD INSECURITY: HOW HARD IS IT FOR YOU TO PAY FOR THE VERY BASICS LIKE FOOD, HOUSING, MEDICAL CARE, AND HEATING?: NOT VERY HARD

## 2025-04-26 SDOH — SOCIAL STABILITY: SOCIAL INSECURITY: WERE YOU ABLE TO COMPLETE ALL THE BEHAVIORAL HEALTH SCREENINGS?: YES

## 2025-04-26 SDOH — ECONOMIC STABILITY: FOOD INSECURITY: WITHIN THE PAST 12 MONTHS, YOU WORRIED THAT YOUR FOOD WOULD RUN OUT BEFORE YOU GOT THE MONEY TO BUY MORE.: NEVER TRUE

## 2025-04-26 SDOH — SOCIAL STABILITY: SOCIAL INSECURITY: ARE THERE ANY APPARENT SIGNS OF INJURIES/BEHAVIORS THAT COULD BE RELATED TO ABUSE/NEGLECT?: NO

## 2025-04-26 SDOH — SOCIAL STABILITY: SOCIAL INSECURITY: DO YOU FEEL ANYONE HAS EXPLOITED OR TAKEN ADVANTAGE OF YOU FINANCIALLY OR OF YOUR PERSONAL PROPERTY?: NO

## 2025-04-26 SDOH — ECONOMIC STABILITY: HOUSING INSECURITY: IN THE PAST 12 MONTHS, HOW MANY TIMES HAVE YOU MOVED WHERE YOU WERE LIVING?: 1

## 2025-04-26 SDOH — SOCIAL STABILITY: SOCIAL INSECURITY: DO YOU FEEL UNSAFE GOING BACK TO THE PLACE WHERE YOU ARE LIVING?: NO

## 2025-04-26 SDOH — ECONOMIC STABILITY: FOOD INSECURITY: WITHIN THE PAST 12 MONTHS, THE FOOD YOU BOUGHT JUST DIDN'T LAST AND YOU DIDN'T HAVE MONEY TO GET MORE.: NEVER TRUE

## 2025-04-26 SDOH — HEALTH STABILITY: PHYSICAL HEALTH: ON AVERAGE, HOW MANY MINUTES DO YOU ENGAGE IN EXERCISE AT THIS LEVEL?: 0 MIN

## 2025-04-26 SDOH — SOCIAL STABILITY: SOCIAL INSECURITY: DOES ANYONE TRY TO KEEP YOU FROM HAVING/CONTACTING OTHER FRIENDS OR DOING THINGS OUTSIDE YOUR HOME?: NO

## 2025-04-26 SDOH — SOCIAL STABILITY: SOCIAL INSECURITY: ABUSE: ADULT

## 2025-04-26 SDOH — ECONOMIC STABILITY: TRANSPORTATION INSECURITY: IN THE PAST 12 MONTHS, HAS LACK OF TRANSPORTATION KEPT YOU FROM MEDICAL APPOINTMENTS OR FROM GETTING MEDICATIONS?: NO

## 2025-04-26 SDOH — SOCIAL STABILITY: SOCIAL INSECURITY: HAVE YOU HAD ANY THOUGHTS OF HARMING ANYONE ELSE?: NO

## 2025-04-26 SDOH — SOCIAL STABILITY: SOCIAL INSECURITY: ARE YOU OR HAVE YOU BEEN THREATENED OR ABUSED PHYSICALLY, EMOTIONALLY, OR SEXUALLY BY ANYONE?: NO

## 2025-04-26 ASSESSMENT — COGNITIVE AND FUNCTIONAL STATUS - GENERAL
DRESSING REGULAR LOWER BODY CLOTHING: A LITTLE
MOVING TO AND FROM BED TO CHAIR: A LITTLE
DAILY ACTIVITIY SCORE: 20
DRESSING REGULAR UPPER BODY CLOTHING: A LITTLE
MOBILITY SCORE: 17
CLIMB 3 TO 5 STEPS WITH RAILING: A LOT
HELP NEEDED FOR BATHING: A LITTLE
WALKING IN HOSPITAL ROOM: A LOT
TOILETING: A LITTLE
DAILY ACTIVITIY SCORE: 20
MOBILITY SCORE: 17
TURNING FROM BACK TO SIDE WHILE IN FLAT BAD: A LITTLE
MOVING TO AND FROM BED TO CHAIR: A LITTLE
DRESSING REGULAR LOWER BODY CLOTHING: A LITTLE
STANDING UP FROM CHAIR USING ARMS: A LITTLE
STANDING UP FROM CHAIR USING ARMS: A LITTLE
TURNING FROM BACK TO SIDE WHILE IN FLAT BAD: A LITTLE
CLIMB 3 TO 5 STEPS WITH RAILING: A LOT
HELP NEEDED FOR BATHING: A LITTLE
TOILETING: A LITTLE
WALKING IN HOSPITAL ROOM: A LOT
DRESSING REGULAR UPPER BODY CLOTHING: A LITTLE
PATIENT BASELINE BEDBOUND: NO

## 2025-04-26 ASSESSMENT — ACTIVITIES OF DAILY LIVING (ADL)
JUDGMENT_ADEQUATE_SAFELY_COMPLETE_DAILY_ACTIVITIES: YES
HEARING - LEFT EAR: FUNCTIONAL
GROOMING: INDEPENDENT
FEEDING YOURSELF: INDEPENDENT
HEARING - RIGHT EAR: FUNCTIONAL
PATIENT'S MEMORY ADEQUATE TO SAFELY COMPLETE DAILY ACTIVITIES?: YES
TOILETING: INDEPENDENT
HEARING - RIGHT EAR: FUNCTIONAL
BATHING: INDEPENDENT
HEARING - LEFT EAR: FUNCTIONAL
ADEQUATE_TO_COMPLETE_ADL: YES
ADEQUATE_TO_COMPLETE_ADL: YES
WALKS IN HOME: INDEPENDENT
TOILETING: INDEPENDENT
LACK_OF_TRANSPORTATION: NO
DRESSING YOURSELF: INDEPENDENT
JUDGMENT_ADEQUATE_SAFELY_COMPLETE_DAILY_ACTIVITIES: YES
LACK_OF_TRANSPORTATION: NO
DRESSING YOURSELF: INDEPENDENT
BATHING: INDEPENDENT
GROOMING: INDEPENDENT
WALKS IN HOME: INDEPENDENT
FEEDING YOURSELF: INDEPENDENT
PATIENT'S MEMORY ADEQUATE TO SAFELY COMPLETE DAILY ACTIVITIES?: YES

## 2025-04-26 ASSESSMENT — PAIN - FUNCTIONAL ASSESSMENT
PAIN_FUNCTIONAL_ASSESSMENT: 0-10

## 2025-04-26 ASSESSMENT — LIFESTYLE VARIABLES
SKIP TO QUESTIONS 9-10: 1
HOW OFTEN DO YOU HAVE 6 OR MORE DRINKS ON ONE OCCASION: NEVER
HOW OFTEN DO YOU HAVE A DRINK CONTAINING ALCOHOL: NEVER
HOW MANY STANDARD DRINKS CONTAINING ALCOHOL DO YOU HAVE ON A TYPICAL DAY: PATIENT DOES NOT DRINK
AUDIT-C TOTAL SCORE: 0
AUDIT-C TOTAL SCORE: 0

## 2025-04-26 ASSESSMENT — PAIN SCALES - GENERAL
PAINLEVEL_OUTOF10: 0 - NO PAIN
PAINLEVEL_OUTOF10: 0 - NO PAIN
PAINLEVEL_OUTOF10: 1
PAINLEVEL_OUTOF10: 0 - NO PAIN

## 2025-04-26 NOTE — ED PROVIDER NOTES
HPI   Chief Complaint   Patient presents with    Abdominal Pain       69-year-old female with past medical history of MS comes into the emergency department via EMS for left upper abdominal pain that started less than an hour ago.  Patient states that the pain feels like if someone punched her.  She denies any nausea or vomiting, chest pain, shortness of breath, cough, congestion, dysuria, hematuria, diarrhea or any other concerns complaints.              Patient History   Medical History[1]  Surgical History[2]  Family History[3]  Social History[4]    Physical Exam   ED Triage Vitals [04/26/25 0519]   Temperature Heart Rate Respirations BP   36.5 °C (97.7 °F) 73 16 132/83      Pulse Ox Temp Source Heart Rate Source Patient Position   98 % Temporal Monitor Lying      BP Location FiO2 (%)     Left arm --       Physical Exam  Vitals and nursing note reviewed.   Constitutional:       General: She is not in acute distress.     Appearance: Normal appearance. She is well-developed. She is not toxic-appearing.   HENT:      Head: Normocephalic and atraumatic.      Nose: Nose normal.      Mouth/Throat:      Mouth: Mucous membranes are moist.   Eyes:      Extraocular Movements: Extraocular movements intact.      Conjunctiva/sclera: Conjunctivae normal.   Cardiovascular:      Rate and Rhythm: Normal rate and regular rhythm.      Pulses: Normal pulses.      Heart sounds: No murmur heard.  Pulmonary:      Effort: Pulmonary effort is normal. No respiratory distress.      Breath sounds: Normal breath sounds.   Abdominal:      General: Abdomen is flat. Bowel sounds are normal.      Palpations: Abdomen is soft.      Tenderness: There is abdominal tenderness in the left upper quadrant. There is no right CVA tenderness, left CVA tenderness, guarding or rebound.   Musculoskeletal:         General: No swelling or deformity. Normal range of motion.      Cervical back: Normal range of motion and neck supple. No rigidity.   Skin:      General: Skin is warm and dry.      Capillary Refill: Capillary refill takes less than 2 seconds.   Neurological:      Mental Status: She is alert and oriented to person, place, and time. Mental status is at baseline.      Comments: At baseline   Psychiatric:         Mood and Affect: Mood normal.       Labs Reviewed   COMPREHENSIVE METABOLIC PANEL - Abnormal       Result Value    Glucose 88      Sodium 137      Potassium 4.6      Chloride 106      Bicarbonate 24      Anion Gap 12      Urea Nitrogen 14      Creatinine 0.55      eGFR >90      Calcium 8.7      Albumin 4.0      Alkaline Phosphatase 101      Total Protein 6.5      AST 16      Bilirubin, Total 0.6      ALT 4 (*)    CBC WITH AUTO DIFFERENTIAL - Abnormal    WBC 8.1      nRBC 0.0      RBC 4.35      Hemoglobin 13.4      Hematocrit 42.5      MCV 98      MCH 30.8      MCHC 31.5 (*)     RDW 12.1      Platelets 194      Neutrophils % 67.5      Immature Granulocytes %, Automated 0.5      Lymphocytes % 16.9      Monocytes % 12.5      Eosinophils % 1.9      Basophils % 0.7      Neutrophils Absolute 5.47      Immature Granulocytes Absolute, Automated 0.04      Lymphocytes Absolute 1.37      Monocytes Absolute 1.01 (*)     Eosinophils Absolute 0.15      Basophils Absolute 0.06     LIPASE - Normal    Lipase 41      Narrative:     Venipuncture immediately after or during the administration of Metamizole may lead to falsely low results. Testing should be performed immediately prior to Metamizole dosing.   TROPONIN SERIES- (INITIAL, 1 HR)    Narrative:     The following orders were created for panel order Troponin I Series, High Sensitivity (0, 1 HR).  Procedure                               Abnormality         Status                     ---------                               -----------         ------                     Troponin I, High Sensiti...[[id number]]                      In process                 Troponin, High Sensitivi...[[id number]]                                                    Please view results for these tests on the individual orders.   URINALYSIS WITH REFLEX CULTURE AND MICROSCOPIC    Narrative:     The following orders were created for panel order Urinalysis with Reflex Culture and Microscopic.  Procedure                               Abnormality         Status                     ---------                               -----------         ------                     Urinalysis with Reflex C...[[id number]]                                                 Extra Urine Gray Tube[[id number]]                                                         Please view results for these tests on the individual orders.   SERIAL TROPONIN-INITIAL   URINALYSIS WITH REFLEX CULTURE AND MICROSCOPIC   EXTRA URINE GRAY TUBE   SERIAL TROPONIN, 1 HOUR        CT abdomen pelvis w IV contrast    (Results Pending)   XR chest 1 view    (Results Pending)        Medications   ondansetron (Zofran) injection 4 mg (4 mg intravenous Given 4/26/25 0552)   morphine injection 4 mg (4 mg intravenous Given 4/26/25 0552)        ED Course & MDM   ED Course as of 04/26/25 0608   Sat Apr 26, 2025   0542 EKG completed.  Ventricular rate of 65.  Normal sinus rhythm.  No QRS widening.  Normal LA interval.  No STEMI [AM]      ED Course User Index  [AM] Kaity Bentíez MD         Diagnoses as of 04/26/25 0608   Left upper quadrant abdominal pain      7:00 AM E care will be transitioned to the other ED physician. Pending results and reevaluation;.          No data recorded     [place] Coma Scale Score: 15 (04/26/25 0559 : Chayo Sanders, JASWINDER)                           Medical Decision Making      Procedure  Procedures         [1]   Past Medical History:  Diagnosis Date    Multiple sclerosis (Multi)     History of multiple sclerosis   [2]   Past Surgical History:  Procedure Laterality Date    MR HEAD ANGIO WO IV CONTRAST  9/21/2020    MR HEAD ANGIO WO IV CONTRAST 9/21/2020 PAR [facility]ERGENCY LEGACY    MR HEAD ANGIO WO IV  CONTRAST  10/24/2022    MR HEAD ANGIO WO IV CONTRAST 10/24/2022 DOCTOR OFFICE LEGAC[facility]    MR NECK ANGIO WO IV CONTRAST  9/21/2020    MR NECK ANGIO WO IV CONTRAST 9/21/2020 PAR [facility]GENCY LEGACY    MR NECK ANGIO WO IV CONTRAST  10/24/2022    MR NECK ANGIO WO IV CONTRAST 10/24/2022 DOCTOR OFFICE LEGAC[facility]    OTHER SURGICAL HISTORY  11/26/2019    Tonsillectomy    OTHER SURGICAL HISTORY  11/26/2019    Hysterectomy   [3] No family history on file.  [4]   Social History  Tobacco Use    Smoking status: Never    Smokeless tobacco: Never   Vaping Use    Vaping status: Never Used   Substance Use Topics    Alcohol use: Never    Drug use: Never        Kaity Benítez MD  04/26/25 0608

## 2025-04-26 NOTE — PROGRESS NOTES
Emergency Medicine Transition of Care Note.    I received Akiko Peng in signout from Dr. Benítez.  Please see the previous ED provider note for all HPI, PE and MDM up to the time of signout at 7 AM. This is in addition to the primary record.    In brief Akiko Peng is an 69 y.o. female presenting for   Chief Complaint   Patient presents with    Abdominal Pain     At the time of signout we were awaiting: Abdominal CAT scan result    ED Course as of 04/26/25 1047   Sat Apr 26, 2025   0542 EKG completed.  Ventricular rate of 65.  Normal sinus rhythm.  No QRS widening.  Normal NE interval.  No STEMI [AM]   1046 Patient CT abdomen pelvis that shows a left lower lobe infiltrate.  Patient was pancultured she was given intermittent Ceftin and azithromycin.  Will be discharged home with doxycycline for 10 days with an outpatient follow-up. [MT]      ED Course User Index  [AM] Kaity Benítez MD  [MT] Nathan London MD         Diagnoses as of 04/26/25 1047   Left upper quadrant abdominal pain       Medical Decision Making  Abdominal CT shows a left lobe infiltrate.  On my examination patient is comfortable soft abdomen nontender no rebound or guarding good bowel sounds.  Patient was pancultured she was given intravenous Rocephin and azithromycin, all repeat eval feeling remarkably better afebrile normotensive no tachycardia or hypoxia can be treated as an outpatient basis will be discharged home with doxycycline with close outpatient follow-up with her primary doctor with strict and precaution.    Final diagnoses:   [R10.12] Left upper quadrant abdominal pain           Procedure  Procedures    Nathan London MD

## 2025-04-26 NOTE — H&P
Subjective/History     Akiko Peng is a 69 y.o. female with a history of multiple sclerosis, rheumatoid arthritis, seizures who presented with left side pain. Pain reportedly occurred at around 5:30 AM this morning. Patient describes a constant ache on the middle of the left-side of her torso that lasted for about 15 minutes. Patient says pain was seemingly unprovoked and came about as she was preparing to get out of bed. Pain resolved without any intervention while at home. Patient was concerned because she had never felt this pain before prompting call for EMS. Denies fevers, chills, SOB, palpitations, chest pain, abdominal pain, urinary/bowel changes, trauma. Denies sick contacts. On my evaluation patient denied any abdominal pain. Patient says she felt fine yesterday.     ED Course:   In the ED vitals were stable. Labs were unremarkable. Troponin 3 (x2). EKG showed NSR. CXR showed no acute cardiopulmonary disease. CT A/P showed left lower lobe infiltrate and pleural effusion. Patient was started on Rocephin and Zithromax. Initially there were plans for discharge home with a course of Abx following ED work up but patient had reportedly said that she felt unsafe going home due to history of MS and there being no help at home. During my evaluation she said she felt comfortable taking care of herself at home.     Surgical history: As below  Family history: As below  Risk/Social factors: Patient says that she has never smoked. Says she has not had alcohol in years. No history of binge drinking. Denies illicit drug use.     Medical History[1]    Surgical History[2]    Family history: Father had an MI. Mom had ALS.    Objective   Physical Exam:  General:  Pleasant and cooperative. No apparent distress.  HEENT:  Normocephalic, atraumatic  Chest:  Clear to auscultation bilaterally. No wheezes, rales, or rhonchi.  CV:  Regular rate and rhythm. No murmurs    Abdomen: Abdomen is soft, non-tender, non-distended. BS +  "  Extremities:  1 + Bilateral lower extremity edema.   Neurological:  AAOx3. No focal deficits.  Skin:  Warm and dry.    Last Recorded Vitals  Blood pressure 124/60, pulse 66, temperature 36.5 °C (97.7 °F), temperature source Temporal, resp. rate 16, height 1.778 m (5' 10\"), weight 63.5 kg (140 lb), SpO2 95%.  Intake/Output last 3 Shifts:  No intake/output data recorded.    Last CBC & BMP  Lab Results   Component Value Date    GLUCOSE 88 04/26/2025    CALCIUM 8.7 04/26/2025     04/26/2025    K 4.6 04/26/2025    CO2 24 04/26/2025     04/26/2025    BUN 14 04/26/2025    CREATININE 0.55 04/26/2025     Lab Results   Component Value Date    WBC 8.1 04/26/2025    HGB 13.4 04/26/2025    HCT 42.5 04/26/2025    MCV 98 04/26/2025     04/26/2025         Assessment / Plan        Akiko Peng is a 69 y.o. female with a history of multiple sclerosis, rheumatoid arthritis, seizures who presented with left side pain.    Acute Medical conditions  # Community Acquired Pneumonia   - Patient comes in with left-sided upper abdominal pain that started early this morning at around 5 AM. Pain lasted for about 15 minutes.   - CT Abd/Pelvis 4/26 showed left lower lobe infiltrate and pleural effusion.   Plan:  - Continue IV Rocephin 1 g (started 4/26)  - Continue IV Zithromax 500 mg (started 4/26)     - PRN DuoNebs     - Ordered Pneumonia labs    Chronic Medical conditions  # Multiple sclerosis  # Seizures - Continue Keppra 750 mg daily   # HLD - Continue home Lipitor 40 mg daily.  # Rheumatoid arthritis     DVT: Lovenox   IVF: --  Diet: Regular  Code: DNR/DNI  Consults: --       Michael Langley, DO   PGY-1, Internal Medicine  This is a preliminary note, please await attending attestation for final A/P        [1]   Past Medical History:  Diagnosis Date    Multiple sclerosis (Multi)     History of multiple sclerosis   [2]   Past Surgical History:  Procedure Laterality Date    MR HEAD ANGIO WO IV CONTRAST  9/21/2020    MR HEAD ANGIO " WO IV CONTRAST 9/21/2020 PAR EMERGENCY LEGACY    MR HEAD ANGIO WO IV CONTRAST  10/24/2022    MR HEAD ANGIO WO IV CONTRAST 10/24/2022 DOCTOR OFFICE LEGACY    MR NECK ANGIO WO IV CONTRAST  9/21/2020    MR NECK ANGIO WO IV CONTRAST 9/21/2020 PAR EMERGENCY LEGACY    MR NECK ANGIO WO IV CONTRAST  10/24/2022    MR NECK ANGIO WO IV CONTRAST 10/24/2022 DOCTOR OFFICE LEGACY    OTHER SURGICAL HISTORY  11/26/2019    Tonsillectomy    OTHER SURGICAL HISTORY  11/26/2019    Hysterectomy

## 2025-04-26 NOTE — PROGRESS NOTES
Emergency Medicine Transition of Care Note.    I received Akiko Peng in signout from Dr. Beníetz.  Please see the previous ED provider note for all HPI, PE and MDM up to the time of signout at 7 AM. This is in addition to the primary record.    In brief Akiko Peng is an 69 y.o. female presenting for   Chief Complaint   Patient presents with    Abdominal Pain     At the time of signout we were awaiting: Disposition    ED Course as of 04/26/25 1404   Sat Apr 26, 2025   0542 EKG completed.  Ventricular rate of 65.  Normal sinus rhythm.  No QRS widening.  Normal WI interval.  No STEMI [AM]   1046 Patient CT abdomen pelvis that shows a left lower lobe infiltrate.  Patient was pancultured she was given intermittent Ceftin and azithromycin.  Will be discharged home with doxycycline for 10 days with an outpatient follow-up. [MT]      ED Course User Index  [AM] Kaity Benítez MD  [MT] Nathan London MD         Diagnoses as of 04/26/25 1404   Left upper quadrant abdominal pain   Pneumonia of left lower lobe due to infectious organism       Medical Decision Making  Initially patient was to be discharged home with doxycycline she received IV Rocephin and azithromycin ED but however patient feels unsafe going home due to the fact she has history of multiple sclerosis with no help at home at which point discharge was aborted and patient will be hospitalized for further care.    Final diagnoses:   [R10.12] Left upper quadrant abdominal pain   [J18.9] Pneumonia of left lower lobe due to infectious organism           Procedure  Procedures    Nathan London MD

## 2025-04-26 NOTE — PROGRESS NOTES
Pharmacy Medication History Review    Akiko Peng is a 69 y.o. female admitted for No Principal Problem: There is no principal problem currently on the Problem List. Please update the Problem List and refresh.. Pharmacy reviewed the patient's [id number] medications and allergies for accuracy.    The list below reflectives the updated PTA list. Please review each medication in order reconciliation for additional clarification and justification.  Prior to Admission medications    Medication Sig Start Date End Date Taking? Authorizing Provider   aspirin 325 mg tablet Take 1 tablet (325 mg) by mouth once daily as needed for mild pain (1 - 3).  Patient not taking: Reported on 4/26/2025    Historical Provider, MD   atorvastatin (Lipitor) 40 mg tablet Take 1 tablet (40 mg) by mouth once daily.  Patient not taking: Reported on 4/26/2025 10/11/24 2/27/25  Talha Thapa DPM   doxycycline (Adoxa) 100 mg tablet Take 1 tablet (100 mg) by mouth 2 times a day for 10 days. Take with a full glass of water and do not lie down for at least 30 minutes after 4/26/25 5/6/25  Nathan London MD   levETIRAcetam (Keppra) 500 mg tablet Take 1 tablet (500 mg) by mouth 2 times a day.  Patient not taking: Reported on 4/26/2025    Historical Provider, MD   levETIRAcetam (Keppra) 750 mg tablet Take 1 tablet (750 mg) by mouth 2 times a day.  Patient not taking: Reported on 2/27/2025 10/11/24 12/10/24  Talha Thapa DPM   multivitamin with minerals tablet Take 1 tablet by mouth once daily.  Patient not taking: Reported on 4/26/2025    Historical Provider, MD        The list below reflectives the updated allergy list. Please review each documented allergy for additional clarification and justification.  Allergies  Reviewed by Yolanda Galvez on 4/26/2025   No Known Allergies         Below are additional concerns with the patient's PTA list.    Patient pharmacy records indicate noncompliance, Keppra 750 mg twice daily and Lipitor 40 mg daily  last filled October 2024 for 30 day supplies. However, patient reports taking medications this morning.    Yolanda Galvez

## 2025-04-26 NOTE — ED TRIAGE NOTES
Pt presents to department via EMS from home with c/o L side pain. Pt states pain began earlier this morning and states feels like someone punched her in the side. VSS. Pt endorses hx of MS with increased weakness and b/l LE edema. No acute distress noted. Respirations are even and unlabored

## 2025-04-26 NOTE — CARE PLAN
The patient's goals for the shift include      The clinical goals for the shift include Patient will report abdominal pain < 3 during shift    Over the shift, the patient did not make progress toward the following goals. Barriers to progression include none. Recommendations to address these barriers include repositioning, PRN medicatoins.

## 2025-04-27 LAB
ANION GAP SERPL CALC-SCNC: 10 MMOL/L (ref 10–20)
BUN SERPL-MCNC: 7 MG/DL (ref 6–23)
CALCIUM SERPL-MCNC: 9.1 MG/DL (ref 8.6–10.3)
CHLORIDE SERPL-SCNC: 103 MMOL/L (ref 98–107)
CO2 SERPL-SCNC: 30 MMOL/L (ref 21–32)
CREAT SERPL-MCNC: 0.54 MG/DL (ref 0.5–1.05)
EGFRCR SERPLBLD CKD-EPI 2021: >90 ML/MIN/1.73M*2
ERYTHROCYTE [DISTWIDTH] IN BLOOD BY AUTOMATED COUNT: 12.2 % (ref 11.5–14.5)
GLUCOSE SERPL-MCNC: 124 MG/DL (ref 74–99)
HCT VFR BLD AUTO: 44.7 % (ref 36–46)
HGB BLD-MCNC: 14.8 G/DL (ref 12–16)
LEGIONELLA AG UR QL: NEGATIVE
MCH RBC QN AUTO: 31.7 PG (ref 26–34)
MCHC RBC AUTO-ENTMCNC: 33.1 G/DL (ref 32–36)
MCV RBC AUTO: 96 FL (ref 80–100)
NRBC BLD-RTO: 0 /100 WBCS (ref 0–0)
PLATELET # BLD AUTO: 192 X10*3/UL (ref 150–450)
POTASSIUM SERPL-SCNC: 4 MMOL/L (ref 3.5–5.3)
RBC # BLD AUTO: 4.67 X10*6/UL (ref 4–5.2)
S PNEUM AG UR QL: NEGATIVE
SODIUM SERPL-SCNC: 139 MMOL/L (ref 136–145)
WBC # BLD AUTO: 6.9 X10*3/UL (ref 4.4–11.3)

## 2025-04-27 PROCEDURE — 1100000001 HC PRIVATE ROOM DAILY

## 2025-04-27 PROCEDURE — 2500000001 HC RX 250 WO HCPCS SELF ADMINISTERED DRUGS (ALT 637 FOR MEDICARE OP)

## 2025-04-27 PROCEDURE — 36415 COLL VENOUS BLD VENIPUNCTURE: CPT

## 2025-04-27 PROCEDURE — 80048 BASIC METABOLIC PNL TOTAL CA: CPT

## 2025-04-27 PROCEDURE — 85027 COMPLETE CBC AUTOMATED: CPT

## 2025-04-27 PROCEDURE — 2500000004 HC RX 250 GENERAL PHARMACY W/ HCPCS (ALT 636 FOR OP/ED): Mod: JZ

## 2025-04-27 PROCEDURE — 97165 OT EVAL LOW COMPLEX 30 MIN: CPT | Mod: GO

## 2025-04-27 PROCEDURE — 97161 PT EVAL LOW COMPLEX 20 MIN: CPT | Mod: GP

## 2025-04-27 RX ORDER — LEVETIRACETAM 750 MG/1
750 TABLET ORAL 2 TIMES DAILY
Qty: 60 TABLET | Refills: 0 | Status: CANCELLED | OUTPATIENT
Start: 2025-04-27 | End: 2025-05-27

## 2025-04-27 RX ADMIN — LEVETIRACETAM 750 MG: 500 TABLET, FILM COATED ORAL at 09:21

## 2025-04-27 RX ADMIN — ATORVASTATIN CALCIUM 40 MG: 40 TABLET, FILM COATED ORAL at 09:21

## 2025-04-27 RX ADMIN — CEFTRIAXONE SODIUM 1 G: 1 INJECTION, SOLUTION INTRAVENOUS at 09:21

## 2025-04-27 RX ADMIN — AZITHROMYCIN DIHYDRATE 500 MG: 500 INJECTION, POWDER, LYOPHILIZED, FOR SOLUTION INTRAVENOUS at 10:05

## 2025-04-27 RX ADMIN — LEVETIRACETAM 750 MG: 500 TABLET, FILM COATED ORAL at 19:48

## 2025-04-27 RX ADMIN — ENOXAPARIN SODIUM 40 MG: 40 INJECTION SUBCUTANEOUS at 16:06

## 2025-04-27 ASSESSMENT — COGNITIVE AND FUNCTIONAL STATUS - GENERAL
WALKING IN HOSPITAL ROOM: A LOT
DRESSING REGULAR LOWER BODY CLOTHING: A LITTLE
TURNING FROM BACK TO SIDE WHILE IN FLAT BAD: A LOT
TURNING FROM BACK TO SIDE WHILE IN FLAT BAD: A LITTLE
MOVING TO AND FROM BED TO CHAIR: A LOT
TOILETING: A LITTLE
CLIMB 3 TO 5 STEPS WITH RAILING: TOTAL
HELP NEEDED FOR BATHING: A LOT
MOVING TO AND FROM BED TO CHAIR: A LITTLE
WALKING IN HOSPITAL ROOM: TOTAL
STANDING UP FROM CHAIR USING ARMS: A LITTLE
DAILY ACTIVITIY SCORE: 20
DRESSING REGULAR UPPER BODY CLOTHING: A LOT
HELP NEEDED FOR BATHING: A LITTLE
CLIMB 3 TO 5 STEPS WITH RAILING: A LOT
MOBILITY SCORE: 17
DRESSING REGULAR LOWER BODY CLOTHING: A LOT
MOVING FROM LYING ON BACK TO SITTING ON SIDE OF FLAT BED WITH BEDRAILS: A LOT
DAILY ACTIVITIY SCORE: 15
DRESSING REGULAR UPPER BODY CLOTHING: A LITTLE
MOBILITY SCORE: 10
TOILETING: A LOT
STANDING UP FROM CHAIR USING ARMS: A LOT
PERSONAL GROOMING: A LITTLE

## 2025-04-27 ASSESSMENT — PAIN SCALES - GENERAL
PAINLEVEL_OUTOF10: 0 - NO PAIN

## 2025-04-27 ASSESSMENT — PAIN - FUNCTIONAL ASSESSMENT
PAIN_FUNCTIONAL_ASSESSMENT: 0-10

## 2025-04-27 NOTE — CARE PLAN
The patient's goals for the shift include      The clinical goals for the shift include pt will not have any pain throughout the night    Over the shift, the patient did not make progress toward the following goals. Barriers to progression include MS, abdominal pain. Recommendations to address these barriers include repositioning, prn medication.

## 2025-04-27 NOTE — PROGRESS NOTES
"  Akiko Peng is a 69 y.o. female on day 0 of admission presenting with Left upper quadrant abdominal pain.      Assessment / Plan        Akiko Peng is a 69 y.o. female with a history of multiple sclerosis, rheumatoid arthritis, seizures who presented with left side pain.     Acute Medical conditions  # Community Acquired Pneumonia   # MS exacerbation secondary to pneumonia   - Patient comes in with left-sided upper abdominal pain that started early this morning at around 5 AM. Pain lasted for about 15 minutes.   - CT Abd/Pelvis 4/26 showed left lower lobe infiltrate and pleural effusion.   Plan:  - Continue IV Rocephin 1 g (started 4/26)  - Continue IV Zithromax 500 mg (started 4/26)     - PRN DuoNebs     Chronic Medical conditions  # Multiple sclerosis  # Seizures - Continue Keppra 750 mg daily   # HLD - Continue home Lipitor 40 mg daily.  # Rheumatoid arthritis      DVT: Lovenox   IVF: --  Diet: Regular  Code: DNR/DNI  Consults: --       Subjective     No acute events overnight. Patient denies fevers, chills, cough, SOB, chest pain at this time. Patient has not had any episodes of left-sided pain since the initial episode that prompted her to call for EMS. PT/OT AMPAC score was 10 and 15 respectively.    Objective     Physical Exam:  General:  Pleasant and cooperative. No apparent distress.  HEENT:  Normocephalic, atraumatic  Chest:  Clear to auscultation bilaterally. No wheezes, rales, or rhonchi.  CV:  Regular rate and rhythm. No murmurs    Abdomen: Abdomen is soft, non-tender, non-distended. BS +   Extremities:  1 + Bilateral lower extremity edema.   Neurological:  AAOx3. No focal deficits.  Skin:  Warm and dry.    Last Recorded Vitals  Blood pressure 116/55, pulse 68, temperature 35.5 °C (95.9 °F), resp. rate 16, height 1.778 m (5' 10\"), weight 63.5 kg (140 lb), SpO2 97%.  Intake/Output last 3 Shifts:  I/O last 3 completed shifts:  In: 300 (4.7 mL/kg) [IV Piggyback:300]  Out: - (0 mL/kg)   Weight: 63.5 " -- DO NOT REPLY / DO NOT REPLY ALL --  -- Message is from the [facility]--    Unable to reach caller to follow up after 3 attempts. Please address when the clinic reopens. Thank you.   kg     Last CBC & BMP  Lab Results   Component Value Date    GLUCOSE 124 (H) 04/27/2025    CALCIUM 9.1 04/27/2025     04/27/2025    K 4.0 04/27/2025    CO2 30 04/27/2025     04/27/2025    BUN 7 04/27/2025    CREATININE 0.54 04/27/2025     Lab Results   Component Value Date    WBC 6.9 04/27/2025    HGB 14.8 04/27/2025    HCT 44.7 04/27/2025    MCV 96 04/27/2025     04/27/2025     Michaelrula Langley DO   PGY-1, Internal Medicine  This is a preliminary note, please await attending attestation for final A/P

## 2025-04-27 NOTE — PROGRESS NOTES
Physical Therapy    Physical Therapy Evaluation    Patient Name: Akiko Peng  MRN: [id number]  Today's Date: 4/27/2025   Time Calculation  Start Time: 1141  Stop Time: 1203  Time Calculation (min): 22 min  336/336-A    Assessment/Plan   PT Assessment  PT Assessment Results: Decreased strength, Decreased endurance, Decreased mobility  Rehab Prognosis: Good  Barriers to Discharge Home: Physical needs  Physical Needs: 24hr mobility assistance needed, 24hr ADL assistance needed  Evaluation/Treatment Tolerance: Patient tolerated treatment well  Medical Staff Made Aware: Yes  Strengths: Living arrangement secure, Support of extended family/friends  Barriers to Participation: Comorbidities  End of Session Communication: Bedside nurse  Assessment Comment: Patient currently requires moderate to max assist for all mobility tasks per history of MS. May benefit from referral to Home PT/OT at discharge.  End of Session Patient Position: Bed, 2 rail up, Alarm on  IP OR SWING BED PT PLAN  Inpatient or Swing Bed: Inpatient  PT Plan  Treatment/Interventions: Bed mobility, Transfer training, Therapeutic exercise, Therapeutic activity  PT Plan: Ongoing PT  PT Frequency: 3 times per week  PT Discharge Recommendations: Low intensity level of continued care  PT Recommended Transfer Status: Assist x2  PT - OK to Discharge: Yes (When cleared by medical team)    Subjective     Current Problem:  1. Left upper quadrant abdominal pain  doxycycline (Adoxa) 100 mg tablet    Referral to Primary Care      2. Pneumonia of left lower lobe due to infectious organism  doxycycline (Adoxa) 100 mg tablet    Referral to Primary Care        Problem List[1]    General Visit Information:  General  Reason for Referral: PT Eval and Treat: LUQ pain, left lower lobe pneumonia  Referred By: Michael Vergara DO  Family/Caregiver Present: No  Co-Treatment: OT  Co-Treatment Reason: Maximize patient safety and mobility  Prior to Session Communication: Bedside  nurse  Patient Position Received: Bed, 2 rail up, Alarm off, not on at start of session  General Comment: 69 year old female admit with c/o of left upper quadrant pain. Diagosed with pneumonia in setting of Multiple Sclerosis.    Home Living:  Home Living  Type of Home: House  Lives With: Spouse, Adult children  Home Adaptive Equipment: Walker rolling or standard, Wheelchair-power  Home Layout: Two level, Able to live on main level with bedroom/bathroom  Home Access: Other (Comment) (Stair lift into house)  Bathroom Shower/Tub: Tub/shower unit  Bathroom Toilet: Standard  Bathroom Equipment: Shower chair with back, Hand-held shower hose, Bedside commode  Home Living Comments: Patient lives with  and daughter in house. 1st floor set up with adjustable bed and BSC. Uses power w/c to mobilize.    Prior Level of Function:  Prior Function Per Pt/Caregiver Report  Level of [place]: Independent with ADLs and functional transfers, Needs assistance with homemaking  Receives Help From: Family  Hand Dominance: Right  Prior Function Comments: Patient reports she is typically able to pivot transfer from her bed to the power w/c or BSC which is next to bed.  Family manages household tasks. Patient reports she is able to get into the kitchen to get something for herself if needed.    Precautions:  Precautions  Hearing/Visual Limitations: wears glasses  Medical Precautions: Fall precautions, Infection precautions  Precautions Comment: Pur wick, IV line in place    Vital Signs:     Objective     Pain:  Pain Assessment  Pain Assessment: 0-10  0-10 (Numeric) Pain Score: 0 - No pain    Cognition:  Cognition  Overall Cognitive Status: Within Functional Limits    General Assessments:  General Observation  General Observation: Patient pleasant and cooperative with treatment given occasional rest breaks   Activity Tolerance  Endurance: Tolerates 10 - 20 min exercise with multiple rests  Activity Tolerance Comments: Patient able  to transfer to stand but difficulty managing turn to BSC as medical lines impeeded progress.     Strength  Strength Comments: Strength at least 3/5 except RLE 3-/5     Coordination  Heel to Shin: Impaired  Coordination Comment: Increased muscle tone and spasticity on RLE with active movement  Postural Control  Posture Comment: increased thoracokyphosis  Static Sitting Balance  Static Sitting-Balance Support: Bilateral upper extremity supported  Static Sitting-Level of Assistance: Close supervision  Dynamic Sitting Balance  Dynamic Sitting-Balance Support: Bilateral upper extremity supported  Dynamic Sitting-Level of Assistance: Close supervision  Static Standing Balance  Static Standing-Balance Support: Bilateral upper extremity supported  Static Standing-Level of Assistance: Moderate assistance  Dynamic Standing Balance  Dynamic Standing-Comments: Unable to manage turn to sit onto BSC: short medical lines impeed progress.    Functional Assessments:     Bed Mobility  Bed Mobility: Yes (max assist x 1)  Transfers  Transfer: Yes (moderate assist x 2 arm in arm sit to stand)             Extremity/Trunk Assessments:  RUE   RUE : Within Functional Limits  LUE   LUE: Within Functional Limits  RLE   RLE : Within Functional Limits  LLE   LLE : Within Functional Limits    Outcome Measures:     [facility] Basic Mobility  Turning from your back to your side while in a flat bed without using bedrails: A lot  Moving from lying on your back to sitting on the side of a flat bed without using bedrails: A lot  Moving to and from bed to chair (including a wheelchair): A lot  Standing up from a chair using your arms (e.g. wheelchair or bedside chair): A lot  To walk in hospital room: Total  Climbing 3-5 steps with railing: Total  Basic Mobility - Total Score: 10                                                             Goals:  Encounter Problems       Encounter Problems (Active)       Mobility       Goal 1 (Progressing)       Start:   04/27/25    Expected End:  05/11/25       STG - Pt will transition supine <> sitting with moderate assist x 1          Goal 2 (Progressing)       Start:  04/27/25    Expected End:  05/11/25       STG - Pt will transfer STS with min assist x 1          Goal 3 (Progressing)       Start:  04/27/25    Expected End:  05/11/25       STG - Pt will SPT bed <> chair with min assist x 1             Pain - Adult            Education Documentation  Mobility Training, taught by Lady Chen, PT at 4/27/2025  1:36 PM.  Learner: Family, Patient  Readiness: Acceptance  Method: Explanation  Response: Verbalizes Understanding, Demonstrated Understanding    Education Comments  No comments found.              [1]   Patient Active Problem List  Diagnosis    TIA (transient ischemic attack)    Aphasia    Bradycardia    Right sided weakness    COVID-19    Cellulitis of right foot    Left upper quadrant abdominal pain

## 2025-04-27 NOTE — PROGRESS NOTES
04/27/25 0931   Discharge Planning   Living Arrangements Spouse/significant other;Children   Support Systems Spouse/significant other;Children   Assistance Needed independent   Type of Residence Private residence  (2 level home with lift)   Number of Stairs to Enter Residence 2   Does the patient need discharge transport arranged? No   Intensity of Service   Intensity of Service 0-30 min     Care transitions at bedside to complete assessment with patient.  TCC introduced self and explained role to patient.  Patient demographics reviewed and verified.  Patient stated that she has been independent at home; uses a cane and has a wheelchair if needed.  PT/OT evaluations pending at this time.  Care transitions to follow.      PCP: Adams Craig  Insurance: Medicare  Pharmacy: [facility]

## 2025-04-27 NOTE — HOSPITAL COURSE
Akiko Peng is a 69 y.o. female with a history of multiple sclerosis, rheumatoid arthritis, seizures who presented with left side pain. In the ED vitals were stable. Labs were unremarkable. Troponin 3 (2x). EKG showed NSR. CXR showed no acute cardiopulmonary disease. CT A/P showed left lower lobe infiltrate and pleural effusion. Patient was started on empiric antibiotics for pneumonia. Pain had resolved by the time patient had arrived to the ED. Initial plan was to discharge the patient home with a course of oral antibiotics but patient had reportedly said that she felt unsafe going home due to history of MS and there being no help at home. Patient later said that she would feel safe taking care of herself if she was to go home. Given patient's history of poor compliance to medications and lack of clarity in regards to patient and family being able to care for her at home patient was admitted to the general medicine service. She was continued on IV antibiotics and home meds were continued. Patient said that she had not had any new episodes of left side pain since the first episode that brought her into the ED. PT/OT [facility] score was 10 and 15 respectively. PT and OT feel that she is close to her sometimes fluctuating baseline given the occasional MS flair. Patient was comfortable with plans for discharge home with home with healthcare.

## 2025-04-27 NOTE — CARE PLAN
The patient's goals for the shift include  pain management    The clinical goals for the shift include pt will not have any pain throughout the night    Over the shift, the patient did not make progress toward the following goals. Recommendations to address these barriers include continue to monitor.

## 2025-04-27 NOTE — PROGRESS NOTES
Occupational Therapy    Evaluation    Patient Name: Akiko Peng  MRN: [id number]  Department: [facility] 3  Room: [address] [place], [place] [address]  Today's Date: 4/27/2025  Time Calculation  Start Time: 1140  Stop Time: 1203  Time Calculation (min): 23 min    Assessment  IP OT Assessment  OT Assessment: Pt. presents with a decline in self-care, mobility and safety and would benefit from skilled OT services to maximize independence and promote return to prior level of function.  Prognosis: Good  Barriers to Discharge Home: No anticipated barriers  End of Session Communication: Bedside nurse  End of Session Patient Position: Bed, 2 rail up, Alarm on  Plan:  Treatment Interventions: ADL retraining, Functional transfer training, Endurance training, Compensatory technique education  OT Frequency: 3 times per week  OT Discharge Recommendations: Low intensity level of continued care (with continued 24 hr family presence/assist)  OT Recommended Transfer Status: Assist of 2  OT - OK to Discharge: Yes (to next level of care when cleared by medical team)    Subjective   Current Problem:  1. Left upper quadrant abdominal pain  doxycycline (Adoxa) 100 mg tablet    Referral to Primary Care      2. Pneumonia of left lower lobe due to infectious organism  doxycycline (Adoxa) 100 mg tablet    Referral to Primary Care        General:  General  Reason for Referral: impaired adl; pt. admitted with, LUQ abdominal pain, pneumonia  Referred By: Michael Vergara DO  Past Medical History Relevant to Rehab: hld, RA, TIA, MS, seizures  Co-Treatment: PT  Co-Treatment Reason: to maximize patient safety/abilities  Prior to Session Communication: Bedside nurse  Patient Position Received: Bed, 2 rail up, Alarm off, not on at start of session  General Comment: pt. agreeable to therapy intervention  Precautions:  Precautions Comment: falls, purewick, piv, tele     Date/Time Vitals Session Patient Position Pulse Resp SpO2 BP MAP (mmHg)    04/27/25 14:13:17 --  --  68  --  97 %  116/55   78                Pain:  Pain Assessment  Pain Assessment: 0-10  0-10 (Numeric) Pain Score: 0 - No pain    Objective   Cognition:  Overall Cognitive Status: Within Functional Limits           Home Living:  Home Living Comments: pt. lives with  and daughter, 1 floor set up, adjustable bed, uses bsc, full bath 1st floor tub/shower or stall shower with hhs/seat/gb, st. cane, wh. walker, power wheelchair   Prior Function:  Prior Function Comments: pt. states she can typically stand pivot transfer from bed to power wheelchair or bsc which is next to bed, pt. states she can bathe/dress self, family manages house hold tasks.  pt. also states her level of assist fluctuates depending on MS symptoms and has family members who can assist.       ADL:  ADL Comments: would estimate max assist for LB bathe/dress/toileting with use of bed pan, would estimate min assist for UB bathe/dress, set up for grooming/feeding  Activity Tolerance:  Endurance: Endurance does not limit participation in activity  Bed Mobility/Transfers: Bed Mobility  Bed Mobility:  (max assist x 1 supine <> sit (pt's bed surface restrictive))    Transfers  Transfer:  (sit<> stand from eob;  mod assist x 2, once pt. standing attempted to pivot to bsc, however iv line was restrictive)    Sensation:  Sensation Comment: sensation intact  Strength:  Strength Comments: bue's at least 3/5 per observation proximally, bilateral  4-/5      Hand Function:  Hand Function  Gross Grasp: Functional  Coordination: Functional  Outcome Measures: [facility] Daily Activity  Putting on and taking off regular lower body clothing: A lot  Bathing (including washing, rinsing, drying): A lot  Putting on and taking off regular upper body clothing: A lot  Toileting, which includes using toilet, bedpan or urinal: A lot  Taking care of personal grooming such as brushing teeth: A little  Eating Meals: None  Daily Activity - Total Score: 15      Education Documentation  Precautions,  taught by Mayda Steele OT at 4/27/2025  2:24 PM.  Learner: Patient  Readiness: Acceptance  Method: Explanation  Response: Verbalizes Understanding, Needs Reinforcement  Comment: OT POC    ADL Training, taught by Mayda Steele OT at 4/27/2025  2:24 PM.  Learner: Patient  Readiness: Acceptance  Method: Explanation  Response: Verbalizes Understanding, Needs Reinforcement  Comment: OT POC      Goals:   Encounter Problems       Encounter Problems (Active)       OT Goals       Increase functional transfers to [facility] for bed/chair/toilet with dme prn   (Progressing)       Start:  04/27/25    Expected End:  05/04/25            Increase ub/lb dressing to supervision with dme prn  (Progressing)       Start:  04/27/25    Expected End:  05/04/25            Increase toileting to supervision with dme prn  (Progressing)       Start:  04/27/25    Expected End:  05/04/25            increase bue ther ex/activity x 7-10 minutes and increase sitting tolerance x 7-10 minutes to promote greater activity tolerance for assist with adl.   (Progressing)       Start:  04/27/25    Expected End:  05/04/25

## 2025-04-28 ENCOUNTER — DOCUMENTATION (OUTPATIENT)
Dept: HOME HEALTH SERVICES | Facility: HOME HEALTH | Age: 70
End: 2025-04-28
Payer: MEDICARE

## 2025-04-28 ENCOUNTER — HOME HEALTH ADMISSION (OUTPATIENT)
Dept: HOME HEALTH SERVICES | Facility: HOME HEALTH | Age: 70
End: 2025-04-28
Payer: MEDICARE

## 2025-04-28 ENCOUNTER — PHARMACY VISIT (OUTPATIENT)
Dept: PHARMACY | Facility: CLINIC | Age: 70
End: 2025-04-28
Payer: COMMERCIAL

## 2025-04-28 VITALS
HEART RATE: 83 BPM | SYSTOLIC BLOOD PRESSURE: 123 MMHG | TEMPERATURE: 96.8 F | WEIGHT: 140 LBS | OXYGEN SATURATION: 94 % | DIASTOLIC BLOOD PRESSURE: 63 MMHG | HEIGHT: 70 IN | RESPIRATION RATE: 18 BRPM | BODY MASS INDEX: 20.04 KG/M2

## 2025-04-28 LAB
ANION GAP SERPL CALC-SCNC: 9 MMOL/L (ref 10–20)
BUN SERPL-MCNC: 8 MG/DL (ref 6–23)
CALCIUM SERPL-MCNC: 8.8 MG/DL (ref 8.6–10.3)
CHLORIDE SERPL-SCNC: 106 MMOL/L (ref 98–107)
CO2 SERPL-SCNC: 29 MMOL/L (ref 21–32)
CREAT SERPL-MCNC: 0.56 MG/DL (ref 0.5–1.05)
EGFRCR SERPLBLD CKD-EPI 2021: >90 ML/MIN/1.73M*2
ERYTHROCYTE [DISTWIDTH] IN BLOOD BY AUTOMATED COUNT: 12 % (ref 11.5–14.5)
GLUCOSE SERPL-MCNC: 94 MG/DL (ref 74–99)
HCT VFR BLD AUTO: 43.2 % (ref 36–46)
HGB BLD-MCNC: 13.9 G/DL (ref 12–16)
MCH RBC QN AUTO: 30.8 PG (ref 26–34)
MCHC RBC AUTO-ENTMCNC: 32.2 G/DL (ref 32–36)
MCV RBC AUTO: 96 FL (ref 80–100)
NRBC BLD-RTO: 0 /100 WBCS (ref 0–0)
PLATELET # BLD AUTO: 204 X10*3/UL (ref 150–450)
POTASSIUM SERPL-SCNC: 4 MMOL/L (ref 3.5–5.3)
RBC # BLD AUTO: 4.51 X10*6/UL (ref 4–5.2)
SODIUM SERPL-SCNC: 140 MMOL/L (ref 136–145)
WBC # BLD AUTO: 5.7 X10*3/UL (ref 4.4–11.3)

## 2025-04-28 PROCEDURE — 80048 BASIC METABOLIC PNL TOTAL CA: CPT

## 2025-04-28 PROCEDURE — RXMED WILLOW AMBULATORY MEDICATION CHARGE

## 2025-04-28 PROCEDURE — 85027 COMPLETE CBC AUTOMATED: CPT

## 2025-04-28 PROCEDURE — 36415 COLL VENOUS BLD VENIPUNCTURE: CPT

## 2025-04-28 PROCEDURE — 2500000004 HC RX 250 GENERAL PHARMACY W/ HCPCS (ALT 636 FOR OP/ED): Mod: JZ

## 2025-04-28 PROCEDURE — 2500000001 HC RX 250 WO HCPCS SELF ADMINISTERED DRUGS (ALT 637 FOR MEDICARE OP)

## 2025-04-28 RX ORDER — LEVOFLOXACIN 750 MG/1
750 TABLET, FILM COATED ORAL EVERY 24 HOURS
Qty: 7 TABLET | Refills: 0 | Status: SHIPPED | OUTPATIENT
Start: 2025-04-28 | End: 2025-05-05

## 2025-04-28 RX ORDER — LEVETIRACETAM 750 MG/1
750 TABLET ORAL 2 TIMES DAILY
Qty: 60 TABLET | Refills: 0 | Status: SHIPPED | OUTPATIENT
Start: 2025-04-28 | End: 2025-05-28

## 2025-04-28 RX ADMIN — POLYETHYLENE GLYCOL 3350 17 G: 17 POWDER, FOR SOLUTION ORAL at 09:00

## 2025-04-28 RX ADMIN — CEFTRIAXONE SODIUM 1 G: 1 INJECTION, SOLUTION INTRAVENOUS at 09:00

## 2025-04-28 RX ADMIN — LEVETIRACETAM 750 MG: 500 TABLET, FILM COATED ORAL at 09:00

## 2025-04-28 RX ADMIN — LEVETIRACETAM 750 MG: 500 TABLET, FILM COATED ORAL at 20:19

## 2025-04-28 RX ADMIN — ATORVASTATIN CALCIUM 40 MG: 40 TABLET, FILM COATED ORAL at 09:00

## 2025-04-28 RX ADMIN — AZITHROMYCIN DIHYDRATE 500 MG: 500 INJECTION, POWDER, LYOPHILIZED, FOR SOLUTION INTRAVENOUS at 10:27

## 2025-04-28 ASSESSMENT — COGNITIVE AND FUNCTIONAL STATUS - GENERAL
WALKING IN HOSPITAL ROOM: A LOT
DRESSING REGULAR UPPER BODY CLOTHING: A LITTLE
HELP NEEDED FOR BATHING: A LITTLE
TOILETING: A LITTLE
CLIMB 3 TO 5 STEPS WITH RAILING: A LOT
DRESSING REGULAR LOWER BODY CLOTHING: A LITTLE
MOVING TO AND FROM BED TO CHAIR: A LITTLE
MOBILITY SCORE: 17
DAILY ACTIVITIY SCORE: 20
TURNING FROM BACK TO SIDE WHILE IN FLAT BAD: A LITTLE
STANDING UP FROM CHAIR USING ARMS: A LITTLE

## 2025-04-28 ASSESSMENT — PAIN SCALES - GENERAL
PAINLEVEL_OUTOF10: 0 - NO PAIN

## 2025-04-28 ASSESSMENT — PAIN - FUNCTIONAL ASSESSMENT: PAIN_FUNCTIONAL_ASSESSMENT: 0-10

## 2025-04-28 NOTE — HH CARE COORDINATION
Home Care received a Referral for Physical Therapy and Occupational Therapy. We have processed the referral for a Start of Care on 4/30-5/1/25.     If you have any questions or concerns, please feel free to contact us at [phone]. Follow the prompts, enter your five digit zip code, and you will be directed to your care team on [facility].

## 2025-04-28 NOTE — DISCHARGE SUMMARY
Discharge Diagnosis  Community Acquired Pneumonia  MS Exacerbation secondary to pneumonia     Discharge Meds     Medication List      START taking these medications     levoFLOXacin 750 mg tablet; Commonly known as: Levaquin; Take 1 tablet   (750 mg) by mouth once every 24 hours for 7 days.     CHANGE how you take these medications     levETIRAcetam 750 mg tablet; Commonly known as: Keppra; Take 1 tablet   (750 mg) by mouth 2 times a day.; What changed: Another medication with   the same name was removed. Continue taking this medication, and follow the   directions you see here.     CONTINUE taking these medications     atorvastatin 40 mg tablet; Commonly known as: Lipitor; Take 1 tablet (40   mg) by mouth once daily.     ASK your doctor about these medications     aspirin 325 mg tablet   multivitamin with minerals tablet       Test Results Pending At Discharge  Pending Labs       Order Current Status    Mycoplasma pneumoniae antibody, IgM In process    Blood Culture Preliminary result    Blood Culture Preliminary result            Hospital Course  Akiko Peng is a 69 y.o. female with a history of multiple sclerosis, rheumatoid arthritis, seizures who presented with left side pain. In the ED vitals were stable. Labs were unremarkable. Troponin 3 (2x). EKG showed NSR. CXR showed no acute cardiopulmonary disease. CT A/P showed left lower lobe infiltrate and pleural effusion. Patient was started on empiric antibiotics for pneumonia. Pain had resolved by the time patient had arrived to the ED. Patient was admitted to the general medicine service for MS exacerbation secondary to pneumonia. She was continued on IV antibiotics and home meds were continued. Patient said that she did not have any new episodes of left side pain since the first episode that brought her into the ED. PT/OT [facility] score was 10 and 15 respectively. PT/OT and patient felt that she was close to her sometimes fluctuating baseline given occasional MS  flair. PT/OT recommended discharge with home healthcare. Patient was comfortable with plans for discharge.    Pertinent Physical Exam At Time of Discharge  General:  Pleasant and cooperative. No apparent distress.  HEENT:  Normocephalic, atraumatic  Chest:  Clear to auscultation bilaterally. No wheezes, rales, or rhonchi.  CV:  Regular rate and rhythm. No murmurs    Abdomen: Abdomen is soft, non-tender, non-distended. BS +   Extremities:  1 + Bilateral lower extremity edema.   Neurological:  AAOx3. No focal deficits.  Skin:  Warm and dry.     Outpatient Follow-Up  No future appointments.    Michael Langley DO   PGY-1, Internal Medicine  This is a preliminary note, please await attending attestation for final A/P

## 2025-04-28 NOTE — CARE PLAN
The patient's goals for the shift include      The clinical goals for the shift include pt will not have any pain throughout the shift    Over the shift, the patient did not make progress toward the following goals. Barriers to progression include MS. Recommendations to address these barriers include Repositioning, PRN medications.

## 2025-04-28 NOTE — CARE PLAN
The patient's goals for the shift include  pain management    The clinical goals for the shift include pt will not have any pain throughout the shift    Over the shift, the patient did not make progress toward the following goals. Recommendations to address these barriers include continue to monitor.

## 2025-04-29 LAB — M PNEUMO IGM SER IA-ACNC: 0.18 U/L

## 2025-04-29 NOTE — CARE PLAN
The patient's goals for the shift include  rest    The clinical goals for the shift include Patient will remain free from falls/injuries during this shift.      Problem: Pain - Adult  Goal: Verbalizes/displays adequate comfort level or baseline comfort level  Outcome: Progressing     Problem: Safety - Adult  Goal: Free from fall injury  Outcome: Progressing     Problem: Discharge Planning  Goal: Discharge to home or other facility with appropriate resources  Outcome: Progressing     Problem: Chronic Conditions and Co-morbidities  Goal: Patient's chronic conditions and co-morbidity symptoms are monitored and maintained or improved  Outcome: Progressing     Problem: Nutrition  Goal: Nutrient intake appropriate for maintaining nutritional needs  Outcome: Progressing

## 2025-04-29 NOTE — NURSING NOTE
Transport is here for patient. Called Spouse (Jasiel) and notified him about her departure. Pt A&Ox4, Respirations unlabored and regular. IV removed.

## 2025-04-30 ENCOUNTER — HOME CARE VISIT (OUTPATIENT)
Dept: HOME HEALTH SERVICES | Facility: HOME HEALTH | Age: 70
End: 2025-04-30

## 2025-04-30 LAB
BACTERIA BLD CULT: NORMAL
BACTERIA BLD CULT: NORMAL

## 2025-05-02 LAB
ATRIAL RATE: 65 BPM
P AXIS: 33 DEGREES
P OFFSET: 191 MS
P ONSET: 139 MS
PR INTERVAL: 168 MS
Q ONSET: 223 MS
QRS COUNT: 11 BEATS
QRS DURATION: 74 MS
QT INTERVAL: 416 MS
QTC CALCULATION(BAZETT): 432 MS
QTC FREDERICIA: 427 MS
R AXIS: 25 DEGREES
T AXIS: 56 DEGREES
T OFFSET: 431 MS
VENTRICULAR RATE: 65 BPM

## 2025-06-30 ENCOUNTER — APPOINTMENT (OUTPATIENT)
Dept: CARDIOLOGY | Facility: HOSPITAL | Age: 70
End: 2025-06-30
Payer: MEDICARE

## 2025-06-30 ENCOUNTER — APPOINTMENT (OUTPATIENT)
Dept: RADIOLOGY | Facility: HOSPITAL | Age: 70
End: 2025-06-30
Payer: MEDICARE

## 2025-06-30 ENCOUNTER — HOSPITAL ENCOUNTER (EMERGENCY)
Facility: HOSPITAL | Age: 70
Discharge: HOME | End: 2025-06-30
Attending: STUDENT IN AN ORGANIZED HEALTH CARE EDUCATION/TRAINING PROGRAM
Payer: MEDICARE

## 2025-06-30 VITALS
HEART RATE: 67 BPM | RESPIRATION RATE: 16 BRPM | SYSTOLIC BLOOD PRESSURE: 131 MMHG | OXYGEN SATURATION: 98 % | BODY MASS INDEX: 20.62 KG/M2 | HEIGHT: 70 IN | DIASTOLIC BLOOD PRESSURE: 76 MMHG | TEMPERATURE: 97 F | WEIGHT: 144 LBS

## 2025-06-30 DIAGNOSIS — R29.898 WEAKNESS OF RIGHT LOWER EXTREMITY: Primary | ICD-10-CM

## 2025-06-30 PROBLEM — G82.20 PARAPARESIS (MULTI): Status: ACTIVE | Noted: 2025-06-30

## 2025-06-30 PROBLEM — R56.9 SEIZURE (MULTI): Status: ACTIVE | Noted: 2025-06-30

## 2025-06-30 PROBLEM — M06.9 RHEUMATOID ARTHRITIS: Status: ACTIVE | Noted: 2025-06-30

## 2025-06-30 LAB
ALBUMIN SERPL BCP-MCNC: 3.6 G/DL (ref 3.4–5)
ALP SERPL-CCNC: 92 U/L (ref 33–136)
ALT SERPL W P-5'-P-CCNC: 4 U/L (ref 7–45)
ANION GAP SERPL CALC-SCNC: 15 MMOL/L (ref 10–20)
AST SERPL W P-5'-P-CCNC: 9 U/L (ref 9–39)
BASOPHILS # BLD AUTO: 0.06 X10*3/UL (ref 0–0.1)
BASOPHILS NFR BLD AUTO: 1.2 %
BILIRUB SERPL-MCNC: 0.4 MG/DL (ref 0–1.2)
BUN SERPL-MCNC: 18 MG/DL (ref 6–23)
CALCIUM SERPL-MCNC: 8.6 MG/DL (ref 8.6–10.3)
CHLORIDE SERPL-SCNC: 108 MMOL/L (ref 98–107)
CO2 SERPL-SCNC: 20 MMOL/L (ref 21–32)
CREAT SERPL-MCNC: 0.59 MG/DL (ref 0.5–1.05)
EGFRCR SERPLBLD CKD-EPI 2021: >90 ML/MIN/1.73M*2
EOSINOPHIL # BLD AUTO: 0.06 X10*3/UL (ref 0–0.7)
EOSINOPHIL NFR BLD AUTO: 1.2 %
ERYTHROCYTE [DISTWIDTH] IN BLOOD BY AUTOMATED COUNT: 12.4 % (ref 11.5–14.5)
GLUCOSE SERPL-MCNC: 85 MG/DL (ref 74–99)
HCT VFR BLD AUTO: 45.3 % (ref 36–46)
HGB BLD-MCNC: 13.9 G/DL (ref 12–16)
IMM GRANULOCYTES # BLD AUTO: 0.01 X10*3/UL (ref 0–0.7)
IMM GRANULOCYTES NFR BLD AUTO: 0.2 % (ref 0–0.9)
LYMPHOCYTES # BLD AUTO: 1.11 X10*3/UL (ref 1.2–4.8)
LYMPHOCYTES NFR BLD AUTO: 21.7 %
MAGNESIUM SERPL-MCNC: 1.99 MG/DL (ref 1.6–2.4)
MCH RBC QN AUTO: 31.4 PG (ref 26–34)
MCHC RBC AUTO-ENTMCNC: 30.7 G/DL (ref 32–36)
MCV RBC AUTO: 103 FL (ref 80–100)
MONOCYTES # BLD AUTO: 0.64 X10*3/UL (ref 0.1–1)
MONOCYTES NFR BLD AUTO: 12.5 %
NEUTROPHILS # BLD AUTO: 3.24 X10*3/UL (ref 1.2–7.7)
NEUTROPHILS NFR BLD AUTO: 63.2 %
NRBC BLD-RTO: 0 /100 WBCS (ref 0–0)
PLATELET # BLD AUTO: 183 X10*3/UL (ref 150–450)
POTASSIUM SERPL-SCNC: 4 MMOL/L (ref 3.5–5.3)
PROT SERPL-MCNC: 5.9 G/DL (ref 6.4–8.2)
RBC # BLD AUTO: 4.42 X10*6/UL (ref 4–5.2)
SODIUM SERPL-SCNC: 139 MMOL/L (ref 136–145)
WBC # BLD AUTO: 5.1 X10*3/UL (ref 4.4–11.3)

## 2025-06-30 PROCEDURE — 2500000004 HC RX 250 GENERAL PHARMACY W/ HCPCS (ALT 636 FOR OP/ED): Mod: JZ | Performed by: NURSE PRACTITIONER

## 2025-06-30 PROCEDURE — 83735 ASSAY OF MAGNESIUM: CPT | Performed by: NURSE PRACTITIONER

## 2025-06-30 PROCEDURE — 71045 X-RAY EXAM CHEST 1 VIEW: CPT | Performed by: RADIOLOGY

## 2025-06-30 PROCEDURE — 93005 ELECTROCARDIOGRAM TRACING: CPT

## 2025-06-30 PROCEDURE — 70450 CT HEAD/BRAIN W/O DYE: CPT

## 2025-06-30 PROCEDURE — 36415 COLL VENOUS BLD VENIPUNCTURE: CPT | Performed by: NURSE PRACTITIONER

## 2025-06-30 PROCEDURE — 84075 ASSAY ALKALINE PHOSPHATASE: CPT | Performed by: NURSE PRACTITIONER

## 2025-06-30 PROCEDURE — 96374 THER/PROPH/DIAG INJ IV PUSH: CPT

## 2025-06-30 PROCEDURE — 71045 X-RAY EXAM CHEST 1 VIEW: CPT

## 2025-06-30 PROCEDURE — 70450 CT HEAD/BRAIN W/O DYE: CPT | Performed by: RADIOLOGY

## 2025-06-30 PROCEDURE — 85025 COMPLETE CBC W/AUTO DIFF WBC: CPT | Performed by: NURSE PRACTITIONER

## 2025-06-30 PROCEDURE — 99285 EMERGENCY DEPT VISIT HI MDM: CPT | Mod: 25 | Performed by: STUDENT IN AN ORGANIZED HEALTH CARE EDUCATION/TRAINING PROGRAM

## 2025-06-30 RX ORDER — PREDNISONE 20 MG/1
40 TABLET ORAL DAILY
Qty: 8 TABLET | Refills: 0 | Status: SHIPPED | OUTPATIENT
Start: 2025-06-30 | End: 2025-07-04

## 2025-06-30 RX ADMIN — METHYLPREDNISOLONE SODIUM SUCCINATE 125 MG: 125 INJECTION, POWDER, FOR SOLUTION INTRAMUSCULAR; INTRAVENOUS at 19:18

## 2025-06-30 ASSESSMENT — PAIN SCALES - GENERAL: PAINLEVEL_OUTOF10: 0 - NO PAIN

## 2025-06-30 ASSESSMENT — PAIN - FUNCTIONAL ASSESSMENT: PAIN_FUNCTIONAL_ASSESSMENT: 0-10

## 2025-06-30 ASSESSMENT — LIFESTYLE VARIABLES
HAVE PEOPLE ANNOYED YOU BY CRITICIZING YOUR DRINKING: NO
HAVE YOU EVER FELT YOU SHOULD CUT DOWN ON YOUR DRINKING: NO
EVER FELT BAD OR GUILTY ABOUT YOUR DRINKING: NO
TOTAL SCORE: 0
EVER HAD A DRINK FIRST THING IN THE MORNING TO STEADY YOUR NERVES TO GET RID OF A HANGOVER: NO

## 2025-06-30 NOTE — ED TRIAGE NOTES
PT PRESENTS TO ED VIA EMS FROM HOME  FOR GENERALIZED WEAKNESS. PT HAS HX OF MS AND ARTHRITIS .  PT STATES SHE WAS UNABLE TO MAKE IT TO THE BATHROOM. PT STATES SHE IS JUST NOT FEELING RIGHT. PT DENIES PAIN ON ED ARRIVAL. PT DID NOT FALL.

## 2025-06-30 NOTE — PROGRESS NOTES
Pharmacy Medication History Review    Akiko Peng is a 69 y.o. female admitted for No Principal Problem: There is no principal problem currently on the Problem List. Please update the Problem List and refresh.. Pharmacy reviewed the patient's [id number] medications and allergies for accuracy.    The list below reflectives the updated PTA list. Please review each medication in order reconciliation for additional clarification and justification.  Prior to Admission medications    Medication Sig Start Date End Date Taking? Authorizing Provider   aspirin 325 mg tablet Take 1 tablet (325 mg) by mouth once daily as needed for mild pain (1 - 3).  Patient not taking: Reported on 4/26/2025   no Historical Provider, MD   atorvastatin (Lipitor) 40 mg tablet Take 1 tablet (40 mg) by mouth once daily.  Patient not taking: Reported on 4/26/2025 10/11/24 2/27/25 no Talha Thapa DPM   levETIRAcetam (Keppra) 750 mg tablet Take 1 tablet (750 mg) by mouth 2 times a day. 4/28/25 6/30/25 Yes Michael Vergara, DO   multivitamin with minerals tablet Take 1 tablet by mouth once daily.  Patient not taking: Reported on 4/26/2025   no Historical Provider, MD        The list below reflectives the updated allergy list. Please review each documented allergy for additional clarification and justification.  Allergies  Reviewed by Shiloh Duarte RN on 6/30/2025   No Known Allergies         Below are additional concerns with the patient's PTA list.      Yolanda Galvez

## 2025-06-30 NOTE — ED PROVIDER NOTES
HPI   Chief Complaint   Patient presents with    Weakness, Gen       Patient is a nontoxic-appearing 69-year-old female with past medical history of TIA, aphasia, bradycardia, right-sided weakness, COVID-19, cellulitis, multiple sclerosis, rheumatoid arthritis, epilepsy, paraparesis, presents the emergency room today for complaint of generalized weakness and right lower extremity weakness.  Patient symptoms began earlier today.  Patient states she does have MS and when she has exacerbations this feels identical to previous episodes.  Patient denies any headache pain, visual disturbances, numbness or tingling, weakness in the upper extremities.  Patient denies any chest pain, shortness of breath difficulty breathing, abdominal pain, nausea, vomiting, diarrhea or constipation, fever, shaking, chills, urinary complaints or incontinence.              Patient History   Medical History[1]  Surgical History[2]  Family History[3]  Social History[4]    Physical Exam   ED Triage Vitals   Temp Pulse Resp BP   -- -- -- --      SpO2 Temp src Heart Rate Source Patient Position   -- -- -- --      BP Location FiO2 (%)     -- --       Physical Exam  Vitals and nursing note reviewed.   Constitutional:       General: She is not in acute distress.     Appearance: Normal appearance. She is well-developed and normal weight. She is not ill-appearing, toxic-appearing or diaphoretic.   HENT:      Head: Normocephalic and atraumatic.      Nose: No congestion or rhinorrhea.      Mouth/Throat:      Mouth: Mucous membranes are moist.      Pharynx: No oropharyngeal exudate or posterior oropharyngeal erythema.   Eyes:      General: No visual field deficit or scleral icterus.        Right eye: No discharge.         Left eye: No discharge.      Extraocular Movements: Extraocular movements intact.      Right eye: Normal extraocular motion and no nystagmus.      Left eye: Normal extraocular motion and no nystagmus.      Conjunctiva/sclera:  Conjunctivae normal.      Pupils: Pupils are equal, round, and reactive to light. Pupils are equal.      Right eye: Pupil is round and reactive.      Left eye: Pupil is round and reactive.   Neck:      Vascular: No carotid bruit.   Cardiovascular:      Rate and Rhythm: Normal rate and regular rhythm.      Pulses: Normal pulses.      Heart sounds: Normal heart sounds. No murmur heard.     No friction rub. No gallop.   Pulmonary:      Effort: Pulmonary effort is normal. No respiratory distress.      Breath sounds: Normal breath sounds. No stridor. No wheezing, rhonchi or rales.   Chest:      Chest wall: No tenderness.   Musculoskeletal:         General: No swelling. Normal range of motion.      Cervical back: Normal range of motion and neck supple. No rigidity or tenderness.   Lymphadenopathy:      Cervical: No cervical adenopathy.   Skin:     General: Skin is warm and dry.      Capillary Refill: Capillary refill takes less than 2 seconds.   Neurological:      General: No focal deficit present.      Mental Status: She is alert and oriented to person, place, and time.      GCS: GCS eye subscore is 4. GCS verbal subscore is 5. GCS motor subscore is 6.      Cranial Nerves: No cranial nerve deficit, dysarthria or facial asymmetry.      Sensory: No sensory deficit.      Motor: Weakness present.      Coordination: Romberg sign negative.           ED Course & MDM   ED Course as of 06/30/25 2200 Mon Jun 30, 2025 1852 Chest x-ray reveals  IMPRESSION:  No acute cardiopulmonary abnormality.   [EC]   1945 Interpreted by the Emergency Department Attending: ECG revealed sinus bradycardia at a rate of 51 beats per minute with SD interval 167 , QRS of 90 , QTc of 426.  No acute injury pattern. Previous EKG on April 26 revealed no significant changes.    [MG]   2116 CT of the head reveals  IMPRESSION:  Cerebral atrophy and chronic periventricular white matter small  vessel ischemic change.      No evidence of acute intracranial  hemorrhage.      If there is persistent concern for acute intracranial process, MRI is  recommended for further evaluation as clinically indicated   [EC]      ED Course User Index  [EC] CINTHIA Martin  [MG] Adams Corona,          Diagnoses as of 06/30/25 2200   Weakness of right lower extremity                 No data recorded     Tabitha Coma Scale Score: 15 (06/30/25 1745 : Shiloh Duarte RN)                           Medical Decision Making  Given patient's complaint presentation a thorough exam was performed.  On exam patient does have weakness present to the right lower extremity, no weakness present in the bilateral upper extremities, remains neurologically intact no focal deficits, has no nuchal rigidity, states symptoms began much earlier today, no adventitious lung sounds auscultated, speaking clearly no distress, cardiac sounds auscultated are regular, remains hemodynamically stable during emergency evaluation, is afebrile, have a low suspicion for acute intracranial process, ACS, pulmonary embolism, aortic, aneurysm.  Lab work was ordered including CT of the head, chest x-ray, EKG and Solu-Medrol. Lab work reveals some irregularities without any critical lab values, chest x-ray as interpreted by radiologist reveals no acute cardiopulmonary process, CT of the head as interpreted by radiologist reveals no evidence of acute intracranial hemorrhage or process.  Reevaluation of patient reveals improvement she states she feels better and requesting discharge home.  Patient received prescription for prednisone and I encouraged monitoring symptoms and if they become worse was given strict precautions return emergency room for further evaluation, otherwise follow primary care provider in the next several weeks.  Patient was agreeable this plan discharged home in stable condition.    CINTHIA Martin     Portions of this note were generated using digital voice recognition software, and may  contain grammatical errors      Procedure  Procedures       [1]   Past Medical History:  Diagnosis Date    Multiple sclerosis (Multi)     History of multiple sclerosis   [2]   Past Surgical History:  Procedure Laterality Date    MR HEAD ANGIO WO IV CONTRAST  9/21/2020    MR HEAD ANGIO WO IV CONTRAST 9/21/2020 [facility]    MR HEAD ANGIO WO IV CONTRAST  10/24/2022    MR HEAD ANGIO WO IV CONTRAST 10/24/2022 [facility]    MR NECK ANGIO WO IV CONTRAST  9/21/2020    MR NECK ANGIO WO IV CONTRAST 9/21/2020 [facility]    MR NECK ANGIO WO IV CONTRAST  10/24/2022    MR NECK ANGIO WO IV CONTRAST 10/24/2022 [facility]    OTHER SURGICAL HISTORY  11/26/2019    Tonsillectomy    OTHER SURGICAL HISTORY  11/26/2019    Hysterectomy   [3] No family history on file.  [4]   Social History  Tobacco Use    Smoking status: Never    Smokeless tobacco: Never   Vaping Use    Vaping status: Never Used   Substance Use Topics    Alcohol use: Never    Drug use: Never        Zachariah Perkins, TRUPTI-CNP  06/30/25 [id number]

## 2025-07-01 LAB
ATRIAL RATE: 51 BPM
P AXIS: 32 DEGREES
PR INTERVAL: 167 MS
Q ONSET: 252 MS
QRS COUNT: 8 BEATS
QRS DURATION: 90 MS
QT INTERVAL: 462 MS
QTC CALCULATION(BAZETT): 426 MS
QTC FREDERICIA: 438 MS
R AXIS: 45 DEGREES
T AXIS: 67 DEGREES
T OFFSET: 483 MS
VENTRICULAR RATE: 51 BPM

## 2025-07-04 ENCOUNTER — HOSPITAL ENCOUNTER (EMERGENCY)
Facility: HOSPITAL | Age: 70
Discharge: HOME | End: 2025-07-04
Attending: EMERGENCY MEDICINE
Payer: MEDICARE

## 2025-07-04 ENCOUNTER — APPOINTMENT (OUTPATIENT)
Dept: RADIOLOGY | Facility: HOSPITAL | Age: 70
End: 2025-07-04
Payer: MEDICARE

## 2025-07-04 ENCOUNTER — APPOINTMENT (OUTPATIENT)
Dept: CARDIOLOGY | Facility: HOSPITAL | Age: 70
End: 2025-07-04
Payer: MEDICARE

## 2025-07-04 VITALS
BODY MASS INDEX: 20.62 KG/M2 | TEMPERATURE: 98.1 F | RESPIRATION RATE: 33 BRPM | OXYGEN SATURATION: 98 % | HEIGHT: 70 IN | WEIGHT: 144 LBS | SYSTOLIC BLOOD PRESSURE: 150 MMHG | HEART RATE: 60 BPM | DIASTOLIC BLOOD PRESSURE: 64 MMHG

## 2025-07-04 DIAGNOSIS — G35 MULTIPLE SCLEROSIS (MULTI): Primary | ICD-10-CM

## 2025-07-04 DIAGNOSIS — R29.898 WEAKNESS OF RIGHT LOWER EXTREMITY: ICD-10-CM

## 2025-07-04 LAB
ALBUMIN SERPL BCP-MCNC: 3.9 G/DL (ref 3.4–5)
ALP SERPL-CCNC: 86 U/L (ref 33–136)
ALT SERPL W P-5'-P-CCNC: 8 U/L (ref 7–45)
ANION GAP SERPL CALC-SCNC: 12 MMOL/L (ref 10–20)
AST SERPL W P-5'-P-CCNC: 9 U/L (ref 9–39)
BASOPHILS # BLD AUTO: 0.06 X10*3/UL (ref 0–0.1)
BASOPHILS NFR BLD AUTO: 1 %
BILIRUB SERPL-MCNC: 0.5 MG/DL (ref 0–1.2)
BNP SERPL-MCNC: 92 PG/ML (ref 0–99)
BUN SERPL-MCNC: 15 MG/DL (ref 6–23)
CALCIUM SERPL-MCNC: 8.6 MG/DL (ref 8.6–10.3)
CARDIAC TROPONIN I PNL SERPL HS: 3 NG/L (ref 0–13)
CHLORIDE SERPL-SCNC: 104 MMOL/L (ref 98–107)
CO2 SERPL-SCNC: 29 MMOL/L (ref 21–32)
CREAT SERPL-MCNC: 0.55 MG/DL (ref 0.5–1.05)
EGFRCR SERPLBLD CKD-EPI 2021: >90 ML/MIN/1.73M*2
EOSINOPHIL # BLD AUTO: 0.09 X10*3/UL (ref 0–0.7)
EOSINOPHIL NFR BLD AUTO: 1.5 %
ERYTHROCYTE [DISTWIDTH] IN BLOOD BY AUTOMATED COUNT: 12.8 % (ref 11.5–14.5)
GLUCOSE SERPL-MCNC: 80 MG/DL (ref 74–99)
HCT VFR BLD AUTO: 41.7 % (ref 36–46)
HGB BLD-MCNC: 14.1 G/DL (ref 12–16)
IMM GRANULOCYTES # BLD AUTO: 0.02 X10*3/UL (ref 0–0.7)
IMM GRANULOCYTES NFR BLD AUTO: 0.3 % (ref 0–0.9)
INR PPP: 1.1 (ref 0.9–1.1)
LACTATE SERPL-SCNC: 0.7 MMOL/L (ref 0.4–2)
LYMPHOCYTES # BLD AUTO: 1.26 X10*3/UL (ref 1.2–4.8)
LYMPHOCYTES NFR BLD AUTO: 20.4 %
MAGNESIUM SERPL-MCNC: 2.1 MG/DL (ref 1.6–2.4)
MCH RBC QN AUTO: 31.8 PG (ref 26–34)
MCHC RBC AUTO-ENTMCNC: 33.8 G/DL (ref 32–36)
MCV RBC AUTO: 94 FL (ref 80–100)
MONOCYTES # BLD AUTO: 0.69 X10*3/UL (ref 0.1–1)
MONOCYTES NFR BLD AUTO: 11.2 %
NEUTROPHILS # BLD AUTO: 4.06 X10*3/UL (ref 1.2–7.7)
NEUTROPHILS NFR BLD AUTO: 65.6 %
NRBC BLD-RTO: 0 /100 WBCS (ref 0–0)
PLATELET # BLD AUTO: 233 X10*3/UL (ref 150–450)
POTASSIUM SERPL-SCNC: 3.9 MMOL/L (ref 3.5–5.3)
PROT SERPL-MCNC: 6.1 G/DL (ref 6.4–8.2)
PROTHROMBIN TIME: 11.6 SECONDS (ref 9.8–12.4)
RBC # BLD AUTO: 4.43 X10*6/UL (ref 4–5.2)
SODIUM SERPL-SCNC: 141 MMOL/L (ref 136–145)
WBC # BLD AUTO: 6.2 X10*3/UL (ref 4.4–11.3)

## 2025-07-04 PROCEDURE — 36415 COLL VENOUS BLD VENIPUNCTURE: CPT | Performed by: EMERGENCY MEDICINE

## 2025-07-04 PROCEDURE — 93005 ELECTROCARDIOGRAM TRACING: CPT

## 2025-07-04 PROCEDURE — 84484 ASSAY OF TROPONIN QUANT: CPT | Performed by: EMERGENCY MEDICINE

## 2025-07-04 PROCEDURE — 83735 ASSAY OF MAGNESIUM: CPT | Performed by: EMERGENCY MEDICINE

## 2025-07-04 PROCEDURE — 83605 ASSAY OF LACTIC ACID: CPT | Performed by: EMERGENCY MEDICINE

## 2025-07-04 PROCEDURE — 85025 COMPLETE CBC W/AUTO DIFF WBC: CPT | Performed by: EMERGENCY MEDICINE

## 2025-07-04 PROCEDURE — 99285 EMERGENCY DEPT VISIT HI MDM: CPT | Mod: 25 | Performed by: EMERGENCY MEDICINE

## 2025-07-04 PROCEDURE — 85610 PROTHROMBIN TIME: CPT | Performed by: EMERGENCY MEDICINE

## 2025-07-04 PROCEDURE — 80053 COMPREHEN METABOLIC PANEL: CPT | Performed by: EMERGENCY MEDICINE

## 2025-07-04 PROCEDURE — 71045 X-RAY EXAM CHEST 1 VIEW: CPT

## 2025-07-04 PROCEDURE — 70450 CT HEAD/BRAIN W/O DYE: CPT

## 2025-07-04 PROCEDURE — 83880 ASSAY OF NATRIURETIC PEPTIDE: CPT | Performed by: EMERGENCY MEDICINE

## 2025-07-04 PROCEDURE — 71045 X-RAY EXAM CHEST 1 VIEW: CPT | Performed by: STUDENT IN AN ORGANIZED HEALTH CARE EDUCATION/TRAINING PROGRAM

## 2025-07-04 PROCEDURE — 70450 CT HEAD/BRAIN W/O DYE: CPT | Performed by: RADIOLOGY

## 2025-07-04 RX ORDER — PREDNISONE 20 MG/1
40 TABLET ORAL DAILY
Qty: 8 TABLET | Refills: 0 | Status: SHIPPED | OUTPATIENT
Start: 2025-07-04 | End: 2025-07-08

## 2025-07-04 NOTE — ED PROVIDER NOTES
HPI   Chief Complaint   Patient presents with    Weakness, Gen     BIBA patient states that she MS and has her good and bad days of weakness, today is having a bad day, states yesterday had one episode of diarrhea, and noticed that her stool was darker than usual       HPI: 69-year-old female history of MS states that she has been having a bad few days.  She states that she just feels weaker than normal.  She denies fevers.  She denies falls.  She states that she normally uses a wheelchair.  She lives at home with her  and her daughter.  He denies chest pain or shortness of breath.    Family HX: Denies any significant/pertinent family history.  Social Hx: Denies ETOH or drug use.  Review of systems:  Gen.: No weight loss, fatigue, anorexia, insomnia, fever.   Eyes: No vision loss, double vision, drainage, eye pain.   ENT: No pharyngitis, dry mouth.   Cardiac: No chest pain, palpitations, syncope, near syncope.   Pulmonary: No shortness of breath, cough, hemoptysis.   Heme/lymph: No swollen glands, fever, bleeding.   GI: No abdominal pain, change in bowel habits, melena, hematemesis, hematochezia, nausea, vomiting, diarrhea.   : No discharge, dysuria, frequency, urgency, hematuria.   Musculoskeletal: No limb pain, joint pain, joint swelling.   Skin: No rashes.   Psych: No depression, anxiety, suicidality, homicidality.   Review of systems is otherwise negative unless stated above or in history of present illness.    Physical Exam:    Appearance: Alert, oriented , cooperative,  in no acute distress. Well nourished & well hydrated.    Skin: Intact,  dry skin, no lesions, rash, petechiae or purpura.     Eyes: PERRLA, EOMs intact,  Conjunctiva pink with no redness or exudates. Eyelids without lesions. No scleral icterus.     ENT: Hearing grossly intact. External auditory canals patent, tympanic membranes intact with visible landmarks. Nares patent, mucus membranes moist. Dentition without lesions. Pharynx clear,  uvula midline.     Neck: Supple, without meningismus. Thyroid not palpable. Trachea at midline. No lymphadenopathy.    Pulmonary: Clear bilaterally with good chest wall excursion. No rales, rhonchi or wheezing. No accessory muscle use or stridor.    Cardiac: Normal S1, S2 without murmur, rub, gallop or extrasystole. No JVD, Carotids without bruits.    Abdomen: Soft, nontender, active bowel sounds.  No palpable organomegaly.  No rebound or guarding.  No CVA tenderness.    Genitourinary: Exam deferred.    Musculoskeletal: Full range of motion. no pain, edema, or deformity. Pulses full and equal. No cyanosis, clubbing, or edema.  Generalized weakness but no focal neural deficits    Neurological:  Cranial nerves II through XII are grossly intact, finger-nose touch is normal, normal sensation, no weakness, no focal findings identified.    Psychiatric: Appropriate mood and affect.     Medical Decision-Making:  Testing: EKG was obtained which is interpreted by me shows a sinus rhythm rate of 55 without evidence of obvious ST elevations or T wave inversions to indicate acute ischemia or infarct.  CT of the head was obtained to look for signs of intracranial hemorrhage versus mass versus obvious changes of MS.  The CT shows no acute intracranial pathology and we also did a chest x-ray which shows no acute cardiopulmonary process per radiology.  Recheck labs to see if there was electrolyte abnormalities which show normal potassium normal magnesium negative troponin she has a normal lactate she has a normal INR.  We checked for signs of leukocytosis for sources of infection and patient has a white count of 6.2 without a leukocytosis and a hemoglobin of 14.  Urinalysis was ordered.  At this time patient states that she feels improved.  She wants to go home.  To be given a dose of Solu-Medrol.  I was given her to a referral to a new neurologist as she states that her previous neurologist recently retired.  I did recommend  admission however patient stated that she would really prefer to go home.  Therefore I feel though she understands my concerns and she should have a low threshold to return.  The patient was having respiratory rates of 30 to put into the computer however I feel like this is a spurious value as in my reevaluation the patient's respiratory rate is 18  Treatment:   Reevaluation:   Plan: Homegoing. Discussed differential. Will follow-up with the primary physician in the next 2-3 days. Return if worse. They understand return precautions and discharge instructions. Patient and family/friend/caregiver are in agreement with this plan.   Impression:   1.  Multiple sclerosis  2.    Labs Reviewed  COMPREHENSIVE METABOLIC PANEL - Abnormal     Glucose                       80                     Sodium                        141                    Potassium                     3.9                    Chloride                      104                    Bicarbonate                   29                     Anion Gap                     12                     Urea Nitrogen                 15                     Creatinine                    0.55                   eGFR                          >90                    Calcium                       8.6                    Albumin                       3.9                    Alkaline Phosphatase          86                     Total Protein                 6.1 (*)                AST                           9                      Bilirubin, Total              0.5                    ALT                           8                   MAGNESIUM - Normal     Magnesium                     2.10                LACTATE - Normal     Lactate                       0.7                      Narrative: Venipuncture immediately after or during the administration of Metamizole may lead to falsely low results. Testing should be performed immediately prior to Metamizole dosing.  PROTIME-INR - Normal      Protime                       11.6                   INR                           1.1                 B-TYPE NATRIURETIC PEPTIDE - Normal     BNP                           92                       Narrative:    <100 pg/mL - Heart failure unlikely                100-299 pg/mL - Intermediate probability of acute heart                                failure exacerbation. Correlate with clinical                                context and patient history.                  >=300 pg/mL - Heart Failure likely. Correlate with clinical                                context and patient history.                                BNP testing is performed using different testing methodology at [facility] than at other [facility]. Direct result comparisons should only be made within the same method.                   SERIAL TROPONIN-INITIAL - Normal     Troponin I, High Sensitivity   3                        Narrative: Less than 99th percentile of normal range cutoff-                Female and children under 18 years old <14 ng/L; Male <21 ng/L: Negative                Repeat testing should be performed if clinically indicated.                                 Female and children under 18 years old 14-50 ng/L; Male 21-50 ng/L:                Consistent with possible cardiac damage and possible increased clinical                 risk. Serial measurements may help to assess extent of myocardial damage.                                 >50 ng/L: Consistent with cardiac damage, increased clinical risk and                myocardial infarction. Serial measurements may help assess extent of                 myocardial damage.                                  NOTE: Children less than 1 year old may have higher baseline troponin                 levels and results should be interpreted in conjunction with the overall                 clinical context.                                 NOTE: Troponin I testing is performed using  a different                 testing methodology at [facility] than at other                 [facility]. Direct result comparisons should only                 be made within the same method.  CBC WITH AUTO DIFFERENTIAL     WBC                           6.2                    nRBC                          0.0                    RBC                           4.43                   Hemoglobin                    14.1                   Hematocrit                    41.7                   MCV                           94                     MCH                           31.8                   MCHC                          33.8                   RDW                           12.8                   Platelets                     233                    Neutrophils %                 65.6                   Immature Granulocytes %, Automated   0.3                    Lymphocytes %                 20.4                   Monocytes %                   11.2                   Eosinophils %                 1.5                    Basophils %                   1.0                    Neutrophils Absolute          4.06                   Immature Granulocytes Absolute, Au*   0.02                   Lymphocytes Absolute          1.26                   Monocytes Absolute            0.69                   Eosinophils Absolute          0.09                   Basophils Absolute            0.06                TROPONIN SERIES- (INITIAL, 1 HR)       Narrative: The following orders were created for panel order Troponin I Series, High Sensitivity (0, 1 HR).                Procedure                               Abnormality         Status                                   ---------                               -----------         ------                                   Troponin I, High Sensiti...[[id number]]  Normal              Final result                             Troponin, High Sensitivi...[[id number]]                                                                                Please view results for these tests on the individual orders.  SERIAL TROPONIN, 1 HOUR  URINALYSIS WITH REFLEX CULTURE AND MICROSCOPIC       Narrative: The following orders were created for panel order Urinalysis with Reflex Culture and Microscopic.                Procedure                               Abnormality         Status                                   ---------                               -----------         ------                                   Urinalysis with Reflex C...[[id number]]                                                               Extra Urine Gray Tube[[id number]]                                                                                     Please view results for these tests on the individual orders.  URINALYSIS WITH REFLEX CULTURE AND MICROSCOPIC  EXTRA URINE GRAY TUBE     CT head wo IV contrast   Final Result    No acute intracranial pathology.          MACRO:    None          Signed by: Alicia Sellers 7/4/2025 6:22 PM    Dictation workstation:   [id number]     XR chest 1 view   Final Result    No acute cardiopulmonary process.                MACRO:    None.          Signed by: Dano Coleman 7/4/2025 5:03 PM    Dictation workstation:   [id number]                    Patient History   Medical History[1]  Surgical History[2]  Family History[3]  Social History[4]    Physical Exam   ED Triage Vitals [07/04/25 1555]   Temperature Heart Rate Respirations BP   36.7 °C (98.1 °F) 54 18 132/60      Pulse Ox Temp Source Heart Rate Source Patient Position   99 % Skin Monitor Sitting      BP Location FiO2 (%)     Left arm --       Physical Exam      ED Course & MDM   Diagnoses as of 07/04/25 1847   Multiple sclerosis (Multi)                 No data recorded     [place] Coma Scale Score: 15 (07/04/25 1613 : Leigh Ann Foster LPN)                           Medical Decision Making      Procedure  Procedures       [1]   Past Medical  History:  Diagnosis Date    Multiple sclerosis (Multi)     History of multiple sclerosis   [2]   Past Surgical History:  Procedure Laterality Date    MR HEAD ANGIO WO IV CONTRAST  9/21/2020    MR HEAD ANGIO WO IV CONTRAST 9/21/2020 PAR EMERGENCY LEGACY    MR HEAD ANGIO WO IV CONTRAST  10/24/2022    MR HEAD ANGIO WO IV CONTRAST 10/24/2022 DOCTOR OFFICE LEGACY    MR NECK ANGIO WO IV CONTRAST  9/21/2020    MR NECK ANGIO WO IV CONTRAST 9/21/2020 PAR EMERGENCY LEGACY    MR NECK ANGIO WO IV CONTRAST  10/24/2022    MR NECK ANGIO WO IV CONTRAST 10/24/2022 DOCTOR OFFICE LEGACY    OTHER SURGICAL HISTORY  11/26/2019    Tonsillectomy    OTHER SURGICAL HISTORY  11/26/2019    Hysterectomy   [3] No family history on file.  [4]   Social History  Tobacco Use    Smoking status: Never    Smokeless tobacco: Never   Vaping Use    Vaping status: Never Used   Substance Use Topics    Alcohol use: Never    Drug use: Never        Michelle Packer MD  07/04/25 7962

## 2025-07-04 NOTE — ED TRIAGE NOTES
BIBA patient states that she MS and has her good and bad days of weakness, today is having a bad day, states yesterday had one episode of diarrhea, and noticed that her stool was darker than usual

## 2025-07-07 LAB
ATRIAL RATE: 55 BPM
P AXIS: 85 DEGREES
P OFFSET: 172 MS
P ONSET: 137 MS
PR INTERVAL: 170 MS
Q ONSET: 222 MS
QRS COUNT: 9 BEATS
QRS DURATION: 68 MS
QT INTERVAL: 456 MS
QTC CALCULATION(BAZETT): 436 MS
QTC FREDERICIA: 443 MS
R AXIS: 43 DEGREES
T AXIS: 62 DEGREES
T OFFSET: 450 MS
VENTRICULAR RATE: 55 BPM

## 2025-07-24 ENCOUNTER — APPOINTMENT (OUTPATIENT)
Dept: RADIOLOGY | Facility: HOSPITAL | Age: 70
DRG: 059 | End: 2025-07-24
Payer: MEDICARE

## 2025-07-24 ENCOUNTER — HOSPITAL ENCOUNTER (INPATIENT)
Facility: HOSPITAL | Age: 70
DRG: 059 | End: 2025-07-24
Attending: STUDENT IN AN ORGANIZED HEALTH CARE EDUCATION/TRAINING PROGRAM | Admitting: INTERNAL MEDICINE
Payer: MEDICARE

## 2025-07-24 ENCOUNTER — APPOINTMENT (OUTPATIENT)
Dept: CARDIOLOGY | Facility: HOSPITAL | Age: 70
DRG: 059 | End: 2025-07-24
Payer: MEDICARE

## 2025-07-24 DIAGNOSIS — R55 ATYPICAL SYNCOPE: Primary | ICD-10-CM

## 2025-07-24 DIAGNOSIS — G35 MULTIPLE SCLEROSIS, RELAPSING-REMITTING (MULTI): ICD-10-CM

## 2025-07-24 DIAGNOSIS — R60.0 BILATERAL LOWER EXTREMITY EDEMA: ICD-10-CM

## 2025-07-24 DIAGNOSIS — R55 POSTURAL DIZZINESS WITH PRESYNCOPE: ICD-10-CM

## 2025-07-24 DIAGNOSIS — M79.604 PAIN IN RIGHT LEG: ICD-10-CM

## 2025-07-24 DIAGNOSIS — R42 POSTURAL DIZZINESS WITH PRESYNCOPE: ICD-10-CM

## 2025-07-24 DIAGNOSIS — R56.9 SEIZURE (MULTI): ICD-10-CM

## 2025-07-24 LAB
ALBUMIN SERPL BCP-MCNC: 4.2 G/DL (ref 3.4–5)
ALP SERPL-CCNC: 92 U/L (ref 33–136)
ALT SERPL W P-5'-P-CCNC: 6 U/L (ref 7–45)
ANION GAP SERPL CALC-SCNC: 12 MMOL/L (ref 10–20)
APPEARANCE UR: CLEAR
AST SERPL W P-5'-P-CCNC: 9 U/L (ref 9–39)
BASOPHILS # BLD AUTO: 0.05 X10*3/UL (ref 0–0.1)
BASOPHILS NFR BLD AUTO: 1.1 %
BILIRUB SERPL-MCNC: 0.5 MG/DL (ref 0–1.2)
BILIRUB UR STRIP.AUTO-MCNC: NEGATIVE MG/DL
BUN SERPL-MCNC: 20 MG/DL (ref 6–23)
CALCIUM SERPL-MCNC: 9.1 MG/DL (ref 8.6–10.3)
CAOX CRY #/AREA UR COMP ASSIST: NORMAL /HPF
CARDIAC TROPONIN I PNL SERPL HS: 3 NG/L (ref 0–13)
CHLORIDE SERPL-SCNC: 105 MMOL/L (ref 98–107)
CO2 SERPL-SCNC: 28 MMOL/L (ref 21–32)
COLOR UR: ABNORMAL
CREAT SERPL-MCNC: 0.55 MG/DL (ref 0.5–1.05)
CRP SERPL-MCNC: <0.1 MG/DL
EGFRCR SERPLBLD CKD-EPI 2021: >90 ML/MIN/1.73M*2
EOSINOPHIL # BLD AUTO: 0.09 X10*3/UL (ref 0–0.7)
EOSINOPHIL NFR BLD AUTO: 2.1 %
ERYTHROCYTE [DISTWIDTH] IN BLOOD BY AUTOMATED COUNT: 12.8 % (ref 11.5–14.5)
ERYTHROCYTE [SEDIMENTATION RATE] IN BLOOD BY WESTERGREN METHOD: 1 MM/H (ref 0–30)
GLUCOSE SERPL-MCNC: 83 MG/DL (ref 74–99)
GLUCOSE UR STRIP.AUTO-MCNC: NORMAL MG/DL
HCT VFR BLD AUTO: 45.9 % (ref 36–46)
HGB BLD-MCNC: 14.9 G/DL (ref 12–16)
IMM GRANULOCYTES # BLD AUTO: 0.02 X10*3/UL (ref 0–0.7)
IMM GRANULOCYTES NFR BLD AUTO: 0.5 % (ref 0–0.9)
KETONES UR STRIP.AUTO-MCNC: ABNORMAL MG/DL
LACTATE SERPL-SCNC: 1.3 MMOL/L (ref 0.4–2)
LEUKOCYTE ESTERASE UR QL STRIP.AUTO: ABNORMAL
LYMPHOCYTES # BLD AUTO: 0.82 X10*3/UL (ref 1.2–4.8)
LYMPHOCYTES NFR BLD AUTO: 18.9 %
MAGNESIUM SERPL-MCNC: 2.06 MG/DL (ref 1.6–2.4)
MCH RBC QN AUTO: 31 PG (ref 26–34)
MCHC RBC AUTO-ENTMCNC: 32.5 G/DL (ref 32–36)
MCV RBC AUTO: 96 FL (ref 80–100)
MONOCYTES # BLD AUTO: 0.49 X10*3/UL (ref 0.1–1)
MONOCYTES NFR BLD AUTO: 11.3 %
MUCOUS THREADS #/AREA URNS AUTO: NORMAL /LPF
NEUTROPHILS # BLD AUTO: 2.88 X10*3/UL (ref 1.2–7.7)
NEUTROPHILS NFR BLD AUTO: 66.1 %
NITRITE UR QL STRIP.AUTO: NEGATIVE
NRBC BLD-RTO: 0 /100 WBCS (ref 0–0)
PH UR STRIP.AUTO: 5.5 [PH]
PLATELET # BLD AUTO: 242 X10*3/UL (ref 150–450)
POTASSIUM SERPL-SCNC: 4 MMOL/L (ref 3.5–5.3)
PROT SERPL-MCNC: 6.8 G/DL (ref 6.4–8.2)
PROT UR STRIP.AUTO-MCNC: NEGATIVE MG/DL
RBC # BLD AUTO: 4.8 X10*6/UL (ref 4–5.2)
RBC # UR STRIP.AUTO: NEGATIVE MG/DL
RBC #/AREA URNS AUTO: NORMAL /HPF
SODIUM SERPL-SCNC: 141 MMOL/L (ref 136–145)
SP GR UR STRIP.AUTO: 1.02
SQUAMOUS #/AREA URNS AUTO: NORMAL /HPF
UROBILINOGEN UR STRIP.AUTO-MCNC: NORMAL MG/DL
WBC # BLD AUTO: 4.4 X10*3/UL (ref 4.4–11.3)
WBC #/AREA URNS AUTO: NORMAL /HPF

## 2025-07-24 PROCEDURE — 81001 URINALYSIS AUTO W/SCOPE: CPT

## 2025-07-24 PROCEDURE — 71046 X-RAY EXAM CHEST 2 VIEWS: CPT | Performed by: RADIOLOGY

## 2025-07-24 PROCEDURE — 87086 URINE CULTURE/COLONY COUNT: CPT | Mod: PARLAB

## 2025-07-24 PROCEDURE — 85025 COMPLETE CBC W/AUTO DIFF WBC: CPT

## 2025-07-24 PROCEDURE — 85652 RBC SED RATE AUTOMATED: CPT

## 2025-07-24 PROCEDURE — 83735 ASSAY OF MAGNESIUM: CPT

## 2025-07-24 PROCEDURE — 36415 COLL VENOUS BLD VENIPUNCTURE: CPT

## 2025-07-24 PROCEDURE — 80053 COMPREHEN METABOLIC PANEL: CPT

## 2025-07-24 PROCEDURE — 2500000005 HC RX 250 GENERAL PHARMACY W/O HCPCS

## 2025-07-24 PROCEDURE — 84484 ASSAY OF TROPONIN QUANT: CPT

## 2025-07-24 PROCEDURE — 71046 X-RAY EXAM CHEST 2 VIEWS: CPT

## 2025-07-24 PROCEDURE — 2500000001 HC RX 250 WO HCPCS SELF ADMINISTERED DRUGS (ALT 637 FOR MEDICARE OP)

## 2025-07-24 PROCEDURE — 99285 EMERGENCY DEPT VISIT HI MDM: CPT | Mod: 25 | Performed by: STUDENT IN AN ORGANIZED HEALTH CARE EDUCATION/TRAINING PROGRAM

## 2025-07-24 PROCEDURE — G0378 HOSPITAL OBSERVATION PER HR: HCPCS

## 2025-07-24 PROCEDURE — 93005 ELECTROCARDIOGRAM TRACING: CPT

## 2025-07-24 PROCEDURE — 86140 C-REACTIVE PROTEIN: CPT

## 2025-07-24 PROCEDURE — 83605 ASSAY OF LACTIC ACID: CPT

## 2025-07-24 PROCEDURE — 2500000004 HC RX 250 GENERAL PHARMACY W/ HCPCS (ALT 636 FOR OP/ED)

## 2025-07-24 RX ORDER — ASPIRIN 325 MG
325 TABLET ORAL EVERY 6 HOURS PRN
COMMUNITY

## 2025-07-24 RX ORDER — LEVETIRACETAM 500 MG/1
750 TABLET ORAL 2 TIMES DAILY
Status: DISCONTINUED | OUTPATIENT
Start: 2025-07-24 | End: 2025-07-29

## 2025-07-24 RX ORDER — ATORVASTATIN CALCIUM 40 MG/1
40 TABLET, FILM COATED ORAL DAILY
Status: DISCONTINUED | OUTPATIENT
Start: 2025-07-24 | End: 2025-07-29 | Stop reason: HOSPADM

## 2025-07-24 RX ORDER — TALC
3 POWDER (GRAM) TOPICAL NIGHTLY
Status: DISCONTINUED | OUTPATIENT
Start: 2025-07-24 | End: 2025-07-29 | Stop reason: HOSPADM

## 2025-07-24 RX ORDER — CEFAZOLIN SODIUM 2 G/50ML
2 SOLUTION INTRAVENOUS EVERY 8 HOURS
Status: DISCONTINUED | OUTPATIENT
Start: 2025-07-24 | End: 2025-07-29 | Stop reason: HOSPADM

## 2025-07-24 RX ORDER — ENOXAPARIN SODIUM 100 MG/ML
40 INJECTION SUBCUTANEOUS EVERY 24 HOURS
Status: DISCONTINUED | OUTPATIENT
Start: 2025-07-24 | End: 2025-07-29 | Stop reason: HOSPADM

## 2025-07-24 RX ORDER — ACETAMINOPHEN 325 MG/1
650 TABLET ORAL EVERY 4 HOURS PRN
Status: DISCONTINUED | OUTPATIENT
Start: 2025-07-24 | End: 2025-07-27

## 2025-07-24 RX ORDER — POLYETHYLENE GLYCOL 3350 17 G/17G
17 POWDER, FOR SOLUTION ORAL DAILY
Status: DISCONTINUED | OUTPATIENT
Start: 2025-07-24 | End: 2025-07-29 | Stop reason: HOSPADM

## 2025-07-24 RX ADMIN — CEFAZOLIN SODIUM 2 G: 2 SOLUTION INTRAVENOUS at 23:04

## 2025-07-24 RX ADMIN — Medication 3 MG: at 23:05

## 2025-07-24 RX ADMIN — LEVETIRACETAM 750 MG: 500 TABLET, FILM COATED ORAL at 23:05

## 2025-07-24 SDOH — ECONOMIC STABILITY: FOOD INSECURITY: WITHIN THE PAST 12 MONTHS, THE FOOD YOU BOUGHT JUST DIDN'T LAST AND YOU DIDN'T HAVE MONEY TO GET MORE.: NEVER TRUE

## 2025-07-24 SDOH — ECONOMIC STABILITY: INCOME INSECURITY: IN THE PAST 12 MONTHS HAS THE ELECTRIC, GAS, OIL, OR WATER COMPANY THREATENED TO SHUT OFF SERVICES IN YOUR HOME?: NO

## 2025-07-24 SDOH — SOCIAL STABILITY: SOCIAL INSECURITY: HAVE YOU HAD ANY THOUGHTS OF HARMING ANYONE ELSE?: NO

## 2025-07-24 SDOH — ECONOMIC STABILITY: FOOD INSECURITY: WITHIN THE PAST 12 MONTHS, YOU WORRIED THAT YOUR FOOD WOULD RUN OUT BEFORE YOU GOT THE MONEY TO BUY MORE.: NEVER TRUE

## 2025-07-24 SDOH — SOCIAL STABILITY: SOCIAL INSECURITY: HAVE YOU HAD THOUGHTS OF HARMING ANYONE ELSE?: NO

## 2025-07-24 SDOH — SOCIAL STABILITY: SOCIAL INSECURITY: WITHIN THE LAST YEAR, HAVE YOU BEEN HUMILIATED OR EMOTIONALLY ABUSED IN OTHER WAYS BY YOUR PARTNER OR EX-PARTNER?: NO

## 2025-07-24 SDOH — SOCIAL STABILITY: SOCIAL INSECURITY: ABUSE: ADULT

## 2025-07-24 SDOH — SOCIAL STABILITY: SOCIAL INSECURITY: DO YOU FEEL UNSAFE GOING BACK TO THE PLACE WHERE YOU ARE LIVING?: NO

## 2025-07-24 SDOH — SOCIAL STABILITY: SOCIAL INSECURITY: WERE YOU ABLE TO COMPLETE ALL THE BEHAVIORAL HEALTH SCREENINGS?: YES

## 2025-07-24 SDOH — SOCIAL STABILITY: SOCIAL INSECURITY: WITHIN THE LAST YEAR, HAVE YOU BEEN AFRAID OF YOUR PARTNER OR EX-PARTNER?: NO

## 2025-07-24 SDOH — SOCIAL STABILITY: SOCIAL INSECURITY: DOES ANYONE TRY TO KEEP YOU FROM HAVING/CONTACTING OTHER FRIENDS OR DOING THINGS OUTSIDE YOUR HOME?: NO

## 2025-07-24 SDOH — SOCIAL STABILITY: SOCIAL INSECURITY: HAS ANYONE EVER THREATENED TO HURT YOUR FAMILY OR YOUR PETS?: NO

## 2025-07-24 SDOH — SOCIAL STABILITY: SOCIAL INSECURITY: ARE YOU OR HAVE YOU BEEN THREATENED OR ABUSED PHYSICALLY, EMOTIONALLY, OR SEXUALLY BY ANYONE?: NO

## 2025-07-24 SDOH — SOCIAL STABILITY: SOCIAL INSECURITY: ARE THERE ANY APPARENT SIGNS OF INJURIES/BEHAVIORS THAT COULD BE RELATED TO ABUSE/NEGLECT?: NO

## 2025-07-24 SDOH — SOCIAL STABILITY: SOCIAL INSECURITY: DO YOU FEEL ANYONE HAS EXPLOITED OR TAKEN ADVANTAGE OF YOU FINANCIALLY OR OF YOUR PERSONAL PROPERTY?: NO

## 2025-07-24 ASSESSMENT — COGNITIVE AND FUNCTIONAL STATUS - GENERAL
MOBILITY SCORE: 19
TOILETING: A LITTLE
DRESSING REGULAR LOWER BODY CLOTHING: A LITTLE
CLIMB 3 TO 5 STEPS WITH RAILING: A LOT
STANDING UP FROM CHAIR USING ARMS: A LITTLE
WALKING IN HOSPITAL ROOM: A LITTLE
PATIENT BASELINE BEDBOUND: NO
DAILY ACTIVITIY SCORE: 22
MOVING TO AND FROM BED TO CHAIR: A LITTLE

## 2025-07-24 ASSESSMENT — LIFESTYLE VARIABLES
HOW OFTEN DO YOU HAVE 6 OR MORE DRINKS ON ONE OCCASION: NEVER
SKIP TO QUESTIONS 9-10: 1
HOW OFTEN DO YOU HAVE A DRINK CONTAINING ALCOHOL: NEVER
AUDIT-C TOTAL SCORE: 0
HOW MANY STANDARD DRINKS CONTAINING ALCOHOL DO YOU HAVE ON A TYPICAL DAY: PATIENT DOES NOT DRINK
AUDIT-C TOTAL SCORE: 0

## 2025-07-24 ASSESSMENT — PAIN SCALES - GENERAL: PAINLEVEL_OUTOF10: 0 - NO PAIN

## 2025-07-24 ASSESSMENT — PATIENT HEALTH QUESTIONNAIRE - PHQ9
1. LITTLE INTEREST OR PLEASURE IN DOING THINGS: NOT AT ALL
SUM OF ALL RESPONSES TO PHQ9 QUESTIONS 1 & 2: 0
2. FEELING DOWN, DEPRESSED OR HOPELESS: NOT AT ALL

## 2025-07-24 ASSESSMENT — PAIN - FUNCTIONAL ASSESSMENT: PAIN_FUNCTIONAL_ASSESSMENT: 0-10

## 2025-07-24 ASSESSMENT — ACTIVITIES OF DAILY LIVING (ADL)
JUDGMENT_ADEQUATE_SAFELY_COMPLETE_DAILY_ACTIVITIES: YES
ADEQUATE_TO_COMPLETE_ADL: YES
GROOMING: INDEPENDENT
FEEDING YOURSELF: INDEPENDENT
BATHING: INDEPENDENT
WALKS IN HOME: NEEDS ASSISTANCE
TOILETING: NEEDS ASSISTANCE
PATIENT'S MEMORY ADEQUATE TO SAFELY COMPLETE DAILY ACTIVITIES?: YES
LACK_OF_TRANSPORTATION: NO
DRESSING YOURSELF: INDEPENDENT
HEARING - LEFT EAR: FUNCTIONAL
HEARING - RIGHT EAR: FUNCTIONAL

## 2025-07-24 NOTE — ED TRIAGE NOTES
Patient BIBA from home for dizziness and Syncope. Per patient report she has been feeling dizzy intermittently. She was attempting to have a BM and had a syncopal episode on the commode. Patient denies head injury or falling. Patient states when she arouse she was still sitting on commode. Patient  then called 911. Patient has a Hx: MS, and arthritis. She denies endorsing any other symptoms at this time.

## 2025-07-24 NOTE — PROGRESS NOTES
Pharmacy Medication History Review    Akiko Peng is a 69 y.o. female admitted for No Principal Problem: There is no principal problem currently on the Problem List. Please update the Problem List and refresh.. Pharmacy reviewed the patient's [id number] medications and allergies for accuracy.    The list below reflectives the updated PTA list. Please review each medication in order reconciliation for additional clarification and justification.  Prior to Admission medications   Medication Sig Start Date End Date Taking? Authorizing Provider   aspirin 325 mg tablet Take 1 tablet (325 mg) by mouth every 6 hours if needed for mild pain (1 - 3) or fever (temp greater than 38.0 C).   Yes Historical Provider, MD   levETIRAcetam (Keppra) 750 mg tablet Take 1 tablet (750 mg) by mouth 2 times a day. 4/28/25 7/24/25 Yes Michael Vergara DO   atorvastatin (Lipitor) 40 mg tablet Take 1 tablet (40 mg) by mouth once daily.  Patient not taking: Reported on 4/26/2025 10/11/24 2/27/25 no Talha Thapa DPM        The list below reflectives the updated allergy list. Please review each documented allergy for additional clarification and justification.  Allergies  Reviewed by Yolanda Galvez on 7/24/2025   No Known Allergies         Below are additional concerns with the patient's PTA list.    Yolanda Galvez

## 2025-07-24 NOTE — H&P
MEDICINE H&P     Subjective / History     Akiko Peng is a 69 y.o. female with a PMHx of mild MVR, HLD, MS, RA, seizures presents to [facility] ED on 7/24/2025 for an episode of dizziness.    Patient resting comfortably in ED bed.  States that at about 8 AM today she hood from bed to the bedside commode, had an unremarkable BM, stood up and suddenly felt lightheaded, braced herself against the wall as she walked back to her bed and sat down with resolution of lightheadedness.  Recently, has been having overall decrease in water intake.  She denies associated LOC, fall, head trauma, prodrome (numbness/tingling, nausea, rush of warmth sensation), diaphoresis, vision changes, CP, dyspnea, recent respiratory distress, blood in the toilet bowl, abdominal pain, other bleeding, recent weight changes.  Does say that she has chronic appetite suppression.  Over the past week, has been experiencing burning with urination including this morning.  Currently, wearing depends diaper, denies recent urinary incontinence.  Does state that she has new BLE edema this month, new RLE redness.  Denies orthopnea, decreased UOP.  No family history of sudden cardiac death.  Compliant on home meds of Lipitor and Keppra, denies recent seizures.    Last echo (Limited) on 5/15/2024 with normal EF (60-65%), valves were not assessed.  Echo prior to that on 2/10/2024 showed same EF, mild AVR.    Admitted to [facility] earlier this year for cellulitis and UTI, VS and labs including UA were unremarkable at that time, CTA with runoff without occlusion, was given IV Ancef inpatient, was discharged home with Augmentin same day.    ED Course:   VS: 115/58, 59 bpm, RR 18, SpO2 100% on RA, afebrile.  ECG: NSR at 60 bpm.  Labs: CBC with differential, CMP, magnesium, troponin (3) WNL.  Imaging: CXR without acute cardiopulmonary process.  Patient to be admitted for evaluation of syncope.    A 10 point ROS was performed with the patient denying any complaint at  this time aside from those listed in the HPI above.     Past Medical History  Medical History[1]   Past Surgical History  Surgical History[2]  Family History   Family History[3]  Social History  Never tobacco smoker, occasional alcohol use.  Allergies  RX Allergies[4]  Code Status  DNR and No Intubation and No ICU     Assessment / Plan     Akiko Peng is a 69 y.o. female with a PMHx of mild MVR, HLD, MS, RA, seizures presents to [facility] ED on 7/24/2025 for an episode of dizziness. Admitted for observation overnight.    Orthostatic presyncope, suspected  BLE edema  Orthostatic VS, message physician provider results  Telemetry for continuous cardiac monitoring, echocardiogram to evaluate for possible cardiac component I/S/O  Encourage PO hydration    RLE cellulitis, suspected  ABX: 2 g IV cefazolin q8h, de-escalate appropriately  Inflammatory markers ordered  BLE US to rule out DVT    Dysuria  Obtain and FU on UA with reflex to microscopy & UCX    Chronic medical conditions:  HLD - continue home Lipitor  MS  RA  Seizures - continue home Keppra    Checklist:  Antimicrobials: Ancef  Feeding: Regular  DVT ppx: Lovenox  Glycemic control: Accu-Cheks  Bowel care: MiraLAX  Lines: PIV  Consults: -  Code Status: DNR/DNI no ICU    Dr. Martín Conrad, DO  PGY-3, Internal Medicine    This is a preliminary note, please await attending attestation for final recommendations     Objective (Vitals, labs, radiological imaging, cardiac work up were personally reviewed)     Physical Exam  GENERAL: Awake/alert, cooperative, euthymic.  EYES: Round and reactive. Anicteric.  ENT: No nasal discharge, mucous membranes dry.  NECK: Atraumatic, no JVD.  CARDIOVASCULAR: HRRR without noticeable murmurs, rubs, gallops.  RESPIRATORY: CTAB, no respiratory distress on RA.  ABDOMEN: Soft, no TTP, ND, nonperitoneal, hypoactive bowel sounds.  EXTREMITIES: +1 BLE pitting edema knee down, noticeable erythema on RLE.  + TTP to dorsum of right foot.   "Peripheral pulses intact.  No calf tenderness bilaterally.  SKIN: Warm and dry. No tenting. Erythema of right foot.  NEUROLOGICAL: Nonfocal grossly. A&O x 4. CN II-XII grossly intact.    Last Recorded Vitals  Vitals:    07/24/25 1316 07/24/25 1430 07/24/25 1700   BP: 115/58 136/62 132/63   BP Location: Right arm     Patient Position: Lying     Pulse: 59 53 52   Resp: 18 19 20   Temp: 36.1 °C (97 °F)     TempSrc: Temporal     SpO2: 100% 95% 100%   Weight: 65.3 kg (144 lb)     Height: 1.778 m (5' 10\")            [1]   Past Medical History:  Diagnosis Date    Multiple sclerosis (Multi)     History of multiple sclerosis   [2]   Past Surgical History:  Procedure Laterality Date    MR HEAD ANGIO WO IV CONTRAST  9/21/2020    MR HEAD ANGIO WO IV CONTRAST 9/21/2020 PAR EMERGENCY LEGACY    MR HEAD ANGIO WO IV CONTRAST  10/24/2022    MR HEAD ANGIO WO IV CONTRAST 10/24/2022 DOCTOR OFFICE LEGACY    MR NECK ANGIO WO IV CONTRAST  9/21/2020    MR NECK ANGIO WO IV CONTRAST 9/21/2020 PAR EMERGENCY LEGACY    MR NECK ANGIO WO IV CONTRAST  10/24/2022    MR NECK ANGIO WO IV CONTRAST 10/24/2022 DOCTOR OFFICE LEGACY    OTHER SURGICAL HISTORY  11/26/2019    Tonsillectomy    OTHER SURGICAL HISTORY  11/26/2019    Hysterectomy   [3] No family history on file.  [4] No Known Allergies    "

## 2025-07-24 NOTE — ED PROVIDER NOTES
History of Present Illness     History provided by: Patient  Limitations to History: None    HPI:  Akiko Peng is a 69 y.o. female with a past medical history of MS.  She presented via EMS for intermittent episodes of dizziness and syncope, patient was urinating and had a syncopal episode on the commode.  She denies hitting her head and was still sitting on the commode when she came to.  Patient states she does not know how long she was passed out for, states that she had no preceding symptoms including chest pain or shortness of breath, no vision changes numbness or tingling.  patient states that she currently feels well and at baseline, she denies any tongue biting or pain in her upper and lower extremities.  She states that she is not currently following with anyone from neurology regarding her MS.     Physical Exam   Triage vitals:  T 36.1 °C (97 °F)  HR 59  /58  RR 18  O2 100 % None (Room air)    General: Awake, alert, in no acute distress  Eyes: Gaze conjugate.  No scleral icterus or injection  HENT: Normo-cephalic, atraumatic. No stridor  CV: Regular rate, regular rhythm. Radial pulses 2+ bilaterally  Resp: Breathing non-labored, speaking in full sentences.  Clear to auscultation bilaterally  GI: Soft, non-distended, non-tender. No rebound or guarding.  MSK/Extremities: No gross bony deformities. Moving all extremities  Skin: Warm. Appropriate color  Neuro: Alert. Oriented. Face symmetric. Speech is fluent  Psych: Appropriate mood and affect    Medical Decision Making & ED Course   Medical Decision Makin y.o. female presenting with episode of syncope. Triage vital signs reviewed and patient is hemodynamically stable at this time. Differential includes but is not limited to cardiogenic syncope, arrhythmias, ischemia, infection, anemia.  Did consider PE given syncope, patient has no shortness of breath no tachycardia, no prior DVTs or PE.  Chest x-ray was obtained with no acute cardiopulmonary  abnormalities, lab work with no leukocytosis, no electrolyte abnormalities.  Troponin is 3.  Patient last had an echo in 5/16/2024 with normal LVEF. Complete TTE deficit obtained 2/2024 showed mild aortic valve regurgitation.  EKG obtained was normal sinus rhythm, rate of 60,  QTc 434.  No ST elevation or depression.  Discussed with patient, decided to admit to medicine for echo given atypical syncopal episode.    ED Course:  Diagnoses as of 07/24/25 1708   Atypical syncope         Independent Result Review and Interpretation: Relevant laboratory and radiographic results were reviewed and independently interpreted by myself.  As necessary, they are commented on in the ED Course.    Care Considerations: As documented above in MDM      Disposition   As a result of their workup, the patient will require admission to the hospital.  The patient was informed of her diagnosis.  The patient was given the opportunity to ask questions and I answered them. The patient agreed to be admitted to the hospital.    Procedures   Procedures    Patient seen and discussed with ED attending physician.    Liss Plata DO  Emergency Medicine     Liss Plata DO  Resident  07/24/25 2139

## 2025-07-25 ENCOUNTER — APPOINTMENT (OUTPATIENT)
Dept: RADIOLOGY | Facility: HOSPITAL | Age: 70
DRG: 059 | End: 2025-07-25
Payer: MEDICARE

## 2025-07-25 ENCOUNTER — APPOINTMENT (OUTPATIENT)
Dept: VASCULAR MEDICINE | Facility: HOSPITAL | Age: 70
DRG: 059 | End: 2025-07-25
Payer: MEDICARE

## 2025-07-25 ENCOUNTER — APPOINTMENT (OUTPATIENT)
Dept: CARDIOLOGY | Facility: HOSPITAL | Age: 70
DRG: 059 | End: 2025-07-25
Payer: MEDICARE

## 2025-07-25 PROBLEM — R20.2 PARESTHESIA OF RIGHT ARM AND LEG: Status: ACTIVE | Noted: 2025-07-25

## 2025-07-25 LAB
ANION GAP SERPL CALC-SCNC: 12 MMOL/L (ref 10–20)
AORTIC VALVE MEAN GRADIENT: 4 MMHG
AORTIC VALVE PEAK VELOCITY: 1.42 M/S
ATRIAL RATE: 60 BPM
AV PEAK GRADIENT: 8 MMHG
AVA (PEAK VEL): 2.23 CM2
AVA (VTI): 2.39 CM2
BUN SERPL-MCNC: 12 MG/DL (ref 6–23)
CALCIUM SERPL-MCNC: 8.8 MG/DL (ref 8.6–10.3)
CHLORIDE SERPL-SCNC: 108 MMOL/L (ref 98–107)
CO2 SERPL-SCNC: 27 MMOL/L (ref 21–32)
CREAT SERPL-MCNC: 0.58 MG/DL (ref 0.5–1.05)
EGFRCR SERPLBLD CKD-EPI 2021: >90 ML/MIN/1.73M*2
EJECTION FRACTION APICAL 4 CHAMBER: 62.9
EJECTION FRACTION: 55 %
ERYTHROCYTE [DISTWIDTH] IN BLOOD BY AUTOMATED COUNT: 12.9 % (ref 11.5–14.5)
GLUCOSE SERPL-MCNC: 89 MG/DL (ref 74–99)
HCT VFR BLD AUTO: 41.6 % (ref 36–46)
HGB BLD-MCNC: 14.2 G/DL (ref 12–16)
HOLD SPECIMEN: NORMAL
HOLD SPECIMEN: NORMAL
LEFT ATRIUM VOLUME AREA LENGTH INDEX BSA: 26.5 ML/M2
LEFT VENTRICLE INTERNAL DIMENSION DIASTOLE: 3.8 CM (ref 3.5–6)
LEFT VENTRICULAR OUTFLOW TRACT DIAMETER: 2 CM
LEVETIRACETAM SERPL-MCNC: 24 UG/ML (ref 10–40)
LEVETIRACETAM SERPL-MCNC: 29 UG/ML (ref 10–40)
MAGNESIUM SERPL-MCNC: 1.91 MG/DL (ref 1.6–2.4)
MCH RBC QN AUTO: 31.8 PG (ref 26–34)
MCHC RBC AUTO-ENTMCNC: 34.1 G/DL (ref 32–36)
MCV RBC AUTO: 93 FL (ref 80–100)
MITRAL VALVE E/A RATIO: 1.01
NRBC BLD-RTO: 0 /100 WBCS (ref 0–0)
P AXIS: 74 DEGREES
P OFFSET: 175 MS
P ONSET: 142 MS
PLATELET # BLD AUTO: 203 X10*3/UL (ref 150–450)
POTASSIUM SERPL-SCNC: 3.7 MMOL/L (ref 3.5–5.3)
PR INTERVAL: 156 MS
Q ONSET: 220 MS
QRS COUNT: 10 BEATS
QRS DURATION: 76 MS
QT INTERVAL: 434 MS
QTC CALCULATION(BAZETT): 434 MS
QTC FREDERICIA: 434 MS
R AXIS: 18 DEGREES
RBC # BLD AUTO: 4.47 X10*6/UL (ref 4–5.2)
RIGHT VENTRICLE FREE WALL PEAK S': 13.3 CM/S
SODIUM SERPL-SCNC: 143 MMOL/L (ref 136–145)
T AXIS: 54 DEGREES
T OFFSET: 437 MS
TRICUSPID ANNULAR PLANE SYSTOLIC EXCURSION: 1.8 CM
VENTRICULAR RATE: 60 BPM
WBC # BLD AUTO: 4.5 X10*3/UL (ref 4.4–11.3)

## 2025-07-25 PROCEDURE — 93306 TTE W/DOPPLER COMPLETE: CPT

## 2025-07-25 PROCEDURE — 93970 EXTREMITY STUDY: CPT | Performed by: INTERNAL MEDICINE

## 2025-07-25 PROCEDURE — 85027 COMPLETE CBC AUTOMATED: CPT

## 2025-07-25 PROCEDURE — 93306 TTE W/DOPPLER COMPLETE: CPT | Performed by: INTERNAL MEDICINE

## 2025-07-25 PROCEDURE — 2500000004 HC RX 250 GENERAL PHARMACY W/ HCPCS (ALT 636 FOR OP/ED)

## 2025-07-25 PROCEDURE — 93970 EXTREMITY STUDY: CPT

## 2025-07-25 PROCEDURE — 36415 COLL VENOUS BLD VENIPUNCTURE: CPT | Performed by: NURSE PRACTITIONER

## 2025-07-25 PROCEDURE — 70553 MRI BRAIN STEM W/O & W/DYE: CPT

## 2025-07-25 PROCEDURE — 99223 1ST HOSP IP/OBS HIGH 75: CPT | Performed by: NURSE PRACTITIONER

## 2025-07-25 PROCEDURE — 1200000002 HC GENERAL ROOM WITH TELEMETRY DAILY

## 2025-07-25 PROCEDURE — 80177 DRUG SCRN QUAN LEVETIRACETAM: CPT | Mod: PARLAB

## 2025-07-25 PROCEDURE — 70553 MRI BRAIN STEM W/O & W/DYE: CPT | Performed by: RADIOLOGY

## 2025-07-25 PROCEDURE — 70544 MR ANGIOGRAPHY HEAD W/O DYE: CPT

## 2025-07-25 PROCEDURE — 83735 ASSAY OF MAGNESIUM: CPT

## 2025-07-25 PROCEDURE — 70547 MR ANGIOGRAPHY NECK W/O DYE: CPT

## 2025-07-25 PROCEDURE — 97112 NEUROMUSCULAR REEDUCATION: CPT | Mod: GP

## 2025-07-25 PROCEDURE — 80048 BASIC METABOLIC PNL TOTAL CA: CPT

## 2025-07-25 PROCEDURE — 2550000001 HC RX 255 CONTRASTS: Performed by: INTERNAL MEDICINE

## 2025-07-25 PROCEDURE — 70544 MR ANGIOGRAPHY HEAD W/O DYE: CPT | Performed by: RADIOLOGY

## 2025-07-25 PROCEDURE — 2500000005 HC RX 250 GENERAL PHARMACY W/O HCPCS

## 2025-07-25 PROCEDURE — 36415 COLL VENOUS BLD VENIPUNCTURE: CPT

## 2025-07-25 PROCEDURE — 97165 OT EVAL LOW COMPLEX 30 MIN: CPT | Mod: GO

## 2025-07-25 PROCEDURE — 80177 DRUG SCRN QUAN LEVETIRACETAM: CPT | Mod: PARLAB | Performed by: NURSE PRACTITIONER

## 2025-07-25 PROCEDURE — 70547 MR ANGIOGRAPHY NECK W/O DYE: CPT | Performed by: RADIOLOGY

## 2025-07-25 PROCEDURE — A9575 INJ GADOTERATE MEGLUMI 0.1ML: HCPCS | Performed by: INTERNAL MEDICINE

## 2025-07-25 PROCEDURE — 2500000001 HC RX 250 WO HCPCS SELF ADMINISTERED DRUGS (ALT 637 FOR MEDICARE OP)

## 2025-07-25 RX ORDER — GADOTERATE MEGLUMINE 376.9 MG/ML
13 INJECTION INTRAVENOUS
Status: COMPLETED | OUTPATIENT
Start: 2025-07-25 | End: 2025-07-25

## 2025-07-25 RX ADMIN — GADOTERATE MEGLUMINE 13 ML: 376.9 INJECTION INTRAVENOUS at 20:23

## 2025-07-25 RX ADMIN — LEVETIRACETAM 750 MG: 500 TABLET, FILM COATED ORAL at 20:35

## 2025-07-25 RX ADMIN — Medication 3 MG: at 20:35

## 2025-07-25 RX ADMIN — CEFAZOLIN SODIUM 2 G: 2 SOLUTION INTRAVENOUS at 07:02

## 2025-07-25 RX ADMIN — LEVETIRACETAM 750 MG: 500 TABLET, FILM COATED ORAL at 10:03

## 2025-07-25 RX ADMIN — ENOXAPARIN SODIUM 40 MG: 100 INJECTION SUBCUTANEOUS at 19:28

## 2025-07-25 RX ADMIN — CEFAZOLIN SODIUM 2 G: 2 SOLUTION INTRAVENOUS at 22:46

## 2025-07-25 RX ADMIN — ATORVASTATIN CALCIUM 40 MG: 40 TABLET, FILM COATED ORAL at 10:03

## 2025-07-25 RX ADMIN — CEFAZOLIN SODIUM 2 G: 2 SOLUTION INTRAVENOUS at 15:51

## 2025-07-25 RX ADMIN — POLYETHYLENE GLYCOL 3350 17 G: 17 POWDER, FOR SOLUTION ORAL at 10:03

## 2025-07-25 ASSESSMENT — PAIN SCALES - GENERAL
PAINLEVEL_OUTOF10: 0 - NO PAIN
PAINLEVEL_OUTOF10: 7
PAINLEVEL_OUTOF10: 0 - NO PAIN
PAINLEVEL_OUTOF10: 7

## 2025-07-25 ASSESSMENT — COGNITIVE AND FUNCTIONAL STATUS - GENERAL
DRESSING REGULAR LOWER BODY CLOTHING: A LITTLE
TOILETING: A LITTLE
MOBILITY SCORE: 19
PERSONAL GROOMING: A LOT
MOVING TO AND FROM BED TO CHAIR: A LOT
DRESSING REGULAR LOWER BODY CLOTHING: TOTAL
MOVING FROM LYING ON BACK TO SITTING ON SIDE OF FLAT BED WITH BEDRAILS: A LOT
STANDING UP FROM CHAIR USING ARMS: A LOT
DAILY ACTIVITIY SCORE: 22
TOILETING: A LOT
WALKING IN HOSPITAL ROOM: TOTAL
CLIMB 3 TO 5 STEPS WITH RAILING: TOTAL
WALKING IN HOSPITAL ROOM: A LITTLE
DRESSING REGULAR UPPER BODY CLOTHING: A LOT
MOVING TO AND FROM BED TO CHAIR: A LITTLE
HELP NEEDED FOR BATHING: A LOT
MOBILITY SCORE: 10
TURNING FROM BACK TO SIDE WHILE IN FLAT BAD: A LOT
STANDING UP FROM CHAIR USING ARMS: A LITTLE
CLIMB 3 TO 5 STEPS WITH RAILING: A LOT
DAILY ACTIVITIY SCORE: 13

## 2025-07-25 ASSESSMENT — ACTIVITIES OF DAILY LIVING (ADL): ADLS_ADDRESSED: YES

## 2025-07-25 ASSESSMENT — PAIN - FUNCTIONAL ASSESSMENT
PAIN_FUNCTIONAL_ASSESSMENT: 0-10

## 2025-07-25 NOTE — PROGRESS NOTES
07/25/25 1428   Discharge Planning   Living Arrangements Spouse/significant other   Support Systems Spouse/significant other;Children   Assistance Needed none   Type of Residence Private residence   Number of Stairs to Enter Residence 0   Who is requesting discharge planning? Provider   Home or Post Acute Services None   Expected Discharge Disposition Home   Does the patient need discharge transport arranged? No     TCC Note: Met with pt at bedside, introduced self. Pt is Medicare OBS. PT AMPAC score is 10, Explained to pt that Medicare will not pay for SNF due to her being in OBS. Asked pt if she is interested in HHC. Provided HHC list per [facility], which includes agencies that are within  Post- Acute Quality network as well as meeting patient's medical needs and are in-network for patient's insurance while also in discharge geographic are patient/family prefers. List identifies each agencies CMS star rating. Pt stated to me that she does not know if she wants HHC or not and wants to 'think about it'. Pt lives with  and she states that her daughter stays there occasionally. States they have a chair lift to get into the home. ER contact is , Mikie Campbell phone: [phone]. Pt states that she is IPTA, family cooks and cleans and either  drives or she uses [employer]. Had fall on day of admission but tells me that she does not remember. Uses can. Pharmacy is [facility] in [place]. Family will transport home at time of discharge. Namrata Peacock, MSN, RN, TCC.

## 2025-07-25 NOTE — PROGRESS NOTES
Occupational Therapy    Evaluation    Patient Name: Akiko Peng  MRN: [id number]  Department: [facility]  Room: [address] [place] [address]  Today's Date: 7/25/2025  Time Calculation  Start Time: 1104  Stop Time: 1142  Time Calculation (min): 38 min    Assessment  IP OT Assessment  OT Assessment: Pt demonstrates a decline in adl/functional mobility. Recommend  high  intensity OT tx intervention 5x/week. Good prognosis for goal attainment  Barriers to Discharge Home: Physical needs  End of Session Communication: Bedside nurse  End of Session Patient Position: Up in chair, Alarm on  Plan:  Treatment Interventions: ADL retraining, Functional transfer training  OT Frequency: 5 times per week  OT Discharge Recommendations: High intensity level of continued care (Pt can benefit from OT tx intervention 5x/week once discharged from acute care hospitalization and discharged to rehab facility)  OT - OK to Discharge: Yes (once medically cleared)    Subjective   Current Problem:  1. Postural dizziness with presyncope  Transthoracic Echo Complete    Transthoracic Echo Complete      2. Atypical syncope        3. Bilateral lower extremity edema  Lower extremity venous duplex bilateral    Lower extremity venous duplex bilateral      4. Pain in right leg  Lower extremity venous duplex bilateral        OT Visit Info:  OT Received On: 07/25/25  General Visit Info:  General  Reason for Referral: OT eval/tx/ impaired functional daily living skills  Referred By: Dr. Craig  Co-Treatment: PT  Co-Treatment Reason: maximize pt safety  Prior to Session Communication: Bedside nurse  Patient Position Received: Bed, 2 rail up  General Comment: Pt agreeable to OT tx intervention,69 year old female admit with c/o of dizziness, syncope where she passed out on the commode. History of Multiple Sclerosis. Syncope  Precautions:  Precautions Comment: fall,Weakess due to MS on right side.           Pain:  Pain Assessment  Pain Assessment: 0-10  0-10 (Numeric) Pain Score:  7  Pain Location: Arm  Pain Orientation: Right  Pain Interventions: Ambulation/increased activity  Response to Interventions: No change in pain    Objective   Cognition:  Overall Cognitive Status: Impaired  Orientation Level: Disoriented to time           Home Living:  Home Living Comments: Patient lives (wheelchair bound) with  who uses a power w/c to get around. Patient uses manual w/c. Son and daughter come by often to assist prn.Pt lives with  ( wheelechair bound) Pt has a stair lift in back of home to access home. Pt has a bed/bath 1st floor with a tub/shower combination witha  bench/raila nd  [facility] . Pt states that she shetty snot use tub bench sponge bathes. Pt's dtr assists with hair washing in basin. Pt ha s elevation/rails for toliet. Pt sleesp in a standard bed. Pt's laundry in is the basement ( children complete). Pt no longer drives. Pt's  cooks and drives for groceries.Shared cleaning (primarily /children)   Prior Function:  Level of [place]: Independent with ADLs and functional transfers  Receives Help From: Family  Ambulatory Assistance: Needs assistance  Prior Function Comments: Patient reports she manages her own pivot transfers bed to w/c to toilet. Mobilizes in manual w/c, has stair lift in thru back door.  IADL History:   Per formed by /children  ADL:  Toileting Assistance with Device: Maximal  Toileting Deficit: Clothing management up, Clothing management down, Perineal hygiene, Increased time to complete (dependent with  periarea hygiene)  Functional Assistance: Maximal  ADL Comments: max a  for toliet ([facility])  Activity Tolerance:  Endurance: Tolerates 10 - 20 min exercise with multiple rests  Activity Tolerance Comments: Easily fatigued after effort to transfer BSC to chair and standing task.  Bed Mobility/Transfers:      Transfers  Transfer: Yes (Moderate to max assist x 1 for sit to stand from BSC and chair.)      Functional Mobility:   Unable to  "assist  Sitting Balance:   fair  Standing Balance:   Poor plus          Vision: Vision - Basic Assessment  Current Vision: No visual deficits  Sensation:  Light Touch:  (Pt describes altered sensation RUE)  Sensation Comment: Patient reports \"no feeling\" in right extremeties.  Strength:  Strength Comments: strength below elbow wfl  Perception:     Coordination:      Hand Function:  Hand Function  Gross Grasp: Functional  Extremities: RUE   RUE :  (shoulder elevation , no arom RUE shoulder flexion, below elbow wfl (although pt reports being unable to perform elbow flexion - but was observed to be wfl) hand withing functional limits) and      Outcome Measures: [facility] Daily Activity  Putting on and taking off regular lower body clothing: Total  Bathing (including washing, rinsing, drying): A lot  Putting on and taking off regular upper body clothing: A lot  Toileting, which includes using toilet, bedpan or urinal: A lot  Taking care of personal grooming such as brushing teeth: A lot (sink level)  Eating Meals: None  Daily Activity - Total Score: 13      Education Documentation  Precautions, taught by Mary Carmen Brandt OT at 7/25/2025  2:25 PM.  Learner: Patient  Readiness: Acceptance  Method: Demonstration  Response: Demonstrated Understanding, Needs Reinforcement    ADL Training, taught by Mary Carmen Brandt OT at 7/25/2025  2:25 PM.  Learner: Patient  Readiness: Acceptance  Method: Demonstration  Response: Demonstrated Understanding, Needs Reinforcement    Body Mechanics, taught by Mary Carmen Brandt OT at 7/25/2025  2:25 PM.  Learner: Patient  Readiness: Acceptance  Method: Demonstration  Response: Demonstrated Understanding, Needs Reinforcement    Precautions, taught by Mary Carmen Brandt OT at 7/25/2025  2:21 PM.  Learner: Patient  Readiness: Acceptance  Method: Demonstration  Response: Demonstrated Understanding, Needs Reinforcement    ADL Training, taught by Mary Carmen Brandt OT at 7/25/2025  2:21 PM.  Learner: " Patient  Readiness: Acceptance  Method: Demonstration  Response: Demonstrated Understanding, Needs Reinforcement    Body Mechanics, taught by Mary Carmen Brandt OT at 7/25/2025  2:21 PM.  Learner: Patient  Readiness: Acceptance  Method: Demonstration  Response: Demonstrated Understanding, Needs Reinforcement    Precautions, taught by Mary Carmen Brandt OT at 7/25/2025  2:18 PM.  Learner: Patient  Readiness: Acceptance  Method: Demonstration  Response: Demonstrated Understanding, Needs Reinforcement    ADL Training, taught by Mary Carmen Brandt OT at 7/25/2025  2:18 PM.  Learner: Patient  Readiness: Acceptance  Method: Demonstration  Response: Demonstrated Understanding, Needs Reinforcement    Body Mechanics, taught by Mary Carmen Brandt OT at 7/25/2025  2:18 PM.  Learner: Patient  Readiness: Acceptance  Method: Demonstration  Response: Demonstrated Understanding, Needs Reinforcement    Education Comments  No comments found.      Goals:   Encounter Problems       Encounter Problems (Active)       impaired functional living skills       Pt will increase Grooming to s/mod indep   Upper Body Bathing to sba \   Lower Body Bathing  to min a x 1-2 \   Increase Upper Body Dressing  to sba    LE Dressing to min a x 1-2 with/ without adaptive equipment as needed (Progressing)       Start:  07/25/25    Expected End:  08/08/25            Pt will increase Functional Transfers Bed Mobility to min a x 1-2    Sit to Stand  to min a x 1-2  with/without  a device   Min a x 1-2  chair/ commode  to increase indep/safety in patients discharge environment (Progressing)       Start:  07/25/25    Expected End:  08/08/25

## 2025-07-25 NOTE — PROGRESS NOTES
"Akiko Peng is a 69 y.o. female on day 0 of admission presenting with dizziness and right-sided weakness.    SUBJECTIVE     Patient evaluated this morning and found to be resting comfortably in in chair.  She endorses however improved right-sided weakness than her presentation.  She denies any episodes of dizziness at present.  OBJECTIVE     Vitals:    07/24/25 2125 07/25/25 0432 07/25/25 1001 07/25/25 1454   BP: 141/65 100/50 118/68 93/53   BP Location:  Right arm Right arm    Patient Position:  Lying Sitting    Pulse: 64 69 72 75   Resp: 17 20 18    Temp: 36.5 °C (97.7 °F) 35.9 °C (96.6 °F) 36.3 °C (97.3 °F)    TempSrc:  Temporal Temporal    SpO2: 98% 98% 98% 98%   Weight: 65.3 kg (143 lb 15.4 oz)      Height: 1.778 m (5' 10\")         Results from last 7 days   Lab Units 07/25/25  0734 07/24/25  1409   WBC AUTO x10*3/uL 4.5 4.4   HEMOGLOBIN g/dL 14.2 14.9   HEMATOCRIT % 41.6 45.9   PLATELETS AUTO x10*3/uL 203 242   NEUTROS PCT AUTO %  --  66.1   LYMPHS PCT AUTO %  --  18.9   MONOS PCT AUTO %  --  11.3   EOS PCT AUTO %  --  2.1     Results from last 7 days   Lab Units 07/25/25  0734 07/24/25  1409   SODIUM mmol/L 143 141   POTASSIUM mmol/L 3.7 4.0   CHLORIDE mmol/L 108* 105   CO2 mmol/L 27 28   BUN mg/dL 12 20   CREATININE mg/dL 0.58 0.55   CALCIUM mg/dL 8.8 9.1   PROTEIN TOTAL g/dL  --  6.8   BILIRUBIN TOTAL mg/dL  --  0.5   ALK PHOS U/L  --  92   ALT U/L  --  6*   AST U/L  --  9   GLUCOSE mg/dL 89 83       Scheduled Medications  [Scheduled Medications]     [Scheduled Medications]  atorvastatin, 40 mg, oral, Daily  ceFAZolin, 2 g, intravenous, q8h  enoxaparin, 40 mg, subcutaneous, q24h  levETIRAcetam, 750 mg, oral, BID  melatonin, 3 mg, oral, Nightly  polyethylene glycol, 17 g, oral, Daily    Physical Exam  Constitutional: well developed, awake, alert, no acute distress  ENMT: mucous membranes moist, EOMI, conjunctivae clear  Head/Neck: normocephalic, atraumatic; supple, trachea midline  Respiratory/Thorax: " patent airways, CTAB; no wheezes, rales, or rhonchi  Cardiovascular: RRR, no murmur  Gastrointestinal: soft, nondistended, non-tender, bowel sounds appreciated  Extremities: palpable peripheral pulses, no edema or cyanosis, strength 1/5 in right lower extremity, and 2/5 in left lower extremity  Neurological: AO x3, no focal deficits  Psychological: appropriate mood and behavior  Skin: warm and dry       ASSESSMENT & PLAN   Akiko Peng is a 69 y.o. female with a PMHx of mild MVR, HLD, MS, RA, seizures presents to [facility] ED on 7/24/2025 for an episode of dizziness. Admitted for observation overnight.   Daily Progress  Patient continues to make progress, reports improvement in the right-sided weakness, no episode of dizziness.    ASSESSMENT & PLAN      Multiple sclerosis flare versus TIA  Has a history of multiple sclerosis, reports weakness, blurring of the vision on and off.  On assessment patient's strength in the lower extremities is reduced.  Patient is currently not on  medications for her multiple sclerosis  Plan  Neurology consulted will appreciate recommendations.  Patient started on atorvastatin  MRI head without IV contrast, angio neck without IV contrast, brain with and without IV contrast scheduled, will plan treatment accordingly for multiple sclerosis flare.      RLE cellulitis, suspected  Noted right-sided foot erythema  Plan  ABX: 2 g IV cefazolin q8h, de-escalate appropriately      Checklist:  Antimicrobials: Ancef  Feeding: Regular  DVT ppx: Lovenox  Glycemic control: Accu-Cheks  Bowel care: MiraLAX  Lines: PIV  Consults: Neurology  Code Status: DNR/DNI no ICU      Shayy Swanson MD  PGY 1, Internal Medicine  Please SecureChat for any further questions  This is a preliminary note, please await attending attestation for final A/P

## 2025-07-25 NOTE — CARE PLAN
The patient's goals for the shift include  comfort.    The clinical goals for the shift include pt. will be up in chair for meals throughout the shift    Over the shift, the patient did not make progress toward the following goals. Barriers to progression include admission for dizziness and bilateral weakness/ edma. Recommendations to address these barriers include frequently hourly rounding, use of call light for assistance, up in chair for meals throughout the shift and elevation of bilat. Lower extremities.

## 2025-07-25 NOTE — PROGRESS NOTES
Physical Therapy      Physical Therapy Evaluation    Patient Name: Akiko Peng  MRN: [id number]  Today's Date: 7/25/2025   Time Calculation  Start Time: 1105  Stop Time: 1140  Time Calculation (min): 35 min  320/320-A    Assessment/Plan   PT Assessment  PT Assessment Results: Decreased strength, Decreased endurance, Decreased mobility, Decreased coordination, Impaired sensation, Decreased cognition  Rehab Prognosis: Good  Barriers to Discharge Home: Physical needs  Physical Needs: 24hr mobility assistance needed, 24hr ADL assistance needed  Evaluation/Treatment Tolerance: Patient limited by fatigue  Medical Staff Made Aware: Yes  Strengths: Living arrangement secure  Barriers to Participation: Comorbidities  End of Session Communication: Bedside nurse  Assessment Comment: Patient presents with decreased strength and functional mobility with all tasks. Patient would benefit from additional skilled therapy post acute stay to return to baseline function.  End of Session Patient Position: Up in chair, Alarm on  IP OR SWING BED PT PLAN  Inpatient or Swing Bed: Inpatient  PT Plan  PT Plan: Ongoing PT  PT Frequency: 3 times per week  PT Discharge Recommendations: High intensity level of continued care  PT Recommended Transfer Status: Assist x2  PT - OK to Discharge: Yes (When cleared by medical staff.)    Subjective     Current Problem:  1. Postural dizziness with presyncope  Transthoracic Echo Complete    Transthoracic Echo Complete      2. Atypical syncope        3. Bilateral lower extremity edema  Lower extremity venous duplex bilateral    Lower extremity venous duplex bilateral      4. Pain in right leg  Lower extremity venous duplex bilateral        Problem List[1]    General Visit Information:  General  Reason for Referral: PT Eval and Treat:Atypical syncope in setting of MS.  Referred By: Marcus Craig MD  Family/Caregiver Present: No  Co-Treatment: OT  Co-Treatment Reason: Maximize patient safety and  mobility  Prior to Session Communication: Bedside nurse  Patient Position Received: Up in bathroom, Alarm off, not on at start of session (BSC)  General Comment: 69 year old female admit with c/o of dizziness, syncope where she passed out on the commode. History of Multiple Sclerosis. Syncope    Home Living:  Home Living  Type of Home: House  Lives With: Spouse  Home Adaptive Equipment: Cane, Wheelchair-power, Wheelchair-manual, Adaptive bed  Home Layout: Two level, Laundry in basement, Full bath main level, Able to live on main level with bedroom/bathroom  Home Access: Other (Comment) (Stair lift in back)  Bathroom Shower/Tub: Tub only  Bathroom Toilet: Adaptive toilet seating  Bathroom Equipment: Grab bars in shower, Tub transfer bench, Grab bars around toilet  Home Living Comments: Patient lives with  who uses a power w/c to get around. Patient uses manual w/c. Son and daughter come by often to assist prn.    Prior Level of Function:  Prior Function Per Pt/Caregiver Report  Level of [place]: Independent with ADLs and functional transfers, Needs assistance with homemaking, Needs assistance with ADLs  Receives Help From: Family  Ambulatory Assistance: Needs assistance  Hand Dominance: Left  Prior Function Comments: Patient reports she manages her own pivot transfers bed to w/c to toilet. Mobilizes in manual w/c, has stair lift in thru back door.    Precautions:  Precautions  Medical Precautions: Fall precautions, Insensate limb precautions  Precautions Comment: Weakess due to MS on right isde.       Objective     Pain:  Pain Assessment  Pain Assessment: 0-10  0-10 (Numeric) Pain Score: 7  Pain Type: Acute pain  Pain Location: Arm  Pain Orientation: Right  Pain Interventions: Ambulation/increased activity  Response to Interventions: No change in pain    Cognition:  Cognition  Overall Cognitive Status: Impaired  Orientation Level: Disoriented to time    General Assessments:  General Observation  General  "Observation: Patient is questionable historian, sometimes gives conflicting information. Able to follow simple commands. Weak right side and decreased functional mobility.   Activity Tolerance  Endurance: Tolerates 10 - 20 min exercise with multiple rests  Activity Tolerance Comments: Easily fatigued after effort to transfer BSC to chair and standing task.  Sensation  Sensation Comment: Patient reports \"no feeling\" in right extremeties.  Strength  Strength Comments: Left UE and LE grossly 3/5. RUE grossly 2+/5, RLE grossly 2/5     Coordination  Heel to Shin: Impaired  Finger to Target: Impaired  Postural Control  Head Control: Difficulty holding head erect in unsupoorted chair.  Posture Comment: increased thoracokyphosis and forward head flex.  Static Sitting Balance  Static Sitting-Level of Assistance: Close supervision  Dynamic Sitting Balance  Dynamic Sitting-Level of Assistance: Minimum assistance  Static Standing Balance  Static Standing-Level of Assistance: Maximum assistance  Dynamic Standing Balance  Dynamic Standing-Level of Assistance: Maximum assistance  Dynamic Standing-Comments: Knees and hips remain in flexed position during stance.    Functional Assessments:  ADL  ADL's Addressed: Yes  Toileting Assistance with Device: Maximal  Toileting Deficit: Increased time to complete, Clothing management up, Clothing management down, Perineal hygiene  Functional Assistance: Maximal  ADL Comments: Max assist for toileting using BSC.     Transfers  Transfer: Yes (Moderate to max assist x 1 for sit to stand from BSC and chair.)  Ambulation/Gait Training  Ambulation/Gait Training Performed: No          Extremity/Trunk Assessments:  RUE   RUE : Within Functional Limits  LUE   LUE: Within Functional Limits  RLE   RLE : Within Functional Limits  LLE   LLE : Within Functional Limits    Outcome Measures:     [facility] Basic Mobility  Turning from your back to your side while in a flat bed without using bedrails: A " lot  Moving from lying on your back to sitting on the side of a flat bed without using bedrails: A lot  Moving to and from bed to chair (including a wheelchair): A lot  Standing up from a chair using your arms (e.g. wheelchair or bedside chair): A lot  To walk in hospital room: Total  Climbing 3-5 steps with railing: Total  Basic Mobility - Total Score: 10                                                             Goals:  Encounter Problems       Encounter Problems (Active)       Mobility       Goal 1 (Progressing)       Start:  07/25/25    Expected End:  08/01/25       STG - Pt will transition supine <> sitting with moderate assist x 1.          Goal 2 (Progressing)       Start:  07/25/25    Expected End:  08/01/25       STG - Pt will transfer STS with moderate assist x 1          Goal 3 (Progressing)       Start:  07/25/25    Expected End:  08/01/25       STG - Pt will SPT bed <> chair with moderate assist.               Education Documentation  Mobility Training, taught by Lady Chen, PT at 7/25/2025  1:45 PM.  Learner: Patient  Readiness: Acceptance  Method: Explanation  Response: Verbalizes Understanding, Demonstrated Understanding    Education Comments  Instructed in transfers using proper technique of positioning, hand placement and push off.              [1]   Patient Active Problem List  Diagnosis    TIA (transient ischemic attack)    Aphasia    Bradycardia    Right sided weakness    COVID-19    Cellulitis of right foot    Left upper quadrant abdominal pain    Multiple sclerosis, relapsing-remitting (Multi)    Rheumatoid arthritis    Seizure (Multi)    Paraparesis (Multi)    Atypical syncope

## 2025-07-25 NOTE — PROGRESS NOTES
Occupational Therapy    Evaluation    Patient Name: Akiko Peng  MRN: [id number]  Department: [facility]  Room: [address] [place] [address]  Today's Date: 7/25/2025  Time Calculation  Start Time: 1104  Stop Time: 1142  Time Calculation (min): 38 min    Assessment  IP OT Assessment  OT Assessment: Pt demonstrates a decline in adl/functional mobility. Recommend  mod  intensity OT tx intervention 3x/week. Good prognosis for goal attainment  Barriers to Discharge Home: Physical needs  End of Session Communication: Bedside nurse  End of Session Patient Position: Up in chair, Alarm on  Plan:  Treatment Interventions: ADL retraining, Functional transfer training  OT Frequency: 3 times per week  OT Discharge Recommendations: Moderate intensity level of continued care  OT - OK to Discharge: Yes (once medically cleared)    Subjective   Current Problem:  1. Postural dizziness with presyncope  Transthoracic Echo Complete    Transthoracic Echo Complete      2. Atypical syncope        3. Bilateral lower extremity edema  Lower extremity venous duplex bilateral    Lower extremity venous duplex bilateral      4. Pain in right leg  Lower extremity venous duplex bilateral        OT Visit Info:  OT Received On: 07/25/25  General Visit Info:  General  Reason for Referral: OT eval/tx/ impaired functional daily living skills  Referred By: Dr. Craig  Co-Treatment: PT  Co-Treatment Reason: maximize pt safety  Prior to Session Communication: Bedside nurse  Patient Position Received: Bed, 2 rail up  General Comment: Pt agreeable to OT tx intervention,69 year old female admit with c/o of dizziness, syncope where she passed out on the commode. History of Multiple Sclerosis. Syncope  Precautions:  Precautions Comment: fall,Weakess due to MS on right side.           Pain:  Pain Assessment  Pain Assessment: 0-10  0-10 (Numeric) Pain Score: 7  Pain Location: Arm  Pain Orientation: Right  Pain Interventions: Ambulation/increased activity  Response to Interventions: No  change in pain    Objective   Cognition:  Overall Cognitive Status: Impaired  Orientation Level: Disoriented to time           Home Living:  Home Living Comments: Patient lives (wheelchair bound) with  who uses a power w/c to get around. Patient uses manual w/c. Son and daughter come by often to assist prn.Pt lives with  ( wheelechair bound) Pt has a stair lift in back of home to access home. Pt has a bed/bath 1st floor with a tub/shower combination witha  bench/raila nd  HHS . Pt states that she shetty snot use tub bench sponge bathes. Pt's dtr assists with hair washing in basin. Pt ha s elevation/rails for toliet. Pt sleesp in a standard bed. Pt's laundry in is the basement ( children complete). Pt no longer drives. Pt's  cooks and drives for groceries.Shared cleaning (primarily /children)   Prior Function:  Level of [place]: Independent with ADLs and functional transfers  Receives Help From: Family  Ambulatory Assistance: Needs assistance  Prior Function Comments: Patient reports she manages her own pivot transfers bed to w/c to toilet. Mobilizes in manual w/c, has stair lift in thru back door.  IADL History:     ADL:  Toileting Assistance with Device: Maximal  Toileting Deficit: Clothing management up, Clothing management down, Perineal hygiene, Increased time to complete (dependent with  periarea hygiene)  Functional Assistance: Maximal  ADL Comments: max a  for toliet (BSC)  Activity Tolerance:  Endurance: Tolerates 10 - 20 min exercise with multiple rests  Activity Tolerance Comments: Easily fatigued after effort to transfer BSC to chair and standing task.  Bed Mobility/Transfers:      Transfers  Transfer: Yes (Moderate to max assist x 1 for sit to stand from BSC and chair.)      Functional Mobility:   Not assesses - stand pivot  Sitting Balance:   wfl  Standing Balance:   Poor functional standing balance       IADL's: completed by /children     Vision: Vision - Basic  "Assessment  Current Vision: No visual deficits  Sensation:  Light Touch:  (Pt describes altered sensation RUE)  Sensation Comment: Patient reports \"no feeling\" in right extremeties.  Strength:  Strength Comments: strength below elbow wfl  Perception:   wfl  Coordination:    wfl  Hand Function:  Hand Function  Gross Grasp: Functional  Extremities: RUE   RUE :  (shoulder elevation , no arom RUE shoulder flexion, below elbow wfl (although pt reports being unable to perform elbow flexion - but was observed to be wfl) hand withing functional limits) and      Outcome Measures: [facility] Daily Activity  Putting on and taking off regular lower body clothing: Total  Bathing (including washing, rinsing, drying): A lot  Putting on and taking off regular upper body clothing: A lot  Toileting, which includes using toilet, bedpan or urinal: A lot  Taking care of personal grooming such as brushing teeth: A lot (sink level)  Eating Meals: None  Daily Activity - Total Score: 13      Education Documentation  Precautions, taught by Mary Carmen Brandt OT at 7/25/2025  2:21 PM.  Learner: Patient  Readiness: Acceptance  Method: Demonstration  Response: Demonstrated Understanding, Needs Reinforcement    ADL Training, taught by Mary Carmen Brandt OT at 7/25/2025  2:21 PM.  Learner: Patient  Readiness: Acceptance  Method: Demonstration  Response: Demonstrated Understanding, Needs Reinforcement    Body Mechanics, taught by Mary Carmen Brandt OT at 7/25/2025  2:21 PM.  Learner: Patient  Readiness: Acceptance  Method: Demonstration  Response: Demonstrated Understanding, Needs Reinforcement    Precautions, taught by Mary Carmen Brandt OT at 7/25/2025  2:18 PM.  Learner: Patient  Readiness: Acceptance  Method: Demonstration  Response: Demonstrated Understanding, Needs Reinforcement    ADL Training, taught by Mary Carmen Brandt OT at 7/25/2025  2:18 PM.  Learner: Patient  Readiness: Acceptance  Method: Demonstration  Response: Demonstrated " Understanding, Needs Reinforcement    Body Mechanics, taught by Mary Carmen Brandt OT at 7/25/2025  2:18 PM.  Learner: Patient  Readiness: Acceptance  Method: Demonstration  Response: Demonstrated Understanding, Needs Reinforcement    Education Comments  No comments found.      Goals:   Encounter Problems       Encounter Problems (Active)       impaired functional living skills       Pt will increase Grooming to s/mod indep   Upper Body Bathing to sba     Lower Body Bathing  to min a x 1-2    Increase Upper Body Dressing  to sba     LE Dressing to min a x 1-2 with/ without adaptive equipment as needed (Progressing)       Start:  07/25/25    Expected End:  08/08/25            Pt will increase Functional Transfers Bed Mobility to min a x 1-2    Sit to Stand  to min a x 1-2  with/without  a device and min a x 1-2  chair/ commode  stand pivot to increase indep/safety in patients discharge environment (Progressing)       Start:  07/25/25    Expected End:  08/08/25

## 2025-07-25 NOTE — CONSULTS
Inpatient consult to Neurology  Consult performed by: TRUPTI Ng-CNP  Consult ordered by: Adams Craig MD          History Of Present Illness  Akiko Peng is a 69 y.o. female presenting with PMHx of mild MVR, HLD, MS, RA, seizures presents to [facility] ED on 7/24/2025 for an episode of dizziness.  *Conflicting information is currently being presented by patient to multiple members of medical staff regarding events leading to hospitalization. 1.) She initially told staff that at about 8 AM the day of admission she hood from bed to use the bedside commode, had an unremarkable BM, stood up and suddenly felt lightheaded, braced herself against the wall as she walked back to her bed and sat down with resolution of lightheadedness. She denied associated LOC, fall, head trauma, prodrome (numbness/tingling, nausea, rush of warmth sensation), diaphoresis, vision changes, CP, dyspnea, recent respiratory distress, blood in the toilet bowl, abdominal pain, other bleeding, recent weight changes.  Over the past week, she has been experiencing burning with urination including this morning.  Currently, wearing depends diaper, denies recent urinary incontinence. Does state that she has new BLE edema this month, new RLE redness.    2.) Her story then changed while working with physical therapy, as endorsed having a full syncopal episode while on the toilet post-BM.  3.)The story further changed while discussing HPI with the primary care team, noting that during this episode she experienced dizziness with associated paresthesias to her right extremities, to which she has experienced in the past with an MS exacerbation.    4.)  Lastly, her story then changed for writer of this note, with patient stating she got up to use her bedside commode, never experienced any episodes of lightheadedness, dizziness, vision changes, paresthesias, nausea, vomiting, passing out, or focal weakness and instead states that her right knee  commonly gives out as she is supposed to wear a supportive brace to this joint, and when she went to pivot from the commode back into the bed her joint buckled, thus causing her to tumble into the bed onto her right side.  She did not fall off of the bed/out of the bed, hit her head, denies LOC, and denies any focal weakness/paresthesias after this incident.  She states that aside from her right knee dysfunction, she typically is weaker on her right side versus her left from known MS, to which she is not on any medications for.  She endorses significant stress at home secondary to her physical debilities and the fact that her  is wheelchair-bound and she is essentially responsible for all of his care.  She does have a daughter that does assist with them intermittently but otherwise has no home health care for further assistance.  She does have a son that lives in [facility] that we will get involved if it is an emergency but otherwise due to distance he is seldom around.    Last echo (Limited) on 5/15/2024 with normal EF (60-65%), valves were not assessed.  Echo prior to that on 2/10/2024 showed same EF, mild AVR.     Admitted to [facility] earlier this year for cellulitis and UTI, VS and labs including UA were unremarkable at that time, CTA with runoff without occlusion, was given IV Ancef inpatient, was discharged home with Augmentin same day.     ED Course:   VS: 115/58, 59 bpm, RR 18, SpO2 100% on RA, afebrile.  ECG: NSR at 60 bpm.  Labs: CBC with differential, CMP, magnesium, troponin (3) WNL.  Imaging: CXR without acute cardiopulmonary process.  Patient to be admitted for evaluation of syncope.  Orthostatic vital signs ordered and are pending collection.  Given complaints of right sided paresthesias during event leading up to hospitalization, neurology now has been consulted for question of MS flare.    10 point review of systems has been completed and negative otherwise noted above in HPI.     Past  Medical History  Medical History[1]  Surgical History  Surgical History[2]  Social History  Social History[3]  Allergies  Patient has no known allergies.  Prescriptions Prior to Admission[4]  Family History[5]    Physical exam/neurological exam  Patient seen and examined at this time; upon entering room she is resting quietly in bed. Appears fully developed and well nourished.   Mental status: A&Ox2 to first last name, date, and location.  She is unaware of her age and states she does not want to know how old she is because she does not care. Memory testing, fund of knowledge and concentration within normal limits. Speech is fluent and negative for any paraphrasic errors.     Cranial Nerves:  Optic II/ Oculomotor III: Fundoscopic exam was technically difficult. PERRL +2. Visual fields are full. Convergence and accomodation noted without difficulty. Negative for deficits to visual acuity confrontation via static-finger wiggle test. Eyes appear aligned and free of exophthalmos and ptosis. Sclera are white bilaterally and lens are free from clouding.   Oculomotor III/ Trochlear IV/ Abducens VI: Extraocular movements are full, with no evidence of nystagmus. Negative for diplopia.   Trigeminal V: Facial sensation is asymmetric to light touch with right cheek noted to be more dull than left cheek, which patient notes is her baseline. Corneal reflex responsive when threatened bilaterally.  Facial VII: intact; nose is midline, with no evidence of flattening to nasolabial folds noted and mouth is negative for evidence of droop. Patient is successfully able to follow commands to raise eyebrows, squeeze eyes shut, smile and show teeth, frown, and puff out cheeks.   Acoustic VIII: Hearing is intact bilaterally.  Glossopharngyeal IX/ Vagus X: Palate elevates symmetrically to phonation. Findings are negative for uvula deviation or dysphagia.   Spinal accessory XI: Sternocleidomastoid/ upper trapezius is 5-/5 to strength testing.  "No asymmetry noted to strength, bulk, or tone.   Hypoglossal XII: Tongue is midline and without deviations. Phonation is articulate and is negative for findings of dysarthria or aphasia.     Motor exam: negative for evidence of involuntary movements or fasiculations. BUE flexion of biceps and brachioradialis graded minorly asymmetric, with RUE 4-/5 versus LUE 4.5-/5, in addition to extension of triceps at elbow and wrists. BUE  strength 5-/5, along with finger abduction and thumb opposition. BLE hip flexion, extension, adduction, and abduction asymmetric, with RLE 1-/5 versus LLE 4-/5. Knee extension & flexion asymmetric, with RLE 1-/5 versus LLE 2-/5 (she states she physically has to pull her left leg up in order to bend her knee. Ankle dorsiflexion and plantarflexion asymmetric, with RLE 1-/5 versus LLE 3-/5. Normal bulk and normal tone.  Patient states abnormal motor exam findings are chronic and that no new changes are currently noted.    Sensory exam: Sensation is impaired to light touch throughout, with patient reporting dullness to right extremities versus left.  She states this is chronic.    Reflexes: Reflexes are 1+ and symmetric. Bilateral plantar responses are flexor.     Coordination: finger-to-nose testing is negative for signs of dysmetria. Pronator drift testing to BUE negative.   Gait exam: Not tested as patient is a high fall risk.    Last Recorded Vitals  Blood pressure 118/68, pulse 72, temperature 36.3 °C (97.3 °F), temperature source Temporal, resp. rate 18, height 1.778 m (5' 10\"), weight 65.3 kg (143 lb 15.4 oz), SpO2 98%.    Relevant Results  Scheduled medications  Scheduled Medications[6]  Continuous medications  Continuous Medications[7]  PRN medications  PRN Medications[8]  Lower extremity venous duplex bilateral  Result Date: 7/25/2025           [address] [address] Tel [phone] and Fax [phone]  [facility] " VENOUS DUPLEX BILATERAL  Patient Name:      TORSTEN DILLON       Cassie Physician: [id number] Debbie Seth MD Study Date:        7/25/2025           Ordering           [id number] TASHA                                        Physician:         ARIANE MRN/PID:           [id number]            Technologist:      Maryann Medley T Accession#:        [id number]        Technologist 2: Date of Birth/Age: 1955 / 69      Encounter#:        [id number]                    years Gender:            F Admission Status:  Inpatient           Location           [facility]                                        Performed:  Diagnosis/ICD: Pain in right leg-M79.604 Indication:    Limb pain. CPT Codes:     51330 Peripheral venous duplex scan for DVT complete  CONCLUSIONS: Right Lower Venous: No evidence of acute deep vein thrombus visualized in the right lower extremity. Additional Findings; Lymph nodes measuring 2.5 cm x 0.7 cm x 1.4 cm noted in groin. Left Lower Venous: No evidence of acute deep vein thrombus visualized in the left lower extremity.  Imaging & Doppler Findings:  Right                 Compressible Thrombus        Flow Distal External Iliac     Yes        None   Spontaneous/Phasic CFV                       Yes        None   Spontaneous/Phasic PFV                       Yes        None FV Proximal               Yes        None   Spontaneous/Phasic FV Mid                    Yes        None FV Distal                 Yes        None Popliteal                 Yes        None   Spontaneous/Phasic Peroneal                  Yes        None PTV                       Yes        None  Left                  Compress Thrombus        Flow Distal External Iliac   Yes      None   Spontaneous/Phasic CFV                     Yes      None   Spontaneous/Phasic PFV                     Yes      None FV Proximal             Yes      None   Spontaneous/Phasic FV Mid                  Yes      None FV Distal               Yes      None  Popliteal               Yes      None   Spontaneous/Phasic Peroneal                Yes      None PTV                     Yes      None  [id number] Debbie Seth MD Electronically signed by [id number] Debbie Seth MD on 7/25/2025 at 2:30:55 PM  ** Final **     Transthoracic Echo Complete  Result Date: 7/25/2025   [facility], [address] [place] [address]           Tel [phone] and Fax [phone] TRANSTHORACIC ECHOCARDIOGRAM REPORT  Patient Name:       TORSTEN WILMA Matthews Physician:    [id number] Savita Marshall MD Study Date:         7/25/2025           Ordering Provider:    [id number] TASHA THAKKAR MRN/PID:            [id number]            Fellow: Accession#:         [id number]        Nurse: Date of Birth/Age:  1955 / 69      Sonographer:          Santa encarnacion RDCS Gender assigned at  F                   Additional Staff: Birth: Height:             170.00 cm           Admit Date:           7/24/2025 Weight:             65.00 kg            Admission Status:     Inpatient -                                                               Routine BSA / BMI:          1.75 m2 / 22.49     Encounter#:           [id number]                     kg/m2 Blood Pressure:     129/89 mmHg         Department Location:  [facility]                                                               [facility] Study Type:    TRANSTHORACIC ECHO (TTE) COMPLETE Diagnosis/ICD: Syncope-R55 Indication:    Postural dizziness with presyncope; CPT Code:      Echo Complete w Full Doppler-51501 Patient History: Pertinent History: PMHx of mild MVR, HLD, MS, RA, seizures. Study Detail: The following Echo studies were performed: 2D, M-Mode, Doppler,               color flow and 3D. Technically challenging study due to body               habitus.   PHYSICIAN INTERPRETATION: Left Ventricle: Left ventricular ejection fraction is low normal by visual estimate at 55%. There are no regional left ventricular wall motion abnormalities. The left ventricular cavity size is normal. There is normal septal and normal posterior left ventricular wall thickness. There is left ventricular concentric remodeling. Spectral Doppler shows a Grade I (impaired relaxation pattern) of left ventricular diastolic filling with normal left atrial filling pressure. Left Atrium: The left atrial size is normal. Right Ventricle: The right ventricle is normal in size. There is normal right ventricular global systolic function. Right Atrium: The right atrium is normal in size. Aortic Valve: The aortic valve was not well visualized. The aortic valve area by VTI is 2.39 cmï¿½ with a peak velocity of 1.42 m/s. The peak and mean gradients are 8 mmHg and 4 mmHg, respectively with a dimensionless index of 0.76. There is mild aortic valve regurgitation. Mitral Valve: The mitral valve is normal in structure. The doppler estimated peak and mean diastolic pressure gradients are 7.4 mmHg and 2 mmHg, respectively. The mean gradient of the mitral valve is 2 mmHg. There is no evidence of mitral valve regurgitation. The E Vmax is 0.79 m/s. Tricuspid Valve: The tricuspid valve is structurally normal. No evidence of tricuspid regurgitation. Pulmonic Valve: The pulmonic valve is not well visualized. There is no indication of pulmonic valve regurgitation. Pericardium: There is no pericardial effusion noted. Aorta: The aortic root is normal. In comparison to the previous echocardiogram(s): Compared with study dated 9/21/2020,.  CONCLUSIONS:  1. Left ventricular ejection fraction is low normal by visual estimate at 55%.  2. Spectral Doppler shows a Grade I (impaired relaxation pattern) of left ventricular diastolic filling with normal left atrial filling pressure.  3. Mild aortic valve regurgitation. QUANTITATIVE  DATA SUMMARY:  2D MEASUREMENTS:          Normal Ranges: LAs:             2.60 cm  (2.7-4.0cm) IVSd:            0.70 cm  (0.6-1.1cm) LVPWd:           0.90 cm  (0.6-1.1cm) LVIDd:           3.80 cm  (3.9-5.9cm) LVIDs:           3.00 cm LV Mass Index:   49 g/m2 LVEDV Index:     34 ml/m2 LV % FS          21.1 %  LEFT ATRIUM:                  Normal Ranges: LA Vol A4C:        37.6 ml    (22+/-6mL/m2) LA Vol A2C:        43.3 ml LA Vol BP:         46.5 ml LA Vol Index A4C:  21.4ml/m2 LA Vol Index A2C:  24.7 ml/m2 LA Vol Index BP:   26.5 ml/m2 LA Area A4C:       17.9 cm2 LA Area A2C:       16.7 cm2 LA Major Axis A4C: 7.2 cm LA Major Axis A2C: 5.5 cm LA Volume Index:   26.5 ml/m2  RIGHT ATRIUM:                 Normal Ranges: RA Vol A4C:        53.1 ml    (8.3-19.5ml) RA Vol Index A4C:  30.3 ml/m2 RA Area A4C:       18.2 cm2 RA Major Axis A4C: 5.3 cm  M-MODE MEASUREMENTS:         Normal Ranges: Ao Root:             3.40 cm (2.0-3.7cm) LAs:                 3.40 cm (2.7-4.0cm)  AORTA MEASUREMENTS:         Normal Ranges: Ao Sinus, d:        2.80 cm (2.1-3.5cm) Ao STJ, d:          2.60 cm (1.7-3.4cm) Asc Ao, d:          2.90 cm (2.1-3.4cm)  LV SYSTOLIC FUNCTION:                      Normal Ranges: EF-A4C View:    63 % (>=55%) EF-A2C View:    59 % EF-Biplane:     61 % EF-Visual:      55 % LV EF Reported: 55 %  LV DIASTOLIC FUNCTION:             Normal Ranges: MV Peak E:             0.79 m/s    (0.7-1.2 m/s) MV Peak A:             0.78 m/s    (0.42-0.7 m/s) E/A Ratio:             1.01        (1.0-2.2) MV e'                  0.081 m/s   (>8.0) MV lateral e'          0.09 m/s MV medial e'           0.07 m/s MV A Dur:              127.00 msec E/e' Ratio:            9.70        (<8.0) PulmV Sys Tahir:         43.10 cm/s PulmV Burris Tahir:        39.80 cm/s PulmV S/D Tahir:         1.10 PulmV A Revs Tahir:      27.00 cm/s PulmV A Revs Dur:      90.00 msec  MITRAL VALVE:          Normal Ranges: MV Vmax:      1.36 m/s (<=1.3m/s) MV peak P.4  mmHg (<5mmHg) MV mean P.0 mmHg (<2mmHg) MV DT:        355 msec (150-240msec)  AORTIC VALVE:                     Normal Ranges: AoV Vmax:                1.42 m/s (<=1.7m/s) AoV Peak P.1 mmHg (<20mmHg) AoV Mean P.0 mmHg (1.7-11.5mmHg) LVOT Max Tahir:            1.01 m/s (<=1.1m/s) AoV VTI:                 30.10 cm (18-25cm) LVOT VTI:                22.90 cm LVOT Diameter:           2.00 cm  (1.8-2.4cm) AoV Area, VTI:           2.39 cm2 (2.5-5.5cm2) AoV Area,Vmax:           2.23 cm2 (2.5-4.5cm2) AoV Dimensionless Index: 0.76  AORTIC INSUFFICIENCY: AI Vmax:       4.62 m/s AI Half-time:  726 msec AI Decel Rate: 186.00 cm/s2  RIGHT VENTRICLE: RV Basal 3.20 cm RV Mid   2.50 cm RV Major 7.3 cm TAPSE:   17.5 mm RV s'    0.13 m/s  TRICUSPID VALVE/RVSP:         Normal Ranges: Est. RA Pressure:     3 IVC Diam:             1.60 cm  PULMONIC VALVE:          Normal Ranges: PV Max Tahir:     0.9 m/s  (0.6-0.9m/s) PV Max PG:      3.5 mmHg  PULMONARY VEINS: PulmV A Revs Dur: 90.00 msec PulmV A Revs Tahir: 27.00 cm/s PulmV Burris Tahir:   39.80 cm/s PulmV S/D Tahir:    1.10 PulmV Sys Tahir:    43.10 cm/s  [id number] Savita Marshall MD Electronically signed on 2025 at 10:38:18 AM  ** Final **     ECG 12 lead  Result Date: 2025  Normal sinus rhythm Normal ECG When compared with ECG of 2025 17:55, (unconfirmed) Nonspecific T wave abnormality no longer evident in Lateral leads    XR chest 2 views  Result Date: 2025  Interpreted By:  Alicia Sellers, STUDY: XR CHEST 2 VIEWS;  2025 1:55 pm   INDICATION: Signs/Symptoms:syncope.   COMPARISON: 2025   ACCESSION NUMBER(S): [id number]   ORDERING CLINICIAN: TASHA THAKKAR   FINDINGS: CARDIOMEDIASTINAL SILHOUETTE: Cardiomediastinal silhouette is normal in size and configuration.     LUNGS: Lungs are clear.   ABDOMEN: No remarkable upper abdominal findings.     BONES: No acute osseous changes.       No acute cardiopulmonary process.   MACRO: None    Signed by: Alicia Sellers 7/24/2025 2:18 PM Dictation workstation:   [id number]    ECG 12 Lead  Result Date: 7/19/2025  Sinus bradycardia See ED provider note for full interpretation and clinical correlation Confirmed by Jessica Kendall ([id number]) on 7/19/2025 4:41:50 PM    ECG 12 lead  Result Date: 7/7/2025  Sinus bradycardia Otherwise normal ECG When compared with ECG of 30-JUN-2025 19:26, PREVIOUS ECG IS PRESENT    CT head wo IV contrast  Result Date: 7/4/2025  Interpreted By:  Alicia Sellers, STUDY: CT HEAD WO IV CONTRAST;  7/4/2025 5:25 pm   INDICATION: Signs/Symptoms:ms.   COMPARISON: 06/30/2025   ACCESSION NUMBER(S): [id number]   ORDERING CLINICIAN: DAIJA MCCARTHY   TECHNIQUE: Examination was performed in the axial plane using soft tissue and bone algorithm.   FINDINGS: INTRACRANIAL: There is prominence of the ventricular system and cerebral sulci consistent with cerebral atrophy. There are periventricular hypodensities consistent with  moderate small vessel disease. No mass or mass effect is identified. There is no hemorrhage or subdural fluid collection. There is no acute infarct.     EXTRACRANIAL: There is a small air-fluid level in the left maxillary sinus.       No acute intracranial pathology.   MACRO: None   Signed by: Alicia Sellers 7/4/2025 6:22 PM Dictation workstation:   [id number]    XR chest 1 view  Result Date: 7/4/2025  Interpreted By:  Dano Coleman, STUDY: XR CHEST 1 VIEW;  7/4/2025 4:59 pm   INDICATION: Signs/Symptoms:Chest Pain.     COMPARISON: 06/30/2025   ACCESSION NUMBER(S): [id number]   ORDERING CLINICIAN: DAIJA MCCARTHY   FINDINGS:     The cardiomediastinal silhouette and pulmonary vasculature are within normal limits. Mild bibasilar atelectasis. No consolidation, pleural effusion or pneumothorax.         No acute cardiopulmonary process.     MACRO: None.   Signed by: Dano Coleman 7/4/2025 5:03 PM Dictation workstation:   [id number]    CT head wo IV contrast  Result  Date: 6/30/2025  Interpreted By:  Abe Baer, STUDY: CT HEAD WO IV CONTRAST;  6/30/2025 7:09 pm   INDICATION: Signs/Symptoms:Weakness right lower extremity, history of MS, feels similar to previous exacerbations.   COMPARISON: 10/4/2024   ACCESSION NUMBER(S): [id number]   ORDERING CLINICIAN: AARON PRECIADO   TECHNIQUE: Contiguous axial images of the head were obtained without intravenous contrast.   FINDINGS: BRAIN PARENCHYMA:  There is cerebral atrophy and chronic periventricular white matter small vessel ischemic change. The gray white matter differentiation is preserved.  No mass effect or midline shift.   HEMORRHAGE:  No evidence of acute intracranial hemorrhage. VENTRICLES AND EXTRA-AXIAL SPACES:  The ventricles are within normal limits in size for brain volume.  No evidence of abnormal extraaxial fluid collection. EXTRACRANIAL SOFT TISSUES:  Within normal limits. PARANASAL SINUSES/MASTOIDS:  The visualized paranasal sinuses and mastoid air cells are clear and well pneumatized. CALVARIUM:  No evidence of depressed calvarial fracture.   OTHER FINDINGS:  None       Cerebral atrophy and chronic periventricular white matter small vessel ischemic change.   No evidence of acute intracranial hemorrhage.   If there is persistent concern for acute intracranial process, MRI is recommended for further evaluation as clinically indicated   MACRO: None   Signed by: Abe Baer 6/30/2025 7:43 PM Dictation workstation:   [id number]    XR chest 1 view  Result Date: 6/30/2025  Interpreted By:  Jonny Mejia, STUDY: XR CHEST 1 VIEW;  6/30/2025 6:24 pm   INDICATION: Signs/Symptoms:Weakness.   COMPARISON: 04/26/2025   ACCESSION NUMBER(S): [id number]   ORDERING CLINICIAN: AARON PRECIADO   FINDINGS: No consolidation. No pleural effusion or pneumothorax. Normal heart size. No acute osseous abnormality.       No acute cardiopulmonary abnormality.   Signed by: Jonny Mejia 6/30/2025 6:41 PM Dictation workstation:    [id number]    Results for orders placed or performed during the hospital encounter of 07/24/25 (from the past 24 hours)   Lactate   Result Value Ref Range    Lactate 1.3 0.4 - 2.0 mmol/L   Urinalysis with Reflex Culture and Microscopic   Result Value Ref Range    Color, Urine Light-Yellow Light-Yellow, Yellow, Dark-Yellow    Appearance, Urine Clear Clear    Specific Gravity, Urine 1.020 1.005 - 1.035    pH, Urine 5.5 5.0, 5.5, 6.0, 6.5, 7.0, 7.5, 8.0    Protein, Urine NEGATIVE NEGATIVE, 10 (TRACE), 20 (TRACE) mg/dL    Glucose, Urine Normal Normal mg/dL    Blood, Urine NEGATIVE NEGATIVE mg/dL    Ketones, Urine 10 (1+) (A) NEGATIVE mg/dL    Bilirubin, Urine NEGATIVE NEGATIVE mg/dL    Urobilinogen, Urine Normal Normal mg/dL    Nitrite, Urine NEGATIVE NEGATIVE    Leukocyte Esterase, Urine 75 Nai/uL (A) NEGATIVE   Extra Urine Gray Tube   Result Value Ref Range    Extra Tube     Microscopic Only, Urine   Result Value Ref Range    WBC, Urine 1-5 1-5, NONE /HPF    RBC, Urine 1-2 NONE, 1-2, 3-5 /HPF    Squamous Epithelial Cells, Urine 1-9 (SPARSE) Reference range not established. /HPF    Mucus, Urine FEW Reference range not established. /LPF    Calcium Oxalate Crystals, Urine 1+ NONE, 1+ /HPF   CBC   Result Value Ref Range    WBC 4.5 4.4 - 11.3 x10*3/uL    nRBC 0.0 0.0 - 0.0 /100 WBCs    RBC 4.47 4.00 - 5.20 x10*6/uL    Hemoglobin 14.2 12.0 - 16.0 g/dL    Hematocrit 41.6 36.0 - 46.0 %    MCV 93 80 - 100 fL    MCH 31.8 26.0 - 34.0 pg    MCHC 34.1 32.0 - 36.0 g/dL    RDW 12.9 11.5 - 14.5 %    Platelets 203 150 - 450 x10*3/uL   Basic metabolic panel   Result Value Ref Range    Glucose 89 74 - 99 mg/dL    Sodium 143 136 - 145 mmol/L    Potassium 3.7 3.5 - 5.3 mmol/L    Chloride 108 (H) 98 - 107 mmol/L    Bicarbonate 27 21 - 32 mmol/L    Anion Gap 12 10 - 20 mmol/L    Urea Nitrogen 12 6 - 23 mg/dL    Creatinine 0.58 0.50 - 1.05 mg/dL    eGFR >90 >60 mL/min/1.73m*2    Calcium 8.8 8.6 - 10.3 mg/dL   Magnesium   Result Value Ref  Range    Magnesium 1.91 1.60 - 2.40 mg/dL   Transthoracic Echo Complete   Result Value Ref Range    AV pk jeanine 1.42 m/s    AV mn grad 4 mmHg    LVOT diam 2.00 cm    MV E/A ratio 1.01     LA vol index A/L 26.5 ml/m2    Tricuspid annular plane systolic excursion 1.8 cm    LV EF 55 %    RV free wall pk S' 13.30 cm/s    LVIDd 3.80 cm    Aortic Valve Area by Continuity of VTI 2.39 cm2    Aortic Valve Area by Continuity of Peak Velocity 2.23 cm2    AV pk grad 8 mmHg    LV A4C EF 62.9      No EEG results found for the past 12 months                    [place] Coma Scale  Best Eye Response: Spontaneous  Best Verbal Response: Oriented  Best Motor Response: Follows commands  Tabitha Coma Scale Score: 15                 I have personally reviewed the following imaging results:   Imaging  XR chest 2 views  Result Date: 7/24/2025  No acute cardiopulmonary process.   MACRO: None   Signed by: Alicia Sellers 7/24/2025 2:18 PM Dictation workstation:   [id number]      Cardiology, Vascular, and Other Imaging  Lower extremity venous duplex bilateral  Result Date: 7/25/2025           [place] [address] Tel [phone] and Fax [phone]  Vascular Lab Report VASC US LOWER EXTREMITY VENOUS DUPLEX BILATERAL  Patient Name:      TORSTEN Matthews Physician: [id number] Debbie Seth MD Study Date:        7/25/2025           Ordering           [id number] TASHA                                        Physician:         ARIANE MRN/PID:           [id number]            Technologist:      Maryann Medley RVT Accession#:        [id number]        Technologist 2: Date of Birth/Age: 1955 / 69      Encounter#:        [id number]                    years Gender:            F Admission Status:  Inpatient           Location           [facility]                                        Performed:  Diagnosis/ICD: Pain in right leg-M79.604 Indication:    Limb pain. CPT Codes:     40337 Peripheral  venous duplex scan for DVT complete  CONCLUSIONS: Right Lower Venous: No evidence of acute deep vein thrombus visualized in the right lower extremity. Additional Findings; Lymph nodes measuring 2.5 cm x 0.7 cm x 1.4 cm noted in groin. Left Lower Venous: No evidence of acute deep vein thrombus visualized in the left lower extremity.  Imaging & Doppler Findings:  Right                 Compressible Thrombus        Flow Distal External Iliac     Yes        None   Spontaneous/Phasic CFV                       Yes        None   Spontaneous/Phasic PFV                       Yes        None FV Proximal               Yes        None   Spontaneous/Phasic FV Mid                    Yes        None FV Distal                 Yes        None Popliteal                 Yes        None   Spontaneous/Phasic Peroneal                  Yes        None PTV                       Yes        None  Left                  Compress Thrombus        Flow Distal External Iliac   Yes      None   Spontaneous/Phasic CFV                     Yes      None   Spontaneous/Phasic PFV                     Yes      None FV Proximal             Yes      None   Spontaneous/Phasic FV Mid                  Yes      None FV Distal               Yes      None Popliteal               Yes      None   Spontaneous/Phasic Peroneal                Yes      None PTV                     Yes      None  [id number] Debbie Seth MD Electronically signed by [id number] Debbie Seth MD on 7/25/2025 at 2:30:55 PM  ** Final **     Transthoracic Echo Complete  Result Date: 7/25/2025   [facility], [address] [place] [address]           Tel [phone] and Fax [phone] TRANSTHORACIC ECHOCARDIOGRAM REPORT  Patient Name:       TORSTEN Matthews Physician:    [id number] Savita Marshall MD Study Date:         7/25/2025           Ordering Provider:    Aries CORDOVA                                                                ARIANE MRN/PID:            [id number]            Fellow: Accession#:         [id number]        Nurse: Date of Birth/Age:  1955 / 69      Sonographer:          Santa encarnacion RDCS Gender assigned at  F                   Additional Staff: Birth: Height:             170.00 cm           Admit Date:           7/24/2025 Weight:             65.00 kg            Admission Status:     Inpatient -                                                               Routine BSA / BMI:          1.75 m2 / 22.49     Encounter#:           [id number]                     kg/m2 Blood Pressure:     129/89 mmHg         Department Location:  [address]                                                               [facility] Study Type:    TRANSTHORACIC ECHO (TTE) COMPLETE Diagnosis/ICD: Syncope-R55 Indication:    Postural dizziness with presyncope; CPT Code:      Echo Complete w Full [id number] Patient History: Pertinent History: PMHx of mild MVR, HLD, MS, RA, seizures. Study Detail: The following Echo studies were performed: 2D, M-Mode, Doppler,               color flow and 3D. Technically challenging study due to body               habitus.  PHYSICIAN INTERPRETATION: Left Ventricle: Left ventricular ejection fraction is low normal by visual estimate at 55%. There are no regional left ventricular wall motion abnormalities. The left ventricular cavity size is normal. There is normal septal and normal posterior left ventricular wall thickness. There is left ventricular concentric remodeling. Spectral Doppler shows a Grade I (impaired relaxation pattern) of left ventricular diastolic filling with normal left atrial filling pressure. Left Atrium: The left atrial size is normal. Right Ventricle: The right ventricle is normal in size. There is normal right ventricular global systolic function. Right Atrium: The right atrium is normal in size. Aortic Valve: The  aortic valve was not well visualized. The aortic valve area by VTI is 2.39 cmï¿½ with a peak velocity of 1.42 m/s. The peak and mean gradients are 8 mmHg and 4 mmHg, respectively with a dimensionless index of 0.76. There is mild aortic valve regurgitation. Mitral Valve: The mitral valve is normal in structure. The doppler estimated peak and mean diastolic pressure gradients are 7.4 mmHg and 2 mmHg, respectively. The mean gradient of the mitral valve is 2 mmHg. There is no evidence of mitral valve regurgitation. The E Vmax is 0.79 m/s. Tricuspid Valve: The tricuspid valve is structurally normal. No evidence of tricuspid regurgitation. Pulmonic Valve: The pulmonic valve is not well visualized. There is no indication of pulmonic valve regurgitation. Pericardium: There is no pericardial effusion noted. Aorta: The aortic root is normal. In comparison to the previous echocardiogram(s): Compared with study dated 9/21/2020,.  CONCLUSIONS:  1. Left ventricular ejection fraction is low normal by visual estimate at 55%.  2. Spectral Doppler shows a Grade I (impaired relaxation pattern) of left ventricular diastolic filling with normal left atrial filling pressure.  3. Mild aortic valve regurgitation. QUANTITATIVE DATA SUMMARY:  2D MEASUREMENTS:          Normal Ranges: LAs:             2.60 cm  (2.7-4.0cm) IVSd:            0.70 cm  (0.6-1.1cm) LVPWd:           0.90 cm  (0.6-1.1cm) LVIDd:           3.80 cm  (3.9-5.9cm) LVIDs:           3.00 cm LV Mass Index:   49 g/m2 LVEDV Index:     34 ml/m2 LV % FS          21.1 %  LEFT ATRIUM:                  Normal Ranges: LA Vol A4C:        37.6 ml    (22+/-6mL/m2) LA Vol A2C:        43.3 ml LA Vol BP:         46.5 ml LA Vol Index A4C:  21.4ml/m2 LA Vol Index A2C:  24.7 ml/m2 LA Vol Index BP:   26.5 ml/m2 LA Area A4C:       17.9 cm2 LA Area A2C:       16.7 cm2 LA Major Axis A4C: 7.2 cm LA Major Axis A2C: 5.5 cm LA Volume Index:   26.5 ml/m2  RIGHT ATRIUM:                 Normal Ranges: RA  Vol A4C:        53.1 ml    (8.3-19.5ml) RA Vol Index A4C:  30.3 ml/m2 RA Area A4C:       18.2 cm2 RA Major Axis A4C: 5.3 cm  M-MODE MEASUREMENTS:         Normal Ranges: Ao Root:             3.40 cm (2.0-3.7cm) LAs:                 3.40 cm (2.7-4.0cm)  AORTA MEASUREMENTS:         Normal Ranges: Ao Sinus, d:        2.80 cm (2.1-3.5cm) Ao STJ, d:          2.60 cm (1.7-3.4cm) Asc Ao, d:          2.90 cm (2.1-3.4cm)  LV SYSTOLIC FUNCTION:                      Normal Ranges: EF-A4C View:    63 % (>=55%) EF-A2C View:    59 % EF-Biplane:     61 % EF-Visual:      55 % LV EF Reported: 55 %  LV DIASTOLIC FUNCTION:             Normal Ranges: MV Peak E:             0.79 m/s    (0.7-1.2 m/s) MV Peak A:             0.78 m/s    (0.42-0.7 m/s) E/A Ratio:             1.01        (1.0-2.2) MV e'                  0.081 m/s   (>8.0) MV lateral e'          0.09 m/s MV medial e'           0.07 m/s MV A Dur:              127.00 msec E/e' Ratio:            9.70        (<8.0) PulmV Sys Tahir:         43.10 cm/s PulmV Burris Tahir:        39.80 cm/s PulmV S/D Tahir:         1.10 PulmV A Revs Tahir:      27.00 cm/s PulmV A Revs Dur:      90.00 msec  MITRAL VALVE:          Normal Ranges: MV Vmax:      1.36 m/s (<=1.3m/s) MV peak P.4 mmHg (<5mmHg) MV mean P.0 mmHg (<2mmHg) MV DT:        355 msec (150-240msec)  AORTIC VALVE:                     Normal Ranges: AoV Vmax:                1.42 m/s (<=1.7m/s) AoV Peak P.1 mmHg (<20mmHg) AoV Mean P.0 mmHg (1.7-11.5mmHg) LVOT Max Tahir:            1.01 m/s (<=1.1m/s) AoV VTI:                 30.10 cm (18-25cm) LVOT VTI:                22.90 cm LVOT Diameter:           2.00 cm  (1.8-2.4cm) AoV Area, VTI:           2.39 cm2 (2.5-5.5cm2) AoV Area,Vmax:           2.23 cm2 (2.5-4.5cm2) AoV Dimensionless Index: 0.76  AORTIC INSUFFICIENCY: AI Vmax:       4.62 m/s AI Half-time:  726 msec AI Decel Rate: 186.00 cm/s2  RIGHT VENTRICLE: RV Basal 3.20 cm RV Mid   2.50 cm RV Major 7.3  cm TAPSE:   17.5 mm RV s'    0.13 m/s  TRICUSPID VALVE/RVSP:         Normal Ranges: Est. RA Pressure:     3 IVC Diam:             1.60 cm  PULMONIC VALVE:          Normal Ranges: PV Max Tahir:     0.9 m/s  (0.6-0.9m/s) PV Max PG:      3.5 mmHg  PULMONARY VEINS: PulmV A Revs Dur: 90.00 msec PulmV A Revs Tahir: 27.00 cm/s PulmV Burris Tahir:   39.80 cm/s PulmV S/D Tahir:    1.10 PulmV Sys Tahir:    43.10 cm/s  [id number] Savita Marshall MD Electronically signed on 7/25/2025 at 10:38:18 AM  ** Final **     ECG 12 lead  Result Date: 7/25/2025  Normal sinus rhythm Normal ECG When compared with ECG of 04-JUL-2025 17:55, (unconfirmed) Nonspecific T wave abnormality no longer evident in Lateral leads       Assessment/Plan   Assessment & Plan  Atypical syncope    Paresthesia of right arm and leg    Patient is a 69-year-old female who presented to [facility] with conflicting information regarding experiencing lightheadedness after using her bedside commode to have a bowel movement after waking up on the day of admission.  It was initially reported that lightheadedness immediately resolved after patient was able to sit back down onto her bed and she denied any LOC, fall, head trauma, numbness/tingling, diaphoresis, or vision changes.  However, when she was further evaluated by PT she informed this specialty she did suffer a full syncopal episode while on the toilet having a BM.  Furthermore she then proceeded to tell the primary care team she did in fact have paresthesias to the right side of her body during this episode, which she has experienced in the past while suffering an MS exacerbation.  Lastly she told writer of this note that she was never lightheaded, never experienced any paresthesias or focal deficits, and instead when she went to pivot from her bedside commode to her bed her right knee gave out, which is common for her as she supposed to wear a supportive brace and never does, which resulted in her tumbling into her bed onto her right  side.  She denies any changes to her chronic MS deficits.  She does endorse significant stress at home given her physical limitations and that her wheelchair-bound  largely relies on her for his care.  They have a daughter that is intermittently involved with assistance, a son that lives too far away to help, and no home health care.  Given that she currently denies any changes to her MS symptoms, further workup for MS exacerbation is not warranted at this time.     -Orthostatic vital signs collected and are currently negative.  -Defer MS exacerbation workup to primary care team if today feel it is warranted.  Given that patient denies any changes to her neurologic deficits from known MS for writer of this note, MRI imaging is not recommended If primary care team decides to continue with MRI and lesions are noted on exam, it is recommended for patient to start Solu-Medrol 1 g IV daily with corresponding Protonix 40 mg IV daily for total of 3 days to treat MS flare.  -Recommend social work consult in this patient due to physical debility of both patient and her  with little to no assistance at home.  Patient would greatly benefit from placement in a skilled nursing facility.  -Evaluate for metabolic abnormalities or sources of infection that could attribute to symptoms.  It does appear that patient has been started on empiric antibiotic treatment for suspected cellulitis.  -Check Keppra level and continue medication at 750 mg p.o. twice daily for now for seizure prophylaxis.  Will defer from EEG testing at this time as patient denies any seizure activity prior to hospitalization and she notes compliance with her medication regimen.  - Recommendations for needs during hospitalization and at discharge via PT and OT  - Continue promotion of lifestyle modifications, such as: Strict BP and glycemic control, dietary habit changes, incorporation of daily exercise regimen, adherence to all prescription/OTC  medication schedules, attendance to all follow-up appointments, cessation from smoking if applicable, abstinence from alcohol and illicit drug use if applicable  - Patient to follow-up with MS specialist in the outpatient setting as established.    Neurology to sign off at this time. Thank you for the opportunity to be a part of this patient's multidisciplinary treatment team.  If any additional questions or concerns arise, please do not hesitate to contact me or the on-call neurologist via [employer] Secure Chat.       I spent 80 minutes in the professional and overall care of this patient.      Josefa Brunner, APRN-CNP         [1]   Past Medical History:  Diagnosis Date    Multiple sclerosis (Multi)     History of multiple sclerosis   [2]   Past Surgical History:  Procedure Laterality Date    MR HEAD ANGIO WO IV CONTRAST  9/21/2020    MR HEAD ANGIO WO IV CONTRAST 9/21/2020 [facility]    MR HEAD ANGIO WO IV CONTRAST  10/24/2022    MR HEAD ANGIO WO IV CONTRAST 10/24/2022 [facility]    MR NECK ANGIO WO IV CONTRAST  9/21/2020    MR NECK ANGIO WO IV CONTRAST 9/21/2020 [facility]    MR NECK ANGIO WO IV CONTRAST  10/24/2022    MR NECK ANGIO WO IV CONTRAST 10/24/2022 [facility]    OTHER SURGICAL HISTORY  11/26/2019    Tonsillectomy    OTHER SURGICAL HISTORY  11/26/2019    Hysterectomy   [3]   Social History  Tobacco Use    Smoking status: Never    Smokeless tobacco: Never   Vaping Use    Vaping status: Never Used   Substance Use Topics    Alcohol use: Never    Drug use: Never   [4]   Medications Prior to Admission   Medication Sig Dispense Refill Last Dose/Taking    aspirin 325 mg tablet Take 1 tablet (325 mg) by mouth every 6 hours if needed for mild pain (1 - 3) or fever (temp greater than 38.0 C).   More than a month    levETIRAcetam (Keppra) 750 mg tablet Take 1 tablet (750 mg) by mouth 2 times a day. 60 tablet 0 7/24/2025 Morning    atorvastatin (Lipitor) 40 mg tablet Take 1 tablet  (40 mg) by mouth once daily. (Patient not taking: Reported on 4/26/2025) 30 tablet 1    [5] No family history on file.  [6] atorvastatin, 40 mg, oral, Daily  ceFAZolin, 2 g, intravenous, q8h  enoxaparin, 40 mg, subcutaneous, q24h  levETIRAcetam, 750 mg, oral, BID  melatonin, 3 mg, oral, Nightly  polyethylene glycol, 17 g, oral, Daily  [7]    [8] PRN medications: acetaminophen

## 2025-07-25 NOTE — CARE PLAN
Pt arrived to Obs unit following concern for syncope. Pt remained safe and stable throughout shift. No reports of pain or discomfort. Rested comfortably. No acute events throughout shift.       Problem: Pain - Adult  Goal: Verbalizes/displays adequate comfort level or baseline comfort level  Outcome: Progressing     Problem: Safety - Adult  Goal: Free from fall injury  Outcome: Progressing     Problem: Discharge Planning  Goal: Discharge to home or other facility with appropriate resources  Outcome: Progressing     Problem: Chronic Conditions and Co-morbidities  Goal: Patient's chronic conditions and co-morbidity symptoms are monitored and maintained or improved  Outcome: Progressing     Problem: Nutrition  Goal: Nutrient intake appropriate for maintaining nutritional needs  Outcome: Progressing

## 2025-07-25 NOTE — PROGRESS NOTES
Ortho Static VS: Laying Down; /56, HR 62, 99% (room air), 20 RR; Sitting Up, /61, 100% (room air) 22 RR, HR 56; Standing Up; /60, HR 68, RR 26, 100% (room air)

## 2025-07-25 NOTE — H&P
"Akiko Peng is a 69 y.o. female on day 0 of admission presenting with dizziness and right-sided weakness.    SUBJECTIVE     Patient evaluated this morning and found to be resting comfortably in in chair.  She endorses however improved right-sided weakness than her presentation.  She denies any episodes of dizziness at present.  OBJECTIVE     Vitals:    07/24/25 2125 07/25/25 0432 07/25/25 1001 07/25/25 1454   BP: 141/65 100/50 118/68 93/53   BP Location:  Right arm Right arm    Patient Position:  Lying Sitting    Pulse: 64 69 72 75   Resp: 17 20 18    Temp: 36.5 °C (97.7 °F) 35.9 °C (96.6 °F) 36.3 °C (97.3 °F)    TempSrc:  Temporal Temporal    SpO2: 98% 98% 98% 98%   Weight: 65.3 kg (143 lb 15.4 oz)      Height: 1.778 m (5' 10\")         Results from last 7 days   Lab Units 07/25/25  0734 07/24/25  1409   WBC AUTO x10*3/uL 4.5 4.4   HEMOGLOBIN g/dL 14.2 14.9   HEMATOCRIT % 41.6 45.9   PLATELETS AUTO x10*3/uL 203 242   NEUTROS PCT AUTO %  --  66.1   LYMPHS PCT AUTO %  --  18.9   MONOS PCT AUTO %  --  11.3   EOS PCT AUTO %  --  2.1     Results from last 7 days   Lab Units 07/25/25  0734 07/24/25  1409   SODIUM mmol/L 143 141   POTASSIUM mmol/L 3.7 4.0   CHLORIDE mmol/L 108* 105   CO2 mmol/L 27 28   BUN mg/dL 12 20   CREATININE mg/dL 0.58 0.55   CALCIUM mg/dL 8.8 9.1   PROTEIN TOTAL g/dL  --  6.8   BILIRUBIN TOTAL mg/dL  --  0.5   ALK PHOS U/L  --  92   ALT U/L  --  6*   AST U/L  --  9   GLUCOSE mg/dL 89 83       Scheduled Medications  Scheduled Medications[1]   Physical Exam  Constitutional: well developed, awake, alert, no acute distress  ENMT: mucous membranes moist, EOMI, conjunctivae clear  Head/Neck: normocephalic, atraumatic; supple, trachea midline  Respiratory/Thorax: patent airways, CTAB; no wheezes, rales, or rhonchi  Cardiovascular: RRR, no murmur  Gastrointestinal: soft, nondistended, non-tender, bowel sounds appreciated  Extremities: palpable peripheral pulses, no edema or cyanosis, strength 1/5 in right " lower extremity, and 2/5 in left lower extremity  Neurological: AO x3, no focal deficits  Psychological: appropriate mood and behavior  Skin: warm and dry       ASSESSMENT & PLAN   Akiko Peng is a 69 y.o. female with a PMHx of mild MVR, HLD, MS, RA, seizures presents to [facility] ED on 7/24/2025 for an episode of dizziness. Admitted for observation overnight.   Daily Progress  Patient continues to make progress, reports improvement in the right-sided weakness, no episode of dizziness.    ASSESSMENT & PLAN      Multiple sclerosis flare versus TIA  Has a history of multiple sclerosis, reports weakness, blurring of the vision on and off.  On assessment patient's strength in the lower extremities is reduced.  Patient is currently not on  medications for her multiple sclerosis  Plan  Neurology consulted will appreciate recommendations.  Patient started on atorvastatin  MRI head without IV contrast, angio neck without IV contrast, brain with and without IV contrast scheduled, will plan treatment accordingly for multiple sclerosis flare.      RLE cellulitis, suspected  Noted right-sided foot erythema  Plan  ABX: 2 g IV cefazolin q8h, de-escalate appropriately      Checklist:  Antimicrobials: Ancef  Feeding: Regular  DVT ppx: Lovenox  Glycemic control: Accu-Cheks  Bowel care: MiraLAX  Lines: PIV  Consults: Neurology  Code Status: DNR/DNI no ICU      Shayy Swanson MD  PGY 1, Internal Medicine  Please SecureChat for any further questions  This is a preliminary note, please await attending attestation for final A/P            [1] atorvastatin, 40 mg, oral, Daily  ceFAZolin, 2 g, intravenous, q8h  enoxaparin, 40 mg, subcutaneous, q24h  levETIRAcetam, 750 mg, oral, BID  melatonin, 3 mg, oral, Nightly  polyethylene glycol, 17 g, oral, Daily

## 2025-07-26 LAB
ANION GAP SERPL CALC-SCNC: 9 MMOL/L (ref 10–20)
BACTERIA UR CULT: NORMAL
BUN SERPL-MCNC: 10 MG/DL (ref 6–23)
CALCIUM SERPL-MCNC: 8.5 MG/DL (ref 8.6–10.3)
CHLORIDE SERPL-SCNC: 109 MMOL/L (ref 98–107)
CO2 SERPL-SCNC: 26 MMOL/L (ref 21–32)
CREAT SERPL-MCNC: 0.63 MG/DL (ref 0.5–1.05)
EGFRCR SERPLBLD CKD-EPI 2021: >90 ML/MIN/1.73M*2
ERYTHROCYTE [DISTWIDTH] IN BLOOD BY AUTOMATED COUNT: 12.8 % (ref 11.5–14.5)
GLUCOSE SERPL-MCNC: 85 MG/DL (ref 74–99)
HCT VFR BLD AUTO: 39.9 % (ref 36–46)
HGB BLD-MCNC: 13.4 G/DL (ref 12–16)
MAGNESIUM SERPL-MCNC: 2.11 MG/DL (ref 1.6–2.4)
MCH RBC QN AUTO: 31.4 PG (ref 26–34)
MCHC RBC AUTO-ENTMCNC: 33.6 G/DL (ref 32–36)
MCV RBC AUTO: 93 FL (ref 80–100)
NRBC BLD-RTO: 0 /100 WBCS (ref 0–0)
PLATELET # BLD AUTO: 203 X10*3/UL (ref 150–450)
POTASSIUM SERPL-SCNC: 3.8 MMOL/L (ref 3.5–5.3)
RBC # BLD AUTO: 4.27 X10*6/UL (ref 4–5.2)
SODIUM SERPL-SCNC: 140 MMOL/L (ref 136–145)
WBC # BLD AUTO: 5.5 X10*3/UL (ref 4.4–11.3)

## 2025-07-26 PROCEDURE — 83735 ASSAY OF MAGNESIUM: CPT

## 2025-07-26 PROCEDURE — 80048 BASIC METABOLIC PNL TOTAL CA: CPT

## 2025-07-26 PROCEDURE — 2500000001 HC RX 250 WO HCPCS SELF ADMINISTERED DRUGS (ALT 637 FOR MEDICARE OP)

## 2025-07-26 PROCEDURE — 2500000005 HC RX 250 GENERAL PHARMACY W/O HCPCS

## 2025-07-26 PROCEDURE — 1200000002 HC GENERAL ROOM WITH TELEMETRY DAILY

## 2025-07-26 PROCEDURE — 85027 COMPLETE CBC AUTOMATED: CPT

## 2025-07-26 PROCEDURE — 2500000004 HC RX 250 GENERAL PHARMACY W/ HCPCS (ALT 636 FOR OP/ED)

## 2025-07-26 PROCEDURE — 36415 COLL VENOUS BLD VENIPUNCTURE: CPT

## 2025-07-26 RX ADMIN — LEVETIRACETAM 750 MG: 500 TABLET, FILM COATED ORAL at 20:10

## 2025-07-26 RX ADMIN — LEVETIRACETAM 750 MG: 500 TABLET, FILM COATED ORAL at 08:47

## 2025-07-26 RX ADMIN — Medication 3 MG: at 20:10

## 2025-07-26 RX ADMIN — ACETAMINOPHEN 650 MG: 325 TABLET ORAL at 19:27

## 2025-07-26 RX ADMIN — ATORVASTATIN CALCIUM 40 MG: 40 TABLET, FILM COATED ORAL at 08:47

## 2025-07-26 RX ADMIN — ENOXAPARIN SODIUM 40 MG: 100 INJECTION SUBCUTANEOUS at 18:34

## 2025-07-26 RX ADMIN — CEFAZOLIN SODIUM 2 G: 2 SOLUTION INTRAVENOUS at 22:09

## 2025-07-26 RX ADMIN — CEFAZOLIN SODIUM 2 G: 2 SOLUTION INTRAVENOUS at 14:32

## 2025-07-26 RX ADMIN — CEFAZOLIN SODIUM 2 G: 2 SOLUTION INTRAVENOUS at 06:43

## 2025-07-26 ASSESSMENT — COGNITIVE AND FUNCTIONAL STATUS - GENERAL
DRESSING REGULAR LOWER BODY CLOTHING: A LITTLE
MOVING TO AND FROM BED TO CHAIR: A LITTLE
STANDING UP FROM CHAIR USING ARMS: A LITTLE
CLIMB 3 TO 5 STEPS WITH RAILING: A LOT
TOILETING: A LITTLE
MOBILITY SCORE: 19
DAILY ACTIVITIY SCORE: 22
WALKING IN HOSPITAL ROOM: A LITTLE

## 2025-07-26 ASSESSMENT — PAIN DESCRIPTION - DESCRIPTORS
DESCRIPTORS: ACHING
DESCRIPTORS: ACHING

## 2025-07-26 ASSESSMENT — PAIN - FUNCTIONAL ASSESSMENT
PAIN_FUNCTIONAL_ASSESSMENT: 0-10
PAIN_FUNCTIONAL_ASSESSMENT: 0-10

## 2025-07-26 ASSESSMENT — PAIN SCALES - GENERAL
PAINLEVEL_OUTOF10: 3
PAINLEVEL_OUTOF10: 0 - NO PAIN
PAINLEVEL_OUTOF10: 3

## 2025-07-26 ASSESSMENT — PAIN DESCRIPTION - LOCATION: LOCATION: LEG

## 2025-07-26 ASSESSMENT — PAIN DESCRIPTION - ORIENTATION: ORIENTATION: RIGHT

## 2025-07-26 NOTE — PROGRESS NOTES
Akiko Peng is a 69 y.o. female on day 1of admission presenting with dizziness and right-sided weakness.       SUBJECTIVE     Patient evaluated this morning and found to be resting comfortably in bed.  Patient states that she has right leg weakness which she always has.  Patient is a poor historian and has had multiple accounts of what happened at presentation.  OBJECTIVE     Vitals:    07/25/25 1806 07/26/25 0519 07/26/25 1001 07/26/25 1413   BP: 128/58 (!) 100/46 118/56 98/50   BP Location:  Left arm Right arm Left arm   Patient Position:   Lying Sitting   Pulse: 72 65 63 59   Resp:   18 18   Temp: 36.9 °C (98.4 °F) 36.6 °C (97.9 °F) 36.6 °C (97.9 °F) 37 °C (98.6 °F)   TempSrc:   Temporal Temporal   SpO2: 98% 97% 97% 96%   Weight:       Height:          Results from last 7 days   Lab Units 07/26/25  0642 07/25/25  0734 07/24/25  1409   WBC AUTO x10*3/uL 5.5   < > 4.4   HEMOGLOBIN g/dL 13.4   < > 14.9   HEMATOCRIT % 39.9   < > 45.9   PLATELETS AUTO x10*3/uL 203   < > 242   NEUTROS PCT AUTO %  --   --  66.1   LYMPHS PCT AUTO %  --   --  18.9   MONOS PCT AUTO %  --   --  11.3   EOS PCT AUTO %  --   --  2.1    < > = values in this interval not displayed.     Results from last 7 days   Lab Units 07/26/25  0642 07/25/25  0734 07/24/25  1409   SODIUM mmol/L 140   < > 141   POTASSIUM mmol/L 3.8   < > 4.0   CHLORIDE mmol/L 109*   < > 105   CO2 mmol/L 26   < > 28   BUN mg/dL 10   < > 20   CREATININE mg/dL 0.63   < > 0.55   CALCIUM mg/dL 8.5*   < > 9.1   PROTEIN TOTAL g/dL  --   --  6.8   BILIRUBIN TOTAL mg/dL  --   --  0.5   ALK PHOS U/L  --   --  92   ALT U/L  --   --  6*   AST U/L  --   --  9   GLUCOSE mg/dL 85   < > 83    < > = values in this interval not displayed.       Scheduled Medications  Scheduled Medications[1]   Physical Exam    Constitutional: well developed, awake, alert, no acute distress  ENMT: mucous membranes moist, EOMI, conjunctivae clear  Head/Neck: normocephalic, atraumatic; supple, trachea  midline  Respiratory/Thorax: patent airways, CTAB; no wheezes, rales, or rhonchi  Cardiovascular: RRR, no murmur  Gastrointestinal: soft, nondistended, non-tender, bowel sounds appreciated  Extremities: palpable peripheral pulses, no edema or cyanosis, strength 1/5 in right lower extremity, and 2/5 in left lower extremity  Neurological: AO x3, no focal deficits  Psychological: appropriate mood and behavior  Skin: warm and dry     Pertinent Imaging  MR angio neck wo IV contrast   No evidence of source vessel arterial occlusion, flow significant   stenosis, or aneurysm within the head and neck.     MR angio head wo IV contrast   No evidence of source vessel arterial occlusion, flow significant   stenosis, or aneurysm within the head and neck.     MR brain w and wo IV contrast   No acute infarct, recent hemorrhage, intracranial mass effect, or   abnormal parenchymal enhancement.       Unchanged extensive supratentorial white matter signal abnormality   that likely represents a combination of reported demyelinating   disease and possible elements of chronic small vessel ischemic   disease given patient age. No new white matter plaques identified in   the interim. Similar parenchymal atrophy.         ASSESSMENT & PLAN   Akiko Peng is a 69 y.o. female with a PMHx of mild MVR, HLD, MS, RA, seizures presents to [facility] ED on 7/24/2025 for an episode of dizziness.  Admitted for further workup.  ASSESSMENT & PLAN        Possible TIA  Has a history of multiple sclerosis, reports weakness, blurring of the vision on and off.  On assessment patient's strength in the lower extremities is reduced.  Patient is currently not on  medications for her multiple sclerosis.  MRI ruled out any new spots or demyelinating process compared to prior MRI.  Plan  Will continue patient's atorvastatin.  Patient's MPX score is 10 and 13 will discuss possibility of SNF or home care.      RLE cellulitis, suspected  Noted right-sided foot  erythema  Plan  ABX: 2 g IV cefazolin q8h, de-escalate appropriately.  Patient can start oral antibiotics tomorrow.    Checklist:  Antimicrobials: Ancef  Feeding: Regular  DVT ppx: Lovenox  Glycemic control: Accu-Cheks  Bowel care: MiraLAX  Lines: PIV  Consults: Neurology  Code Status: DNR/DNI no ICU      Shayy Swanson MD  PGY 1, Internal Medicine  Please SecureChat for any further questions  This is a preliminary note, please await attending attestation for final A/P            [1] atorvastatin, 40 mg, oral, Daily  ceFAZolin, 2 g, intravenous, q8h  enoxaparin, 40 mg, subcutaneous, q24h  levETIRAcetam, 750 mg, oral, BID  melatonin, 3 mg, oral, Nightly  polyethylene glycol, 17 g, oral, Daily

## 2025-07-26 NOTE — CARE PLAN
The patient's goals for the shift include  safety    The clinical goals for the shift include patient to be safe throughout the shift    Over the shift, the patient did not make progress toward the following goals. Barriers to progression include weakness. Recommendations to address these barriers include communication.

## 2025-07-27 VITALS
SYSTOLIC BLOOD PRESSURE: 120 MMHG | HEIGHT: 70 IN | RESPIRATION RATE: 16 BRPM | BODY MASS INDEX: 20.61 KG/M2 | HEART RATE: 84 BPM | OXYGEN SATURATION: 95 % | TEMPERATURE: 99.3 F | WEIGHT: 143.96 LBS | DIASTOLIC BLOOD PRESSURE: 62 MMHG

## 2025-07-27 LAB
ANION GAP SERPL CALC-SCNC: 12 MMOL/L (ref 10–20)
BUN SERPL-MCNC: 10 MG/DL (ref 6–23)
CALCIUM SERPL-MCNC: 8.7 MG/DL (ref 8.6–10.3)
CHLORIDE SERPL-SCNC: 106 MMOL/L (ref 98–107)
CO2 SERPL-SCNC: 28 MMOL/L (ref 21–32)
CREAT SERPL-MCNC: 0.63 MG/DL (ref 0.5–1.05)
EGFRCR SERPLBLD CKD-EPI 2021: >90 ML/MIN/1.73M*2
ERYTHROCYTE [DISTWIDTH] IN BLOOD BY AUTOMATED COUNT: 12.8 % (ref 11.5–14.5)
GLUCOSE SERPL-MCNC: 84 MG/DL (ref 74–99)
HCT VFR BLD AUTO: 41.1 % (ref 36–46)
HGB BLD-MCNC: 13.9 G/DL (ref 12–16)
MAGNESIUM SERPL-MCNC: 2.05 MG/DL (ref 1.6–2.4)
MCH RBC QN AUTO: 31.7 PG (ref 26–34)
MCHC RBC AUTO-ENTMCNC: 33.8 G/DL (ref 32–36)
MCV RBC AUTO: 94 FL (ref 80–100)
NRBC BLD-RTO: 0 /100 WBCS (ref 0–0)
PLATELET # BLD AUTO: 196 X10*3/UL (ref 150–450)
POTASSIUM SERPL-SCNC: 3.7 MMOL/L (ref 3.5–5.3)
RBC # BLD AUTO: 4.38 X10*6/UL (ref 4–5.2)
SODIUM SERPL-SCNC: 142 MMOL/L (ref 136–145)
WBC # BLD AUTO: 5.3 X10*3/UL (ref 4.4–11.3)

## 2025-07-27 PROCEDURE — 2500000001 HC RX 250 WO HCPCS SELF ADMINISTERED DRUGS (ALT 637 FOR MEDICARE OP)

## 2025-07-27 PROCEDURE — 36415 COLL VENOUS BLD VENIPUNCTURE: CPT

## 2025-07-27 PROCEDURE — 83735 ASSAY OF MAGNESIUM: CPT

## 2025-07-27 PROCEDURE — 1200000002 HC GENERAL ROOM WITH TELEMETRY DAILY

## 2025-07-27 PROCEDURE — 2500000005 HC RX 250 GENERAL PHARMACY W/O HCPCS

## 2025-07-27 PROCEDURE — 80048 BASIC METABOLIC PNL TOTAL CA: CPT

## 2025-07-27 PROCEDURE — 2500000004 HC RX 250 GENERAL PHARMACY W/ HCPCS (ALT 636 FOR OP/ED)

## 2025-07-27 PROCEDURE — 85027 COMPLETE CBC AUTOMATED: CPT

## 2025-07-27 RX ORDER — LIDOCAINE 560 MG/1
1 PATCH PERCUTANEOUS; TOPICAL; TRANSDERMAL DAILY
Status: DISCONTINUED | OUTPATIENT
Start: 2025-07-27 | End: 2025-07-29 | Stop reason: HOSPADM

## 2025-07-27 RX ORDER — ACETAMINOPHEN 325 MG/1
650 TABLET ORAL EVERY 4 HOURS PRN
Status: DISCONTINUED | OUTPATIENT
Start: 2025-07-27 | End: 2025-07-29 | Stop reason: HOSPADM

## 2025-07-27 RX ADMIN — CEFAZOLIN SODIUM 2 G: 2 SOLUTION INTRAVENOUS at 15:30

## 2025-07-27 RX ADMIN — ENOXAPARIN SODIUM 40 MG: 100 INJECTION SUBCUTANEOUS at 17:54

## 2025-07-27 RX ADMIN — CEFAZOLIN SODIUM 2 G: 2 SOLUTION INTRAVENOUS at 23:31

## 2025-07-27 RX ADMIN — LEVETIRACETAM 750 MG: 500 TABLET, FILM COATED ORAL at 20:21

## 2025-07-27 RX ADMIN — LEVETIRACETAM 750 MG: 500 TABLET, FILM COATED ORAL at 09:14

## 2025-07-27 RX ADMIN — Medication 3 MG: at 20:22

## 2025-07-27 RX ADMIN — CEFAZOLIN SODIUM 2 G: 2 SOLUTION INTRAVENOUS at 06:13

## 2025-07-27 RX ADMIN — LIDOCAINE 1 PATCH: 4 PATCH TOPICAL at 17:41

## 2025-07-27 RX ADMIN — ATORVASTATIN CALCIUM 40 MG: 40 TABLET, FILM COATED ORAL at 09:15

## 2025-07-27 ASSESSMENT — COGNITIVE AND FUNCTIONAL STATUS - GENERAL
STANDING UP FROM CHAIR USING ARMS: TOTAL
DRESSING REGULAR UPPER BODY CLOTHING: TOTAL
MOBILITY SCORE: 7
CLIMB 3 TO 5 STEPS WITH RAILING: TOTAL
HELP NEEDED FOR BATHING: TOTAL
DRESSING REGULAR LOWER BODY CLOTHING: TOTAL
WALKING IN HOSPITAL ROOM: TOTAL
TOILETING: TOTAL
PERSONAL GROOMING: A LITTLE
MOVING FROM LYING ON BACK TO SITTING ON SIDE OF FLAT BED WITH BEDRAILS: A LOT
MOVING TO AND FROM BED TO CHAIR: TOTAL
DAILY ACTIVITIY SCORE: 11
TURNING FROM BACK TO SIDE WHILE IN FLAT BAD: TOTAL

## 2025-07-27 ASSESSMENT — PAIN - FUNCTIONAL ASSESSMENT: PAIN_FUNCTIONAL_ASSESSMENT: 0-10

## 2025-07-27 ASSESSMENT — PAIN SCALES - GENERAL
PAINLEVEL_OUTOF10: 0 - NO PAIN
PAINLEVEL_OUTOF10: 0 - NO PAIN

## 2025-07-27 NOTE — CARE PLAN
The patient's goals for the shift include safety     The clinical goals for the shift include patient will remain safe and comfortable throughout the shift    Over the shift, the patient did not make progress toward the following goals. Barriers to progression include weakness. Recommendations to address these barriers include communication.

## 2025-07-27 NOTE — PROGRESS NOTES
Akiko Peng is a 69 y.o. female on day 2 of admission presenting with dizziness and right-sided weakness.      SUBJECTIVE     Patient evaluated this morning and found to be resting comfortably in bed.  Denies any acute symptoms at this point.    OBJECTIVE     Vitals:    07/26/25 2000 07/27/25 0530 07/27/25 0947 07/27/25 1353   BP: 118/57 123/56 121/56 107/52   BP Location:   Right arm Right arm   Patient Position:  Lying Lying Lying   Pulse: 61 59 68 65   Resp:   18 18   Temp: 36.7 °C (98.1 °F) 35.3 °C (95.5 °F) 36.6 °C (97.9 °F) 36.5 °C (97.7 °F)   TempSrc:  Temporal Temporal Temporal   SpO2: 97% 98% 97% 95%   Weight:       Height:          Results from last 7 days   Lab Units 07/27/25  0701 07/25/25  0734 07/24/25  1409   WBC AUTO x10*3/uL 5.3   < > 4.4   HEMOGLOBIN g/dL 13.9   < > 14.9   HEMATOCRIT % 41.1   < > 45.9   PLATELETS AUTO x10*3/uL 196   < > 242   NEUTROS PCT AUTO %  --   --  66.1   LYMPHS PCT AUTO %  --   --  18.9   MONOS PCT AUTO %  --   --  11.3   EOS PCT AUTO %  --   --  2.1    < > = values in this interval not displayed.     Results from last 7 days   Lab Units 07/27/25  0701 07/25/25  0734 07/24/25  1409   SODIUM mmol/L 142   < > 141   POTASSIUM mmol/L 3.7   < > 4.0   CHLORIDE mmol/L 106   < > 105   CO2 mmol/L 28   < > 28   BUN mg/dL 10   < > 20   CREATININE mg/dL 0.63   < > 0.55   CALCIUM mg/dL 8.7   < > 9.1   PROTEIN TOTAL g/dL  --   --  6.8   BILIRUBIN TOTAL mg/dL  --   --  0.5   ALK PHOS U/L  --   --  92   ALT U/L  --   --  6*   AST U/L  --   --  9   GLUCOSE mg/dL 84   < > 83    < > = values in this interval not displayed.       Scheduled Medications  Scheduled Medications[1]   Physical Exam    Constitutional: well developed, awake, alert, no acute distress  ENMT: mucous membranes moist, EOMI, conjunctivae clear  Head/Neck: normocephalic, atraumatic; supple, trachea midline  Respiratory/Thorax: patent airways, CTAB; no wheezes, rales, or rhonchi  Cardiovascular: RRR, no  murmur  Gastrointestinal: soft, nondistended, non-tender, bowel sounds appreciated  Extremities: palpable peripheral pulses, no edema or cyanosis, strength 1/5 in right lower extremity, and 2/5 in left lower extremity  Neurological: AO x3, no focal deficits  Psychological: appropriate mood and behavior  Skin: warm and dry     Pertinent Imaging    MR angio neck wo IV contrast   No evidence of source vessel arterial occlusion, flow significant   stenosis, or aneurysm within the head and neck.      MR angio head wo IV contrast   No evidence of source vessel arterial occlusion, flow significant   stenosis, or aneurysm within the head and neck.      MR brain w and wo IV contrast   No acute infarct, recent hemorrhage, intracranial mass effect, or   abnormal parenchymal enhancement.       Unchanged extensive supratentorial white matter signal abnormality   that likely represents a combination of reported demyelinating   disease and possible elements of chronic small vessel ischemic   disease given patient age. No new white matter plaques identified in   the interim. Similar parenchymal atrophy.       ASSESSMENT & PLAN   Akiko Peng is a 69 y.o. female with a PMHx of mild MVR, HLD, MS, RA, seizures presents to [facility] ED on 7/24/2025 for an episode of dizziness.  Admitted for further workup.  ASSESSMENT & PLAN        Possible TIA  Has a history of multiple sclerosis, reports weakness, blurring of the vision on and off.  On assessment patient's strength in the lower extremities is reduced.  Patient is currently not on  medications for her multiple sclerosis.  MRI ruled out any new spots or demyelinating process compared to prior MRI.  Plan  Will continue patient's atorvastatin.  Patient can be discharged tomorrow with home home care as per patient's preference.        RLE cellulitis, suspected  Noted right-sided foot erythema  Plan  ABX: 2 g IV cefazolin q8h, de-escalate appropriately.  New with 2 more days of  antibiotics.    Checklist:  Antimicrobials: Ancef  Feeding: Regular  DVT ppx: Lovenox  Glycemic control: Accu-Cheks  Bowel care: MiraLAX  Lines: PIV  Consults: Neurology  Code Status: DNR/DNI no ICU    Shayy Swanson MD  PGY 1, Internal Medicine  Please SecureChat for any further questions  This is a preliminary note, please await attending attestation for final A/P            [1] atorvastatin, 40 mg, oral, Daily  ceFAZolin, 2 g, intravenous, q8h  enoxaparin, 40 mg, subcutaneous, q24h  levETIRAcetam, 750 mg, oral, BID  lidocaine, 1 patch, transdermal, Daily  melatonin, 3 mg, oral, Nightly  polyethylene glycol, 17 g, oral, Daily

## 2025-07-28 LAB
ANION GAP SERPL CALC-SCNC: 13 MMOL/L (ref 10–20)
BUN SERPL-MCNC: 10 MG/DL (ref 6–23)
CALCIUM SERPL-MCNC: 8.9 MG/DL (ref 8.6–10.3)
CHLORIDE SERPL-SCNC: 104 MMOL/L (ref 98–107)
CO2 SERPL-SCNC: 28 MMOL/L (ref 21–32)
CREAT SERPL-MCNC: 0.57 MG/DL (ref 0.5–1.05)
EGFRCR SERPLBLD CKD-EPI 2021: >90 ML/MIN/1.73M*2
ERYTHROCYTE [DISTWIDTH] IN BLOOD BY AUTOMATED COUNT: 12.8 % (ref 11.5–14.5)
GLUCOSE SERPL-MCNC: 88 MG/DL (ref 74–99)
HCT VFR BLD AUTO: 42.2 % (ref 36–46)
HGB BLD-MCNC: 13.9 G/DL (ref 12–16)
HOLD SPECIMEN: NORMAL
MAGNESIUM SERPL-MCNC: 2.1 MG/DL (ref 1.6–2.4)
MCH RBC QN AUTO: 31 PG (ref 26–34)
MCHC RBC AUTO-ENTMCNC: 32.9 G/DL (ref 32–36)
MCV RBC AUTO: 94 FL (ref 80–100)
NRBC BLD-RTO: 0 /100 WBCS (ref 0–0)
PLATELET # BLD AUTO: 189 X10*3/UL (ref 150–450)
POTASSIUM SERPL-SCNC: 3.7 MMOL/L (ref 3.5–5.3)
RBC # BLD AUTO: 4.48 X10*6/UL (ref 4–5.2)
SODIUM SERPL-SCNC: 141 MMOL/L (ref 136–145)
WBC # BLD AUTO: 4.6 X10*3/UL (ref 4.4–11.3)

## 2025-07-28 PROCEDURE — 80048 BASIC METABOLIC PNL TOTAL CA: CPT

## 2025-07-28 PROCEDURE — 2500000001 HC RX 250 WO HCPCS SELF ADMINISTERED DRUGS (ALT 637 FOR MEDICARE OP)

## 2025-07-28 PROCEDURE — 1100000001 HC PRIVATE ROOM DAILY

## 2025-07-28 PROCEDURE — 97535 SELF CARE MNGMENT TRAINING: CPT | Mod: CO,GO

## 2025-07-28 PROCEDURE — 2500000004 HC RX 250 GENERAL PHARMACY W/ HCPCS (ALT 636 FOR OP/ED)

## 2025-07-28 PROCEDURE — 97535 SELF CARE MNGMENT TRAINING: CPT | Mod: GP,CQ

## 2025-07-28 PROCEDURE — 2500000005 HC RX 250 GENERAL PHARMACY W/O HCPCS

## 2025-07-28 PROCEDURE — 85027 COMPLETE CBC AUTOMATED: CPT

## 2025-07-28 PROCEDURE — 36415 COLL VENOUS BLD VENIPUNCTURE: CPT

## 2025-07-28 PROCEDURE — 83735 ASSAY OF MAGNESIUM: CPT

## 2025-07-28 RX ADMIN — POLYETHYLENE GLYCOL 3350 17 G: 17 POWDER, FOR SOLUTION ORAL at 09:41

## 2025-07-28 RX ADMIN — CEFAZOLIN SODIUM 2 G: 2 SOLUTION INTRAVENOUS at 16:51

## 2025-07-28 RX ADMIN — CEFAZOLIN SODIUM 2 G: 2 SOLUTION INTRAVENOUS at 06:23

## 2025-07-28 RX ADMIN — LEVETIRACETAM 750 MG: 500 TABLET, FILM COATED ORAL at 20:16

## 2025-07-28 RX ADMIN — ENOXAPARIN SODIUM 40 MG: 100 INJECTION SUBCUTANEOUS at 18:44

## 2025-07-28 RX ADMIN — ACETAMINOPHEN 650 MG: 325 TABLET ORAL at 10:48

## 2025-07-28 RX ADMIN — LEVETIRACETAM 750 MG: 500 TABLET, FILM COATED ORAL at 09:41

## 2025-07-28 RX ADMIN — ATORVASTATIN CALCIUM 40 MG: 40 TABLET, FILM COATED ORAL at 09:41

## 2025-07-28 RX ADMIN — Medication 3 MG: at 20:16

## 2025-07-28 ASSESSMENT — COGNITIVE AND FUNCTIONAL STATUS - GENERAL
DAILY ACTIVITIY SCORE: 6
STANDING UP FROM CHAIR USING ARMS: A LOT
MOBILITY SCORE: 12
CLIMB 3 TO 5 STEPS WITH RAILING: TOTAL
MOVING TO AND FROM BED TO CHAIR: A LOT
HELP NEEDED FOR BATHING: TOTAL
DRESSING REGULAR UPPER BODY CLOTHING: A LITTLE
DAILY ACTIVITIY SCORE: 16
MOVING TO AND FROM BED TO CHAIR: TOTAL
STANDING UP FROM CHAIR USING ARMS: TOTAL
DRESSING REGULAR LOWER BODY CLOTHING: A LOT
DRESSING REGULAR UPPER BODY CLOTHING: TOTAL
PERSONAL GROOMING: TOTAL
MOVING FROM LYING ON BACK TO SITTING ON SIDE OF FLAT BED WITH BEDRAILS: A LITTLE
MOBILITY SCORE: 6
WALKING IN HOSPITAL ROOM: TOTAL
WALKING IN HOSPITAL ROOM: TOTAL
TURNING FROM BACK TO SIDE WHILE IN FLAT BAD: A LITTLE
PERSONAL GROOMING: A LITTLE
HELP NEEDED FOR BATHING: A LOT
TOILETING: A LOT
MOVING FROM LYING ON BACK TO SITTING ON SIDE OF FLAT BED WITH BEDRAILS: TOTAL
EATING MEALS: TOTAL
CLIMB 3 TO 5 STEPS WITH RAILING: TOTAL
TURNING FROM BACK TO SIDE WHILE IN FLAT BAD: TOTAL
DRESSING REGULAR LOWER BODY CLOTHING: TOTAL
TOILETING: TOTAL

## 2025-07-28 ASSESSMENT — PAIN - FUNCTIONAL ASSESSMENT
PAIN_FUNCTIONAL_ASSESSMENT: 0-10

## 2025-07-28 ASSESSMENT — PAIN SCALES - GENERAL
PAINLEVEL_OUTOF10: 0 - NO PAIN
PAINLEVEL_OUTOF10: 9
PAINLEVEL_OUTOF10: 0 - NO PAIN
PAINLEVEL_OUTOF10: 9
PAINLEVEL_OUTOF10: 4

## 2025-07-28 ASSESSMENT — PAIN DESCRIPTION - DESCRIPTORS: DESCRIPTORS: OTHER (COMMENT)

## 2025-07-28 ASSESSMENT — PAIN DESCRIPTION - LOCATION: LOCATION: LEG

## 2025-07-28 ASSESSMENT — ACTIVITIES OF DAILY LIVING (ADL): HOME_MANAGEMENT_TIME_ENTRY: 15

## 2025-07-28 ASSESSMENT — PAIN DESCRIPTION - ORIENTATION: ORIENTATION: RIGHT

## 2025-07-28 NOTE — PROGRESS NOTES
Art Therapy Note    Akiko Peng was referred by Bell Giordano LPN    Therapy Session  Referral Type: New referral this admission  Visit Type: New visit  Session Start Time: 1306  Session End Time: 1348  Intervention Delivery: In-person  Conflict of Service: None  Family Present for Session: None    Pre-assessment  Pain Score: 0 - No pain  Stress Level (0-10): 1  Anxiety Level (0-10): 1  Coping Level (0-10): 9         Treatment/Interventions  Areas of Focus: Coping  Art Therapy Interventions: Active art engagement  Interruption: Yes  Interrupted by: Staff  Interruption Duration (min): 6 minutes  Interruption Outcome: Session resumed    Post-assessment  0-10 (Numeric) Pain Score: 0 - No pain  Stress Level (0-10): 0  Anxiety Level (0-10): 0  Coping Level (0-10): 8  Continue Visiting: Yes  Total Session Time (min): 42 minutes    Narrative  Assessment Detail: ATR found pt laying in bed awake. ATR introduce self and art therapy service to the pt. Pt agreed to art therapy session with encourgament. Pt expressed her interest in art and what she use to be able to do.  Plan: ATR will engage pt in active art making as a means to add additional support for the pt and coping.  Intervention: Pt participated by laying in the bed to start and then was transitioned to chair during the session. Pt seclected to paint a bird house. Pt selected their paint colors usig purple and yellow. Pt shared with ATR that the bird house makes her think about all the cats her children have a need to keep them away from the birds.  Evaluation: Pt actively engaged in art intervention. Pt used her left hand to hold the paint brush while ATR held the bird house steady. Pt was seen trying to use her right hand to paint and expressed her frustation of not being able to use it on her own. Pt pointed out that she has not been able to do alot of things she use to do on her own unless she has help getting around. With emotional and verbal support from  the ATR pt was able to paint the bird house on her own using her left hand and expressed enjoyment in session.  Follow-up: ATR will continue to follow up with pt.    Education Documentation  Coping Strategies, taught by Jodie Hall at 7/28/2025  3:52 PM.  Learner: Patient  Readiness: Acceptance  Method: Explanation  Response: Verbalizes Understanding

## 2025-07-28 NOTE — PROGRESS NOTES
"  Akiko Peng is a 69 y.o. female on day 3 of admission presenting with Atypical syncope.    Subjective     Patient seen and examined. No acute overnight events.       Objective       Physical Exam:  General:  Pleasant and cooperative. No apparent distress.  HEENT:  Normocephalic, atraumatic  Chest:  Clear to auscultation bilaterally. No wheezes, rales, or rhonchi.  CV:  Regular rate and rhythm. No murmurs    Abdomen: Abdomen is soft, non-tender, non-distended. BS +   Extremities: palpable peripheral pulses, no edema or cyanosis, strength 1/5 in right lower extremity, and 2/5 in left lower extremity   Neurological:  AAOx3. No focal deficits.  Skin:  Warm and dry.    Last Recorded Vitals  Blood pressure 96/53, pulse 73, temperature 36.5 °C (97.7 °F), resp. rate 16, height 1.778 m (5' 10\"), weight 65.3 kg (143 lb 15.4 oz), SpO2 96%.  Intake/Output last 3 Shifts:  I/O last 3 completed shifts:  In: 250 (3.8 mL/kg) [IV Piggyback:250]  Out: 1450 (22.2 mL/kg) [Urine:1450 (0.6 mL/kg/hr)]  Weight: 65.3 kg     Last CBC & BMP  Lab Results   Component Value Date    GLUCOSE 88 07/28/2025    CALCIUM 8.9 07/28/2025     07/28/2025    K 3.7 07/28/2025    CO2 28 07/28/2025     07/28/2025    BUN 10 07/28/2025    CREATININE 0.57 07/28/2025     Lab Results   Component Value Date    WBC 4.6 07/28/2025    HGB 13.9 07/28/2025    HCT 42.2 07/28/2025    MCV 94 07/28/2025     07/28/2025     Daily progress:7/28  Patient has been seen on rounds today. She is medically clear and agreed to SNF placement.  She has requested dose reduction of Keppra ,and neurology has been contacted for further recommendation.  They recommend drop to 500 twice daily and see her neurologist within the month.      Assessment / Plan     # Atypical syncope    # MS Flare rule out  - medically clear and agreed to SNF placement ( [facility] ;6)  -Occupational Therapy   # Seizure  - Keppra 500mg p.o. twice daily (after reach out to the " neurology team)  # RLE cellulitis, suspected  2 g IV cefazolin q8h, de-escalate appropriately.    #TIA rule out  -CT of head wo contrast: No acute intracranial pathology     John Sheth MD  PGY-1, Internal Medicine  This is a preliminary note, please await attending attestation for final A/P

## 2025-07-28 NOTE — CARE PLAN
Problem: Pain - Adult  Goal: Verbalizes/displays adequate comfort level or baseline comfort level  Outcome: Progressing     Problem: Safety - Adult  Goal: Free from fall injury  Outcome: Progressing     Problem: Discharge Planning  Goal: Discharge to home or other facility with appropriate resources  Outcome: Progressing     Problem: Chronic Conditions and Co-morbidities  Goal: Patient's chronic conditions and co-morbidity symptoms are monitored and maintained or improved  Outcome: Progressing     Problem: Nutrition  Goal: Nutrient intake appropriate for maintaining nutritional needs  Outcome: Progressing     Problem: Skin  Goal: Decreased wound size/increased tissue granulation at next dressing change  Outcome: Progressing  Flowsheets (Taken 7/27/2025 2349)  Decreased wound size/increased tissue granulation at next dressing change: Promote sleep for wound healing  Goal: Participates in plan/prevention/treatment measures  Outcome: Progressing  Flowsheets (Taken 7/27/2025 2349)  Participates in plan/prevention/treatment measures:   Elevate heels   Increase activity/out of bed for meals  Goal: Prevent/manage excess moisture  Outcome: Progressing  Flowsheets (Taken 7/27/2025 2349)  Prevent/manage excess moisture:   Cleanse incontinence/protect with barrier cream   Moisturize dry skin  Goal: Prevent/minimize sheer/friction injuries  Outcome: Progressing  Flowsheets (Taken 7/27/2025 2349)  Prevent/minimize sheer/friction injuries:   Complete micro-shifts as needed if patient unable. Adjust patient position to relieve pressure points, not a full turn   Use pull sheet   Increase activity/out of bed for meals   HOB 30 degrees or less   Turn/reposition every 2 hours/use positioning/transfer devices  Goal: Promote/optimize nutrition  Outcome: Progressing  Flowsheets (Taken 7/27/2025 2349)  Promote/optimize nutrition:   Monitor/record intake including meals   Consume > 50% meals/supplements   Offer water/supplements/favorite  foods  Goal: Promote skin healing  Outcome: Progressing  Flowsheets (Taken 7/27/2025 2129)  Promote skin healing:   Assess skin/pad under line(s)/device(s)   Turn/reposition every 2 hours/use positioning/transfer devices   Ensure correct size (line/device) and apply per  instructions   Rotate device position/do not position patient on device   The patient's goals for the shift include      The clinical goals for the shift include comfort and safety    Over the shift, the patient did make progress toward the following goals. Barriers to progression include decreased mobility. Recommendations to address these barriers include education on the importance of mobility and AROM activities to help improve mobility.

## 2025-07-28 NOTE — PROGRESS NOTES
"Occupational Therapy    OT Treatment    Patient Name: Akiko Peng  MRN: [id number]  Department: [facility] 3  Room: [address] [place] [address]  Today's Date: 7/28/2025  Time Calculation  Start Time: 1042  Stop Time: 1105  Time Calculation (min): 23 min        Assessment:  End of Session Communication: Bedside nurse, Care Coordinator  End of Session Patient Position: Bed, 3 rail up, Alarm on     Plan:  Treatment Interventions: ADL retraining, Functional transfer training  OT Frequency: 5 times per week  OT Discharge Recommendations: High intensity level of continued care (Pt can benefit from OT tx intervention 5x/week once discharged from acute care hospitalization and discharged to rehab facility)  OT - OK to Discharge: Yes (once medically cleared)  Treatment Interventions: ADL retraining, Functional transfer training    Subjective   OT Visit Info:  OT Received On: 07/28/25  General Visit Info:  General  Co-Treatment: PT  Co-Treatment Reason: Maximize patient safety and mobility  Prior to Session Communication: Bedside nurse  Patient Position Received: Bed, 3 rail up, Alarm off, not on at start of session  General Comment: pt agreeable to participate in therapy with encouragement however appears to demo severe self-limiting behavior throughout despite encouragement given.  Precautions:  Medical Precautions: Fall precautions  Precautions Comment: Weakess due to MS on right side, tele, purewick        Pain:  Pain Assessment  Pain Assessment:  (9/10 in R side. nursing notified and pain medication given during therapy session)    Objective    Cognition:  Cognition  Overall Cognitive Status: Within Functional Limits       Activities of Daily Living: Feeding  Feeding Comments: while nursing giving pt medication pt refusing to hold cup stating, \"I can't\" pt also stating one could not sip through straw. once nurse gave medication crushed pt sipped through straw without difficulty but still would not hold cup    LE Dressing  LE Dressing: Yes  Sock " "Level of Assistance: Dependent  LE Dressing Where Assessed: Bed level       Bed Mobility/Transfers: Bed Mobility  Bed Mobility: Yes  Bed Mobility 1  Bed Mobility 1: Supine to sitting, Sitting to supine  Level of Assistance 1: Dependent, +2, Maximum verbal cues  Bed Mobility Comments 1: pt not participating despite max encouragement stating, \"I can't\" draw sheet used to complete task.    Transfers  Transfer: No (unable to safely progress)    Sitting Balance:  Static Sitting Balance  Static Sitting-Balance Support: Feet supported, Bilateral upper extremity supported  Static Sitting-Level of Assistance:  (Max A for most of task with partial Min A at one point. pt leaning backwards and not following cues for upright posture)  Static Sitting-Comment/Number of Minutes: pt tolerated sitting EOB for 10:00      Outcome Measures:[facility] Daily Activity  Putting on and taking off regular lower body clothing: Total  Bathing (including washing, rinsing, drying): Total  Putting on and taking off regular upper body clothing: Total  Toileting, which includes using toilet, bedpan or urinal: Total  Taking care of personal grooming such as brushing teeth: Total  Eating Meals: Total  Daily Activity - Total Score: 6        Education Documentation  Precautions, taught by JOURDAN Pena at 7/28/2025 12:40 PM.  Learner: Patient  Readiness: Acceptance  Method: Explanation  Response: Needs Reinforcement, No Evidence of Learning    ADL Training, taught by JOURDAN Pena at 7/28/2025 12:40 PM.  Learner: Patient  Readiness: Acceptance  Method: Explanation  Response: Needs Reinforcement, No Evidence of Learning    Body Mechanics, taught by JOURDAN Pena at 7/28/2025 12:40 PM.  Learner: Patient  Readiness: Acceptance  Method: Explanation  Response: Needs Reinforcement, No Evidence of Learning    Education Comments  No comments found.               Goals:  Encounter Problems       Encounter Problems (Active)       impaired " functional living skills       Pt will increase Grooming to s/mod indep Upper Body Bathing to sba  and Lower Body Bathing  to min a x 1-2  increase Upper Body Dressing  to sba  and LE Dressing to min a x 1-2 with/ without adaptive equipment as needed (Not Progressing)       Start:  07/25/25    Expected End:  08/08/25            Pt will increase Functional Transfers Bed Mobility to min a x 1-2  Sit to Stand  to min a x 1-2  with/without  a device min a x 1-2  chair/ commode  to increase indep/safety in patients discharge environment (Not Progressing)       Start:  07/25/25    Expected End:  08/08/25

## 2025-07-28 NOTE — PROGRESS NOTES
"Physical Therapy    Physical Therapy Treatment    Patient Name: Akiko Peng  MRN: [id number]  Department: [facility]  Room: [address] [place] [address]  Today's Date: 7/28/2025  Time Calculation  Start Time: 1045  Stop Time: 1108  Time Calculation (min): 23 min         Assessment/Plan   PT Assessment  End of Session Communication: Bedside nurse, Care Coordinator  End of Session Patient Position: Bed, 3 rail up, Alarm on     PT Plan  PT Plan: Ongoing PT  PT Frequency: 3 times per week  PT Discharge Recommendations: High intensity level of continued care  PT Recommended Transfer Status: Assist x2  PT - OK to Discharge: Yes (When cleared by medical staff.)      General Visit Information:   General  Co-Treatment: OT  Co-Treatment Reason: to maximize patient safety and mobility  Prior to Session Communication: Bedside nurse  Patient Position Received: Bed, 3 rail up, Alarm off, not on at start of session  General Comment: pt agreeable to participate in therapy with encouragement however appears to demo severe self-limiting behavior throughout    Subjective   Precautions:  Precautions  Medical Precautions: Fall precautions  Precautions Comment: rt side weakness 2/2 MS; tele; [employer]      Objective   Pain:  Pain Assessment  Pain Assessment: 0-10  0-10 (Numeric) Pain Score: 9  Pain Location:  (RUE & RLE  (UE>LE))  Pain Interventions:  (RN providing pain meds during tx session)    Cognition:  Cognition  Orientation Level: Oriented X4     Treatments:  Bed Mobility  Bed Mobility:  (max cuing & encouragement for pt to initiate movement for bed mobility however pt unable to move either UE/LE, stating \"I can't\"; sup <> sit completed with dep A x 2 using drawsheet to assist.  pt able to sit EOB up to 10 min. largely requiring max A d/t heavy posterior and R-side lean.  cuing for posture/anterior wt shift however pt states \"I can't\".  pt also stating \"I can't\" when attempting to position BUEs for added support, however pt does demo BUE AROM on one's own " at other times during tx.  pt does report mild SOB while seated EOB; SpO2 94-95%.)    Transfers  Transfer:  (unable to progress)    Outcome Measures:  [facility] Basic Mobility  Turning from your back to your side while in a flat bed without using bedrails: Total  Moving from lying on your back to sitting on the side of a flat bed without using bedrails: Total  Moving to and from bed to chair (including a wheelchair): Total  Standing up from a chair using your arms (e.g. wheelchair or bedside chair): Total  To walk in hospital room: Total  Climbing 3-5 steps with railing: Total  Basic Mobility - Total Score: 6    Education Documentation  Mobility Training, taught by Juany Mckeon PTA at 7/28/2025 11:40 AM.  Learner: Patient  Readiness: Acceptance  Method: Explanation  Response: Needs Reinforcement      EDUCATION:       Encounter Problems       Encounter Problems (Active)       Mobility       Goal 1 (Progressing)       Start:  07/25/25    Expected End:  08/01/25       STG - Pt will transition supine <> sitting with moderate assist x 1.          Goal 2 (Progressing)       Start:  07/25/25    Expected End:  08/01/25       STG - Pt will transfer STS with moderate assist x 1          Goal 3 (Progressing)       Start:  07/25/25    Expected End:  08/01/25       STG - Pt will SPT bed <> chair with moderate assist.

## 2025-07-28 NOTE — NURSING NOTE
I spoke with the patient's , Mikie Peng, this evening, and he would like to be given a call tomorrow, 7/28, from social work to discuss the discharge plans for the patient. He said he would be interested in the patient going to a SNF, if insurance will cover it, but if they will not, his [facility] preference would be with the company [employer]. His phone number is [phone], and he would requested to be called sometime after 10:00am.  RACHNA SILVA RN

## 2025-07-28 NOTE — PROGRESS NOTES
TCC Note: Spoke with pt's  and he is interested in [facility]. Pt was Medicare OBS however, was flipped to Inpatient late Friday afternoon, so she has met her 3 Midnight's stay. Message sent to [facility] to please review case to see if pt qualifies. Went to go speak to pt and she was sleeping. Will update  after we hear back from [facility]. Namrata Peacock, ROMEL, RN, TCC.    ADDENDUM: Liaison from [facility] came to do Bedside Evaluation and pt was still sleeping. Namrata Peacock, ROMEL, RN, TCC.    ADDENDUM: Was informed by therapy that they went in to see the pt and pt was not eagerly working with them and was saying that she was tired. Therapy informed me that as of now, the score that they could assign to pt, with her not working with them, would be a 6. [facility] will not accept pt with that score. Asked Therapy if they have the opportunity to please see again later, if they could. They stated they would try. Informed them that I would also speak with pt's  when he comes in at 1:00 PM to ask him to encourage pt to work with therapy and I also spoke with pt asking her to please try to work with them as well so we can get a solid discharge plan. Pt agreed. Namrata Peacock, MSN, RN, TCC.    ADDENDUM: Went in to pt room to provide SNF list per [facility], which includes facilities that are within  [facility] Quality network as well as meeting patient's medical needs and are in-network for patient's insurance while also in discharge geographic are patient/family prefers. List identifies each facilities CMS star rating. Patient/family to review SNF list and provide choices for SNF preference. Will follow for SNF choices.  should be here shortly to assist with that. Will continue to follow. Namrata Peacock, ROMEL,RN, TCC.    ADDENDUM: SNF choices were [facility], [facility] and [facility]. [facility] has  accepted. Pt is Medicare and  has hit her 3 Midnights. Anticipating a written discharge tomorrow. Pt can admit to [facility] pending bed availability. Namrata Peacock, MSN,RN, TCC.

## 2025-07-28 NOTE — CARE PLAN
The patient's goals for the shift include  comfort and up in chair for meals.    The clinical goals for the shift include Patient will remain safe throughout the shift.     Over the shift, the patient did not make progress toward the following goals. Barriers to progression include Hx of MS and weakness. Recommendations to address these barriers include assistance to chair for meals, bed/ chair alarm, frequent repositioning and use of call light for assistance.      Problem: Safety - Adult  Goal: Free from fall injury  Outcome: Progressing     Problem: Chronic Conditions and Co-morbidities  Goal: Patient's chronic conditions and co-morbidity symptoms are monitored and maintained or improved  Outcome: Progressing     Problem: Skin  Goal: Promote skin healing  7/28/2025 1557 by Bell Giordano LPN  Outcome: Progressing  7/28/2025 1555 by Bell Giordano LPN  Outcome: Progressing  Flowsheets (Taken 7/28/2025 1555)  Promote skin healing: Turn/reposition every 2 hours/use positioning/transfer devices

## 2025-07-29 ENCOUNTER — PHARMACY VISIT (OUTPATIENT)
Dept: PHARMACY | Facility: CLINIC | Age: 70
End: 2025-07-29
Payer: COMMERCIAL

## 2025-07-29 VITALS
WEIGHT: 143.96 LBS | BODY MASS INDEX: 20.61 KG/M2 | OXYGEN SATURATION: 99 % | TEMPERATURE: 97.2 F | HEART RATE: 49 BPM | HEIGHT: 70 IN | RESPIRATION RATE: 20 BRPM | SYSTOLIC BLOOD PRESSURE: 114 MMHG | DIASTOLIC BLOOD PRESSURE: 54 MMHG

## 2025-07-29 LAB
ANION GAP SERPL CALC-SCNC: 15 MMOL/L (ref 10–20)
ATRIAL RATE: 55 BPM
BUN SERPL-MCNC: 13 MG/DL (ref 6–23)
CALCIUM SERPL-MCNC: 8.8 MG/DL (ref 8.6–10.3)
CHLORIDE SERPL-SCNC: 105 MMOL/L (ref 98–107)
CO2 SERPL-SCNC: 25 MMOL/L (ref 21–32)
CREAT SERPL-MCNC: 0.56 MG/DL (ref 0.5–1.05)
EGFRCR SERPLBLD CKD-EPI 2021: >90 ML/MIN/1.73M*2
ERYTHROCYTE [DISTWIDTH] IN BLOOD BY AUTOMATED COUNT: 12.9 % (ref 11.5–14.5)
GLUCOSE SERPL-MCNC: 77 MG/DL (ref 74–99)
HCT VFR BLD AUTO: 41.5 % (ref 36–46)
HGB BLD-MCNC: 13.8 G/DL (ref 12–16)
MAGNESIUM SERPL-MCNC: 2.19 MG/DL (ref 1.6–2.4)
MCH RBC QN AUTO: 31.6 PG (ref 26–34)
MCHC RBC AUTO-ENTMCNC: 33.3 G/DL (ref 32–36)
MCV RBC AUTO: 95 FL (ref 80–100)
NRBC BLD-RTO: 0 /100 WBCS (ref 0–0)
P AXIS: 85 DEGREES
P OFFSET: 172 MS
P ONSET: 137 MS
PLATELET # BLD AUTO: 184 X10*3/UL (ref 150–450)
POTASSIUM SERPL-SCNC: 3.8 MMOL/L (ref 3.5–5.3)
PR INTERVAL: 170 MS
Q ONSET: 222 MS
QRS COUNT: 9 BEATS
QRS DURATION: 68 MS
QT INTERVAL: 456 MS
QTC CALCULATION(BAZETT): 436 MS
QTC FREDERICIA: 443 MS
R AXIS: 43 DEGREES
RBC # BLD AUTO: 4.37 X10*6/UL (ref 4–5.2)
SODIUM SERPL-SCNC: 141 MMOL/L (ref 136–145)
T AXIS: 62 DEGREES
T OFFSET: 450 MS
VENTRICULAR RATE: 55 BPM
WBC # BLD AUTO: 4 X10*3/UL (ref 4.4–11.3)

## 2025-07-29 PROCEDURE — 2500000004 HC RX 250 GENERAL PHARMACY W/ HCPCS (ALT 636 FOR OP/ED)

## 2025-07-29 PROCEDURE — 2500000001 HC RX 250 WO HCPCS SELF ADMINISTERED DRUGS (ALT 637 FOR MEDICARE OP)

## 2025-07-29 PROCEDURE — 2500000005 HC RX 250 GENERAL PHARMACY W/O HCPCS

## 2025-07-29 PROCEDURE — RXMED WILLOW AMBULATORY MEDICATION CHARGE

## 2025-07-29 PROCEDURE — 80048 BASIC METABOLIC PNL TOTAL CA: CPT

## 2025-07-29 PROCEDURE — 36415 COLL VENOUS BLD VENIPUNCTURE: CPT

## 2025-07-29 PROCEDURE — 83735 ASSAY OF MAGNESIUM: CPT

## 2025-07-29 PROCEDURE — 85027 COMPLETE CBC AUTOMATED: CPT

## 2025-07-29 RX ORDER — LEVETIRACETAM 500 MG/1
500 TABLET ORAL 2 TIMES DAILY
Qty: 60 TABLET | Refills: 0 | Status: SHIPPED | OUTPATIENT
Start: 2025-07-29 | End: 2025-08-28

## 2025-07-29 RX ORDER — LEVETIRACETAM 500 MG/1
500 TABLET ORAL 2 TIMES DAILY
Status: DISCONTINUED | OUTPATIENT
Start: 2025-07-29 | End: 2025-07-29 | Stop reason: HOSPADM

## 2025-07-29 RX ADMIN — ATORVASTATIN CALCIUM 40 MG: 40 TABLET, FILM COATED ORAL at 09:30

## 2025-07-29 RX ADMIN — LEVETIRACETAM 750 MG: 500 TABLET, FILM COATED ORAL at 09:30

## 2025-07-29 RX ADMIN — CEFAZOLIN SODIUM 2 G: 2 SOLUTION INTRAVENOUS at 01:04

## 2025-07-29 RX ADMIN — CEFAZOLIN SODIUM 2 G: 2 SOLUTION INTRAVENOUS at 09:29

## 2025-07-29 RX ADMIN — CEFAZOLIN SODIUM 2 G: 2 SOLUTION INTRAVENOUS at 16:00

## 2025-07-29 RX ADMIN — LIDOCAINE 1 PATCH: 4 PATCH TOPICAL at 09:30

## 2025-07-29 ASSESSMENT — COGNITIVE AND FUNCTIONAL STATUS - GENERAL
DAILY ACTIVITIY SCORE: 11
TOILETING: A LOT
DRESSING REGULAR LOWER BODY CLOTHING: TOTAL
EATING MEALS: A LITTLE
WALKING IN HOSPITAL ROOM: TOTAL
MOVING TO AND FROM BED TO CHAIR: A LOT
DRESSING REGULAR UPPER BODY CLOTHING: A LOT
STANDING UP FROM CHAIR USING ARMS: A LOT
TURNING FROM BACK TO SIDE WHILE IN FLAT BAD: A LOT
HELP NEEDED FOR BATHING: TOTAL
MOVING FROM LYING ON BACK TO SITTING ON SIDE OF FLAT BED WITH BEDRAILS: A LOT
PERSONAL GROOMING: A LOT

## 2025-07-29 ASSESSMENT — PAIN SCALES - GENERAL: PAINLEVEL_OUTOF10: 0 - NO PAIN

## 2025-07-29 NOTE — NURSING NOTE
Unable to feed herself lunch.  Complaining that arms feel weak.     Sat up in the recliner for two hours and then wanted to go back to bed.   Heavy two person assist into bed.

## 2025-07-29 NOTE — CARE PLAN
The patient's goals for the shift include  freq reposition for comfort.     The clinical goals for the shift include comfort.

## 2025-07-29 NOTE — CARE PLAN
Problem: Pain - Adult  Goal: Verbalizes/displays adequate comfort level or baseline comfort level  Outcome: Progressing     Problem: Safety - Adult  Goal: Free from fall injury  Outcome: Progressing     Problem: Discharge Planning  Goal: Discharge to home or other facility with appropriate resources  Outcome: Progressing     Problem: Chronic Conditions and Co-morbidities  Goal: Patient's chronic conditions and co-morbidity symptoms are monitored and maintained or improved  Outcome: Progressing     Problem: Nutrition  Goal: Nutrient intake appropriate for maintaining nutritional needs  Outcome: Progressing     Problem: Skin  Goal: Decreased wound size/increased tissue granulation at next dressing change  Outcome: Progressing  Goal: Participates in plan/prevention/treatment measures  Outcome: Progressing  Goal: Prevent/manage excess moisture  Outcome: Progressing  Goal: Prevent/minimize sheer/friction injuries  Outcome: Progressing  Goal: Promote/optimize nutrition  Outcome: Progressing  Goal: Promote skin healing  Outcome: Progressing     Problem: Fall/Injury  Goal: Not fall by end of shift  Outcome: Progressing  Goal: Be free from injury by end of the shift  Outcome: Progressing  Goal: Verbalize understanding of personal risk factors for fall in the hospital  Outcome: Progressing  Goal: Verbalize understanding of risk factor reduction measures to prevent injury from fall in the home  Outcome: Progressing  Goal: Use assistive devices by end of the shift  Outcome: Progressing  Goal: Pace activities to prevent fatigue by end of the shift  Outcome: Progressing     Problem: Pain  Goal: Takes deep breaths with improved pain control throughout the shift  Outcome: Progressing  Goal: Turns in bed with improved pain control throughout the shift  Outcome: Progressing  Goal: Walks with improved pain control throughout the shift  Outcome: Progressing  Goal: Performs ADL's with improved pain control throughout shift  Outcome:  Progressing  Goal: Participates in PT with improved pain control throughout the shift  Outcome: Progressing  Goal: Free from opioid side effects throughout the shift  Outcome: Progressing  Goal: Free from acute confusion related to pain meds throughout the shift  Outcome: Progressing      The clinical goals for the shift include pt to remain HDS by the end of shift

## 2025-07-29 NOTE — PROGRESS NOTES
TCC Note: Pt has written discharge to go to [facility]. Pt and RN aware. Transport requested. Transport time is 6:18 PM. Pt and RN aware. All appropriate staff members were informed. ROMEL Dalton, RN, TCC.

## 2025-07-29 NOTE — DISCHARGE INSTRUCTIONS
- Please note that your levetiracetam (Keppra) has been decreased to 500 mg twice a day.  I please start taking this medication on this regimen.  - Referral has been placed for you to establish with a  neurologist.  Please call with the provided number to make an appointment.  - Please follow-up with your primary care physician in the next 2-4 weeks.

## 2025-07-29 NOTE — DISCHARGE SUMMARY
Discharge diagnosis:  # Atypical syncope (TIA vs MS flare)  # Hx Seizure disorder  # UTI    Hospital course:  Akiko Peng is a 69 y.o. female with a PMHx of mild MVR, HLD, MS, RA, seizures presents to [facility] ED on 7/24/2025 for an episode of dizziness.  During admission, neurology was consulted to evaluate for possible TIA or multiple sclerosis flare, both of which were rule out by MRI and CT of the head.  Seizure management neurology initially recommended Keppra 750 mg twice daily; however, the dose was reduced to 500 mg twice daily due to reported weakness likely associated with Keppra.  Patient also received antibiotics for treatment of the urine tract infection and bilateral lower extremity cellulitis.  OT and PT were consulted functional assessment and rehabilitation planning.  Patient discharged to SNF. Follow-up with your primary care physician in the next 1-2 weeks.    Scheduled Medications  Scheduled Medications[1]   Vitals:    07/29/25 1232   BP: 114/54   Pulse: (!) 49   Resp: 20   Temp: 36.2 °C (97.2 °F)   SpO2: 99%      Physical Exam:    General:  Pleasant and cooperative. No apparent distress.  HEENT:  Normocephalic, atraumatic  Chest:  Clear to auscultation bilaterally. No wheezes, rales, or rhonchi.  CV:  Regular rate and rhythm. No murmurs    Abdomen: Abdomen is soft, non-tender, non-distended. BS +   Extremities: palpable peripheral pulses, no edema or cyanosis, strength 1/5 in right lower extremity, and 2/5 in left lower extremity   Neurological:  AAOx3. No focal deficits.  Skin:  Warm and dry.    Outpatient follow-up instructions and medication changes:  - Please note that your levetiracetam (Keppra) has been decreased to 500 mg twice a day.  I please start taking this medication on this regimen.  - Referral has been placed for you to establish with a  neurologist.  Please call with the provided number to make an appointment.  - Please follow-up with your primary care physician in the next 1-2  weeks.    John Sheth MD  PGY-1, Internal Medicine  Please SecureChat for any further questions         [1] atorvastatin, 40 mg, oral, Daily  ceFAZolin, 2 g, intravenous, q8h  enoxaparin, 40 mg, subcutaneous, q24h  levETIRAcetam, 500 mg, oral, BID  lidocaine, 1 patch, transdermal, Daily  melatonin, 3 mg, oral, Nightly  polyethylene glycol, 17 g, oral, Daily

## 2025-07-29 NOTE — NURSING NOTE
Report called to Wilber Benton, manager took report.  Family aware of discharge to ECF.  Discharged with all belongings.

## 2025-08-04 ENCOUNTER — LAB REQUISITION (OUTPATIENT)
Dept: LAB | Facility: HOSPITAL | Age: 70
End: 2025-08-04
Payer: MEDICARE

## 2025-08-04 DIAGNOSIS — G35 MULTIPLE SCLEROSIS (MULTI): ICD-10-CM

## 2025-08-04 LAB
ANION GAP SERPL CALC-SCNC: 12 MMOL/L (ref 10–20)
BUN SERPL-MCNC: 18 MG/DL (ref 6–23)
CALCIUM SERPL-MCNC: 8.9 MG/DL (ref 8.6–10.3)
CHLORIDE SERPL-SCNC: 105 MMOL/L (ref 98–107)
CO2 SERPL-SCNC: 28 MMOL/L (ref 21–32)
CREAT SERPL-MCNC: 0.58 MG/DL (ref 0.5–1.05)
EGFRCR SERPLBLD CKD-EPI 2021: >90 ML/MIN/1.73M*2
ERYTHROCYTE [DISTWIDTH] IN BLOOD BY AUTOMATED COUNT: 13 % (ref 11.5–14.5)
GLUCOSE SERPL-MCNC: 67 MG/DL (ref 74–99)
HCT VFR BLD AUTO: 46.3 % (ref 36–46)
HGB BLD-MCNC: 15.2 G/DL (ref 12–16)
MCH RBC QN AUTO: 31.8 PG (ref 26–34)
MCHC RBC AUTO-ENTMCNC: 32.8 G/DL (ref 32–36)
MCV RBC AUTO: 97 FL (ref 80–100)
NRBC BLD-RTO: 0 /100 WBCS (ref 0–0)
PLATELET # BLD AUTO: 282 X10*3/UL (ref 150–450)
POTASSIUM SERPL-SCNC: 3.7 MMOL/L (ref 3.5–5.3)
RBC # BLD AUTO: 4.78 X10*6/UL (ref 4–5.2)
SODIUM SERPL-SCNC: 141 MMOL/L (ref 136–145)
WBC # BLD AUTO: 5.9 X10*3/UL (ref 4.4–11.3)

## 2025-08-04 PROCEDURE — 80048 BASIC METABOLIC PNL TOTAL CA: CPT | Mod: OUT | Performed by: INTERNAL MEDICINE

## 2025-08-04 PROCEDURE — 36415 COLL VENOUS BLD VENIPUNCTURE: CPT | Mod: OUT | Performed by: INTERNAL MEDICINE

## 2025-08-04 PROCEDURE — 85027 COMPLETE CBC AUTOMATED: CPT | Mod: OUT | Performed by: INTERNAL MEDICINE

## 2025-08-09 LAB
ATRIAL RATE: 60 BPM
P AXIS: 74 DEGREES
P OFFSET: 175 MS
P ONSET: 142 MS
PR INTERVAL: 156 MS
Q ONSET: 220 MS
QRS COUNT: 10 BEATS
QRS DURATION: 76 MS
QT INTERVAL: 434 MS
QTC CALCULATION(BAZETT): 434 MS
QTC FREDERICIA: 434 MS
R AXIS: 18 DEGREES
T AXIS: 54 DEGREES
T OFFSET: 437 MS
VENTRICULAR RATE: 60 BPM

## 2025-08-18 ENCOUNTER — APPOINTMENT (OUTPATIENT)
Dept: NEUROLOGY | Facility: HOSPITAL | Age: 70
End: 2025-08-18
Payer: MEDICARE

## 2025-08-31 ENCOUNTER — APPOINTMENT (OUTPATIENT)
Dept: CARDIOLOGY | Facility: HOSPITAL | Age: 70
End: 2025-08-31
Payer: MEDICARE

## 2025-08-31 ENCOUNTER — HOSPITAL ENCOUNTER (EMERGENCY)
Facility: HOSPITAL | Age: 70
Discharge: HOME | End: 2025-08-31
Attending: STUDENT IN AN ORGANIZED HEALTH CARE EDUCATION/TRAINING PROGRAM
Payer: MEDICARE

## 2025-08-31 ENCOUNTER — APPOINTMENT (OUTPATIENT)
Dept: RADIOLOGY | Facility: HOSPITAL | Age: 70
End: 2025-08-31
Payer: MEDICARE

## 2025-08-31 VITALS
HEART RATE: 65 BPM | WEIGHT: 148 LBS | DIASTOLIC BLOOD PRESSURE: 64 MMHG | SYSTOLIC BLOOD PRESSURE: 114 MMHG | OXYGEN SATURATION: 98 % | RESPIRATION RATE: 21 BRPM | TEMPERATURE: 97.2 F | HEIGHT: 70 IN | BODY MASS INDEX: 21.19 KG/M2

## 2025-08-31 DIAGNOSIS — N30.00 ACUTE CYSTITIS WITHOUT HEMATURIA: Primary | ICD-10-CM

## 2025-08-31 LAB
ALBUMIN SERPL BCP-MCNC: 4.1 G/DL (ref 3.4–5)
ALP SERPL-CCNC: 147 U/L (ref 33–136)
ALT SERPL W P-5'-P-CCNC: 17 U/L (ref 7–45)
ANION GAP SERPL CALC-SCNC: 10 MMOL/L (ref 10–20)
APPEARANCE UR: ABNORMAL
AST SERPL W P-5'-P-CCNC: 21 U/L (ref 9–39)
BACTERIA #/AREA URNS AUTO: ABNORMAL /HPF
BASOPHILS # BLD AUTO: 0.06 X10*3/UL (ref 0–0.1)
BASOPHILS NFR BLD AUTO: 1.1 %
BILIRUB SERPL-MCNC: 0.8 MG/DL (ref 0–1.2)
BILIRUB UR STRIP.AUTO-MCNC: NEGATIVE MG/DL
BUN SERPL-MCNC: 18 MG/DL (ref 6–23)
CALCIUM SERPL-MCNC: 9.2 MG/DL (ref 8.6–10.3)
CARDIAC TROPONIN I PNL SERPL HS: 3 NG/L (ref 0–13)
CHLORIDE SERPL-SCNC: 106 MMOL/L (ref 98–107)
CO2 SERPL-SCNC: 28 MMOL/L (ref 21–32)
COLOR UR: YELLOW
CREAT SERPL-MCNC: 0.58 MG/DL (ref 0.5–1.05)
EGFRCR SERPLBLD CKD-EPI 2021: >90 ML/MIN/1.73M*2
EOSINOPHIL # BLD AUTO: 0.12 X10*3/UL (ref 0–0.7)
EOSINOPHIL NFR BLD AUTO: 2.2 %
ERYTHROCYTE [DISTWIDTH] IN BLOOD BY AUTOMATED COUNT: 12.9 % (ref 11.5–14.5)
GLUCOSE SERPL-MCNC: 104 MG/DL (ref 74–99)
GLUCOSE UR STRIP.AUTO-MCNC: NORMAL MG/DL
HCT VFR BLD AUTO: 42.7 % (ref 36–46)
HGB BLD-MCNC: 14.5 G/DL (ref 12–16)
IMM GRANULOCYTES # BLD AUTO: 0.02 X10*3/UL (ref 0–0.7)
IMM GRANULOCYTES NFR BLD AUTO: 0.4 % (ref 0–0.9)
KETONES UR STRIP.AUTO-MCNC: ABNORMAL MG/DL
LEUKOCYTE ESTERASE UR QL STRIP.AUTO: ABNORMAL
LIPASE SERPL-CCNC: 43 U/L (ref 9–82)
LYMPHOCYTES # BLD AUTO: 1.15 X10*3/UL (ref 1.2–4.8)
LYMPHOCYTES NFR BLD AUTO: 21.4 %
MCH RBC QN AUTO: 31.5 PG (ref 26–34)
MCHC RBC AUTO-ENTMCNC: 34 G/DL (ref 32–36)
MCV RBC AUTO: 93 FL (ref 80–100)
MONOCYTES # BLD AUTO: 0.58 X10*3/UL (ref 0.1–1)
MONOCYTES NFR BLD AUTO: 10.8 %
MUCOUS THREADS #/AREA URNS AUTO: ABNORMAL /LPF
NEUTROPHILS # BLD AUTO: 3.44 X10*3/UL (ref 1.2–7.7)
NEUTROPHILS NFR BLD AUTO: 64.1 %
NITRITE UR QL STRIP.AUTO: ABNORMAL
NRBC BLD-RTO: 0 /100 WBCS (ref 0–0)
PH UR STRIP.AUTO: 8.5 [PH]
PLATELET # BLD AUTO: 237 X10*3/UL (ref 150–450)
POTASSIUM SERPL-SCNC: 3.8 MMOL/L (ref 3.5–5.3)
PROT SERPL-MCNC: 6.5 G/DL (ref 6.4–8.2)
PROT UR STRIP.AUTO-MCNC: ABNORMAL MG/DL
RBC # BLD AUTO: 4.61 X10*6/UL (ref 4–5.2)
RBC # UR STRIP.AUTO: ABNORMAL MG/DL
RBC #/AREA URNS AUTO: >20 /HPF
SODIUM SERPL-SCNC: 140 MMOL/L (ref 136–145)
SP GR UR STRIP.AUTO: >1.05
SQUAMOUS #/AREA URNS AUTO: ABNORMAL /HPF
TRI-PHOS CRY #/AREA UR COMP ASSIST: ABNORMAL /HPF
UROBILINOGEN UR STRIP.AUTO-MCNC: NORMAL MG/DL
WBC # BLD AUTO: 5.4 X10*3/UL (ref 4.4–11.3)
WBC #/AREA URNS AUTO: ABNORMAL /HPF

## 2025-08-31 PROCEDURE — 85025 COMPLETE CBC W/AUTO DIFF WBC: CPT

## 2025-08-31 PROCEDURE — 80053 COMPREHEN METABOLIC PANEL: CPT

## 2025-08-31 PROCEDURE — 83690 ASSAY OF LIPASE: CPT

## 2025-08-31 PROCEDURE — 36415 COLL VENOUS BLD VENIPUNCTURE: CPT

## 2025-08-31 PROCEDURE — 99285 EMERGENCY DEPT VISIT HI MDM: CPT | Mod: 25 | Performed by: STUDENT IN AN ORGANIZED HEALTH CARE EDUCATION/TRAINING PROGRAM

## 2025-08-31 PROCEDURE — 2500000001 HC RX 250 WO HCPCS SELF ADMINISTERED DRUGS (ALT 637 FOR MEDICARE OP)

## 2025-08-31 PROCEDURE — 93005 ELECTROCARDIOGRAM TRACING: CPT

## 2025-08-31 PROCEDURE — 2550000001 HC RX 255 CONTRASTS: Performed by: STUDENT IN AN ORGANIZED HEALTH CARE EDUCATION/TRAINING PROGRAM

## 2025-08-31 PROCEDURE — 74177 CT ABD & PELVIS W/CONTRAST: CPT

## 2025-08-31 PROCEDURE — 84484 ASSAY OF TROPONIN QUANT: CPT

## 2025-08-31 PROCEDURE — 81001 URINALYSIS AUTO W/SCOPE: CPT

## 2025-08-31 PROCEDURE — 74177 CT ABD & PELVIS W/CONTRAST: CPT | Performed by: RADIOLOGY

## 2025-08-31 PROCEDURE — 87086 URINE CULTURE/COLONY COUNT: CPT | Mod: PARLAB

## 2025-08-31 RX ORDER — CEPHALEXIN 500 MG/1
500 CAPSULE ORAL 4 TIMES DAILY
Qty: 40 CAPSULE | Refills: 0 | Status: SHIPPED | OUTPATIENT
Start: 2025-08-31 | End: 2025-09-10

## 2025-08-31 RX ORDER — CEPHALEXIN 500 MG/1
500 CAPSULE ORAL ONCE
Status: COMPLETED | OUTPATIENT
Start: 2025-08-31 | End: 2025-08-31

## 2025-08-31 RX ORDER — MORPHINE SULFATE 4 MG/ML
4 INJECTION, SOLUTION INTRAMUSCULAR; INTRAVENOUS ONCE
Status: DISCONTINUED | OUTPATIENT
Start: 2025-08-31 | End: 2025-08-31 | Stop reason: HOSPADM

## 2025-08-31 RX ADMIN — IOHEXOL 75 ML: 350 INJECTION, SOLUTION INTRAVENOUS at 11:03

## 2025-08-31 RX ADMIN — CEPHALEXIN 500 MG: 500 CAPSULE ORAL at 15:01

## 2025-08-31 ASSESSMENT — PAIN SCALES - GENERAL
PAINLEVEL_OUTOF10: 0 - NO PAIN
PAINLEVEL_OUTOF10: 0 - NO PAIN
PAINLEVEL_OUTOF10: 5 - MODERATE PAIN
PAINLEVEL_OUTOF10: 5 - MODERATE PAIN
PAINLEVEL_OUTOF10: 6

## 2025-08-31 ASSESSMENT — LIFESTYLE VARIABLES
HAVE YOU EVER FELT YOU SHOULD CUT DOWN ON YOUR DRINKING: NO
EVER HAD A DRINK FIRST THING IN THE MORNING TO STEADY YOUR NERVES TO GET RID OF A HANGOVER: NO
HAVE PEOPLE ANNOYED YOU BY CRITICIZING YOUR DRINKING: NO
EVER FELT BAD OR GUILTY ABOUT YOUR DRINKING: NO
TOTAL SCORE: 0

## 2025-08-31 ASSESSMENT — PAIN - FUNCTIONAL ASSESSMENT
PAIN_FUNCTIONAL_ASSESSMENT: 0-10
PAIN_FUNCTIONAL_ASSESSMENT: 0-10

## 2025-08-31 ASSESSMENT — PAIN DESCRIPTION - LOCATION
LOCATION: HIP
LOCATION: ABDOMEN

## 2025-08-31 ASSESSMENT — PAIN DESCRIPTION - ORIENTATION
ORIENTATION: MID
ORIENTATION: RIGHT

## 2025-09-02 LAB
BACTERIA UR CULT: NORMAL
HOLD SPECIMEN: NORMAL

## 2025-09-05 LAB
ATRIAL RATE: 61 BPM
P AXIS: 81 DEGREES
P OFFSET: 177 MS
P ONSET: 143 MS
PR INTERVAL: 154 MS
Q ONSET: 220 MS
QRS COUNT: 10 BEATS
QRS DURATION: 76 MS
QT INTERVAL: 580 MS
QTC CALCULATION(BAZETT): 583 MS
QTC FREDERICIA: 582 MS
R AXIS: 20 DEGREES
T AXIS: 85 DEGREES
T OFFSET: 510 MS
VENTRICULAR RATE: 61 BPM